# Patient Record
Sex: FEMALE | Race: WHITE | Employment: UNEMPLOYED | URBAN - METROPOLITAN AREA
[De-identification: names, ages, dates, MRNs, and addresses within clinical notes are randomized per-mention and may not be internally consistent; named-entity substitution may affect disease eponyms.]

---

## 2017-07-08 ENCOUNTER — HOSPITAL ENCOUNTER (OUTPATIENT)
Dept: MAMMOGRAPHY | Age: 47
Discharge: HOME | End: 2017-07-08
Attending: OBSTETRICS & GYNECOLOGY
Payer: COMMERCIAL

## 2017-07-08 DIAGNOSIS — Z12.31 VISIT FOR SCREENING MAMMOGRAM: ICD-10-CM

## 2017-07-08 PROCEDURE — 77067 SCR MAMMO BI INCL CAD: CPT

## 2018-02-14 ENCOUNTER — HOSPITAL ENCOUNTER (EMERGENCY)
Age: 48
Discharge: HOME | End: 2018-02-14
Payer: COMMERCIAL

## 2018-02-14 ENCOUNTER — APPOINTMENT (OUTPATIENT)
Dept: CT IMAGING | Age: 48
End: 2018-02-14
Payer: COMMERCIAL

## 2018-02-14 VITALS
DIASTOLIC BLOOD PRESSURE: 60 MMHG | RESPIRATION RATE: 16 BRPM | BODY MASS INDEX: 38.06 KG/M2 | WEIGHT: 257 LBS | TEMPERATURE: 98.1 F | OXYGEN SATURATION: 95 % | SYSTOLIC BLOOD PRESSURE: 125 MMHG | HEIGHT: 69 IN | HEART RATE: 102 BPM

## 2018-02-14 DIAGNOSIS — R51.9 NONINTRACTABLE HEADACHE, UNSPECIFIED CHRONICITY PATTERN, UNSPECIFIED HEADACHE TYPE: Primary | ICD-10-CM

## 2018-02-14 LAB — GLUCOSE BLD STRIP.AUTO-MCNC: 226 MG/DL (ref 65–100)

## 2018-02-14 PROCEDURE — 96372 THER/PROPH/DIAG INJ SC/IM: CPT

## 2018-02-14 PROCEDURE — 74011250636 HC RX REV CODE- 250/636

## 2018-02-14 PROCEDURE — 82962 GLUCOSE BLOOD TEST: CPT

## 2018-02-14 PROCEDURE — 99285 EMERGENCY DEPT VISIT HI MDM: CPT

## 2018-02-14 PROCEDURE — 70450 CT HEAD/BRAIN W/O DYE: CPT

## 2018-02-14 RX ORDER — BUTALBITAL, ACETAMINOPHEN AND CAFFEINE 300; 40; 50 MG/1; MG/1; MG/1
1 CAPSULE ORAL
Qty: 10 CAP | Refills: 0 | Status: SHIPPED | OUTPATIENT
Start: 2018-02-14 | End: 2018-02-14

## 2018-02-14 RX ORDER — ONDANSETRON HYDROCHLORIDE 8 MG/1
8 TABLET, FILM COATED ORAL
Qty: 10 TAB | Refills: 0 | Status: SHIPPED | OUTPATIENT
Start: 2018-02-14 | End: 2020-06-07

## 2018-02-14 RX ORDER — TIZANIDINE HYDROCHLORIDE 6 MG/1
6 CAPSULE, GELATIN COATED ORAL 3 TIMES DAILY
COMMUNITY
End: 2018-06-13

## 2018-02-14 RX ORDER — TRAMADOL HYDROCHLORIDE 50 MG/1
50 TABLET ORAL
COMMUNITY
End: 2018-06-13

## 2018-02-14 RX ORDER — ONDANSETRON 8 MG/1
8 TABLET, ORALLY DISINTEGRATING ORAL
Qty: 10 TAB | Refills: 0 | Status: SHIPPED | OUTPATIENT
Start: 2018-02-14 | End: 2019-09-03 | Stop reason: ALTCHOICE

## 2018-02-14 RX ORDER — ONDANSETRON 8 MG/1
8 TABLET, ORALLY DISINTEGRATING ORAL
COMMUNITY
End: 2018-02-14

## 2018-02-14 RX ORDER — KETOROLAC TROMETHAMINE 30 MG/ML
60 INJECTION, SOLUTION INTRAMUSCULAR; INTRAVENOUS
Status: COMPLETED | OUTPATIENT
Start: 2018-02-14 | End: 2018-02-14

## 2018-02-14 RX ORDER — BUTALBITAL, ACETAMINOPHEN AND CAFFEINE 300; 40; 50 MG/1; MG/1; MG/1
1 CAPSULE ORAL
Qty: 10 CAP | Refills: 0 | Status: SHIPPED | OUTPATIENT
Start: 2018-02-14 | End: 2018-06-13

## 2018-02-14 RX ADMIN — KETOROLAC TROMETHAMINE 60 MG: 30 INJECTION, SOLUTION INTRAMUSCULAR at 03:27

## 2018-02-14 NOTE — DISCHARGE INSTRUCTIONS
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
## 2018-02-14 NOTE — ED NOTES
I have reviewed discharge instructions with the patient. The patient verbalized understanding. Patient left ED via Discharge Method: ambulatory to Home with self    Opportunity for questions and clarification provided. Patient given 2 scripts. To continue your aftercare when you leave the hospital, you may receive an automated call from our care team to check in on how you are doing. This is a free service and part of our promise to provide the best care and service to meet your aftercare needs.  If you have questions, or wish to unsubscribe from this service please call [phone]. Thank you for Choosing our [facility] Emergency Department.

## 2018-06-13 ENCOUNTER — HOSPITAL ENCOUNTER (EMERGENCY)
Age: 48
Discharge: HOME | End: 2018-06-14
Attending: EMERGENCY MEDICINE
Payer: COMMERCIAL

## 2018-06-13 DIAGNOSIS — R10.84 ABDOMINAL PAIN, GENERALIZED: Primary | ICD-10-CM

## 2018-06-13 LAB
ALBUMIN SERPL-MCNC: 3.2 G/DL (ref 3.5–5)
ALBUMIN/GLOB SERPL: 0.7 {RATIO} (ref 1.2–3.5)
ALP SERPL-CCNC: 101 U/L (ref 50–136)
ALT SERPL-CCNC: 26 U/L (ref 12–65)
ANION GAP SERPL CALC-SCNC: 9 MMOL/L (ref 7–16)
AST SERPL-CCNC: 25 U/L (ref 15–37)
BASOPHILS # BLD: 0 K/UL (ref 0–0.2)
BASOPHILS NFR BLD: 0 % (ref 0–2)
BILIRUB SERPL-MCNC: 0.2 MG/DL (ref 0.2–1.1)
BUN SERPL-MCNC: 15 MG/DL (ref 6–23)
CALCIUM SERPL-MCNC: 9.4 MG/DL (ref 8.3–10.4)
CHLORIDE SERPL-SCNC: 97 MMOL/L (ref 98–107)
CO2 SERPL-SCNC: 30 MMOL/L (ref 21–32)
CREAT SERPL-MCNC: 0.76 MG/DL (ref 0.6–1)
DIFFERENTIAL METHOD BLD: ABNORMAL
EOSINOPHIL # BLD: 0.2 K/UL (ref 0–0.8)
EOSINOPHIL NFR BLD: 2 % (ref 0.5–7.8)
ERYTHROCYTE [DISTWIDTH] IN BLOOD BY AUTOMATED COUNT: 15.4 % (ref 11.9–14.6)
GLOBULIN SER CALC-MCNC: 4.5 G/DL (ref 2.3–3.5)
GLUCOSE SERPL-MCNC: 177 MG/DL (ref 65–100)
HCT VFR BLD AUTO: 36.5 % (ref 35.8–46.3)
HGB BLD-MCNC: 12.2 G/DL (ref 11.7–15.4)
IMM GRANULOCYTES # BLD: 0 K/UL (ref 0–0.5)
IMM GRANULOCYTES NFR BLD AUTO: 1 % (ref 0–5)
LACTATE BLD-SCNC: 1.8 MMOL/L (ref 0.5–1.9)
LIPASE SERPL-CCNC: 110 U/L (ref 73–393)
LYMPHOCYTES # BLD: 2.8 K/UL (ref 0.5–4.6)
LYMPHOCYTES NFR BLD: 35 % (ref 13–44)
MCH RBC QN AUTO: 25.8 PG (ref 26.1–32.9)
MCHC RBC AUTO-ENTMCNC: 33.4 G/DL (ref 31.4–35)
MCV RBC AUTO: 77.2 FL (ref 79.6–97.8)
MONOCYTES # BLD: 0.4 K/UL (ref 0.1–1.3)
MONOCYTES NFR BLD: 4 % (ref 4–12)
NEUTS SEG # BLD: 4.7 K/UL (ref 1.7–8.2)
NEUTS SEG NFR BLD: 58 % (ref 43–78)
PLATELET # BLD AUTO: 236 K/UL (ref 150–450)
PMV BLD AUTO: 9.4 FL (ref 10.8–14.1)
POTASSIUM SERPL-SCNC: 3.7 MMOL/L (ref 3.5–5.1)
PROT SERPL-MCNC: 7.7 G/DL (ref 6.3–8.2)
RBC # BLD AUTO: 4.73 M/UL (ref 4.05–5.25)
SODIUM SERPL-SCNC: 136 MMOL/L (ref 136–145)
WBC # BLD AUTO: 8 K/UL (ref 4.3–11.1)

## 2018-06-13 PROCEDURE — 80053 COMPREHEN METABOLIC PANEL: CPT

## 2018-06-13 PROCEDURE — 96375 TX/PRO/DX INJ NEW DRUG ADDON: CPT | Performed by: EMERGENCY MEDICINE

## 2018-06-13 PROCEDURE — 99284 EMERGENCY DEPT VISIT MOD MDM: CPT | Performed by: EMERGENCY MEDICINE

## 2018-06-13 PROCEDURE — 96361 HYDRATE IV INFUSION ADD-ON: CPT | Performed by: EMERGENCY MEDICINE

## 2018-06-13 PROCEDURE — 96374 THER/PROPH/DIAG INJ IV PUSH: CPT | Performed by: EMERGENCY MEDICINE

## 2018-06-13 PROCEDURE — 74011250636 HC RX REV CODE- 250/636: Performed by: EMERGENCY MEDICINE

## 2018-06-13 PROCEDURE — 74011250636 HC RX REV CODE- 250/636

## 2018-06-13 PROCEDURE — 83605 ASSAY OF LACTIC ACID: CPT

## 2018-06-13 PROCEDURE — 83690 ASSAY OF LIPASE: CPT

## 2018-06-13 PROCEDURE — 85025 COMPLETE CBC W/AUTO DIFF WBC: CPT

## 2018-06-13 RX ORDER — METHOCARBAMOL 750 MG/1
TABLET, FILM COATED ORAL 4 TIMES DAILY
Status: ON HOLD | COMMUNITY
End: 2019-09-27

## 2018-06-13 RX ORDER — ONDANSETRON 2 MG/ML
4 INJECTION INTRAMUSCULAR; INTRAVENOUS
Status: COMPLETED | OUTPATIENT
Start: 2018-06-13 | End: 2018-06-13

## 2018-06-13 RX ORDER — HYDROMORPHONE HYDROCHLORIDE 1 MG/ML
1 INJECTION, SOLUTION INTRAMUSCULAR; INTRAVENOUS; SUBCUTANEOUS ONCE
Status: COMPLETED | OUTPATIENT
Start: 2018-06-13 | End: 2018-06-13

## 2018-06-13 RX ADMIN — HYDROMORPHONE HYDROCHLORIDE 1 MG: 1 INJECTION, SOLUTION INTRAMUSCULAR; INTRAVENOUS; SUBCUTANEOUS at 23:45

## 2018-06-13 RX ADMIN — ONDANSETRON 4 MG: 2 INJECTION INTRAMUSCULAR; INTRAVENOUS at 23:45

## 2018-06-13 RX ADMIN — SODIUM CHLORIDE 1000 ML: 900 INJECTION, SOLUTION INTRAVENOUS at 23:45

## 2018-06-13 NOTE — Clinical Note
Clear liquid diet for the next 12 hours and advance her diet as tolerated. Avoid dairy products for at least 2 days.   Follow closely with her primary care physician for possible GI referral.

## 2018-06-14 ENCOUNTER — APPOINTMENT (OUTPATIENT)
Dept: ULTRASOUND IMAGING | Age: 48
End: 2018-06-14
Attending: EMERGENCY MEDICINE
Payer: COMMERCIAL

## 2018-06-14 VITALS
HEIGHT: 69 IN | OXYGEN SATURATION: 94 % | RESPIRATION RATE: 20 BRPM | HEART RATE: 98 BPM | WEIGHT: 228 LBS | BODY MASS INDEX: 33.77 KG/M2 | DIASTOLIC BLOOD PRESSURE: 63 MMHG | TEMPERATURE: 98 F | SYSTOLIC BLOOD PRESSURE: 119 MMHG

## 2018-06-14 PROCEDURE — 74011250637 HC RX REV CODE- 250/637: Performed by: EMERGENCY MEDICINE

## 2018-06-14 PROCEDURE — 76705 ECHO EXAM OF ABDOMEN: CPT

## 2018-06-14 PROCEDURE — 74011000250 HC RX REV CODE- 250: Performed by: EMERGENCY MEDICINE

## 2018-06-14 RX ORDER — DICYCLOMINE HYDROCHLORIDE 20 MG/1
20 TABLET ORAL EVERY 6 HOURS
Qty: 12 TAB | Refills: 0 | Status: SHIPPED | OUTPATIENT
Start: 2018-06-14 | End: 2018-06-14

## 2018-06-14 RX ORDER — LIDOCAINE HYDROCHLORIDE 20 MG/ML
15 SOLUTION OROPHARYNGEAL
Status: COMPLETED | OUTPATIENT
Start: 2018-06-14 | End: 2018-06-14

## 2018-06-14 RX ORDER — DICYCLOMINE HYDROCHLORIDE 20 MG/1
20 TABLET ORAL EVERY 6 HOURS
Qty: 12 TAB | Refills: 0 | Status: ON HOLD | OUTPATIENT
Start: 2018-06-14 | End: 2019-09-27

## 2018-06-14 RX ADMIN — LIDOCAINE HYDROCHLORIDE 15 ML: 20 SOLUTION ORAL; TOPICAL at 00:57

## 2018-06-14 RX ADMIN — Medication 30 ML: at 00:56

## 2018-06-14 NOTE — ED PROVIDER NOTES
HPI Comments: Patient states she started having upper abdominal pain radiating around to her right flank and back this morning. The pain has been constant achy sharp pain worse when she tries to eat or drink. She has had nausea and vomiting as well. She has a history of a cholecystectomy and states that this feels very similar. She denies any fever, denies any known sick contacts. She has not taken any medicine for her symptoms. The pain gets severe at times. Elements of this note were created using speech recognition software. As such, errors of speech recognition may be present. Patient is a 41 y.o. female presenting with abdominal pain. The history is provided by the patient. Abdominal Pain    Associated symptoms include nausea and vomiting. Pertinent negatives include no fever.         Past Medical History:   Diagnosis Date    Anemia     Chronic pain     due to RA    Depression     GERD (gastroesophageal reflux disease)     Hx of renal calculi          Hypercholesterolemia     Hypertension     Migraine     Morbid obesity ([address]     Ovarian cyst     Reflex sympathetic dystrophy since 2005    right arm; s/p injury - laceration in hand    Rheumatoid [address] [address]             Past Surgical History:   Procedure Laterality Date    HX APPENDECTOMY      HX BREAST BIOPSY Left 4/27/2015    LEFT NIPPLE EXPLORATION WITH REMOVAL OF LATTISIMUS DUCT performed by Pratima Ferris MD at [address] [place] [address] HX CHOLECYSTECTOMY      HX ORTHOPAEDIC      rt hand    HX ORTHOPAEDIC      neck    HX TOTAL ABDOMINAL HYSTERECTOMY           HX TUBAL LIGATION           Family History:   Problem Relation Age of Onset    Breast Cancer Paternal Aunt 39    Cancer Paternal Aunt      brst    Cancer Maternal Grandfather      colon    Colon Cancer Maternal Grandfather     Breast Cancer Paternal Grandmother 46    Cancer Paternal Grandmother      breast    Ovarian Cancer Paternal Grandmother     Cancer Mother throat    Hypertension Mother     Cancer Father      non hodgkins lymphoma    Cancer Maternal Grandmother      ovarian    Breast Cancer Maternal Grandmother     Heart Disease Paternal Grandfather     Diabetes Paternal Grandfather     Breast Cancer Sister        Social History     Social History    Marital status:      Spouse name: N/A    Number of children: N/A    Years of education: N/A     Occupational History    Not on file. Social History Main Topics    Smoking status: Never Smoker    Smokeless tobacco: Not on file    Alcohol use Yes      Comment: \"once every couple years\"    Drug use: No    Sexual activity: Yes     Partners: Male     Birth control/ protection: Surgical     Other Topics Concern    Not on file     Social History Narrative         ALLERGIES: Pcn [penicillins] and Norco [hydrocodone-acetaminophen]    Review of Systems   Constitutional: Negative for chills and fever. Gastrointestinal: Positive for abdominal pain, nausea and vomiting. All other systems reviewed and are negative. Vitals:    06/13/18 2318   BP: 174/80   Pulse: 98   Resp: 20   Temp: 98 °F (36.7 °C)   SpO2: 96%   Weight: 103.4 kg (228 lb)   Height: 5' 8.5\" (1.74 m)            Physical Exam   Constitutional: She is oriented to person, place, and time. She appears well-developed and well-nourished. HENT:   Head: Normocephalic and atraumatic. Eyes: Conjunctivae are normal. Pupils are equal, round, and reactive to light. Neck: Normal range of motion. Neck supple. Cardiovascular: Normal rate and regular rhythm. Pulmonary/Chest: Effort normal and breath sounds normal.   Abdominal: Soft. Bowel sounds are normal. There is tenderness. Moderate tenderness to palpation diffuse upper abdomen as indicated   Musculoskeletal: She exhibits no edema or tenderness. Neurological: She is alert and oriented to person, place, and time. Skin: Skin is warm and dry.    Psychiatric: She has a normal mood and affect. Her behavior is normal.   Nursing note and vitals reviewed. MDM  Number of Diagnoses or Management Options  Diagnosis management comments: 1:10 AM discussed results with patient. She had no relief after GI cocktail.   A right upper quadrant ultrasound has been ordered       Amount and/or Complexity of Data Reviewed  Clinical lab tests: ordered and reviewed  Tests in the radiology section of CPT®: ordered    Risk of Complications, Morbidity, and/or Mortality  Presenting problems: moderate  Diagnostic procedures: moderate  Management options: moderate    Patient Progress  Patient progress: stable        ED Course       Procedures

## 2018-06-14 NOTE — ED NOTES
I have reviewed discharge instructions with the patient. The patient verbalized understanding. Patient left ED via Discharge Method: ambulatory to Home with (insert name of family/friend, self, transport ). Opportunity for questions and clarification provided. Patient given 1 scripts. Lata        To continue your aftercare when you leave the hospital, you may receive an automated call from our care team to check in on how you are doing. This is a free service and part of our promise to provide the best care and service to meet your aftercare needs.  If you have questions, or wish to unsubscribe from this service please call [phone]. Thank you for Choosing our [facility] Emergency Department.

## 2018-09-21 ENCOUNTER — HOSPITAL ENCOUNTER (OUTPATIENT)
Dept: MAMMOGRAPHY | Age: 48
Discharge: HOME | End: 2018-09-21
Attending: OBSTETRICS & GYNECOLOGY
Payer: COMMERCIAL

## 2018-09-21 ENCOUNTER — APPOINTMENT (OUTPATIENT)
Dept: GENERAL RADIOLOGY | Age: 48
End: 2018-09-21
Attending: EMERGENCY MEDICINE
Payer: COMMERCIAL

## 2018-09-21 ENCOUNTER — HOSPITAL ENCOUNTER (EMERGENCY)
Age: 48
Discharge: HOME | End: 2018-09-21
Attending: EMERGENCY MEDICINE
Payer: COMMERCIAL

## 2018-09-21 VITALS
DIASTOLIC BLOOD PRESSURE: 80 MMHG | BODY MASS INDEX: 33.62 KG/M2 | WEIGHT: 227 LBS | HEART RATE: 94 BPM | HEIGHT: 69 IN | RESPIRATION RATE: 18 BRPM | SYSTOLIC BLOOD PRESSURE: 130 MMHG | OXYGEN SATURATION: 98 % | TEMPERATURE: 98.4 F

## 2018-09-21 DIAGNOSIS — S90.456A: Primary | ICD-10-CM

## 2018-09-21 DIAGNOSIS — Z12.31 VISIT FOR SCREENING MAMMOGRAM: ICD-10-CM

## 2018-09-21 PROCEDURE — 77067 SCR MAMMO BI INCL CAD: CPT

## 2018-09-21 PROCEDURE — 75810000283 HC INJECTION NERVE BLOCK: Performed by: NURSE PRACTITIONER

## 2018-09-21 PROCEDURE — 74011250636 HC RX REV CODE- 250/636: Performed by: NURSE PRACTITIONER

## 2018-09-21 PROCEDURE — 90471 IMMUNIZATION ADMIN: CPT | Performed by: NURSE PRACTITIONER

## 2018-09-21 PROCEDURE — 75810000121 HC INCSN/RMVL FB ANY OTHER SITE: Performed by: NURSE PRACTITIONER

## 2018-09-21 PROCEDURE — 99283 EMERGENCY DEPT VISIT LOW MDM: CPT | Performed by: NURSE PRACTITIONER

## 2018-09-21 PROCEDURE — 90715 TDAP VACCINE 7 YRS/> IM: CPT | Performed by: NURSE PRACTITIONER

## 2018-09-21 PROCEDURE — 73630 X-RAY EXAM OF FOOT: CPT

## 2018-09-21 RX ADMIN — TETANUS TOXOID, REDUCED DIPHTHERIA TOXOID AND ACELLULAR PERTUSSIS VACCINE, ADSORBED 0.5 ML: 5; 2.5; 8; 8; 2.5 SUSPENSION INTRAMUSCULAR at 17:20

## 2018-09-21 NOTE — ED PROVIDER NOTES
Patient is a 57 y.o. female presenting with toe pain. The history is provided by the patient. No  was used. Toe Pain This is a new problem. The current episode started 1 to 2 hours ago. The problem occurs constantly. The problem has not changed since onset. The pain is present in the right foot. The pain is at a severity of 8/10. The pain is moderate. Associated symptoms include limited range of motion and stiffness. Pertinent negatives include no neck pain. The symptoms are aggravated by standing, movement and palpation. Treatments tried: attempt at self removal. The treatment provided no relief. There has been a history of trauma. This patient presents the ED with a complaint of screw partially embedded into her right great toe onset approximately an hour prior to arrival.  She states that she became angry while on the phone, and kicked the desk, at which point the screw loosened, and embedded into her toe rather forcefully. She states that she attempted to remove the screw on her own, but due to the pain presented to the emergency department. Past Medical History:  
Diagnosis Date  Anemia  Chronic pain   
 due to RA  Depression  GERD (gastroesophageal reflux disease)  Hx of renal calculi  Hypercholesterolemia  Hypertension  Migraine  Morbid obesity ([address]  Ovarian cyst   
 Reflex sympathetic dystrophy since 2005  
 right arm; s/p injury - laceration in hand  Rheumatoid arthritis ([address] Past Surgical History:  
Procedure Laterality Date  HX APPENDECTOMY  HX BREAST BIOPSY Left 4/27/2015 LEFT NIPPLE EXPLORATION WITH REMOVAL OF LATTISIMUS DUCT performed by Anastasiya Yanes MD at [address] [place] [address]  HX ORTHOPAEDIC    
 rt hand  HX ORTHOPAEDIC    
 neck  HX TOTAL ABDOMINAL HYSTERECTOMY  HX TUBAL LIGATION Family History:  
Problem Relation Age of Onset  Breast Cancer Paternal Aunt 39  Cancer Paternal Aunt   
  brst  
 Cancer Maternal Grandfather   
  colon  Colon Cancer Maternal Grandfather  Breast Cancer Paternal Grandmother 46  Cancer Paternal Grandmother   
  breast  
 Ovarian Cancer Paternal Grandmother  Cancer Mother   
  throat  Hypertension Mother  Cancer Father   
  non hodgkins lymphoma  Cancer Maternal Grandmother   
  ovarian  Breast Cancer Maternal Grandmother  Heart Disease Paternal Grandfather  Diabetes Paternal Grandfather  Breast Cancer Sister Social History Social History  Marital status:  Spouse name: N/A  
 Number of children: N/A  
 Years of education: N/A Occupational History  Not on file. Social History Main Topics  Smoking status: Never Smoker  Smokeless tobacco: Not on file  Alcohol use Yes Comment: \"once every couple years\"  Drug use: No  
 Sexual activity: Yes  
  Partners: Male Birth control/ protection: Surgical  
 
Other Topics Concern  Not on file Social History Narrative ALLERGIES: Pcn [penicillins] and Norco [hydrocodone-acetaminophen] Review of Systems Constitutional: Negative for chills and fever. Gastrointestinal: Negative for nausea and vomiting. Musculoskeletal: Positive for gait problem and stiffness. Negative for arthralgias, joint swelling and neck pain. Skin: Positive for wound. Negative for rash. Neurological: Negative for dizziness and syncope. Vitals:  
 09/21/18 1650 BP: 130/80 Pulse: 94 Resp: 18 Temp: 98.4 °F (36.9 °C) SpO2: 98% Weight: 103 kg (227 lb) Height: 5' 8.5\" (1.74 m) Physical Exam  
Constitutional: She is oriented to person, place, and time. She appears well-developed and well-nourished. No distress. HENT:  
Head: Normocephalic and atraumatic. Pulmonary/Chest: Effort normal. No respiratory distress. Neurological: She is alert and oriented to person, place, and time. Skin: Skin is warm and dry. No erythema. Patient has partially avulsed her toenail with the screw, which is embedded just inferior to the nail. There is some involvement of the skin, but no extensive laceration or bleeding present. MDM Number of Diagnoses or Management Options Foreign body of toe, initial encounter: new and does not require workup Diagnosis management comments: Foreign body removed without complication. I do not appreciate any damage on x-ray evaluation; pending radiology evaluation. No significant laceration requiring tissue repair. I discussed follow-up treatment. The patient voiced her full understanding and compliance. Amount and/or Complexity of Data Reviewed Tests in the radiology section of CPT®: ordered and reviewed Independent visualization of images, tracings, or specimens: yes Risk of Complications, Morbidity, and/or Mortality Presenting problems: moderate Diagnostic procedures: low Management options: minimal 
 
Patient Progress Patient progress: improved ED Course Foreign Body Removal 
Date/Time: 9/21/2018 5:16 PM 
Performed by: Alice Rizo Authorized by: Alice Rizo  
 
Consent:  
  Consent obtained:  Verbal 
  Consent given by:  Patient Risks discussed:  Pain and infection Location: Location:  Toe Toe location:  R great toe Depth:  Subungual 
  Tendon involvement:  None Pre-procedure details:  
  Imaging:  X-ray Neurovascular status: intact Preparation: Patient was prepped and draped in usual sterile fashion Anesthesia (see MAR for exact dosages): Anesthesia method:  Nerve block Block needle gauge:  27 G Block anesthetic:  Lidocaine 1% w/o epi Block injection procedure:  Anatomic landmarks palpated, anatomic landmarks identified, negative aspiration for blood, introduced needle and incremental injection Block outcome:  Anesthesia achieved Procedure type:  
  Procedure complexity:  Simple Procedure details:  
  Dissection of underlying tissues: no   
  Removal mechanism: digital. 
  Foreign bodies recovered:  1 Description:  1 wood screw Intact foreign body removal: yes Post-procedure details:  
  Neurovascular status: intact Confirmation:  No additional foreign bodies on visualization Skin closure:  None Dressing:  Antibiotic ointment and bulky dressing Patient tolerance of procedure: Tolerated well, no immediate complications

## 2018-09-21 NOTE — ED NOTES
I have reviewed discharge instructions with the patient. The patient verbalized understanding. Patient left ED via Discharge Method: ambulatory to Home with spouse. Opportunity for questions and clarification provided. Patient given 0 scripts. To continue your aftercare when you leave the hospital, you may receive an automated call from our care team to check in on how you are doing. This is a free service and part of our promise to provide the best care and service to meet your aftercare needs.  If you have questions, or wish to unsubscribe from this service please call [phone]. Thank you for Choosing our [facility] Emergency Department.

## 2018-09-21 NOTE — DISCHARGE INSTRUCTIONS
Keep the wound clean, dry, and covered. I recommend you apply antibiotic ointment every day until the wound fully heals. I also recommend you follow up with your primary physician for further evaluation and treatment; use the numbers listed in your paperwork as needed to help establish with this. Return to the Emergency Department if you have any new or worsening symptoms, including spreading redness, severe drainage from the wound, fever, or rash around the site.

## 2018-10-19 ENCOUNTER — APPOINTMENT (OUTPATIENT)
Dept: CT IMAGING | Age: 48
End: 2018-10-19
Attending: EMERGENCY MEDICINE
Payer: COMMERCIAL

## 2018-10-19 ENCOUNTER — HOSPITAL ENCOUNTER (EMERGENCY)
Age: 48
Discharge: HOME | End: 2018-10-19
Attending: EMERGENCY MEDICINE
Payer: COMMERCIAL

## 2018-10-19 VITALS
SYSTOLIC BLOOD PRESSURE: 125 MMHG | BODY MASS INDEX: 35.1 KG/M2 | TEMPERATURE: 98.1 F | WEIGHT: 237 LBS | HEIGHT: 69 IN | OXYGEN SATURATION: 100 % | HEART RATE: 80 BPM | RESPIRATION RATE: 16 BRPM | DIASTOLIC BLOOD PRESSURE: 80 MMHG

## 2018-10-19 DIAGNOSIS — R10.9 FLANK PAIN, ACUTE: Primary | ICD-10-CM

## 2018-10-19 LAB
ALBUMIN SERPL-MCNC: 3.3 G/DL (ref 3.5–5)
ALBUMIN/GLOB SERPL: 0.8 {RATIO}
ALP SERPL-CCNC: 85 U/L (ref 50–136)
ALT SERPL-CCNC: 28 U/L (ref 12–65)
ANION GAP SERPL CALC-SCNC: 7 MMOL/L
AST SERPL-CCNC: 24 U/L (ref 15–37)
BASOPHILS # BLD: 0 K/UL (ref 0–0.2)
BASOPHILS NFR BLD: 0 % (ref 0–2)
BILIRUB SERPL-MCNC: 0.2 MG/DL (ref 0.2–1.1)
BUN SERPL-MCNC: 12 MG/DL (ref 6–23)
CALCIUM SERPL-MCNC: 9.3 MG/DL (ref 8.3–10.4)
CHLORIDE SERPL-SCNC: 99 MMOL/L (ref 98–107)
CO2 SERPL-SCNC: 29 MMOL/L (ref 21–32)
CREAT SERPL-MCNC: 0.64 MG/DL (ref 0.6–1)
DIFFERENTIAL METHOD BLD: ABNORMAL
EOSINOPHIL # BLD: 0.2 K/UL (ref 0–0.8)
EOSINOPHIL NFR BLD: 3 % (ref 0.5–7.8)
ERYTHROCYTE [DISTWIDTH] IN BLOOD BY AUTOMATED COUNT: 15.9 %
GLOBULIN SER CALC-MCNC: 4.3 G/DL (ref 2.3–3.5)
GLUCOSE SERPL-MCNC: 188 MG/DL (ref 65–100)
HCT VFR BLD AUTO: 37.2 % (ref 35.8–46.3)
HGB BLD-MCNC: 11.6 G/DL (ref 11.7–15.4)
IMM GRANULOCYTES # BLD: 0 K/UL (ref 0–0.5)
IMM GRANULOCYTES NFR BLD AUTO: 1 % (ref 0–5)
LYMPHOCYTES # BLD: 2.2 K/UL (ref 0.5–4.6)
LYMPHOCYTES NFR BLD: 37 % (ref 13–44)
MCH RBC QN AUTO: 24.9 PG (ref 26.1–32.9)
MCHC RBC AUTO-ENTMCNC: 31.2 G/DL (ref 31.4–35)
MCV RBC AUTO: 80 FL (ref 79.6–97.8)
MONOCYTES # BLD: 0.3 K/UL (ref 0.1–1.3)
MONOCYTES NFR BLD: 5 % (ref 4–12)
NEUTS SEG # BLD: 3.1 K/UL (ref 1.7–8.2)
NEUTS SEG NFR BLD: 54 % (ref 43–78)
NRBC # BLD: 0 K/UL (ref 0–0.2)
PLATELET # BLD AUTO: 179 K/UL (ref 150–450)
PMV BLD AUTO: 9.8 FL (ref 9.4–12.3)
POTASSIUM SERPL-SCNC: 3.9 MMOL/L (ref 3.5–5.1)
PROT SERPL-MCNC: 7.6 G/DL
RBC # BLD AUTO: 4.65 M/UL (ref 4.05–5.2)
SODIUM SERPL-SCNC: 135 MMOL/L (ref 136–145)
WBC # BLD AUTO: 5.8 K/UL (ref 4.3–11.1)

## 2018-10-19 PROCEDURE — 74011250636 HC RX REV CODE- 250/636: Performed by: EMERGENCY MEDICINE

## 2018-10-19 PROCEDURE — 80053 COMPREHEN METABOLIC PANEL: CPT

## 2018-10-19 PROCEDURE — 81003 URINALYSIS AUTO W/O SCOPE: CPT | Performed by: EMERGENCY MEDICINE

## 2018-10-19 PROCEDURE — 74011000250 HC RX REV CODE- 250: Performed by: EMERGENCY MEDICINE

## 2018-10-19 PROCEDURE — 96375 TX/PRO/DX INJ NEW DRUG ADDON: CPT | Performed by: EMERGENCY MEDICINE

## 2018-10-19 PROCEDURE — 96374 THER/PROPH/DIAG INJ IV PUSH: CPT | Performed by: EMERGENCY MEDICINE

## 2018-10-19 PROCEDURE — 74176 CT ABD & PELVIS W/O CONTRAST: CPT

## 2018-10-19 PROCEDURE — 85025 COMPLETE CBC W/AUTO DIFF WBC: CPT

## 2018-10-19 PROCEDURE — 99283 EMERGENCY DEPT VISIT LOW MDM: CPT | Performed by: EMERGENCY MEDICINE

## 2018-10-19 PROCEDURE — 96361 HYDRATE IV INFUSION ADD-ON: CPT | Performed by: EMERGENCY MEDICINE

## 2018-10-19 RX ORDER — MORPHINE SULFATE 10 MG/ML
6 INJECTION, SOLUTION INTRAMUSCULAR; INTRAVENOUS
Status: COMPLETED | OUTPATIENT
Start: 2018-10-19 | End: 2018-10-19

## 2018-10-19 RX ORDER — ONDANSETRON 2 MG/ML
4 INJECTION INTRAMUSCULAR; INTRAVENOUS
Status: COMPLETED | OUTPATIENT
Start: 2018-10-19 | End: 2018-10-19

## 2018-10-19 RX ORDER — KETOROLAC TROMETHAMINE 30 MG/ML
30 INJECTION, SOLUTION INTRAMUSCULAR; INTRAVENOUS
Status: COMPLETED | OUTPATIENT
Start: 2018-10-19 | End: 2018-10-19

## 2018-10-19 RX ORDER — HYDROMORPHONE HYDROCHLORIDE 2 MG/ML
1 INJECTION, SOLUTION INTRAMUSCULAR; INTRAVENOUS; SUBCUTANEOUS ONCE
Status: COMPLETED | OUTPATIENT
Start: 2018-10-19 | End: 2018-10-19

## 2018-10-19 RX ADMIN — ONDANSETRON HYDROCHLORIDE 4 MG: 2 INJECTION INTRAMUSCULAR; INTRAVENOUS at 19:04

## 2018-10-19 RX ADMIN — SODIUM CHLORIDE 1000 ML: 900 INJECTION, SOLUTION INTRAVENOUS at 17:21

## 2018-10-19 RX ADMIN — KETOROLAC TROMETHAMINE 30 MG: 30 INJECTION, SOLUTION INTRAMUSCULAR at 18:42

## 2018-10-19 RX ADMIN — SODIUM CHLORIDE 25 MG: 9 INJECTION INTRAMUSCULAR; INTRAVENOUS; SUBCUTANEOUS at 17:21

## 2018-10-19 RX ADMIN — HYDROMORPHONE HYDROCHLORIDE 1 MG: 2 INJECTION, SOLUTION INTRAMUSCULAR; INTRAVENOUS; SUBCUTANEOUS at 17:21

## 2018-10-19 RX ADMIN — MORPHINE SULFATE 6 MG: 10 INJECTION INTRAVENOUS at 19:04

## 2018-10-19 NOTE — ED TRIAGE NOTES
Pt c/o right flank pain, thinks she has a kidney stone. States she has a history of kidney stone. Pt c/o nausea.

## 2018-10-19 NOTE — ED PROVIDER NOTES
Patient states she has been having flank pain since yesterday. She describes it as right flank nonradiating aching pain which has been constant. She has a history of kidney stones with similar pain. She has had some dysuria, she also has had nausea and vomiting. She states it is \"a good while\" and she had a last kidney stone, has seen urology for this in the past.  She has been taking Motrin without any improvement in her symptoms. She states that today she has been unable to eat or drink due to her vomiting. Elements of this note were created using speech recognition software. As such, errors of speech recognition may be present. Flank Pain Associated symptoms include dysuria. Pertinent negatives include no fever. Past Medical History:  
Diagnosis Date  Anemia  Chronic pain   
 due to RA  Depression  GERD (gastroesophageal reflux disease)  Hx of renal calculi  Hypercholesterolemia  Hypertension  Migraine  Morbid obesity ([address]  Ovarian cyst   
 Reflex sympathetic dystrophy since 2005  
 right arm; s/p injury - laceration in hand  Rheumatoid arthritis ([address] Past Surgical History:  
Procedure Laterality Date  HX APPENDECTOMY  HX CHOLECYSTECTOMY  HX ORTHOPAEDIC    
 rt hand  HX ORTHOPAEDIC    
 neck  HX TOTAL ABDOMINAL HYSTERECTOMY  HX TUBAL LIGATION Family History:  
Problem Relation Age of Onset  Breast Cancer Paternal Aunt 39  Cancer Paternal Aunt   
     brst  
 Cancer Maternal Grandfather   
     colon  Colon Cancer Maternal Grandfather  Breast Cancer Paternal Grandmother 46  Cancer Paternal Grandmother   
     breast  
 Ovarian Cancer Paternal Grandmother  Cancer Mother   
     throat  Hypertension Mother  Cancer Father   
     non hodgkins lymphoma  Cancer Maternal Grandmother   
     ovarian  Breast Cancer Maternal Grandmother  Heart Disease Paternal Grandfather  Diabetes Paternal Grandfather  Breast Cancer Sister Social History Socioeconomic History  Marital status:  Spouse name: Not on file  Number of children: Not on file  Years of education: Not on file  Highest education level: Not on file Social Needs  Financial resource strain: Not on file  Food insecurity - worry: Not on file  Food insecurity - inability: Not on file  Transportation needs - medical: Not on file  Transportation needs - non-medical: Not on file Occupational History  Not on file Tobacco Use  Smoking status: Never Smoker  Smokeless tobacco: Never Used Substance and Sexual Activity  Alcohol use: Yes Comment: \"once every couple years\"  Drug use: No  
 Sexual activity: Yes  
  Partners: Male Birth control/protection: Surgical  
Other Topics Concern  Not on file Social History Narrative  Not on file ALLERGIES: Pcn [penicillins] and Norco [hydrocodone-acetaminophen] Review of Systems Constitutional: Negative for chills and fever. Gastrointestinal: Positive for nausea and vomiting. Genitourinary: Positive for dysuria and flank pain. All other systems reviewed and are negative. Vitals:  
 10/19/18 1612 BP: 129/84 Pulse: 82 Resp: 19 Temp: 98.3 °F (36.8 °C) SpO2: 97% Weight: 107.5 kg (237 lb) Height: 5' 8.5\" (1.74 m) Physical Exam  
Constitutional: She is oriented to person, place, and time. She appears well-developed and well-nourished. HENT:  
Head: Normocephalic and atraumatic. Eyes: Conjunctivae are normal. Pupils are equal, round, and reactive to light. Neck: Normal range of motion. Neck supple. Cardiovascular: Normal rate and regular rhythm. Pulmonary/Chest: Effort normal and breath sounds normal.  
Abdominal: Soft. Bowel sounds are normal.  
Musculoskeletal: Normal range of motion. She exhibits no edema. Neurological: She is alert and oriented to person, place, and time. Skin: Skin is warm and dry. MDM Number of Diagnoses or Management Options Diagnosis management comments: 6:59 PM ppatient states she received minimal relief with Dilaudid. I discussed the results with her, no ureteral stone or abnormality on her right side. She will be given morphine for pain control and discharged home with follow-up with her doctor. Amount and/or Complexity of Data Reviewed Clinical lab tests: ordered and reviewed Tests in the radiology section of CPT®: ordered and reviewed Risk of Complications, Morbidity, and/or Mortality Presenting problems: moderate Diagnostic procedures: moderate Management options: moderate Patient Progress Patient progress: improved Procedures

## 2018-10-19 NOTE — ED NOTES
I have reviewed discharge instructions with the patient. The patient verbalized understanding. Patient left ED via Discharge Method: ambulatory to Home with spouse. Opportunity for questions and clarification provided. Patient given 0 scripts. To continue your aftercare when you leave the hospital, you may receive an automated call from our care team to check in on how you are doing. This is a free service and part of our promise to provide the best care and service to meet your aftercare needs.  If you have questions, or wish to unsubscribe from this service please call [phone]. Thank you for Choosing our [facility] Emergency Department.

## 2018-10-19 NOTE — DISCHARGE INSTRUCTIONS
Flank Pain: Care Instructions  Your Care Instructions  Flank pain is pain on the side of the back just below the rib cage and above the waist. It can be on one or both sides. Flank pain has many possible causes, including a kidney stone, a urinary tract infection, or back strain. Flank pain may get better on its own. But don't ignore new symptoms, such as fever, nausea and vomiting, urination problems, pain that gets worse, and dizziness. These may be signs of a more serious problem. You may have to have tests or other treatment. Follow-up care is a key part of your treatment and safety. Be sure to make and go to all appointments, and call your doctor if you are having problems. It's also a good idea to know your test results and keep a list of the medicines you take. How can you care for yourself at home? · Rest until you feel better. · Take pain medicines exactly as directed. ? If the doctor gave you a prescription medicine for pain, take it as prescribed. ? If you are not taking a prescription pain medicine, ask your doctor if you can take an over-the-counter pain medicine, such as acetaminophen (Tylenol), ibuprofen (Advil, Motrin), or naproxen (Aleve). Read and follow all instructions on the label. · If your doctor prescribed antibiotics, take them as directed. Do not stop taking them just because you feel better. You need to take the full course of antibiotics. · To apply heat, put a warm water bottle, a heating pad set on low, or a warm cloth on the painful area. Do not go to sleep with a heating pad on your skin. · To prevent dehydration, drink plenty of fluids, enough so that your urine is light yellow or clear like water. Choose water and other caffeine-free clear liquids until you feel better. If you have kidney, heart, or liver disease and have to limit fluids, talk with your doctor before you increase the amount of fluids you drink. When should you call for help?   Call your doctor now or seek immediate medical care if:    · Your flank pain gets worse.     · You have new symptoms, such as fever, nausea, or vomiting.     · You have symptoms of a urinary problem. For example:  ? You have blood or pus in your urine. ? You have chills or body aches. ? It hurts to urinate. ? You have groin or belly pain.    Watch closely for changes in your health, and be sure to contact your doctor if you do not get better as expected. Where can you learn more? Go to http://martha-neo.info/. Enter S191 in the search box to learn more about \"Flank Pain: Care Instructions. \"  Current as of: November 20, 2017  Content Version: 11.8  © 3163-2368 [employer]. Care instructions adapted under license by [employer] (which disclaims liability or warranty for this information). If you have questions about a medical condition or this instruction, always ask your healthcare professional. [place] [address] any warranty or liability for your use of this information.

## 2019-06-18 ENCOUNTER — HOSPITAL ENCOUNTER (EMERGENCY)
Age: 49
Discharge: HOME | End: 2019-06-18
Attending: EMERGENCY MEDICINE
Payer: COMMERCIAL

## 2019-06-18 ENCOUNTER — APPOINTMENT (OUTPATIENT)
Dept: GENERAL RADIOLOGY | Age: 49
End: 2019-06-18
Attending: EMERGENCY MEDICINE
Payer: COMMERCIAL

## 2019-06-18 VITALS
SYSTOLIC BLOOD PRESSURE: 133 MMHG | DIASTOLIC BLOOD PRESSURE: 85 MMHG | RESPIRATION RATE: 18 BRPM | OXYGEN SATURATION: 99 % | TEMPERATURE: 98.2 F | HEART RATE: 85 BPM

## 2019-06-18 DIAGNOSIS — L03.032 CELLULITIS OF GREAT TOE OF LEFT FOOT: Primary | ICD-10-CM

## 2019-06-18 DIAGNOSIS — R11.2 NAUSEA AND VOMITING, INTRACTABILITY OF VOMITING NOT SPECIFIED, UNSPECIFIED VOMITING TYPE: ICD-10-CM

## 2019-06-18 LAB
ALBUMIN SERPL-MCNC: 3.3 G/DL (ref 3.5–5)
ALBUMIN/GLOB SERPL: 0.7 {RATIO} (ref 1.2–3.5)
ALP SERPL-CCNC: 87 U/L (ref 50–136)
ALT SERPL-CCNC: 24 U/L (ref 12–65)
ANION GAP SERPL CALC-SCNC: 5 MMOL/L (ref 7–16)
AST SERPL-CCNC: 14 U/L (ref 15–37)
BASOPHILS # BLD: 0 K/UL (ref 0–0.2)
BASOPHILS NFR BLD: 0 % (ref 0–2)
BILIRUB SERPL-MCNC: 0.3 MG/DL (ref 0.2–1.1)
BUN SERPL-MCNC: 19 MG/DL (ref 6–23)
CALCIUM SERPL-MCNC: 9.1 MG/DL (ref 8.3–10.4)
CHLORIDE SERPL-SCNC: 100 MMOL/L (ref 98–107)
CO2 SERPL-SCNC: 30 MMOL/L (ref 21–32)
CREAT SERPL-MCNC: 0.82 MG/DL (ref 0.6–1)
CRP SERPL-MCNC: 2.2 MG/DL (ref 0–0.9)
DIFFERENTIAL METHOD BLD: ABNORMAL
EOSINOPHIL # BLD: 0.1 K/UL (ref 0–0.8)
EOSINOPHIL NFR BLD: 2 % (ref 0.5–7.8)
ERYTHROCYTE [DISTWIDTH] IN BLOOD BY AUTOMATED COUNT: 14.9 % (ref 11.9–14.6)
GLOBULIN SER CALC-MCNC: 4.7 G/DL (ref 2.3–3.5)
GLUCOSE SERPL-MCNC: 232 MG/DL (ref 65–100)
HCT VFR BLD AUTO: 35 % (ref 35.8–46.3)
HGB BLD-MCNC: 11.2 G/DL (ref 11.7–15.4)
IMM GRANULOCYTES # BLD AUTO: 0 K/UL (ref 0–0.5)
IMM GRANULOCYTES NFR BLD AUTO: 1 % (ref 0–5)
LIPASE SERPL-CCNC: 135 U/L (ref 73–393)
LYMPHOCYTES # BLD: 1.8 K/UL (ref 0.5–4.6)
LYMPHOCYTES NFR BLD: 33 % (ref 13–44)
MCH RBC QN AUTO: 25.5 PG (ref 26.1–32.9)
MCHC RBC AUTO-ENTMCNC: 32 G/DL (ref 31.4–35)
MCV RBC AUTO: 79.7 FL (ref 79.6–97.8)
MONOCYTES # BLD: 0.3 K/UL (ref 0.1–1.3)
MONOCYTES NFR BLD: 5 % (ref 4–12)
NEUTS SEG # BLD: 3.3 K/UL (ref 1.7–8.2)
NEUTS SEG NFR BLD: 59 % (ref 43–78)
NRBC # BLD: 0 K/UL (ref 0–0.2)
PLATELET # BLD AUTO: 195 K/UL (ref 150–450)
PMV BLD AUTO: 9.3 FL (ref 9.4–12.3)
POTASSIUM SERPL-SCNC: 3.8 MMOL/L (ref 3.5–5.1)
PROT SERPL-MCNC: 8 G/DL (ref 6.3–8.2)
RBC # BLD AUTO: 4.39 M/UL (ref 4.05–5.2)
SODIUM SERPL-SCNC: 135 MMOL/L (ref 136–145)
WBC # BLD AUTO: 5.6 K/UL (ref 4.3–11.1)

## 2019-06-18 PROCEDURE — 73660 X-RAY EXAM OF TOE(S): CPT

## 2019-06-18 PROCEDURE — 85025 COMPLETE CBC W/AUTO DIFF WBC: CPT

## 2019-06-18 PROCEDURE — 83690 ASSAY OF LIPASE: CPT

## 2019-06-18 PROCEDURE — 80053 COMPREHEN METABOLIC PANEL: CPT

## 2019-06-18 PROCEDURE — 99283 EMERGENCY DEPT VISIT LOW MDM: CPT | Performed by: EMERGENCY MEDICINE

## 2019-06-18 PROCEDURE — 86140 C-REACTIVE PROTEIN: CPT

## 2019-06-18 PROCEDURE — 81003 URINALYSIS AUTO W/O SCOPE: CPT | Performed by: EMERGENCY MEDICINE

## 2019-06-18 RX ORDER — PROMETHAZINE HYDROCHLORIDE 25 MG/1
25 TABLET ORAL
Qty: 12 TAB | Refills: 0 | Status: SHIPPED | OUTPATIENT
Start: 2019-06-18 | End: 2019-06-22

## 2019-06-18 RX ORDER — METFORMIN HYDROCHLORIDE 500 MG/1
TABLET ORAL 2 TIMES DAILY WITH MEALS
COMMUNITY
End: 2020-06-29

## 2019-06-18 RX ORDER — ASPIRIN 81 MG/1
TABLET ORAL DAILY
COMMUNITY
End: 2020-06-29

## 2019-06-18 RX ORDER — TRAZODONE HYDROCHLORIDE 50 MG/1
100 TABLET ORAL
COMMUNITY
End: 2020-01-30

## 2019-06-18 RX ORDER — CLINDAMYCIN HYDROCHLORIDE 300 MG/1
300 CAPSULE ORAL 4 TIMES DAILY
Qty: 28 CAP | Refills: 0 | Status: SHIPPED | OUTPATIENT
Start: 2019-06-18 | End: 2019-06-25

## 2019-06-18 RX ORDER — DOXYCYCLINE 100 MG/1
100 CAPSULE ORAL 2 TIMES DAILY
COMMUNITY
End: 2019-06-18

## 2019-06-18 RX ORDER — PRAVASTATIN SODIUM 10 MG/1
TABLET ORAL
COMMUNITY
End: 2019-09-28

## 2019-06-19 NOTE — DISCHARGE INSTRUCTIONS
Take antibiotic as prescribed. Return to the ER if symptoms worsen or progress in any way. Follow up with primary care physician in 48-72 hours for wound recheck. Cellulitis: Care Instructions  Your Care Instructions    Cellulitis is a skin infection caused by bacteria, most often strep or staph. It often occurs after a break in the skin from a scrape, cut, bite, or puncture, or after a rash. Cellulitis may be treated without doing tests to find out what caused it. But your doctor may do tests, if needed, to look for a specific bacteria, like methicillin-resistant Staphylococcus aureus (MRSA). The doctor has checked you carefully, but problems can develop later. If you notice any problems or new symptoms, get medical treatment right away. Follow-up care is a key part of your treatment and safety. Be sure to make and go to all appointments, and call your doctor if you are having problems. It's also a good idea to know your test results and keep a list of the medicines you take. How can you care for yourself at home? · Take your antibiotics as directed. Do not stop taking them just because you feel better. You need to take the full course of antibiotics. · Prop up the infected area on pillows to reduce pain and swelling. Try to keep the area above the level of your heart as often as you can. · If your doctor told you how to care for your wound, follow your doctor's instructions. If you did not get instructions, follow this general advice:  ? Wash the wound with clean water 2 times a day. Don't use hydrogen peroxide or alcohol, which can slow healing. ? You may cover the wound with a thin layer of petroleum jelly, such as Vaseline, and a nonstick bandage. ? Apply more petroleum jelly and replace the bandage as needed. · Be safe with medicines. Take pain medicines exactly as directed. ? If the doctor gave you a prescription medicine for pain, take it as prescribed.   ? If you are not taking a prescription pain medicine, ask your doctor if you can take an over-the-counter medicine. To prevent cellulitis in the future  · Try to prevent cuts, scrapes, or other injuries to your skin. Cellulitis most often occurs where there is a break in the skin. · If you get a scrape, cut, mild burn, or bite, wash the wound with clean water as soon as you can to help avoid infection. Don't use hydrogen peroxide or alcohol, which can slow healing. · If you have swelling in your legs (edema), support stockings and good skin care may help prevent leg sores and cellulitis. · Take care of your feet, especially if you have diabetes or other conditions that increase the risk of infection. Wear shoes and socks. Do not go barefoot. If you have athlete's foot or other skin problems on your feet, talk to your doctor about how to treat them. When should you call for help? Call your doctor now or seek immediate medical care if:    · You have signs that your infection is getting worse, such as:  ? Increased pain, swelling, warmth, or redness. ? Red streaks leading from the area. ? Pus draining from the area. ? A fever.     · You get a rash.    Watch closely for changes in your health, and be sure to contact your doctor if:    · You do not get better as expected. Where can you learn more? Go to http://martha-neo.info/. Dangelo Goodwin in the search box to learn more about \"Cellulitis: Care Instructions. \"  Current as of: April 17, 2018  Content Version: 11.9  © 7466-6335 [employer]. Care instructions adapted under license by [employer] (which disclaims liability or warranty for this information). If you have questions about a medical condition or this instruction, always ask your healthcare professional. [place] [address] any warranty or liability for your use of this information.          Patient Education        Nausea and Vomiting: Care Instructions  Your Care Instructions    When you are nauseated, you may feel weak and sweaty and notice a lot of saliva in your mouth. Nausea often leads to vomiting. Most of the time you do not need to worry about nausea and vomiting, but they can be signs of other illnesses. Two common causes of nausea and vomiting are stomach flu and food poisoning. Nausea and vomiting from viral stomach flu will usually start to improve within 24 hours. Nausea and vomiting from food poisoning may last from 12 to 48 hours. The doctor has checked you carefully, but problems can develop later. If you notice any problems or new symptoms, get medical treatment right away. Follow-up care is a key part of your treatment and safety. Be sure to make and go to all appointments, and call your doctor if you are having problems. It's also a good idea to know your test results and keep a list of the medicines you take. How can you care for yourself at home? · To prevent dehydration, drink plenty of fluids, enough so that your urine is light yellow or clear like water. Choose water and other caffeine-free clear liquids until you feel better. If you have kidney, heart, or liver disease and have to limit fluids, talk with your doctor before you increase the amount of fluids you drink. · Rest in bed until you feel better. · When you are able to eat, try clear soups, mild foods, and liquids until all symptoms are gone for 12 to 48 hours. Other good choices include dry toast, crackers, cooked cereal, and gelatin dessert, such as Jell-O. When should you call for help? Call 911 anytime you think you may need emergency care. For example, call if:    · You passed out (lost consciousness).    Call your doctor now or seek immediate medical care if:    · You have symptoms of dehydration, such as:  ? Dry eyes and a dry mouth. ? Passing only a little dark urine. ?  Feeling thirstier than usual.     · You have new or worsening belly pain.     · You have a new or higher fever.     · You vomit blood or what looks like coffee grounds.    Watch closely for changes in your health, and be sure to contact your doctor if:    · You have ongoing nausea and vomiting.     · Your vomiting is getting worse.     · Your vomiting lasts longer than 2 days.     · You are not getting better as expected. Where can you learn more? Go to http://martha-neo.info/. Enter 25 [id number] in the search box to learn more about \"Nausea and Vomiting: Care Instructions. \"  Current as of: September 23, 2018  Content Version: 11.9  © 2710-1936 [employer]. Care instructions adapted under license by [employer] (which disclaims liability or warranty for this information). If you have questions about a medical condition or this instruction, always ask your healthcare professional. [address] any warranty or liability for your use of this information.

## 2019-06-19 NOTE — ED TRIAGE NOTES
Pt has had wound to L great toe for a couple months, has been seen at PCP for it, states wound is not healing and now has numbness to feet with N/V since yesterday.

## 2019-06-19 NOTE — ED PROVIDER NOTES
53-year-old female presents with complaint of increased redness and pain to left great toe over the past several weeks. States that she was started on doxycycline several weeks ago and completed. States that she was seen by her nurse practitioner today who told her that she may be developing worsening cellulitis and prescribed her doxycycline. Patient states she has chronic numbness to left great toe. Patient with history of previous injury to left great toe. Denies any recent trauma or injury. Denies fever, chills. States that she's had mild nausea and several episodes of vomiting yesterday. States that has resolved. Denies dysuria, hematuria, diarrhea, constipation, melena, hematochezia, chest pain, shortness of breath. The history is provided by the patient. No  was used. Wound Check    This is a new problem. The current episode started more than 1 week ago (3 weeks). The problem occurs constantly. The problem has not changed since onset. Pain location: L great toe  The quality of the pain is described as dull. The pain is at a severity of 2/10. The pain is mild. Pertinent negatives include full range of motion, no stiffness, no tingling, no itching, no back pain and no neck pain. She has tried nothing for the symptoms. The treatment provided no relief. There has been a history of trauma. Vomiting    Pertinent negatives include no chills, no fever, no abdominal pain, no diarrhea, no headaches, no cough and no headaches.         Past Medical History:   Diagnosis Date    Anemia     Chronic pain     due to RA    Depression     GERD (gastroesophageal reflux disease)     Hx of renal calculi          Hypercholesterolemia     Hypertension     Migraine     Morbid obesity ([address]     Ovarian cyst     Reflex sympathetic dystrophy since 2005    right arm; s/p injury - laceration in hand    Rheumatoid arthritis [facility])             Past Surgical History:   Procedure Laterality Date  HX APPENDECTOMY      HX BREAST BIOPSY Left 4/27/2015    LEFT NIPPLE EXPLORATION WITH REMOVAL OF LATTISIMUS DUCT performed by Nithin Marquez MD at [address] HX CHOLECYSTECTOMY      HX ORTHOPAEDIC      rt hand    HX ORTHOPAEDIC      neck    HX TOTAL ABDOMINAL HYSTERECTOMY           HX TUBAL LIGATION           Family History:   Problem Relation Age of Onset    Breast Cancer Paternal Aunt 39    Cancer Paternal Aunt         brst    Cancer Maternal Grandfather         colon    Colon Cancer Maternal Grandfather     Breast Cancer Paternal Grandmother 46    Cancer Paternal Grandmother         breast    Ovarian Cancer Paternal Grandmother     Cancer Mother         throat    Hypertension Mother     Cancer Father         non hodgkins lymphoma    Cancer Maternal Grandmother         ovarian    Breast Cancer Maternal Grandmother     Heart Disease Paternal Grandfather     Diabetes Paternal Grandfather     Breast Cancer Sister        Social History     Socioeconomic History    Marital status:      Spouse name: Not on file    Number of children: Not on file    Years of education: Not on file    Highest education level: Not on file   Occupational History    Not on file   Social Needs    Financial resource strain: Not on file    Food insecurity:     Worry: Not on file     Inability: Not on file    Transportation needs:     Medical: Not on file     Non-medical: Not on file   Tobacco Use    Smoking status: Never Smoker    Smokeless tobacco: Never Used   Substance and Sexual Activity    Alcohol use: Yes     Comment: \"once every couple years\"    Drug use: No    Sexual activity: Yes     Partners: Male     Birth control/protection: Surgical   Lifestyle    Physical activity:     Days per week: Not on file     Minutes per session: Not on file    Stress: Not on file   Relationships    Social connections:     Talks on phone: Not on file     Gets together: Not on file     Attends Bahai service: Not on file     Active member of club or organization: Not on file     Attends meetings of clubs or organizations: Not on file     Relationship status: Not on file    Intimate partner violence:     Fear of current or ex partner: Not on file     Emotionally abused: Not on file     Physically abused: Not on file     Forced sexual activity: Not on file   Other Topics Concern    Not on file   Social History Narrative    Not on file         ALLERGIES: Pcn [penicillins] and Norco [hydrocodone-acetaminophen]    Review of Systems   Constitutional: Negative for chills, fatigue and fever. HENT: Negative for congestion and sore throat. Respiratory: Negative for cough and shortness of breath. Cardiovascular: Negative for chest pain, palpitations and leg swelling. Gastrointestinal: Positive for nausea and vomiting. Negative for abdominal pain, constipation and diarrhea. Genitourinary: Negative for dysuria and flank pain. Musculoskeletal: Negative for back pain, neck pain and stiffness. Skin: Positive for rash and wound. Negative for itching. Neurological: Negative for dizziness, tingling, syncope, light-headedness and headaches. Vitals:    06/18/19 2107   BP: 120/82   Pulse: 86   Resp: 18   Temp: 98.3 °F (36.8 °C)   SpO2: 96%            Physical Exam   Constitutional: She is oriented to person, place, and time. She appears well-developed and well-nourished. Well appearing and in NAD. HENT:   Head: Normocephalic. Mouth/Throat: Oropharynx is clear and moist.   MMM. Eyes: Pupils are equal, round, and reactive to light. EOM are normal.   Neck: No JVD present. No tracheal deviation present. Cardiovascular: Normal rate, regular rhythm, normal heart sounds and intact distal pulses. RRR. Pulses 2+ and equal bilaterally. Pulmonary/Chest: Effort normal and breath sounds normal.   CTAB. Abdominal: Soft. Bowel sounds are normal. There is no tenderness. Soft, NTND.  No rebound or guarding. No CVAT. Musculoskeletal: Normal range of motion. Left foot: There is tenderness and swelling. There is normal range of motion, no bony tenderness, normal capillary refill, no crepitus, no deformity and no laceration. Feet:    Mild swelling noted to left great toe. Mild erythema noted to left great toe. Chronic wounds present to plantar aspect of L great toe and lateral aspect of L great toe. .  No drainage. Cap refill <3 seconds. No LE edema. No calf TTP. DP pulses 2+ in the bilaterally. Neurological: She is alert and oriented to person, place, and time. No cranial nerve deficit. Strength 5/5 throughout. Normal sensory. Skin: Skin is warm and dry. No rash. Psychiatric: She has a normal mood and affect. Her behavior is normal.   Nursing note and vitals reviewed. MDM  Number of Diagnoses or Management Options  Cellulitis of great toe of left foot: new and requires workup  Nausea and vomiting, intractability of vomiting not specified, unspecified vomiting type: new and requires workup  Diagnosis management comments: WBC normal.  XR w/ evidence of cellulitis. CRP 2.2. Patient states that she was on doxycycline in May for this and did not improve. States that she was started on doxycycline today by nurse practitioner. States she took 1 dose. Discussed options for other antibiotic coverage including Keflex versus clindamycin. Discussed possible side effects. Patient states that she was previously on Keflex and doxycycline in the past.  Patient states that she does not want to receive any IV Abx in the ER and is ready for discharge home with prescriptions. Will discharge home with clindamycin and Phenergan for nausea. Patient tolerating po. Instructed the patient to follow up with PCP in 48-72 hours for wound check. Patient given strict return precautions   Patient verbalizes understanding and is in agreement with plan.        Amount and/or Complexity of Data Reviewed  Clinical lab tests: ordered and reviewed  Tests in the radiology section of CPT®: ordered and reviewed  Tests in the medicine section of CPT®: ordered and reviewed  Review and summarize past medical records: yes  Independent visualization of images, tracings, or specimens: yes    Risk of Complications, Morbidity, and/or Mortality  Presenting problems: moderate  Diagnostic procedures: moderate  Management options: moderate    Patient Progress  Patient progress: stable    ED Course as of Jun 18 2322   Tue Jun 18, 2019   2303 XR L great toe IMPRESSION: Cellulitis. [DF]   2304 UA negative. [DF]      ED Course User Index  [DF] Omkar Dozier MD       Procedures    Results Include:    Recent Results (from the past 24 hour(s))   CBC WITH AUTOMATED DIFF    Collection Time: 06/18/19  9:18 PM   Result Value Ref Range    WBC 5.6 4.3 - 11.1 K/uL    RBC 4.39 4.05 - 5.2 M/uL    HGB 11.2 (L) 11.7 - 15.4 g/dL    HCT 35.0 (L) 35.8 - 46.3 %    MCV 79.7 79.6 - 97.8 FL    MCH 25.5 (L) 26.1 - 32.9 PG    MCHC 32.0 31.4 - 35.0 g/dL    RDW 14.9 (H) 11.9 - 14.6 %    PLATELET 383 751 - 957 K/uL    MPV 9.3 (L) 9.4 - 12.3 FL    ABSOLUTE NRBC 0.00 0.0 - 0.2 K/uL    DF AUTOMATED      NEUTROPHILS 59 43 - 78 %    LYMPHOCYTES 33 13 - 44 %    MONOCYTES 5 4.0 - 12.0 %    EOSINOPHILS 2 0.5 - 7.8 %    BASOPHILS 0 0.0 - 2.0 %    IMMATURE GRANULOCYTES 1 0.0 - 5.0 %    ABS. NEUTROPHILS 3.3 1.7 - 8.2 K/UL    ABS. LYMPHOCYTES 1.8 0.5 - 4.6 K/UL    ABS. MONOCYTES 0.3 0.1 - 1.3 K/UL    ABS. EOSINOPHILS 0.1 0.0 - 0.8 K/UL    ABS. BASOPHILS 0.0 0.0 - 0.2 K/UL    ABS. IMM.  GRANS. 0.0 0.0 - 0.5 K/UL   METABOLIC PANEL, COMPREHENSIVE    Collection Time: 06/18/19  9:18 PM   Result Value Ref Range    Sodium 135 (L) 136 - 145 mmol/L    Potassium 3.8 3.5 - 5.1 mmol/L    Chloride 100 98 - 107 mmol/L    CO2 30 21 - 32 mmol/L    Anion gap 5 (L) 7 - 16 mmol/L    Glucose 232 (H) 65 - 100 mg/dL    BUN 19 6 - 23 MG/DL    Creatinine 0.82 0.6 - 1.0 MG/DL    GFR est AA >60 >60 ml/min/1.73m2    GFR est non-AA >60 >60 ml/min/1.73m2    Calcium 9.1 8.3 - 10.4 MG/DL    Bilirubin, total 0.3 0.2 - 1.1 MG/DL    ALT (SGPT) 24 12 - 65 U/L    AST (SGOT) 14 (L) 15 - 37 U/L    Alk. phosphatase 87 50 - 136 U/L    Protein, total 8.0 6.3 - 8.2 g/dL    Albumin 3.3 (L) 3.5 - 5.0 g/dL    Globulin 4.7 (H) 2.3 - 3.5 g/dL    A-G Ratio 0.7 (L) 1.2 - 3.5     LIPASE    Collection Time: 06/18/19  9:18 PM   Result Value Ref Range    Lipase 135 73 - 393 U/L   C REACTIVE PROTEIN, QT    Collection Time: 06/18/19  9:18 PM   Result Value Ref Range    C-Reactive protein 2.2 (H) 0.0 - 0.9 mg/dL              Koby Chahal MD; 6/18/2019 @10:46 PM Voice dictation software was used during the making of this note. This software is not perfect and grammatical and other typographical errors may be present.   This note has not been proofread for errors.  ===================================================================

## 2019-06-19 NOTE — ED NOTES
I have reviewed discharge instructions with the patient. The patient verbalized understanding. Patient left ED via Discharge Method: ambulatory to Home with self. Opportunity for questions and clarification provided. Patient given 2 scripts. To continue your aftercare when you leave the hospital, you may receive an automated call from our care team to check in on how you are doing. This is a free service and part of our promise to provide the best care and service to meet your aftercare needs.  If you have questions, or wish to unsubscribe from this service please call [phone]. Thank you for Choosing our [facility].

## 2019-07-18 ENCOUNTER — HOSPITAL ENCOUNTER (OUTPATIENT)
Dept: CT IMAGING | Age: 49
Discharge: HOME | End: 2019-07-18
Attending: OTOLARYNGOLOGY
Payer: COMMERCIAL

## 2019-07-18 DIAGNOSIS — J32.9 CHRONIC SINUSITIS, UNSPECIFIED LOCATION: ICD-10-CM

## 2019-07-18 PROCEDURE — 70486 CT MAXILLOFACIAL W/O DYE: CPT

## 2019-09-26 ENCOUNTER — HOSPITAL ENCOUNTER (OUTPATIENT)
Age: 49
Setting detail: OBSERVATION
Discharge: HOME | End: 2019-09-28
Attending: EMERGENCY MEDICINE | Admitting: HOSPITALIST
Payer: COMMERCIAL

## 2019-09-26 ENCOUNTER — APPOINTMENT (OUTPATIENT)
Dept: CT IMAGING | Age: 49
End: 2019-09-26
Attending: EMERGENCY MEDICINE
Payer: COMMERCIAL

## 2019-09-26 DIAGNOSIS — R51.9 NONINTRACTABLE HEADACHE, UNSPECIFIED CHRONICITY PATTERN, UNSPECIFIED HEADACHE TYPE: ICD-10-CM

## 2019-09-26 DIAGNOSIS — R53.1 LEFT-SIDED WEAKNESS: Primary | ICD-10-CM

## 2019-09-26 PROBLEM — G45.9 TIA (TRANSIENT ISCHEMIC ATTACK): Status: ACTIVE | Noted: 2019-09-26

## 2019-09-26 PROBLEM — F32.A DEPRESSION: Status: ACTIVE | Noted: 2019-09-26

## 2019-09-26 PROBLEM — R29.810 FACIAL DROOP: Status: ACTIVE | Noted: 2019-09-26

## 2019-09-26 PROBLEM — G90.50 REFLEX SYMPATHETIC DYSTROPHY: Status: ACTIVE | Noted: 2019-09-26

## 2019-09-26 PROBLEM — M06.9 RHEUMATOID ARTHRITIS (HCC): Status: ACTIVE | Noted: 2019-09-26

## 2019-09-26 PROBLEM — I10 HYPERTENSION: Status: ACTIVE | Noted: 2019-09-26

## 2019-09-26 PROBLEM — G89.29 CHRONIC PAIN: Status: ACTIVE | Noted: 2019-09-26

## 2019-09-26 LAB
ALBUMIN SERPL-MCNC: 3.1 G/DL (ref 3.5–5)
ALBUMIN/GLOB SERPL: 0.7 {RATIO} (ref 1.2–3.5)
ALP SERPL-CCNC: 86 U/L (ref 50–136)
ALT SERPL-CCNC: 13 U/L (ref 12–65)
ANION GAP SERPL CALC-SCNC: 6 MMOL/L (ref 7–16)
APTT PPP: 31 SEC (ref 24.7–39.8)
AST SERPL-CCNC: 11 U/L (ref 15–37)
ATRIAL RATE: 90 BPM
BASOPHILS # BLD: 0 K/UL (ref 0–0.2)
BASOPHILS NFR BLD: 0 % (ref 0–2)
BILIRUB SERPL-MCNC: 0.3 MG/DL (ref 0.2–1.1)
BUN SERPL-MCNC: 16 MG/DL (ref 6–23)
CALCIUM SERPL-MCNC: 9.5 MG/DL (ref 8.3–10.4)
CALCULATED P AXIS, ECG09: 70 DEGREES
CALCULATED R AXIS, ECG10: 57 DEGREES
CALCULATED T AXIS, ECG11: 13 DEGREES
CHLORIDE SERPL-SCNC: 100 MMOL/L (ref 98–107)
CO2 SERPL-SCNC: 29 MMOL/L (ref 21–32)
CREAT SERPL-MCNC: 0.64 MG/DL (ref 0.6–1)
DIAGNOSIS, 93000: NORMAL
DIFFERENTIAL METHOD BLD: ABNORMAL
EOSINOPHIL # BLD: 0.2 K/UL (ref 0–0.8)
EOSINOPHIL NFR BLD: 3 % (ref 0.5–7.8)
ERYTHROCYTE [DISTWIDTH] IN BLOOD BY AUTOMATED COUNT: 15.9 % (ref 11.9–14.6)
GLOBULIN SER CALC-MCNC: 4.7 G/DL (ref 2.3–3.5)
GLUCOSE BLD STRIP.AUTO-MCNC: 174 MG/DL (ref 65–100)
GLUCOSE SERPL-MCNC: 141 MG/DL (ref 65–100)
HCT VFR BLD AUTO: 34.4 % (ref 35.8–46.3)
HGB BLD-MCNC: 10.8 G/DL (ref 11.7–15.4)
IMM GRANULOCYTES # BLD AUTO: 0 K/UL (ref 0–0.5)
IMM GRANULOCYTES NFR BLD AUTO: 1 % (ref 0–5)
INR BLD: 1.2 (ref 0.9–1.2)
LYMPHOCYTES # BLD: 2 K/UL (ref 0.5–4.6)
LYMPHOCYTES NFR BLD: 35 % (ref 13–44)
MCH RBC QN AUTO: 25.8 PG (ref 26.1–32.9)
MCHC RBC AUTO-ENTMCNC: 31.4 G/DL (ref 31.4–35)
MCV RBC AUTO: 82.3 FL (ref 79.6–97.8)
MONOCYTES # BLD: 0.4 K/UL (ref 0.1–1.3)
MONOCYTES NFR BLD: 7 % (ref 4–12)
NEUTS SEG # BLD: 3.2 K/UL (ref 1.7–8.2)
NEUTS SEG NFR BLD: 55 % (ref 43–78)
NRBC # BLD: 0 K/UL (ref 0–0.2)
P-R INTERVAL, ECG05: 132 MS
PLATELET # BLD AUTO: 179 K/UL (ref 150–450)
PMV BLD AUTO: 10.3 FL (ref 9.4–12.3)
POTASSIUM SERPL-SCNC: 3.6 MMOL/L (ref 3.5–5.1)
PROT SERPL-MCNC: 7.8 G/DL (ref 6.3–8.2)
PT BLD: 14.2 SECS (ref 9.6–11.6)
Q-T INTERVAL, ECG07: 348 MS
QRS DURATION, ECG06: 92 MS
QTC CALCULATION (BEZET), ECG08: 425 MS
RBC # BLD AUTO: 4.18 M/UL (ref 4.05–5.2)
SODIUM SERPL-SCNC: 135 MMOL/L (ref 136–145)
TROPONIN I SERPL-MCNC: <0.02 NG/ML (ref 0.02–0.05)
VENTRICULAR RATE, ECG03: 90 BPM
WBC # BLD AUTO: 5.9 K/UL (ref 4.3–11.1)

## 2019-09-26 PROCEDURE — 85610 PROTHROMBIN TIME: CPT

## 2019-09-26 PROCEDURE — 99218 HC RM OBSERVATION: CPT

## 2019-09-26 PROCEDURE — 70496 CT ANGIOGRAPHY HEAD: CPT

## 2019-09-26 PROCEDURE — 93005 ELECTROCARDIOGRAM TRACING: CPT | Performed by: EMERGENCY MEDICINE

## 2019-09-26 PROCEDURE — 99285 EMERGENCY DEPT VISIT HI MDM: CPT | Performed by: EMERGENCY MEDICINE

## 2019-09-26 PROCEDURE — 80053 COMPREHEN METABOLIC PANEL: CPT

## 2019-09-26 PROCEDURE — 82962 GLUCOSE BLOOD TEST: CPT

## 2019-09-26 PROCEDURE — 84484 ASSAY OF TROPONIN QUANT: CPT

## 2019-09-26 PROCEDURE — 85025 COMPLETE CBC W/AUTO DIFF WBC: CPT

## 2019-09-26 PROCEDURE — 74011000258 HC RX REV CODE- 258: Performed by: EMERGENCY MEDICINE

## 2019-09-26 PROCEDURE — 85730 THROMBOPLASTIN TIME PARTIAL: CPT

## 2019-09-26 PROCEDURE — 74011636320 HC RX REV CODE- 636/320: Performed by: EMERGENCY MEDICINE

## 2019-09-26 PROCEDURE — 0042T CT PERF W CBF: CPT

## 2019-09-26 PROCEDURE — 70450 CT HEAD/BRAIN W/O DYE: CPT

## 2019-09-26 RX ORDER — SODIUM CHLORIDE 0.9 % (FLUSH) 0.9 %
10 SYRINGE (ML) INJECTION
Status: COMPLETED | OUTPATIENT
Start: 2019-09-26 | End: 2019-09-26

## 2019-09-26 RX ADMIN — SODIUM CHLORIDE 100 ML: 900 INJECTION, SOLUTION INTRAVENOUS at 22:03

## 2019-09-26 RX ADMIN — Medication 10 ML: at 22:03

## 2019-09-26 RX ADMIN — IOPAMIDOL 90 ML: 755 INJECTION, SOLUTION INTRAVENOUS at 22:03

## 2019-09-27 ENCOUNTER — APPOINTMENT (OUTPATIENT)
Dept: MRI IMAGING | Age: 49
End: 2019-09-27
Attending: HOSPITALIST
Payer: COMMERCIAL

## 2019-09-27 PROCEDURE — 99218 HC RM OBSERVATION: CPT

## 2019-09-27 PROCEDURE — 74011250637 HC RX REV CODE- 250/637: Performed by: HOSPITALIST

## 2019-09-27 PROCEDURE — 97161 PT EVAL LOW COMPLEX 20 MIN: CPT

## 2019-09-27 PROCEDURE — 70551 MRI BRAIN STEM W/O DYE: CPT

## 2019-09-27 PROCEDURE — 97166 OT EVAL MOD COMPLEX 45 MIN: CPT

## 2019-09-27 PROCEDURE — C8929 TTE W OR WO FOL WCON,DOPPLER: HCPCS

## 2019-09-27 PROCEDURE — 92610 EVALUATE SWALLOWING FUNCTION: CPT

## 2019-09-27 PROCEDURE — 74011250636 HC RX REV CODE- 250/636: Performed by: HOSPITALIST

## 2019-09-27 PROCEDURE — 97530 THERAPEUTIC ACTIVITIES: CPT

## 2019-09-27 PROCEDURE — 72141 MRI NECK SPINE W/O DYE: CPT

## 2019-09-27 PROCEDURE — 96372 THER/PROPH/DIAG INJ SC/IM: CPT

## 2019-09-27 RX ORDER — LISINOPRIL AND HYDROCHLOROTHIAZIDE 20; 25 MG/1; MG/1
1 TABLET ORAL DAILY
Status: DISCONTINUED | OUTPATIENT
Start: 2019-09-27 | End: 2019-09-28 | Stop reason: HOSPADM

## 2019-09-27 RX ORDER — BISACODYL 5 MG
5 TABLET, DELAYED RELEASE (ENTERIC COATED) ORAL DAILY PRN
Status: DISCONTINUED | OUTPATIENT
Start: 2019-09-27 | End: 2019-09-28 | Stop reason: HOSPADM

## 2019-09-27 RX ORDER — ONDANSETRON 8 MG/1
8 TABLET, ORALLY DISINTEGRATING ORAL
Status: DISCONTINUED | OUTPATIENT
Start: 2019-09-27 | End: 2019-09-28 | Stop reason: HOSPADM

## 2019-09-27 RX ORDER — DICYCLOMINE HYDROCHLORIDE 20 MG/1
20 TABLET ORAL EVERY 6 HOURS
Status: DISCONTINUED | OUTPATIENT
Start: 2019-09-27 | End: 2019-09-27

## 2019-09-27 RX ORDER — SODIUM CHLORIDE 0.9 % (FLUSH) 0.9 %
5-40 SYRINGE (ML) INJECTION EVERY 8 HOURS
Status: DISCONTINUED | OUTPATIENT
Start: 2019-09-27 | End: 2019-09-28 | Stop reason: HOSPADM

## 2019-09-27 RX ORDER — ASPIRIN 81 MG/1
81 TABLET ORAL DAILY
Status: DISCONTINUED | OUTPATIENT
Start: 2019-09-27 | End: 2019-09-28 | Stop reason: HOSPADM

## 2019-09-27 RX ORDER — ENOXAPARIN SODIUM 100 MG/ML
40 INJECTION SUBCUTANEOUS EVERY 24 HOURS
Status: DISCONTINUED | OUTPATIENT
Start: 2019-09-27 | End: 2019-09-28 | Stop reason: HOSPADM

## 2019-09-27 RX ORDER — SODIUM CHLORIDE 0.9 % (FLUSH) 0.9 %
5-40 SYRINGE (ML) INJECTION AS NEEDED
Status: DISCONTINUED | OUTPATIENT
Start: 2019-09-27 | End: 2019-09-28 | Stop reason: HOSPADM

## 2019-09-27 RX ORDER — ONDANSETRON 2 MG/ML
4 INJECTION INTRAMUSCULAR; INTRAVENOUS
Status: DISCONTINUED | OUTPATIENT
Start: 2019-09-27 | End: 2019-09-28 | Stop reason: HOSPADM

## 2019-09-27 RX ORDER — TRAZODONE HYDROCHLORIDE 50 MG/1
100 TABLET ORAL
Status: DISCONTINUED | OUTPATIENT
Start: 2019-09-27 | End: 2019-09-28 | Stop reason: HOSPADM

## 2019-09-27 RX ORDER — PRAVASTATIN SODIUM 20 MG/1
10 TABLET ORAL
Status: DISCONTINUED | OUTPATIENT
Start: 2019-09-27 | End: 2019-09-28 | Stop reason: HOSPADM

## 2019-09-27 RX ORDER — ONDANSETRON HYDROCHLORIDE 8 MG/1
8 TABLET, FILM COATED ORAL
Status: DISCONTINUED | OUTPATIENT
Start: 2019-09-27 | End: 2019-09-27 | Stop reason: SDUPTHER

## 2019-09-27 RX ORDER — PANTOPRAZOLE SODIUM 40 MG/1
40 TABLET, DELAYED RELEASE ORAL
Status: DISCONTINUED | OUTPATIENT
Start: 2019-09-27 | End: 2019-09-27

## 2019-09-27 RX ORDER — SODIUM CHLORIDE 9 MG/ML
125 INJECTION, SOLUTION INTRAVENOUS CONTINUOUS
Status: DISCONTINUED | OUTPATIENT
Start: 2019-09-27 | End: 2019-09-27

## 2019-09-27 RX ORDER — DULOXETIN HYDROCHLORIDE 60 MG/1
60 CAPSULE, DELAYED RELEASE ORAL EVERY 12 HOURS
Status: DISCONTINUED | OUTPATIENT
Start: 2019-09-27 | End: 2019-09-28 | Stop reason: HOSPADM

## 2019-09-27 RX ORDER — DULOXETIN HYDROCHLORIDE 60 MG/1
60 CAPSULE, DELAYED RELEASE ORAL DAILY
Status: DISCONTINUED | OUTPATIENT
Start: 2019-09-27 | End: 2019-09-27 | Stop reason: SDUPTHER

## 2019-09-27 RX ORDER — ACETAMINOPHEN 325 MG/1
650 TABLET ORAL
Status: DISCONTINUED | OUTPATIENT
Start: 2019-09-27 | End: 2019-09-28 | Stop reason: HOSPADM

## 2019-09-27 RX ORDER — METHOCARBAMOL 750 MG/1
750 TABLET, FILM COATED ORAL 4 TIMES DAILY
Status: DISCONTINUED | OUTPATIENT
Start: 2019-09-27 | End: 2019-09-27

## 2019-09-27 RX ADMIN — TRAZODONE HYDROCHLORIDE 100 MG: 50 TABLET ORAL at 02:43

## 2019-09-27 RX ADMIN — ASPIRIN 81 MG: 81 TABLET ORAL at 10:18

## 2019-09-27 RX ADMIN — TRAZODONE HYDROCHLORIDE 100 MG: 50 TABLET ORAL at 21:29

## 2019-09-27 RX ADMIN — Medication 10 ML: at 13:23

## 2019-09-27 RX ADMIN — ACETAMINOPHEN 650 MG: 325 TABLET, FILM COATED ORAL at 02:43

## 2019-09-27 RX ADMIN — PERFLUTREN 1 ML: 6.52 INJECTION, SUSPENSION INTRAVENOUS at 13:30

## 2019-09-27 RX ADMIN — DULOXETINE HYDROCHLORIDE 60 MG: 60 CAPSULE, DELAYED RELEASE ORAL at 10:18

## 2019-09-27 RX ADMIN — Medication 5 ML: at 01:29

## 2019-09-27 RX ADMIN — ENOXAPARIN SODIUM 40 MG: 40 INJECTION SUBCUTANEOUS at 10:18

## 2019-09-27 RX ADMIN — SODIUM CHLORIDE 125 ML/HR: 900 INJECTION, SOLUTION INTRAVENOUS at 01:29

## 2019-09-27 RX ADMIN — SODIUM CHLORIDE 125 ML/HR: 900 INJECTION, SOLUTION INTRAVENOUS at 10:19

## 2019-09-27 RX ADMIN — PRAVASTATIN SODIUM 10 MG: 20 TABLET ORAL at 02:43

## 2019-09-27 RX ADMIN — LISINOPRIL AND HYDROCHLOROTHIAZIDE 1 TABLET: 25; 20 TABLET ORAL at 10:18

## 2019-09-27 RX ADMIN — Medication 10 ML: at 21:29

## 2019-09-27 RX ADMIN — PRAVASTATIN SODIUM 10 MG: 20 TABLET ORAL at 21:29

## 2019-09-27 RX ADMIN — DULOXETINE HYDROCHLORIDE 60 MG: 60 CAPSULE, DELAYED RELEASE ORAL at 21:29

## 2019-09-27 RX ADMIN — Medication 10 ML: at 06:15

## 2019-09-27 NOTE — PROGRESS NOTES
NIH assessment completed at bedside with outgoing RN David Roche.        09/27/19 0015   NIH Stroke Scale   Interval Other (comment)   LOC 0   LOC Questions 0   LOC Commands 0   Best Gaze 0   Visual 0   Facial Palsy 1   Motor Right Arm 0   Motor Left Arm 2   Motor Right Leg 0   Motor Left Leg 2   Limb Ataxia 0   Sensory 1   Best Language 0   Dysarthria 0   Extinction and Inattention 0   Total 6

## 2019-09-27 NOTE — ED PROVIDER NOTES
Presents with complaint of right facial numbness and feeling like it was swollen and left-sided weakness. Patient has difficulty verbalizing this. Last known well was 1845. Started while feeding her children. Has had headache for a week with nausea and vomiting. She had sinus surgery 2 weeks ago. Pt evaluated in triage and reeval after return from CT. The history is provided by the patient and the EMS personnel. Facial Droop   This is a new problem. Episode onset: 1845. The problem has not changed since onset. There was left upper extremity, left lower extremity and right facial focality noted. Primary symptoms include focal weakness, slurred speech, speech difficulty and movement disorder. Pertinent negatives include no loss of sensation, no loss of balance, no memory loss, no agitation, no visual change, no auditory change, no mental status change, no unresponsiveness and no disorientation. There has been no fever. Associated symptoms include vomiting and nausea. Pertinent negatives include no shortness of breath and no chest pain. Associated medical issues do not include CVA.         Past Medical History:   Diagnosis Date    Anemia     Chronic pain     due to RA    Depression     GERD (gastroesophageal reflux disease)     Hx of renal calculi          Hypercholesterolemia     Hypertension     Migraine     Morbid obesity ([address]     Ovarian cyst     Reflex sympathetic dystrophy since 2005    right arm; s/p injury - laceration in hand    Rheumatoid arthritis ([address]          Sinus problem        Past Surgical History:   Procedure Laterality Date    HX APPENDECTOMY      HX BREAST BIOPSY Left 4/27/2015    LEFT NIPPLE EXPLORATION WITH REMOVAL OF LATTISIMUS DUCT [facility] [address] [facility]X CHOLECYSTECTOMY      HX ORTHOPAEDIC      rt hand    HX ORTHOPAEDIC      neck    HX SEPTOPLASTY  08/28/2019    FESS,SMRIT's    HX TOTAL ABDOMINAL HYSTERECTOMY           HX TUBAL LIGATION           Family History:   Problem Relation Age of Onset    Breast Cancer Paternal Aunt 39    Cancer Paternal Aunt         brst    Cancer Maternal Grandfather         colon    Colon Cancer Maternal Grandfather     Breast Cancer Paternal Grandmother 46    Cancer Paternal Grandmother         breast    Ovarian Cancer Paternal Grandmother     Cancer Mother         throat    Hypertension Mother     Cancer Father         non hodgkins lymphoma    Cancer Maternal Grandmother         ovarian    Breast Cancer Maternal Grandmother     Heart Disease Paternal Grandfather     Diabetes Paternal Grandfather     Breast Cancer Sister        Social History     Socioeconomic History    Marital status:      Spouse name: Not on file    Number of children: Not on file    Years of education: Not on file    Highest education level: Not on file   Occupational History    Not on file   Social Needs    Financial resource strain: Not on file    Food insecurity:     Worry: Not on file     Inability: Not on file    Transportation needs:     Medical: Not on file     Non-medical: Not on file   Tobacco Use    Smoking status: Never Smoker    Smokeless tobacco: Never Used   Substance and Sexual Activity    Alcohol use: Yes     Comment: \"once every couple years\"    Drug use: No    Sexual activity: Yes     Partners: Male     Birth control/protection: Surgical   Lifestyle    Physical activity:     Days per week: Not on file     Minutes per session: Not on file    Stress: Not on file   Relationships    Social connections:     Talks on phone: Not on file     Gets together: Not on file     Attends Orthodox service: Not on file     Active member of club or organization: Not on file     Attends meetings of clubs or organizations: Not on file     Relationship status: Not on file    Intimate partner violence:     Fear of current or ex partner: Not on file     Emotionally abused: Not on file     Physically abused: Not on file     Forced sexual activity: Not on file   Other Topics Concern    Not on file   Social History Narrative    Not on file         ALLERGIES: Pcn [penicillins] and Norco [hydrocodone-acetaminophen]    Review of Systems   Constitutional: Negative for chills and fever. Respiratory: Negative for shortness of breath. Cardiovascular: Negative for chest pain. Gastrointestinal: Positive for nausea and vomiting. Neurological: Positive for focal weakness and speech difficulty. Negative for loss of balance. Psychiatric/Behavioral: Negative for agitation and memory loss. All other systems reviewed and are negative. There were no vitals filed for this visit. Physical Exam   Constitutional: She is oriented to person, place, and time. She appears well-developed and well-nourished. No distress. HENT:   Head: Normocephalic and atraumatic. Eyes: Conjunctivae are normal. Right eye exhibits no discharge. Left eye exhibits no discharge. Neck: Normal range of motion. Neck supple. Cardiovascular: Normal rate and regular rhythm. Pulmonary/Chest: Effort normal and breath sounds normal. No respiratory distress. Abdominal: Soft. She exhibits no distension. There is no tenderness. Musculoskeletal: Normal range of motion. She exhibits no edema. Neurological: She is alert and oriented to person, place, and time. No cranial nerve deficit. Loss of right nasolabial fold, weakness on the left side but somewhat inconsistent on exam   Skin: Skin is warm and dry. Capillary refill takes less than 2 seconds. She is not diaphoretic. Psychiatric: She has a normal mood and affect. Her behavior is normal.   Nursing note and vitals reviewed. MDM  Number of Diagnoses or Management Options  Left-sided weakness:   Nonintractable headache, unspecified chronicity pattern, unspecified headache type:   Diagnosis management comments: Patient seen by Nate Saez.   Patient declined TPA recommended CTA and CT perfusion. Neurosurgery was alerted and patient to be admitted for MRI. I spoke with patient and family about TPA and she again declined. Admitted to the hospitalist after a CT perfusion and CTA that were negative.        Amount and/or Complexity of Data Reviewed  Clinical lab tests: reviewed and ordered  Decide to obtain previous medical records or to obtain history from someone other than the patient: yes (ems)  Review and summarize past medical records: yes  Discuss the patient with other providers: yes  Independent visualization of images, tracings, or specimens: yes    Risk of Complications, Morbidity, and/or Mortality  Presenting problems: high  Diagnostic procedures: moderate  Management options: high    Patient Progress  Patient progress: improved         Procedures

## 2019-09-27 NOTE — ED NOTES
Teleneuro offered patient TPA, explained the risks and benefits, pt declined. This nurse offered to have Dr. Tyshawn Sanchez come and explain the risks and benefits again, pt declined.

## 2019-09-27 NOTE — PROGRESS NOTES
Pt had failed initial dysphagia screening in ER. Instructed pt to keep NPO until speech had come by and assessed. Notified Dr. Fariha Roper. Notified MD pt had a neck surgery a year ago with metal plate implant. Per pt, pt has MRI after the neck surgery for her sinus surgery recently. Per MD, it is okay. Consent completed with pt, her spouse, and second RN in room. 0205- Per Dr. Shalom Valdez, repeat dysphagia assessment and attempt swallowing with water. If she is able to tolerate, administered PO medication and allow pt to have oral intake. 0230- Pt able to tolerate apple sauce, water by cup and water by straw. Will administer PO medication and continue to monitor.

## 2019-09-27 NOTE — ED NOTES
Pt arrived via [facility] from home for stroke like symptoms. Last known well at [address]. Pt presents with left sided facial droop, left side numbness, and inability to use left side with same efficacy as right side. Pt evaluated by Dr. Apurva Daniel. Code S called.

## 2019-09-27 NOTE — H&P
Hospitalist H&P/Consult Note     Admit Date:  2019  8:19 PM   Name:  Linda Echavarria   Age:  54 y.o.  :  1970   MRN:  [id number]   PCP:  Juana Bishop MD  Treatment Team: Attending Provider: Zoila Paz MD    HPI:   Patient is a 53 y/o female with hx hypertension, obesity, RA, chronic pain who presented to ED with new onset left facial droop, drooling, dysarthria and facial numbness that began at 6 pm. Also had some numbness to left arm. A code S was called. Head CT negative. She was evaluated by tele-neurology who offered TPA but patient declined after hearing all the risks and benefits. She was then sent for STAT CTA head and neck and this is negative. Hospitalist service consulted for observational admission for further workup. 10 systems reviewed and negative except as noted in HPI. Past Medical History:   Diagnosis Date    Anemia     Chronic pain     due to RA    Depression     GERD (gastroesophageal reflux disease)     Hx of renal calculi          Hypercholesterolemia     Hypertension     Migraine     Morbid obesity ([address]     Ovarian cyst     Reflex sympathetic dystrophy since     right arm; s/p injury - laceration in hand    Rheumatoid arthritis ([address]          Rheumatoid arthritis ([address] 2019    Sinus problem     TIA (transient ischemic attack) 2019      Past Surgical History:   Procedure Laterality Date    HX APPENDECTOMY      HX BREAST BIOPSY Left 2015    LEFT NIPPLE EXPLORATION WITH REMOVAL OF LATTISIMUS DUCT performed by Grace Irwin MD at [address] HX CHOLECYSTECTOMY      HX ORTHOPAEDIC      rt hand    HX ORTHOPAEDIC      neck    HX SEPTOPLASTY  2019    FESS,SMRIT's    HX TOTAL ABDOMINAL HYSTERECTOMY           HX TUBAL LIGATION        Prior to Admission Medications   Prescriptions Last Dose Informant Patient Reported? Taking?    DULoxetine (CYMBALTA) 60 mg capsule 2019 at Unknown time  Yes Yes   Sig: Take 60 mg by mouth two (2) times a day. TAKE AM OF SURGERY WITH SMALL SIP OF WATER   aspirin delayed-release 81 mg tablet 2019 at Unknown time  Yes Yes   Sig: Take  by mouth daily. ibuprofen (MOTRIN) 400 mg tablet 2019 at Unknown time  Yes Yes   Sig: Take  by mouth every six (6) hours as needed for Pain. lisinopril-hydrochlorothiazide (PRINZIDE, ZESTORETIC) 20-25 mg per tablet 2019 at Unknown time  Yes Yes   Si Tab daily. metFORMIN (GLUCOPHAGE) 500 mg tablet Not Taking at Unknown time  Yes No   Sig: Take  by mouth two (2) times daily (with meals). ondansetron hcl (ZOFRAN, AS HYDROCHLORIDE,) 8 mg tablet Not Taking at Unknown time  No No   Sig: Take 1 Tab by mouth every eight (8) hours as needed for Nausea. pravastatin (PRAVACHOL) 10 mg tablet 2019 at Unknown time  Yes Yes   Sig: Take  by mouth nightly. traZODone (DESYREL) 50 mg tablet 2019 at Unknown time  Yes Yes   Sig: Take 100 mg by mouth nightly.       Facility-Administered Medications: None     Allergies   Allergen Reactions    Pcn [Penicillins] Hives    Norco [Hydrocodone-Acetaminophen] Nausea and Vomiting      Social History     Tobacco Use    Smoking status: Never Smoker    Smokeless tobacco: Never Used   Substance Use Topics    Alcohol use: Yes     Comment: \"once every couple years\"      Family History   Problem Relation Age of Onset    Breast Cancer Paternal Aunt 39    Cancer Paternal Aunt         brst    Cancer Maternal Grandfather         colon    Colon Cancer Maternal Grandfather     Breast Cancer Paternal Grandmother 46    Cancer Paternal Grandmother         breast    Ovarian Cancer Paternal Grandmother     Cancer Mother         throat    Hypertension Mother     Cancer Father         non hodgkins lymphoma    Cancer Maternal Grandmother         ovarian    Breast Cancer Maternal Grandmother     Heart Disease Paternal Grandfather     Diabetes Paternal Grandfather     Breast Cancer Sister       Immunization History   Administered Date(s) Administered    Tdap 09/21/2018       Objective:     Patient Vitals for the past 24 hrs:   Temp Pulse Resp BP SpO2   09/27/19 0016 98.6 °F (37 °C) 92 18 128/74 96 %   09/26/19 2228  79 19 124/59 97 %   09/26/19 2055 98.1 °F (36.7 °C) 94 16 116/55 96 %   09/26/19 2036  90 16 124/60 97 %     Oxygen Therapy  O2 Sat (%): 96 % (09/27/19 0016)  Pulse via Oximetry: 79 beats per minute (09/26/19 2228)  O2 Device: Room air (09/27/19 0015)  No intake or output data in the 24 hours ending 09/27/19 0218    Physical Exam:  General:    obese. Alert. Slight left facial droop and dysarthria   Eyes:   Normal sclera. Extraocular movements intact. ENT:  Normocephalic, atraumatic. Moist mucous membranes  CV:   RRR. No murmur, rub, or gallop. Lungs:  CTAB. No wheezing, rhonchi, or rales. Abdomen: Soft, nontender, nondistended. Bowel sounds normal.   Extremities: Warm and dry. No cyanosis or edema. Neurologic: CN II-XII grossly intact. Sensation intact. Skin:     No rashes or jaundice. Wound left great toe  Left great toe heavily calloused    Psych:  Normal mood and affect. I reviewed the labs, imaging, EKGs, telemetry, and other studies done this admission.   Data Review:   Recent Results (from the past 24 hour(s))   GLUCOSE, POC    Collection Time: 09/26/19  8:18 PM   Result Value Ref Range    Glucose (POC) 174 (H) 65 - 100 mg/dL   POC PT/INR    Collection Time: 09/26/19  8:20 PM   Result Value Ref Range    Prothrombin time (POC) 14.2 (H) 9.6 - 11.6 SECS    INR (POC) 1.2 0.9 - 1.2     CBC WITH AUTOMATED DIFF    Collection Time: 09/26/19  8:23 PM   Result Value Ref Range    WBC 5.9 4.3 - 11.1 K/uL    RBC 4.18 4.05 - 5.2 M/uL    HGB 10.8 (L) 11.7 - 15.4 g/dL    HCT 34.4 (L) 35.8 - 46.3 %    MCV 82.3 79.6 - 97.8 FL    MCH 25.8 (L) 26.1 - 32.9 PG    MCHC 31.4 31.4 - 35.0 g/dL    RDW 15.9 (H) 11.9 - 14.6 %    PLATELET 551 173 - 103 K/uL    MPV 10.3 9.4 - 12.3 FL    ABSOLUTE NRBC 0.00 0.0 - 0.2 K/uL    DF AUTOMATED      NEUTROPHILS 55 43 - 78 %    LYMPHOCYTES 35 13 - 44 %    MONOCYTES 7 4.0 - 12.0 %    EOSINOPHILS 3 0.5 - 7.8 %    BASOPHILS 0 0.0 - 2.0 %    IMMATURE GRANULOCYTES 1 0.0 - 5.0 %    ABS. NEUTROPHILS 3.2 1.7 - 8.2 K/UL    ABS. LYMPHOCYTES 2.0 0.5 - 4.6 K/UL    ABS. MONOCYTES 0.4 0.1 - 1.3 K/UL    ABS. EOSINOPHILS 0.2 0.0 - 0.8 K/UL    ABS. BASOPHILS 0.0 0.0 - 0.2 K/UL    ABS. IMM. GRANS. 0.0 0.0 - 0.5 K/UL   METABOLIC PANEL, COMPREHENSIVE    Collection Time: 09/26/19  8:23 PM   Result Value Ref Range    Sodium 135 (L) 136 - 145 mmol/L    Potassium 3.6 3.5 - 5.1 mmol/L    Chloride 100 98 - 107 mmol/L    CO2 29 21 - 32 mmol/L    Anion gap 6 (L) 7 - 16 mmol/L    Glucose 141 (H) 65 - 100 mg/dL    BUN 16 6 - 23 MG/DL    Creatinine 0.64 0.6 - 1.0 MG/DL    GFR est AA >60 >60 ml/min/1.73m2    GFR est non-AA >60 >60 ml/min/1.73m2    Calcium 9.5 8.3 - 10.4 MG/DL    Bilirubin, total 0.3 0.2 - 1.1 MG/DL    ALT (SGPT) 13 12 - 65 U/L    AST (SGOT) 11 (L) 15 - 37 U/L    Alk. phosphatase 86 50 - 136 U/L    Protein, total 7.8 6.3 - 8.2 g/dL    Albumin 3.1 (L) 3.5 - 5.0 g/dL    Globulin 4.7 (H) 2.3 - 3.5 g/dL    A-G Ratio 0.7 (L) 1.2 - 3.5     PTT    Collection Time: 09/26/19  8:23 PM   Result Value Ref Range    aPTT 31.0 24.7 - 39.8 SEC   EKG, 12 LEAD, INITIAL    Collection Time: 09/26/19  8:38 PM   Result Value Ref Range    Ventricular Rate 90 BPM    Atrial Rate 90 BPM    P-R Interval 132 ms    QRS Duration 92 ms    Q-T Interval 348 ms    QTC Calculation (Bezet) 425 ms    Calculated P Axis 70 degrees    Calculated R Axis 57 degrees    Calculated T Axis 13 degrees    Diagnosis       !! AGE AND GENDER SPECIFIC ECG ANALYSIS !!   Normal sinus rhythm  Cannot rule out Anterior infarct (cited on or before 19-APR-2016)  Abnormal ECG  When compared with ECG of 19-APR-2016 14:45,  Borderline criteria for Inferior infarct are no longer Present  Confirmed by Donato Pizarro MD (), [facility] ([id number]) on 9/26/2019 9:00:39 PM TROPONIN I    Collection Time: 09/26/19  9:23 PM   Result Value Ref Range    Troponin-I, Qt. <0.02 (L) 0.02 - 0.05 NG/ML       Imaging Studies:  CXR Results  (Last 48 hours)    None        CT Results  (Last 48 hours)               09/26/19 2203  CTA HEAD NECK W WO CONT Preliminary result    09/26/19 2203  CT PERF W CBF Final result    Impression:  IMPRESSION: No CT evidence of acute intracranial perfusion abnormality. Please note that the determination of patient treatment is not based solely upon   imaging factors or calculation values. Management of ischemia is at the   discretion of the primary physician and is based upon a combination of clinical   and imaging data, along other factors. Narrative:  HEAD CT with PERFUSION IMAGING       INDICATION:  Slurred speech. Multiple axial images obtained through the brain without intravenous contrast.    CT perfusion imaging of the brain was then performed after the administration of   intravenous contrast.  Perfusion maps and perfusion analysis output were   generated using the RAPID perfusion processing software algorithm. Radiation   dose reduction techniques were used for this study: All CT scans performed at   this facility use one or all of the following: Automated exposure control,   adjustment of the mA and/or kVp according to patient's size, iterative   reconstruction. FINDINGS: No areas of abnormal attenuation are seen in the brain. There is no CT   evidence of acute hemorrhage or infarction. The ventricles are normal in size. There are no extra-axial fluid collections. No masses are seen. The sinuses are   clear. There are no bony lesions.        RAPID Output Values:        CBF < 30% volume:  0 ml   (core infarction volume greater than 50 cc associated   with poor outcomes)   Tmax > 6 seconds: 0 ml   Tmax/CBF Mismatch Volume: 0 ml   Tmax/CBF Mismatch Ratio: NA   Hypoperfusion Intensity Ratio (Tmax > 10 seconds/Tmax > 6 seconds): 0   (values   greater than 0.5 associated with poor outcome)   Tmax > 10 seconds Volume: 0 ml   (volume greater than 100 mL is associated with   poor outcome)           09/26/19 2027  CT HEAD WO CONT Final result    Impression:  IMPRESSION:  Negative for acute intracranial abnormality. Maxillary sinus   opacification bilaterally. Narrative:  CT HEAD WITHOUT CONTRAST. INDICATION: Slurred speech and left-sided facial droop. COMPARISON: February 2018         TECHNIQUE:   5 mm axial scans from the skull base to the vertex. Our CT   scanners use one or more of the following:  Automated exposure control,   adjustment of the mA and or kV according to patient size, iterative   reconstruction. FINDINGS:  No acute intraparenchymal hemorrhage or abnormal extra-axial fluid   collection. The ventricles are normal size. No midline shift or mass effect. Included portion of the paranasal sinuses and orbits grossly demonstrates   significant maxillary sinus opacification. . No skull fracture.                  Assessment and Plan:     Hospital Problems as of 9/27/2019 Date Reviewed: 9/3/2019          Codes Class Noted - Resolved POA    * (Principal) TIA (transient ischemic attack) ICD-10-CM: G45.9  ICD-9-CM: 435.9  9/26/2019 - Present Yes        Facial droop ICD-10-CM: R29.810  ICD-9-CM: 781.94  9/26/2019 - Present Yes        Reflex sympathetic dystrophy ICD-10-CM: G90.50  ICD-9-CM: 337.20  9/26/2019 - Present Yes    Overview Signed 9/26/2019 10:38 PM by Sabas Ferguson MD     right arm; s/p injury - laceration in hand             Hypertension ICD-10-CM: I10  ICD-9-CM: 401.9  9/26/2019 - Present Yes        Rheumatoid arthritis ([facility] ICD-10-CM: M06.9  ICD-9-CM: 714.0  9/26/2019 - Present Yes    Overview Signed 9/26/2019 10:38 PM by Sabas Ferguson MD                   Chronic pain ICD-10-CM: G89.29  ICD-9-CM: 338.29  9/26/2019 - Present Yes    Overview Signed 9/26/2019 10:39 PM by Zeke Verduzco Lola Valle MD     due to RA             Depression ICD-10-CM: F32.9  ICD-9-CM: 895  9/26/2019 - Present Yes              PLAN:  · Admit as observation to remote telemetry  · Start aspirin, continue statin  · CT head and CTA head and neck negative  · Will obtain MRI of brain in am to r/o CVA  · Also obtain MRI cervical spine.  She has had prior neck surgery  · echocardiogram  · Additional labs to include thyroid functions, lipid panel  · Also check A1c, sed rate, mg and CRP  · PT/OT and speech evaluations  · SQ lovenox for DVT prophylaxis  · Discussed with patient's spouse at bedside      Estimated LOS:  Less than 24 hours    Signed:  Diamond Deleon MD

## 2019-09-27 NOTE — PROGRESS NOTES
Verbal bedside report given to [facility], oncoming RN. Patient's situation, background, assessment and recommendations provided. Opportunity for questions provided. Oncoming RN assumed care of patient.

## 2019-09-27 NOTE — ED NOTES
TRANSFER - OUT REPORT:    Verbal report given to Acacia(name) on Dena Barthel  being transferred to 329(unit) for routine progression of care       Report consisted of patients Situation, Background, Assessment and   Recommendations(SBAR). Information from the following report(s) ED Summary was reveiwed with the receiving nurse. Opportunity for questions and clarification was provided.       Ischemic Stroke without Activase/TIA    BP Parameters: Less Than 220/120 for 24 hours, then consult MD for parameters    Controlled With: None    Dysphagia Screen Completed: Yes: Fail  Dysphagia Screening  Vocal Quality/Secretions: (!) Abnormal  History of Dysphagia: No  O2 Saturation: Normal  Alertness: Normal  Pre-Swallow Assessment Score: 1     NIH Stroke Scale Complete: Yes: 7    Frequency of Vital Signs: Every 2 hours    Frequency of Neuro Checks: Every 2 hours

## 2019-09-27 NOTE — ED NOTES
Teleneuro first assessment finished. Neurologist to call Dr. Whitney Link, then come back on the screen.

## 2019-09-27 NOTE — PROGRESS NOTES
NIH assessment completed at shift change with oncoming RN.       09/27/19 5174   NIH Stroke Scale   Interval Other (comment)   LOC 0   LOC Questions 0   LOC Commands 0   Best Gaze 0   Visual 0   Facial Palsy 1   Motor Right Arm 0   Motor Left Arm 1   Motor Right Leg 0   Motor Left Leg 1   Limb Ataxia 0   Sensory 1   Best Language 0   Dysarthria 0   Extinction and Inattention 0   Total 4

## 2019-09-27 NOTE — PROGRESS NOTES
Problem: Mobility Impaired (Adult and Pediatric)  Goal: *Acute Goals and Plan of Care (Insert Text)  Description  LTG:  (1.)Ms. Mata Bernabe will move from supine to sit and sit to supine , scoot up and down and roll side to side in bed with MODIFIED INDEPENDENCE within 7 treatment day(s). (2.)Ms. Mata Bernabe will transfer from bed to chair and chair to bed with MODIFIED INDEPENDENCE using the least restrictive device within 7 treatment day(s). (3.)Ms. Mata Bernabe will ambulate with SUPERVISION for 250 feet with the least restrictive device within 7 treatment day(s). (4.)Ms. Mata Bernabe will participate in therapeutic activity/exercises x 23 minutes for increased strength within 7 treatment days. (5.)Ms. Mata Bernabe will perform standing static and dynamic balance activities x 15 minutes with STAND BY ASSIST to improve safety within 7 treatment day(s). (6.)Ms. Mata Bernabe will ascend and descend 2 stairs using R hand rail(s) with SUPERVISION to improve functional mobility and safety within 7 treatment day(s).     ________________________________________________________________________________________________     Outcome: Progressing Towards Goal     PHYSICAL THERAPY: Initial Assessment and AM 9/27/2019  OBSERVATION: PT Visit Days : 1  Payor: Daphne oJnes / Plan: [facility] / Product Type: PPO /       NAME/AGE/GENDER: Jeet Escoto is a 54 y.o. female   PRIMARY DIAGNOSIS: TIA (transient ischemic attack) [G45.9] TIA (transient ischemic attack)   TIA (transient ischemic attack)          ICD-10: Treatment Diagnosis:    Generalized Muscle Weakness (M62.81)  Difficulty in walking, Not elsewhere classified (R26.2)   Precaution/Allergies:  Pcn [penicillins] and Norco [hydrocodone-acetaminophen]      ASSESSMENT:     Ms. Mata Bernabe is a 54 y.o. female in the hospital for the above who was supine in the bed upon arrival.  Pt reports that she lives in a two story house with her spouse that has 14 steps to enter her bedroom. Pt also reported that PTA she was working at her own [employer] and ambulated with independence. Pt admitted to no recent falls in the past year. Ms. Eliza Mathew presents to PT with [facility] AROM in B LEs and decreased L LE strength in hip flexion (4-/5), knee extension (4/5), and ankle dorsiflexion (4/5). Pt also presents with decreased sensation in L L3-S2 dermatomes. Ms. Eliza Mathew demonstrated decreased coordination in L LE but appears to have normal tone. Pt was able to come to sitting on EOB with SBA and intact sitting balance. She has obvious L facial droop with slightly dysarthric speech. Pt was able to stand initially requiring Eleonora and demonstrating fair standing balance. She transferred to recliner with verbal cuing as well as Eleonora. Pt stood again and performed several pre-gait exercises including weight shifting and marching in place. After short seated rest break pt stood again but this time with RW and SBA. She appeared more steady with RW AROM and was educated on safe use of AD for transfers. Pt ambulated around room with RW/gait belt and CGA. Her gait appeared shuffled with decreased L step clearance. Pt also with decreased gait speed. She performed several seated/standing activites and then assisted back to chair with report of \"light headedness. \"  Her symptoms appeared to resolve with sitting. Ms. Eliza Mathew could benefit from skilled PT as she is currently functioning below her baseline.       This section established at most recent assessment   PROBLEM LIST (Impairments causing functional limitations):  Decreased Strength  Decreased Ambulation Ability/Technique  Decreased Balance  Decreased Activity Tolerance   INTERVENTIONS PLANNED: (Benefits and precautions of physical therapy have been discussed with the patient.)  Balance Exercise  Bed Mobility  Family Education  Gait Training  Neuromuscular Re-education/Strengthening  Therapeutic Activites  Therapeutic Exercise/Strengthening  Transfer Training     TREATMENT PLAN: Frequency/Duration: 3 times a week for duration of hospital stay  Rehabilitation Potential For Stated Goals: Good     REHAB RECOMMENDATIONS (at time of discharge pending progress):    Placement: It is my opinion, based on this patient's performance to date, that Ms. Angi Mayfield may benefit from participating in 1-2 additional therapy sessions in order to continue to assess for rehab potential and then make recommendation for disposition at discharge. Equipment:   None at this time              HISTORY:   History of Present Injury/Illness (Reason for Referral):  TIA  Past Medical History/Comorbidities:   Ms. Angi Mayfield  has a past medical history of Anemia, Chronic pain, Depression, GERD (gastroesophageal reflux disease), renal calculi, Hypercholesterolemia, Hypertension, Migraine, Morbid obesity ([address] Ovarian cyst, Reflex sympathetic dystrophy (since 2005), Rheumatoid arthritis ([address] Rheumatoid arthritis ([address] (9/26/2019), Sinus problem, and TIA (transient ischemic attack) (9/26/2019). She also has no past medical history of Aneurysm ([address] Arrhythmia, Asthma, CAD (coronary artery disease), Cancer ([address] Chronic kidney disease, Chronic obstructive pulmonary disease ([address] Coagulation disorder ([address] Diabetes ([address] Difficult intubation, Heart failure ([address] Liver disease, Malignant hyperthermia due to anesthesia, Nausea & vomiting, Pseudocholinesterase deficiency, PUD (peptic ulcer disease), Seizures ([address] Thromboembolus ([address] Thyroid disease, Unspecified adverse effect of anesthesia, or Unspecified sleep apnea. Ms. Angi Mayfield  has a past surgical history that includes hx cholecystectomy; hx appendectomy; hx total abdominal hysterectomy; hx tubal ligation; hx breast biopsy (Left, 4/27/2015); hx orthopaedic; hx orthopaedic; and hx septoplasty (08/28/2019).   Social History/Living Environment:   Home Environment: Private residence  # Steps to Enter: [address] to Enter: Yes  Hand Rails : Bilateral  One/Two Story Residence: Two story  # of Interior Steps: 15  Interior Rails: Left  Living Alone: No  Support Systems: Spouse/Significant Other/Partner  Patient Expects to be Discharged to[de-identified] Private residence  Current DME Used/Available at Home: None  Tub or Shower Type: Shower  Prior Level of Function/Work/Activity:  Lives with spouse in two story home and independent at baseline. Works as a baker.      Number of Personal Factors/Comorbidities that affect the Plan of Care: 3+: HIGH COMPLEXITY   EXAMINATION:   Most Recent Physical Functioning:   Gross Assessment:  AROM: Within functional limits  PROM: Within functional limits  Strength: Generally decreased, functional(L hip flexion 4-/5, knee ext 4/5, dorsiflexion 4/5)  Coordination: Generally decreased, functional(L LE)  Tone: Normal  Sensation: Impaired(decreased in L L3-S2)               Posture:     Balance:  Sitting: Intact  Standing: Impaired  Standing - Static: Fair  Standing - Dynamic : Fair Bed Mobility:  Supine to Sit: Stand-by assistance  Scooting: Supervision  Wheelchair Mobility:     Transfers:  Sit to Stand: Stand-by assistance  Stand to Sit: Contact guard assistance  Bed to Chair: Contact guard assistance;Minimum assistance  Gait:     Speed/Guadalupe: Slow;Shuffled  Step Length: Right shortened;Left shortened  Swing Pattern: Left asymmetrical  Gait Abnormalities: Decreased step clearance  Distance (ft): 12 Feet (ft)(x 2)  Assistive Device: Gait belt;Walker, rolling  Ambulation - Level of Assistance: Contact guard assistance;Minimal assistance      Body Structures Involved:  Nerves  Muscles Body Functions Affected:  Sensory/Pain  Neuromusculoskeletal  Movement Related Activities and Participation Affected:  General Tasks and Demands  Mobility  Domestic Life  Community, Social and Civic Life   Number of elements that affect the Plan of Care: 4+: HIGH COMPLEXITY   CLINICAL PRESENTATION:   Presentation: Stable and uncomplicated: LOW COMPLEXITY   CLINICAL DECISION MAKIN[address] [place] [address] AM-PAC 6 Clicks   Basic Mobility Inpatient Short Form  How much difficulty does the patient currently have. .. Unable A Lot A Little None   1. Turning over in bed (including adjusting bedclothes, sheets and blankets)? ? 1   ? 2   ? 3   ? 4   2. Sitting down on and standing up from a chair with arms ( e.g., wheelchair, bedside commode, etc.)   ? 1   ? 2   ? 3   ? 4   3. Moving from lying on back to sitting on the side of the bed?   ? 1   ? 2   ? 3   ? 4   How much help from another person does the patient currently need. .. Total A Lot A Little None   4. Moving to and from a bed to a chair (including a wheelchair)? ? 1   ? 2   ? 3   ? 4   5. Need to walk in hospital room? ? 1   ? 2   ? 3   ? 4   6. Climbing 3-5 steps with a railing? ? 1   ? 2   ? 3   ? 4   © , Trustees of [address] [place] [address], under license to [employer]. All rights reserved      Score:  Initial: 20 Most Recent: X (Date: -- )    Interpretation of Tool:  Represents activities that are increasingly more difficult (i.e. Bed mobility, Transfers, Gait). Medical Necessity:     Patient demonstrates good   rehab potential due to higher previous functional level. Reason for Services/Other Comments:  Patient continues to require skilled intervention due to decreased balance and functional mobility   . Use of outcome tool(s) and clinical judgement create a POC that gives a: Clear prediction of patient's progress: LOW COMPLEXITY            TREATMENT:   (In addition to Assessment/Re-Assessment sessions the following treatments were rendered)   Pre-treatment Symptoms/Complaints:  Headache  Pain: Initial:   Pain Intensity 1: 6  Pain Location 1: Head  Post Session:  6/10     Therapeutic Activity: (    23 minutes):   Therapeutic activities including Bed transfers, Chair transfers, pre-gait training, Ambulation on level ground and BSC transfers  to improve mobility, strength, balance and coordination. Required minimal   to promote static and dynamic balance in standing and promote coordination of bilateral, upper extremity(s), lower extremity(s). Braces/Orthotics/Lines/Etc:   IV  O2 Device: Room air  Treatment/Session Assessment:    Response to Treatment:  Tolerated fairly given some reports of light headedness while ambulating. Interdisciplinary Collaboration:   Physical Therapist  Registered Nurse  After treatment position/precautions:   Up in chair  Bed/Chair-wheels locked  Bed in low position  Call light within reach  RN notified  Family at bedside   Compliance with Program/Exercises: Will assess as treatment progresses  Recommendations/Intent for next treatment session: \"Next visit will focus on advancements to more challenging activities and reduction in assistance provided\".   Total Treatment Duration:  PT Patient Time In/Time Out  Time In: 0932  Time Out: 120 Tong Gonzales, PT, DPT

## 2019-09-27 NOTE — PROGRESS NOTES
Problem: Dysphagia (Adult)  Goal: *Acute Goals and Plan of Care (Insert Text)  Description  LTG: Patient will tolerate least restrictive diet without overt signs or symptoms of airway compromise. STG: Patient will tolerate regular diet and thin liquids without overt signs or symptoms of airway compromise. STG: Patient will complete oral motor exercises with 90% accuracy given minimal cues. Outcome: Progressing Towards Goal      SPEECH LANGUAGE PATHOLOGY: DYSPHAGIA- Initial Assessment    NAME/AGE/GENDER: Matt Erickson is a 54 y.o. female  DATE: 9/27/2019  PRIMARY DIAGNOSIS: TIA (transient ischemic attack) [G45.9]       ICD-10: Treatment Diagnosis: R13.11 Dysphagia, Oral Phase    INTERDISCIPLINARY COLLABORATION: Registered Nurse  PRECAUTIONS/ALLERGIES: Pcn [penicillins] and Norco [hydrocodone-acetaminophen]       SUBJECTIVE   Alert and participatory.  at bedside. Speech mildly dysarthric. Reports no changes in cognitive linguistic function. Diet Prior to Evaluation: Regular/thin     History of Present Injury/Illness: Ms. Lauren Bai  has a past medical history of Anemia, Chronic pain, Depression, GERD (gastroesophageal reflux disease), renal calculi, Hypercholesterolemia, Hypertension, Migraine, Morbid obesity ([address] Ovarian cyst, Reflex sympathetic dystrophy (since 2005), Rheumatoid arthritis ([address] Rheumatoid arthritis ([address] (9/26/2019), Sinus problem, and TIA (transient ischemic attack) (9/26/2019).  She also has no past medical history of Aneurysm ([address] Arrhythmia, Asthma, CAD (coronary artery disease), Cancer ([address] Chronic kidney disease, Chronic obstructive pulmonary disease ([address] Coagulation disorder ([address] Diabetes ([address] Difficult intubation, Heart failure ([address] Liver disease, Malignant hyperthermia due to anesthesia, Nausea & vomiting, Pseudocholinesterase deficiency, PUD (peptic ulcer disease), Seizures ([address] Thromboembolus ([address] Thyroid disease, Unspecified adverse effect of anesthesia, or Unspecified sleep apnea. . She also  has a past surgical history that includes hx cholecystectomy; hx appendectomy; hx total abdominal hysterectomy; hx tubal ligation; hx breast biopsy (Left, 4/27/2015); hx orthopaedic; hx orthopaedic; and hx septoplasty (08/28/2019). Previous Dysphagia: NONE REPORTED    Problem List:  (Impairments causing functional limitations):  Oral dysphagia    Orientation:   Person  Place  Time  Situation     Pain: Pain Scale 1: Numeric (0 - 10)  Pain Intensity 1: 0  Pain Location 1: Head  Pain Intervention(s) 1: Ice         OBJECTIVE   Oral Motor Assessment:  Labial: Decreased seal and Left droop  Dentition: Intact and Natural  Oral Hygiene: Adequate  Lingual: Decreased rate and Decreased strength  Reduced maximum jaw opening on right side. Left side facial and lingual numbness       Swallow assessment:   Patient presented with thin liquids by cup/straw/successive swallows, puree, mixed, and solid trials. NO overt s/sx airway compromise. No anterior spillage; however, patient placed straw on right side and also masticating chewables on right side of oral cavity. Oral prep time mild-moderately delayed. Oral clearance WNL with all trials. ASSESSMENT   Patient presents with mild oral dysphagia; however, patient compensating for labial and lingual weakness. Patient reports not wanting to have diet modified stating she will continue to take small bites and take her time with meals. Recommend regular consistency diet/thin liquids. Medications whole with liquid wash. Will continue to follow for oral motor exercises to maximize swallow function. Tool Used: Dysphagia Outcome and Severity Scale (BELLE)    Score Comments   Normal Diet  ? 7 With no strategies or extra time needed   Functional Swallow  ? 6 May have mild oral or pharyngeal delay   Mild Dysphagia  ? 5 Which may require one diet consistency restricted    Mild-Moderate Dysphagia  ?  4 With 1-2 diet consistencies restricted   Moderate Dysphagia  ? 3 With 2 or more diet consistencies restricted   Moderate-Severe Dysphagia  ? 2 With partial PO strategies (trials with ST only)   Severe Dysphagia  ? 1 With inability to tolerate any PO safely      Score:  Initial: 5 Most Recent:  (Date 09/27/19 )   Interpretation of Tool: The Dysphagia Outcome and Severity Scale (BELLE) is a simple, easy-to-use, 7-point scale developed to systematically rate the functional severity of dysphagia based on objective assessment and make recommendations for diet level, independence level, and type of nutrition. Current Medications:   No current facility-administered medications on file prior to encounter. Current Outpatient Medications on File Prior to Encounter   Medication Sig Dispense Refill    traZODone (DESYREL) 50 mg tablet Take 100 mg by mouth nightly. pravastatin (PRAVACHOL) 10 mg tablet Take  by mouth nightly. aspirin delayed-release 81 mg tablet Take  by mouth daily. ibuprofen (MOTRIN) 400 mg tablet Take  by mouth every six (6) hours as needed for Pain. lisinopril-hydrochlorothiazide (PRINZIDE, ZESTORETIC) 20-25 mg per tablet 1 Tab daily. DULoxetine (CYMBALTA) 60 mg capsule Take 60 mg by mouth two (2) times a day. TAKE AM OF SURGERY WITH SMALL SIP OF WATER      metFORMIN (GLUCOPHAGE) 500 mg tablet Take  by mouth two (2) times daily (with meals). ondansetron hcl (ZOFRAN, AS HYDROCHLORIDE,) 8 mg tablet Take 1 Tab by mouth every eight (8) hours as needed for Nausea. 10 Tab 0         PLAN    FREQUENCY/DURATION: Continue to follow patient 3 times a week for duration of hospital stay to address above goals. - Recommendations for next treatment session: Next treatment will address oral motor exercises.       REHABILITATION POTENTIAL FOR STATED GOALS: Excellent     COMPLIANCE WITH PROGRAM/EXERCISES: Will assess as treatment progresses    CONTINUATION OF SKILLED SERVICES/MEDICAL NECESSITY:  Patient is expected to demonstrate progress in  swallow strength, swallow function and swallow safety in order to  improve swallow safety and decrease aspiration risk. Patient continues to require skilled intervention due to oral dysphagia. RECOMMENDATIONS   DIET:   continue prescribed diet  PO:  Regular  Liquids:  regular thin    MEDICATIONS: With liquid     ASPIRATION PRECAUTIONS  Slow rate of intake  Small bites/sips  Upright at 90 degrees during meal     COMPENSATORY STRATEGIES/MODIFICATIONS  Ensure oral cavity is clear after meals     EDUCATION:  Recommendations discussed with Nursing  Family  Patient     RECOMMENDATIONS for CONTINUED SPEECH THERAPY:   YES: Anticipate need for ongoing speech therapy during this hospitalization and at next level of care.        SAFETY:  After treatment position/precautions:  Upright in bed  RN notified   at bedside      Total Treatment Duration:   Time In: 3926  Time Out: Musa Montes 3 [facility] 08, [address] [facility]

## 2019-09-27 NOTE — PROGRESS NOTES
Pt seen for Left great toe wound. Pt states that she cannot feel that foot very well  Which started before she came to the hospitaland was frustrated on the phone and kicked a computer desk and noticed there was in a screw in the plantar aspect of her left great toe. Noted a calloused area on lateral aspect of left toe and ingrown toe nail. Pedal pulse present and palpable cap refill less than 3 seconds. Wound with pale pink wound bed and calloused edges. Cleansed wound with dermal wound cleanser, applied layer of xeroform over wound, and wrapped with kev gauze wrap. Recommend dressing change daily and as needed. Also pt wound benefit from out patient follow up with podiatrist for offloading shoe, callous pairing and ingrown toe management. Wound team will continue to follow while in acute care setting.

## 2019-09-27 NOTE — CONSULTS
[facility]  [address] [facility]  [address], [address] [facility]          Chief Complaint   Patient presents with   Mercedes Minor is a 54 y.o. female who presented with the following history  HPI:   Patient is a 53 y/o female with hx hypertension, obesity, RA, chronic pain who presented to ED with new onset left facial droop, drooling, dysarthria and facial numbness that began at 6 pm. Also had some numbness to left arm. A code S was called. Head CT negative. She was evaluated by tele-neurology who offered TPA but patient declined after hearing all the risks and benefits. There are no prior neurological events.     Risk factors include hypertension dyslipidemia morbid obesity long-standing history reflex sympathetic dystrophy with chronic pain    It is noted that the patient has seropositive rheumatoid arthritis    In terms of treatment is noted she has gastroesophageal reflux disease    The patient is being seen for ongoing neurological management           Past Medical History:   Diagnosis Date    Anemia     Chronic pain     due to RA    Depression     GERD (gastroesophageal reflux disease)     Hx of renal calculi          Hypercholesterolemia     Hypertension     Migraine     Morbid obesity ([address]     Ovarian cyst     Reflex sympathetic dystrophy since 2005    right arm; s/p injury - laceration in hand    Rheumatoid arthritis ([address]          Rheumatoid arthritis ([address] 9/26/2019    Sinus problem     TIA (transient ischemic attack) 9/26/2019       Past Surgical History:   Procedure Laterality Date    HX APPENDECTOMY      HX BREAST BIOPSY Left 4/27/2015    LEFT NIPPLE EXPLORATION WITH REMOVAL OF LATTISIMUS DUCT performed by Xi Drake MD at [address] [facility] HX CHOLECYSTECTOMY      HX ORTHOPAEDIC      rt hand    HX ORTHOPAEDIC      neck    HX SEPTOPLASTY  08/28/2019    FESS,SMRIT's    HX TOTAL ABDOMINAL HYSTERECTOMY           HX TUBAL LIGATION Family History   Problem Relation Age of Onset    Breast Cancer Paternal Aunt 39    Cancer Paternal Aunt         brst    Cancer Maternal Grandfather         colon    Colon Cancer Maternal Grandfather     Breast Cancer Paternal Grandmother 46    Cancer Paternal Grandmother         breast    Ovarian Cancer Paternal Grandmother     Cancer Mother         throat    Hypertension Mother     Cancer Father         non hodgkins lymphoma    Cancer Maternal Grandmother         ovarian    Breast Cancer Maternal Grandmother     Heart Disease Paternal Grandfather     Diabetes Paternal Grandfather     Breast Cancer Sister        Social History     Socioeconomic History    Marital status:      Spouse name: Not on file    Number of children: Not on file    Years of education: Not on file    Highest education level: Not on file   Tobacco Use    Smoking status: Never Smoker    Smokeless tobacco: Never Used   Substance and Sexual Activity    Alcohol use: Yes     Comment: \"once every couple years\"    Drug use: No    Sexual activity: Yes     Partners: Male     Birth control/protection: Surgical           Current Facility-Administered Medications:     aspirin delayed-release tablet 81 mg, 81 mg, Oral, DAILY, Claudine Haider MD, 81 mg at 09/27/19 1018    lisinopril-hydroCHLOROthiazide (PRINZIDE, ZESTORETIC) 20-25 mg per tablet 1 Tab, 1 Tab, Oral, DAILY, Claudine Haider MD, 1 Tab at 09/27/19 1018    traZODone (DESYREL) tablet 100 mg, 100 mg, Oral, QHS, Claudine Haider MD, 100 mg at 09/27/19 0243    pravastatin (PRAVACHOL) tablet 10 mg, 10 mg, Oral, QHS, Claudine Haider MD, 10 mg at 09/27/19 0243    sodium chloride (NS) flush 5-40 mL, 5-40 mL, IntraVENous, Q8H, Claudine Haider MD, 10 mL at 09/27/19 1323    sodium chloride (NS) flush 5-40 mL, 5-40 mL, IntraVENous, PRN, Claudine Haider MD    0.9% sodium chloride infusion, 125 mL/hr, IntraVENous, CONTINUOUS, Claudine Haider MD, Last Rate: 125 mL/hr at 09/27/19 1019, 125 mL/hr at 09/27/19 1019    ondansetron (ZOFRAN) injection 4 mg, 4 mg, IntraVENous, Q6H PRN, Gus Zaragoza MD    acetaminophen (TYLENOL) tablet 650 mg, 650 mg, Oral, Q4H PRN, Gus Zaragoza MD, 650 mg at 09/27/19 0243    bisacodyl (DULCOLAX) tablet 5 mg, 5 mg, Oral, DAILY PRN, Gus Zaragoza MD    enoxaparin (LOVENOX) injection 40 mg, 40 mg, SubCUTAneous, Q24H, Gus Zaragoza MD, 40 mg at 09/27/19 1018    ondansetron (ZOFRAN ODT) tablet 8 mg, 8 mg, Oral, Q8H PRN, Gus Zaragoza MD    DULoxetine (CYMBALTA) capsule 60 mg, 60 mg, Oral, Q12H, Gus Zaragoza MD, 60 mg at 09/27/19 1018    Allergies   Allergen Reactions    Pcn [Penicillins] Hives    Norco [Hydrocodone-Acetaminophen] Nausea and Vomiting       Review of Systems   Constitutional: Negative for activity change, appetite change and fever. HENT: Negative. Eyes: Negative. Respiratory: Negative. Cardiovascular: Negative. Gastrointestinal: Negative. Endocrine: Negative. Genitourinary: Negative. Musculoskeletal: Negative. Allergic/Immunologic: Negative. Neurological: Positive for facial asymmetry, weakness, light-headedness and numbness. Psychiatric/Behavioral: Negative. Denies diplopia dysarthria dysphasia dysphagia.   No history of seizures no recent head trauma has heartburn no corticosteroid usage currently  No shortness of breath no palpitations no malar rash    Remainder review of systems unremarkable    Visit Vitals  /55 (BP 1 Location: Right arm, BP Patient Position: At rest)   Pulse 68   Temp 98.1 °F (36.7 °C)   Resp 18   Ht 5' 8.5\" (1.74 m)   Wt 265 lb 6.4 oz (120.4 kg)   SpO2 98%   BMI 39.77 kg/m²     P/E      Most recent MRI  MRI BRAIN WITHOUT CONTRAST 9/27/2019     HISTORY: 24-year-old female with new onset left facial droop and dysarthria with  facial numbness and left arm numbness     TECHNIQUE: Sagittal and axial T1-weighted, axial T2-weighted, axial and coronal  FLAIR, axial T2-weighted gradient-echo, axial diffusion weighted images with ADC  maps of the brain.     COMPARISON: CT head, CT perfusion, CT angiogram 9/26/2019     FINDINGS: There is no acute infarction, acute intracranial hemorrhage, or  significant mass effect.     The left maxillary sinus and multiple ethmoid air cells are opacified. Debris is  present in the right maxillary sinus.     On the T2-weighted and FLAIR sequences, there are a few occasional punctate  white matter hyperintensities within the supratentorial brain. This is not an  uncommon finding which may be present with asymptomatic patients, with migraine  headaches or with mild chronic small vessel ischemic disease. I have reviewed the MRI on the PACS system and concur there is no evidence of acute infarction    Most recent MRA      Most recent CTA    CT angiogram reviewed which demonstrated no large vessel occlusion. The study is reviewed on the PACS system  Most recent Echo  No results found for this visit on 09/26/19. Most recent lipid panels  Lab Results   Component Value Date/Time    Cholesterol, total 228 (H) 01/29/2009 05:33 AM    HDL Cholesterol 46 01/29/2009 05:33 AM    LDL, calculated 147.2 (H) 01/29/2009 05:33 AM    VLDL, calculated 34.8 (H) 01/29/2009 05:33 AM    Triglyceride 174 (H) 01/29/2009 05:33 AM    CHOL/HDL Ratio  01/29/2009 05:33 AM     5.0  [NORMAL MALE]  1/2 AVERAGE  3.43      AVERAGE  4.97  2X  AVERAGE  9.55  3X  AVERAGE 23.99  [NORMAL FEMALE]  1/2 AVERAGE  3.27      AVERAGE  4.44  2X  AVERAGE  7.05  3X  AVERAGE 11.04       Most recent Hgb A1C  Lab Results   Component Value Date/Time    Hemoglobin A1c 5.7 01/29/2009 05:33 AM     General examination demonstrates the patient to be well-nourished. Skin turgor is normal.    Cardiac rate and rhythm is normal.  No murmurs appreciated.     Examination of the joints reveals no evidence of inflammatory peripheral arthritis in the hands, upper or lower extremities    . On formal neurologic examination the patient is alert, cooperative, bright individual in no acute distress. Interaction is normal.  Mood appears normal in terms of modulation. Patient does not appear depressed. The patient, functions normally. On formal mental status questions. In terms of orientation, recent and remote memory. Abstraction and calculation ability. Head and neck examination demonstrates no carotid bruits. No thyromegaly. No evidence of adenopathy in the neck. Cranial nerves  The optic discs are normal to funduscopic inspection. 3 4 6the extraocular movements are full . There is no ptosis. There is no lid lag and there is no nystagmus. 5 7 . The face is clearly asymmetric  8 . Hearing is intact. Agrawal and Charis do not lateralize. 9.  Palate elevates symmetrically in the midline. 10/11/12 speech is normal.  Tongue is midline in the mouth. Shoulder shrug is symmetric. Motor examination  The musculature appears reasonably developed throughout. No asymmetry in tone is appreciated. Individual muscle. Muscle strength is tested in both upper and lower extremities and is 5/5 throughout. There is no focal drift appreciated in either the upper or lower extremities. It is noted on functional examination, the patient has normal ability to stand up from a seated position and arms demonstrate normal posture with thumbs facing anteriorly. The cerebellar examination    Finger to nose and heel knee to shin testing is performed accurately. Midline cerebellar function and gait demonstrate the gait is normal and fluid. He'll toe walking is performed acceptably, and the GAIT  is not abnormally broad. The deep tendon reflexes. The deep tendon reflexes are assessed at biceps, brachioradialis and triceps in the upper extremities, and at the patella and Achilles in the lower extremities. The reflexes are 2/2 in the upper extremities.     The reflexes are 2/2 in the lower extremities. The plantar responses demonstrate a flexor response on the left and on the right side. Diagnoses and all orders for this visit:    1. Left-sided weakness    2. Nonintractable headache, unspecified chronicity pattern, unspecified headache type    Other orders  -     CT HEAD WO CONT; Standing  -     CBC WITH AUTOMATED DIFF; Standing  -     METABOLIC PANEL, COMPREHENSIVE; Standing  -     INSERT PERIPHERAL IV; Standing  -     PTT; Standing  -     POC PT/INR; Standing  -     EKG, 12 LEAD, INITIAL; Standing  -     NURSING-MISCELLANEOUS:; Standing  -     POC GLUCOSE; Standing  -     POC PT/INR;  Standing  -     GLUCOSE, POC; Standing  -     TROPONIN I; Standing  -     CTA HEAD NECK W WO CONT; Standing  -     CT PERF W CBF; Standing  -     iopamidol (ISOVUE-370) 76 % injection 90 mL  -     sodium chloride 0.9 % bolus infusion 100 mL  -     saline peripheral flush soln 10 mL  -     INITIAL PHYSICIAN ORDER: OBSERVATION/OUTPATIENT IN A BED; Standing  -     CARDIAC MONITORING; Standing  -     aspirin delayed-release tablet 81 mg  -     lisinopril-hydroCHLOROthiazide (PRINZIDE, ZESTORETIC) 20-25 mg per tablet 1 Tab  -     traZODone (DESYREL) tablet 100 mg  -     pravastatin (PRAVACHOL) tablet 10 mg  -     NURSING-MISCELLANEOUS:; Standing  -     VITAL SIGNS PER UNIT ROUTINE; Standing  -     NOTIFY PROVIDER: VITAL SIGNS CHANGES; Standing  -     NEURO/VASCULAR CHECKS; Standing  -     INTAKE AND OUTPUT; Standing  -     MEASURE HEIGHT; Standing  -     WEIGH PATIENT; Standing  -     FALL PRECAUTIONS; Standing  -     NURSING-MISCELLANEOUS:; Standing  -     NURSING-MISCELLANEOUS:; Standing  -     sodium chloride (NS) flush 5-40 mL  -     sodium chloride (NS) flush 5-40 mL  -     IP CONSULT TO STROKE COORDINATOR; Standing  -     IP CONSULT TO CASE MANAGEMENT; Standing  -     SLP--EVAL, DEVISE PLAN OF CARE AND TREAT; Standing  -     PT--EVAL, DEVISE PLAN OF CARE AND TREAT; Standing  -     OT--EVAL, DEVISE PLAN OF CARE AND TREAT; Standing  -     FULL CODE; Standing  -     NIH STROKE SCALE ASSESSMENT; Standing  -     RT--OXIMETRY, SPOT CHECK; Standing  -     DIET CARDIAC; Standing  -     0.9% sodium chloride infusion  -     ondansetron (ZOFRAN) injection 4 mg  -     acetaminophen (TYLENOL) tablet 650 mg  -     bisacodyl (DULCOLAX) tablet 5 mg  -     enoxaparin (LOVENOX) injection 40 mg  -     2D ECHO COMPLETE ADULT (TTE) W OR WO CONTR; Standing  -     MRI BRAIN WO CONT; Standing  -     LIPID PANEL; Standing  -     HEMOGLOBIN A1C WITH EAG; Standing  -     SED RATE, AUTOMATED; Standing  -     CRP, HIGH SENSITIVITY; Standing  -     DRUG SCREEN, URINE; Standing  -     TSH 3RD GENERATION; Standing  -     T4, FREE; Standing  -     MAGNESIUM; Standing  -     PHOSPHORUS; Standing  -     ondansetron (ZOFRAN ODT) tablet 8 mg  -     DULoxetine (CYMBALTA) capsule 60 mg  -     IP CONSULT TO WOUND CARE; Standing  -     MRI CERV SPINE WO CONT; Standing  -     NURSING-MISCELLANEOUS:; Standing  -     WOUND CARE, DRESSING CHANGE; Standing  -     perflutren lipid microspheres (DEFINITY) in NS bolus IV  -     IP CONSULT TO NEUROLOGY; Standing  -     NURSING-MISCELLANEOUS:; Standing  -     NURSING-MISCELLANEOUS:; Standing        Impression    Nonspecific event I do not see evidence to suggest stroke. Patient regardless clearly at substantial risk in terms of cerebrovascular disease based upon multiple risk factors which are self-evident in terms of morbid obesity metabolic syndrome dyslipidemia blood pressure etc.    Addressing all of these issues will be challenging    Would agree with long-term aspirin statin and optimisation of metabolic status along with diet and exercise.   Patient compliance will be a substantial issuewith reference to diet the patient was eating a full breakfast from [employer] including biscuits hot cakes syrup etc. at the time I saw her        Thomas Stewart MD

## 2019-09-27 NOTE — PROGRESS NOTES
Care Management Interventions  PCP Verified by CM: Yes  Last Visit to PCP: 09/28/18  Mode of Transport at Discharge: Other (see comment)( Renee Nearing Spouse [phone] )  Transition of Care Consult (CM Consult): Discharge Planning  Discharge Durable Medical Equipment: No  Physical Therapy Consult: No  Occupational Therapy Consult: No  Speech Therapy Consult: No  Current Support Network: Lives with Spouse  Confirm Follow Up Transport: Family  Plan discussed with Pt/Family/Caregiver: Yes  Freedom of Choice Offered: Yes  Discharge Location  Discharge Placement: Home      Pt admitted to 3rd floor [facility] for TIA . CM met with pt to discuss CM needs & DCP. Pt is A&Ox4. Pt is indep at home with all ADLS. Pt lives with spouse. Pt has no DME needs. Pt has no difficulty with obtaining medications or transport. DCP home with spouse. PT OT Eval: outpatient therapy. CM to continue to monitor.

## 2019-09-27 NOTE — PROGRESS NOTES
Problem: Self Care Deficits Care Plan (Adult)  Goal: *Acute Goals and Plan of Care (Insert Text)  Description  1. Pt will toilet with SBA   2. Pt will complete functional mobility for ADLs with SBA  3. Pt will complete lower body dressing with SBA using AE as needed  4. Pt will complete grooming and hygiene at sink with SBA  5. Pt will demonstrate independence with HEP to promote increased BUE strength, coordination,and functional use for ADLs  6. Pt will tolerate 23 minutes functional activity without rest breaks to promote increased endurance for ADLs      Timeframe: 7 days     Outcome: Progressing Towards Goal     OCCUPATIONAL THERAPY: Initial Assessment 9/27/2019  OBSERVATION:    Payor: Daphne Jones / Plan: [place] [facility] / Product Type: PPO /      NAME/AGE/GENDER: Jeet Escoto is a 54 y.o. female   PRIMARY DIAGNOSIS:  TIA (transient ischemic attack) [G45.9] TIA (transient ischemic attack)   TIA (transient ischemic attack)          ICD-10: Treatment Diagnosis:    Generalized Muscle Weakness (M62.81)  Other lack of cordination (R27.8)   Precautions/Allergies:     Pcn [penicillins] and Norco [hydrocodone-acetaminophen]      ASSESSMENT:     Ms. Mata Bernabe was admitted with L facial droop, L side weakness and numbness. Pt recently obtained L toe wound d/t lack of sensation. Pt lives with her  and is independent at baseline, does not use an AD for mobility. This session, pt presented with deficits in strength, coordination, mobility, and balance impacting ADLs. LUE is generally weak, grossly 4/5. LUE lags behind L with ROM but pt was eventually able to obtain full range. Coordination is moderately impaired. Pt donned/ doffed socks with CGA and extra time, demonstrated mobility with CGA with RW. Pt slightly unsteady and required UE support to maintain balance. Pt c/o headache, denied change in vision.  Pt is below her functional baseline and would benefit from skilled OT services to address deficits. This section established at most recent assessment   PROBLEM LIST (Impairments causing functional limitations):  Decreased Strength  Decreased ADL/Functional Activities  Decreased Transfer Abilities  Decreased Balance  Decreased Flexibility/Joint Mobility   INTERVENTIONS PLANNED: (Benefits and precautions of occupational therapy have been discussed with the patient.)  Activities of daily living training  Adaptive equipment training  Balance training  Therapeutic activity  Therapeutic exercise     TREATMENT PLAN: Frequency/Duration: Follow patient 3 times/ week to address above goals. Rehabilitation Potential For Stated Goals: Good     REHAB RECOMMENDATIONS (at time of discharge pending progress):    Placement: It is my opinion, based on this patient's performance to date, that Ms. Angi Mayfield may benefit from OUTPATIENT THERAPY after discharge due to the functional deficits listed above that are likely to improve with skilled rehabilitation because because he/she will benefit from the individualized approach tailored to his/her deficits. Equipment[place]   3[place] [facility] [place]               OCCUPATIONAL PROFILE AND HISTORY:   History of Present Injury/Illness (Reason for Referral):  See H&P  Past Medical History/Comorbidities:   Ms. Angi Mayfield  has a past medical history of Anemia, Chronic pain, Depression, GERD (gastroesophageal reflux disease), renal calculi, Hypercholesterolemia, Hypertension, Migraine, Morbid obesity ([address] Ovarian cyst, Reflex sympathetic dystrophy (since 2005), Rheumatoid arthritis ([address] Rheumatoid arthritis ([address] (9/26/2019), Sinus problem, and TIA (transient ischemic attack) (9/26/2019).  She also has no past medical history of Aneurysm ([address] Arrhythmia, Asthma, CAD (coronary artery disease), Cancer ([address] Chronic kidney disease, Chronic obstructive pulmonary disease ([address] Coagulation disorder ([address] Diabetes ([address] Difficult intubation, Heart failure ([address] Liver disease, Malignant hyperthermia due to anesthesia, Nausea & vomiting, Pseudocholinesterase deficiency, PUD (peptic ulcer disease), Seizures ([facility] Thromboembolus ([facility] Thyroid disease, Unspecified adverse effect of anesthesia, or Unspecified sleep apnea. Ms. Angi Mayfield  has a past surgical history that includes hx cholecystectomy; hx appendectomy; hx total abdominal hysterectomy; hx tubal ligation; hx breast biopsy (Left, 4/27/2015); hx orthopaedic; hx orthopaedic; and hx septoplasty (08/28/2019). Social History/Living Environment:   Home Environment: Private residence  # Steps to Enter: 2  Rails to Enter: Yes  Hand Rails : Bilateral  One/Two Story Residence: Two story  # of Interior Steps: 15  Interior Rails: Left  Living Alone: No  Support Systems: Spouse/Significant Other/Partner, Family member(s)  Patient Expects to be Discharged to[de-identified] Private residence  Current DME Used/Available at Home: None  Tub or Shower Type: Shower  Prior Level of Function/Work/Activity:  Independent, lives with family, no AD     Number of Personal Factors/Comorbidities that affect the Plan of Care: Expanded review of therapy/medical records (1-2):  MODERATE COMPLEXITY   ASSESSMENT OF OCCUPATIONAL PERFORMANCE[de-identified]   Activities of Daily Living:   Basic ADLs (From Assessment) Complex ADLs (From Assessment)   Feeding: Setup  Oral Facial Hygiene/Grooming: Contact guard assistance  Bathing: Contact guard assistance, Minimum assistance  Upper Body Dressing: Contact guard assistance  Lower Body Dressing: Contact guard assistance  Toileting: Contact guard assistance Instrumental ADL  Meal Preparation: Minimum assistance  Homemaking: Minimum assistance   Grooming/Bathing/Dressing Activities of Daily Living     Cognitive Retraining  Safety/Judgement: Awareness of environment; Fall prevention                       Bed/Mat Mobility  Supine to Sit: Stand-by assistance  Sit to Stand: Contact guard assistance  Stand to Sit: Contact guard assistance  Bed to Chair: Contact guard assistance;Minimum assistance  Scooting: Supervision     Most Recent Physical Functioning:   Gross Assessment:  AROM: Within functional limits  Strength: Generally decreased, functional  Coordination: Generally decreased, functional  Tone: Normal  Sensation: Impaired               Posture:     Balance:  Sitting: Intact  Standing: Impaired  Standing - Static: Fair  Standing - Dynamic : Fair Bed Mobility:  Supine to Sit: Stand-by assistance  Scooting: Supervision  Wheelchair Mobility:     Transfers:  Sit to Stand: Contact guard assistance  Stand to Sit: Contact guard assistance  Bed to Chair: Contact guard assistance;Minimum assistance            Patient Vitals for the past 6 hrs:   BP BP Patient Position SpO2 Pulse   19 1010 92/51 Sitting 96 % 76       Mental Status  Neurologic State: Alert  Orientation Level: Oriented X4  Cognition: Follows commands  Perception: Appears intact  Perseveration: No perseveration noted  Safety/Judgement: Awareness of environment, Fall prevention                          Physical Skills Involved:  Range of Motion  Balance  Strength  Activity Tolerance  Sensation  Fine Motor Control  Gross Motor Control Cognitive Skills Affected (resulting in the inability to perform in a timely and safe manner):  none  Psychosocial Skills Affected:  Habits/Routines  Environmental Adaptation   Number of elements that affect the Plan of Care: 5+:  HIGH COMPLEXITY   CLINICAL DECISION MAKIN [facility] [address] [address] -PAC 6 Clicks   Daily Activity Inpatient Short Form  How much help from another person does the patient currently need. .. Total A Lot A Little None   1. Putting on and taking off regular lower body clothing? ? 1   ? 2   ? 3   ? 4   2. Bathing (including washing, rinsing, drying)? ? 1   ? 2   ? 3   ? 4   3. Toileting, which includes using toilet, bedpan or urinal?   ? 1   ? 2   ? 3   ? 4   4. Putting on and taking off regular upper body clothing? ? 1   ? 2   ? 3   ? 4   5.   Taking care of personal grooming such as brushing teeth? ? 1   ? 2   ? 3   ? 4   6. Eating meals? ? 1   ? 2   ? 3   ? 4   © 2007, Trustees of [address] [place] [address] [address] [address], under license to [employer]. All rights reserved      Score:  Initial: 18 Most Recent: X (Date: -- )    Interpretation of Tool:  Represents activities that are increasingly more difficult (i.e. Bed mobility, Transfers, Gait). Medical Necessity:     Patient demonstrates good   rehab potential due to higher previous functional level. Reason for Services/Other Comments:  Patient continues to require present interventions due to patient's inability to independently complete ADLs   . Use of outcome tool(s) and clinical judgement create a POC that gives a: MODERATE COMPLEXITY         TREATMENT:   (In addition to Assessment/Re-Assessment sessions the following treatments were rendered)     Pre-treatment Symptoms/Complaints:    Pain: Initial:   Pain Intensity 1: 8  Pain Location 1: Head  Pain Intervention(s) 1: Nurse notified  Post Session:  8     Assessment/Reassessment only, no treatment provided today    Braces/Orthotics/Lines/Etc:   O2 Device: Room air  Treatment/Session Assessment:    Response to Treatment:  no adverse reaction   Interdisciplinary Collaboration:   Occupational Therapist  Registered Nurse  After treatment position/precautions:   Supine in bed  Bed alarm/tab alert on  Bed/Chair-wheels locked  Call light within reach  RN notified  Family at bedside   Compliance with Program/Exercises: Compliant all of the time. Recommendations/Intent for next treatment session: \"Next visit will focus on advancements to more challenging activities and reduction in assistance provided\".   Total Treatment Duration:  OT Patient Time In/Time Out  Time In: 1025  Time Out: [address] [facility] [place] Reyes

## 2019-09-27 NOTE — PROGRESS NOTES
Dual skin assessment completed upon admission. 09/27/19 0015   Skin Integumentary   Skin Integumentary (WDL) X    Pressure  Injury Documentation No Pressure Injury Noted-Pressure Ulcer Prevention Initiated   Skin Color Appropriate for ethnicity   Skin Condition/Temp Warm;Dry   Skin Integrity Wound (add Wound LDA); Scars (comment); Tattoos (comment)  (L second toe; scratch on upper abdomen)   Turgor Non-tenting   Hair Growth Present   Varicosities Absent     Sacrum skin is intact. Heels dry but intact. Scar from previous surgery noted on upper abdomen. A scratch was noted on upper abdomen also. Per pt, she scratched herself prior to admission. Tattoo visualized on lower back. Wound noted on left second toe. Per pt, she has received the wound from a month ago and had not healed.

## 2019-09-27 NOTE — PROGRESS NOTES
TRANSFER - IN REPORT:    Verbal report received from JAGDISH Rodgers on Grace Berry being received from ER for routine progression of care      Report consisted of patients Situation, Background, Assessment and Recommendations(SBAR). Information from the following report(s) Kardex, ED Summary, Intake/Output, MAR, Recent Results and Cardiac Rhythm SR was reviewed with the receiving nurse. Opportunity for questions and clarification was provided. Assessment completed upon patients arrival to unit and care assumed.

## 2019-09-28 VITALS
RESPIRATION RATE: 17 BRPM | HEART RATE: 75 BPM | HEIGHT: 69 IN | SYSTOLIC BLOOD PRESSURE: 92 MMHG | OXYGEN SATURATION: 96 % | TEMPERATURE: 98.2 F | WEIGHT: 266.5 LBS | DIASTOLIC BLOOD PRESSURE: 60 MMHG | BODY MASS INDEX: 39.47 KG/M2

## 2019-09-28 LAB
ANION GAP SERPL CALC-SCNC: 5 MMOL/L (ref 7–16)
APPEARANCE UR: ABNORMAL
BACTERIA URNS QL MICRO: 0 /HPF
BASOPHILS # BLD: 0 K/UL (ref 0–0.2)
BASOPHILS NFR BLD: 0 % (ref 0–2)
BILIRUB UR QL: NEGATIVE
BUN SERPL-MCNC: 11 MG/DL (ref 6–23)
CALCIUM SERPL-MCNC: 8.6 MG/DL (ref 8.3–10.4)
CASTS URNS QL MICRO: ABNORMAL /LPF
CHLORIDE SERPL-SCNC: 102 MMOL/L (ref 98–107)
CHOLEST SERPL-MCNC: 181 MG/DL
CO2 SERPL-SCNC: 31 MMOL/L (ref 21–32)
COLOR UR: YELLOW
CREAT SERPL-MCNC: 0.67 MG/DL (ref 0.6–1)
CRP SERPL HS-MCNC: 26 MG/L
DIFFERENTIAL METHOD BLD: ABNORMAL
EOSINOPHIL # BLD: 0.2 K/UL (ref 0–0.8)
EOSINOPHIL NFR BLD: 3 % (ref 0.5–7.8)
EPI CELLS #/AREA URNS HPF: ABNORMAL /HPF
ERYTHROCYTE [DISTWIDTH] IN BLOOD BY AUTOMATED COUNT: 16.1 % (ref 11.9–14.6)
ERYTHROCYTE [SEDIMENTATION RATE] IN BLOOD: 86 MM/HR (ref 0–20)
EST. AVERAGE GLUCOSE BLD GHB EST-MCNC: 140 MG/DL
GLUCOSE SERPL-MCNC: 111 MG/DL (ref 65–100)
GLUCOSE UR STRIP.AUTO-MCNC: NEGATIVE MG/DL
HBA1C MFR BLD: 6.5 % (ref 4.8–6)
HCT VFR BLD AUTO: 31.2 % (ref 35.8–46.3)
HDLC SERPL-MCNC: 40 MG/DL (ref 40–60)
HDLC SERPL: 4.5 {RATIO}
HGB BLD-MCNC: 9.6 G/DL (ref 11.7–15.4)
HGB UR QL STRIP: NEGATIVE
IMM GRANULOCYTES # BLD AUTO: 0 K/UL (ref 0–0.5)
IMM GRANULOCYTES NFR BLD AUTO: 1 % (ref 0–5)
KETONES UR QL STRIP.AUTO: NEGATIVE MG/DL
LDLC SERPL CALC-MCNC: 66.4 MG/DL
LEUKOCYTE ESTERASE UR QL STRIP.AUTO: ABNORMAL
LIPID PROFILE,FLP: ABNORMAL
LYMPHOCYTES # BLD: 2.2 K/UL (ref 0.5–4.6)
LYMPHOCYTES NFR BLD: 40 % (ref 13–44)
MAGNESIUM SERPL-MCNC: 2.1 MG/DL (ref 1.8–2.4)
MCH RBC QN AUTO: 25.7 PG (ref 26.1–32.9)
MCHC RBC AUTO-ENTMCNC: 30.8 G/DL (ref 31.4–35)
MCV RBC AUTO: 83.6 FL (ref 79.6–97.8)
MONOCYTES # BLD: 0.4 K/UL (ref 0.1–1.3)
MONOCYTES NFR BLD: 7 % (ref 4–12)
NEUTS SEG # BLD: 2.7 K/UL (ref 1.7–8.2)
NEUTS SEG NFR BLD: 49 % (ref 43–78)
NITRITE UR QL STRIP.AUTO: NEGATIVE
NRBC # BLD: 0 K/UL (ref 0–0.2)
PH UR STRIP: 6.5 [PH] (ref 5–9)
PHOSPHATE SERPL-MCNC: 4.3 MG/DL (ref 2.5–4.5)
PLATELET # BLD AUTO: 161 K/UL (ref 150–450)
PMV BLD AUTO: 9.7 FL (ref 9.4–12.3)
POTASSIUM SERPL-SCNC: 3.9 MMOL/L (ref 3.5–5.1)
PROT UR STRIP-MCNC: NEGATIVE MG/DL
RBC # BLD AUTO: 3.73 M/UL (ref 4.05–5.2)
RBC #/AREA URNS HPF: ABNORMAL /HPF
SODIUM SERPL-SCNC: 138 MMOL/L (ref 136–145)
SP GR UR REFRACTOMETRY: 1.02 (ref 1–1.02)
T4 FREE SERPL-MCNC: 1.2 NG/DL (ref 0.9–1.8)
TRIGL SERPL-MCNC: 373 MG/DL (ref 35–150)
UROBILINOGEN UR QL STRIP.AUTO: 1 EU/DL (ref 0.2–1)
VLDLC SERPL CALC-MCNC: 74.6 MG/DL (ref 6–23)
WBC # BLD AUTO: 5.5 K/UL (ref 4.3–11.1)
WBC URNS QL MICRO: >100 /HPF

## 2019-09-28 PROCEDURE — 85652 RBC SED RATE AUTOMATED: CPT

## 2019-09-28 PROCEDURE — 80048 BASIC METABOLIC PNL TOTAL CA: CPT

## 2019-09-28 PROCEDURE — 74011250636 HC RX REV CODE- 250/636: Performed by: HOSPITALIST

## 2019-09-28 PROCEDURE — 84439 ASSAY OF FREE THYROXINE: CPT

## 2019-09-28 PROCEDURE — 85025 COMPLETE CBC W/AUTO DIFF WBC: CPT

## 2019-09-28 PROCEDURE — 74011000302 HC RX REV CODE- 302: Performed by: NURSE PRACTITIONER

## 2019-09-28 PROCEDURE — 96372 THER/PROPH/DIAG INJ SC/IM: CPT

## 2019-09-28 PROCEDURE — 80061 LIPID PANEL: CPT

## 2019-09-28 PROCEDURE — 86580 TB INTRADERMAL TEST: CPT | Performed by: NURSE PRACTITIONER

## 2019-09-28 PROCEDURE — 36415 COLL VENOUS BLD VENIPUNCTURE: CPT

## 2019-09-28 PROCEDURE — 99218 HC RM OBSERVATION: CPT

## 2019-09-28 PROCEDURE — 84100 ASSAY OF PHOSPHORUS: CPT

## 2019-09-28 PROCEDURE — 80307 DRUG TEST PRSMV CHEM ANLYZR: CPT

## 2019-09-28 PROCEDURE — 74011250637 HC RX REV CODE- 250/637: Performed by: HOSPITALIST

## 2019-09-28 PROCEDURE — 83735 ASSAY OF MAGNESIUM: CPT

## 2019-09-28 PROCEDURE — 81001 URINALYSIS AUTO W/SCOPE: CPT

## 2019-09-28 PROCEDURE — 83036 HEMOGLOBIN GLYCOSYLATED A1C: CPT

## 2019-09-28 PROCEDURE — 86141 C-REACTIVE PROTEIN HS: CPT

## 2019-09-28 RX ORDER — PREDNISONE 20 MG/1
60 TABLET ORAL
Qty: 15 TAB | Refills: 0 | Status: SHIPPED | OUTPATIENT
Start: 2019-09-28 | End: 2020-06-07

## 2019-09-28 RX ORDER — PRAVASTATIN SODIUM 40 MG/1
40 TABLET ORAL
Qty: 30 TAB | Refills: 0 | Status: SHIPPED | OUTPATIENT
Start: 2019-09-28 | End: 2020-09-29

## 2019-09-28 RX ORDER — SULFAMETHOXAZOLE AND TRIMETHOPRIM 800; 160 MG/1; MG/1
1 TABLET ORAL 2 TIMES DAILY
Qty: 6 TAB | Refills: 0 | Status: SHIPPED | OUTPATIENT
Start: 2019-09-28 | End: 2020-01-30

## 2019-09-28 RX ORDER — IBUPROFEN 400 MG/1
800 TABLET ORAL
Status: COMPLETED | OUTPATIENT
Start: 2019-09-28 | End: 2019-09-28

## 2019-09-28 RX ORDER — VALACYCLOVIR HYDROCHLORIDE 1 G/1
1000 TABLET, FILM COATED ORAL 3 TIMES DAILY
Qty: 21 TAB | Refills: 0 | Status: SHIPPED | OUTPATIENT
Start: 2019-09-28 | End: 2020-01-30

## 2019-09-28 RX ADMIN — ASPIRIN 81 MG: 81 TABLET ORAL at 09:01

## 2019-09-28 RX ADMIN — IBUPROFEN 600 MG: 400 TABLET ORAL at 13:05

## 2019-09-28 RX ADMIN — LISINOPRIL AND HYDROCHLOROTHIAZIDE 1 TABLET: 25; 20 TABLET ORAL at 09:01

## 2019-09-28 RX ADMIN — Medication 10 ML: at 06:41

## 2019-09-28 RX ADMIN — TUBERCULIN PURIFIED PROTEIN DERIVATIVE 5 UNITS: 5 INJECTION, SOLUTION INTRADERMAL at 00:35

## 2019-09-28 RX ADMIN — ENOXAPARIN SODIUM 40 MG: 40 INJECTION SUBCUTANEOUS at 09:00

## 2019-09-28 RX ADMIN — IBUPROFEN 800 MG: 400 TABLET ORAL at 00:32

## 2019-09-28 RX ADMIN — DULOXETINE HYDROCHLORIDE 60 MG: 60 CAPSULE, DELAYED RELEASE ORAL at 09:01

## 2019-09-28 NOTE — DISCHARGE INSTRUCTIONS
DISCHARGE SUMMARY from Nurse    PATIENT INSTRUCTIONS:    After general anesthesia or intravenous sedation, for 24 hours or while taking prescription Narcotics:  · Limit your activities  · Do not drive and operate hazardous machinery  · Do not make important personal or business decisions  · Do  not drink alcoholic beverages  · If you have not urinated within 8 hours after discharge, please contact your surgeon on call. Report the following to your surgeon:  · Excessive pain, swelling, redness or odor of or around the surgical area  · Temperature over 100.5  · Nausea and vomiting lasting longer than 4 hours or if unable to take medications  · Any signs of decreased circulation or nerve impairment to extremity: change in color, persistent  numbness, tingling, coldness or increase pain  · Any questions    What to do at Home:  Recommended activity: Activity as tolerated. If you experience any of the following symptoms dizziness, weakness, stroke symptoms, please follow up with MD.    *  Please give a list of your current medications to your Primary Care Provider. *  Please update this list whenever your medications are discontinued, doses are      changed, or new medications (including over-the-counter products) are added. *  Please carry medication information at all times in case of emergency situations. These are general instructions for a healthy lifestyle:    No smoking/ No tobacco products/ Avoid exposure to second hand smoke  Surgeon General's Warning:  Quitting smoking now greatly reduces serious risk to your health.     Obesity, smoking, and sedentary lifestyle greatly increases your risk for illness    A healthy diet, regular physical exercise & weight monitoring are important for maintaining a healthy lifestyle    You may be retaining fluid if you have a history of heart failure or if you experience any of the following symptoms:  Weight gain of 3 pounds or more overnight or 5 pounds in a week, increased swelling in our hands or feet or shortness of breath while lying flat in bed. Please call your doctor as soon as you notice any of these symptoms; do not wait until your next office visit. The discharge information has been reviewed with the patient. The patient verbalized understanding. Discharge medications reviewed with the patient and appropriate educational materials and side effects teaching were provided.   ___________________________________________________________________________________________________________________________________

## 2019-09-28 NOTE — PROGRESS NOTES
Pt is for discharge home today with no needs/supportive care orders recieved for CM at this time. Spoke with pt and she denied any need for follow up therapy at this time. Care Management Interventions  PCP Verified by CM: Yes  Last Visit to PCP: 09/28/18  Mode of Transport at Discharge:  Other (see comment)( Renee Nearing Spouse [phone] )  Transition of Care Consult (CM Consult): Discharge Planning  Discharge Durable Medical Equipment: No  Physical Therapy Consult: No  Occupational Therapy Consult: No  Speech Therapy Consult: No  Current Support Network: Lives with Spouse  Confirm Follow Up Transport: Family  Plan discussed with Pt/Family/Caregiver: Yes  Freedom of Choice Offered: Yes  Discharge Location  Discharge Placement: Home

## 2019-09-28 NOTE — PROGRESS NOTES
09/27/19 2015   NIH Stroke Scale   Interval Other (comment)  (q4 neuro)   LOC 0   LOC Questions 0   LOC Commands 0   Best Gaze 0   Visual 0   Facial Palsy 2   Motor Right Arm 0   Motor Left Arm 1   Motor Right Leg 0   Motor Left Leg 1   Limb Ataxia 0   Sensory 2   Best Language 0   Dysarthria 0   Extinction and Inattention 0   Total 6     Baseline shift assessment. Unchanged.

## 2019-09-28 NOTE — PROGRESS NOTES
Visit by spiritual volunteer.     Rollo Hodgkins, staff [address] [facility]  /   [email or web address]

## 2019-09-28 NOTE — PROGRESS NOTES
Bedside and Verbal shift change report given to Jayden Mccullough RN (oncoming nurse) by self (offgoing nurse).  Report included the following information SBAR, Kardex, Intake/Output, MAR, Recent Results and Cardiac Rhythm SR.

## 2019-09-28 NOTE — PROGRESS NOTES
Discharge instructions given and reviewed with patient and . Prescriptions given, questions answered. Discharged home.

## 2019-09-28 NOTE — DISCHARGE SUMMARY
Hospitalist Discharge Summary     Patient ID:  Grace Berry  [id number]  54 y.o.  1970  Admit date: 9/26/2019  8:19 PM  Discharge date and time: 9/28/2019  Attending: Armand Cody MD  PCP:  Abdirahman Ross MD  Treatment Team: Attending Provider: Armand Cody MD; Primary Nurse: Stan Cantor; Utilization Review: Lulu Laird RN; Hospitalist: Brandy Quinn NP; Care Manager: Marzena Quiroz; Consulting Provider: Mckinley Hill MD    Principal Diagnosis TIA (transient ischemic attack)   Principal Problem:    TIA (transient ischemic attack) (9/26/2019)    Active Problems:    Facial droop (9/26/2019)      Reflex sympathetic dystrophy (9/26/2019)      Overview: right arm; s/p injury - laceration in hand      Hypertension (9/26/2019)      Rheumatoid arthritis ([facility] [address] (9/26/2019)      Overview:        Chronic pain (9/26/2019)      Overview: due to RA      Depression (9/26/2019)     Hospital Course: \"Patient presented to ER 9/26 with complaints of right facial numbness and sensation of edema, left side weakness that began when feeding her children about 1.5 hours PTA. Also reported headache for about a week with intermittent nausea and vomiting after sinus surgery:  Septoplasty on 8/28/19. Some slurred speech. No additional symptoms. No history of CVA. Code S called, CT head negative. Evaluated by TeleNeuro with refusal of tPa offered. Admitted to Observation with stroke protocols in place. Failed first bedside swallow eval, passed second. \"    Cholesterol 373, LDL 66.4, A1C 6.5. ECHO completed but final report pending. PCP to follow up on official report for ECHO. TSH 1.2. CT head neck showed no evidence of large vessel occlusion. MRI brain without contrast showed Occasional nonspecific punctate white matter hyperintensities present. This pattern may be present with migraine headaches or mild chronic small vessel ischemic disease.  MRI cervical spine showed anterior cervical fusion from c4-c7 and mild right foraminal stenosis at C3-C4. Neurology consulted who did not think was CVA. Would like to continue ASA and statin that I have changed to moderate intensity. Patient had persistent left facial droop with suspicion for bells palsy. Will send home with steroid burst and Valacyclovir. Patient with dysuria. UA consistent with UTI. Ordered urine culture that will need to be followed up with PCP. Urine cultures in the past have grown Klebsiella sensitive to Bactrim. Patient had noted toe injury. Wound care saw and recommended outpatient wound care follow up. If worsening weakness, chest pain, or SOB, please come back to the ED. Repeat CMP in three days since on bactrim and Valacyclovir     Update@ 14:43- spoke with neuro who did not think steroids needed. I updated patient and told her not to take the prednisone. Please refer to the admission H&P for details of presentation. In summary, the patient is stable for discharge. Significant Diagnostic Studies:       Labs: Results:       Chemistry Recent Labs     09/28/19 0432 09/26/19 2023   * 141*    135*   K 3.9 3.6    100   CO2 31 29   BUN 11 16   CREA 0.67 0.64   CA 8.6 9.5   AGAP 5* 6*   AP  --  86   TP  --  7.8   ALB  --  3.1*   GLOB  --  4.7*   AGRAT  --  0.7*      CBC w/Diff Recent Labs     09/28/19 0432 09/26/19 2023   WBC 5.5 5.9   RBC 3.73* 4.18   HGB 9.6* 10.8*   HCT 31.2* 34.4*    179   GRANS 49 55   LYMPH 40 35   EOS 3 3      Cardiac Enzymes No results for input(s): CPK, CKND1, JENNIFER in the last 72 hours.     No lab exists for component: CKRMB, TROIP   Coagulation Recent Labs     09/26/19 2023 09/26/19 2020   INR  --  1.2   APTT 31.0  --        Lipid Panel Lab Results   Component Value Date/Time    Cholesterol, total 181 09/28/2019 04:32 AM    HDL Cholesterol 40 09/28/2019 04:32 AM    LDL, calculated 66.4 09/28/2019 04:32 AM    VLDL, calculated 74.6 (H) 09/28/2019 04:32 AM Triglyceride 373 (H) 09/28/2019 04:32 AM    CHOL/HDL Ratio 4.5 09/28/2019 04:32 AM      BNP No results for input(s): BNPP in the last 72 hours. Liver Enzymes Recent Labs     09/26/19 2023   TP 7.8   ALB 3.1*   AP 86   SGOT 11*      Thyroid Studies Lab Results   Component Value Date/Time    T4, Total 9.8 01/29/2009 05:33 AM    T3 Uptake 34 01/29/2009 05:33 AM            Discharge Exam:  Visit Vitals  BP 92/60 (BP 1 Location: Left arm, BP Patient Position: At rest)   Pulse 75   Temp 98.2 °F (36.8 °C)   Resp 17   Ht 5' 8.5\" (1.74 m)   Wt 120.9 kg (266 lb 8 oz)   SpO2 96%   BMI 39.93 kg/m²     General appearance: alert, cooperative, no distress, appears stated age. Left facial droop. Lungs: clear to auscultation bilaterally  Heart: regular rate and rhythm, S1, S2 normal,  Abdomen: soft, non-tender. Bowel sounds normal. No masses,  no organomegaly  Extremities: no cyanosis or edema. Good  strength bilaterally   Neurologic: Grossly normal    Disposition:Home   Discharge Condition: stable  Patient Instructions: As above   Current Discharge Medication List      START taking these medications    Details   trimethoprim-sulfamethoxazole (BACTRIM DS, SEPTRA DS) 160-800 mg per tablet Take 1 Tab by mouth two (2) times a day. Qty: 6 Tab, Refills: 0      predniSONE (DELTASONE) 20 mg tablet Take 60 mg by mouth daily (with breakfast). Qty: 15 Tab, Refills: 0      valACYclovir (VALTREX) 1 gram tablet Take 1 Tab by mouth three (3) times daily. Qty: 21 Tab, Refills: 0         CONTINUE these medications which have CHANGED    Details   pravastatin (PRAVACHOL) 40 mg tablet Take 1 Tab by mouth nightly. Qty: 30 Tab, Refills: 0         CONTINUE these medications which have NOT CHANGED    Details   traZODone (DESYREL) 50 mg tablet Take 100 mg by mouth nightly. aspirin delayed-release 81 mg tablet Take  by mouth daily. ibuprofen (MOTRIN) 400 mg tablet Take  by mouth every six (6) hours as needed for Pain. lisinopril-hydrochlorothiazide (PRINZIDE, ZESTORETIC) 20-25 mg per tablet 1 Tab daily. Associated Diagnoses: Bloody discharge from left nipple; Breast pain, left; Family history of breast cancer      DULoxetine (CYMBALTA) 60 mg capsule Take 60 mg by mouth two (2) times a day. TAKE AM OF SURGERY WITH SMALL SIP OF WATER      metFORMIN (GLUCOPHAGE) 500 mg tablet Take  by mouth two (2) times daily (with meals). ondansetron hcl (ZOFRAN, AS HYDROCHLORIDE,) 8 mg tablet Take 1 Tab by mouth every eight (8) hours as needed for Nausea. Qty: 10 Tab, Refills: 0             Activity: Up and yang   Diet:Cardiac   Wound Care:xeroform over wound, and wrapped with kev gauze wrap. Recommend dressing change daily and as needed    Follow-up PCP in one week. Dr. Keyla Ochoa in two weeks.    ·     Time spent to discharge patient 35 minutes  Signed:  Rocio Mcallister DO  9/28/2019  1:13 PM

## 2019-09-29 LAB
AMPHET UR QL SCN: NEGATIVE
BARBITURATES UR QL SCN: NEGATIVE
BENZODIAZ UR QL: NEGATIVE
CANNABINOIDS UR QL SCN: NEGATIVE
COCAINE UR QL SCN: NEGATIVE
METHADONE UR QL: NEGATIVE
OPIATES UR QL: NEGATIVE
PCP UR QL: NEGATIVE

## 2019-11-02 ENCOUNTER — APPOINTMENT (OUTPATIENT)
Dept: GENERAL RADIOLOGY | Age: 49
End: 2019-11-02
Attending: NURSE PRACTITIONER
Payer: COMMERCIAL

## 2019-11-02 ENCOUNTER — HOSPITAL ENCOUNTER (EMERGENCY)
Age: 49
Discharge: HOME | End: 2019-11-02
Attending: EMERGENCY MEDICINE
Payer: COMMERCIAL

## 2019-11-02 VITALS
RESPIRATION RATE: 18 BRPM | OXYGEN SATURATION: 98 % | BODY MASS INDEX: 32.29 KG/M2 | SYSTOLIC BLOOD PRESSURE: 105 MMHG | HEIGHT: 69 IN | DIASTOLIC BLOOD PRESSURE: 65 MMHG | TEMPERATURE: 98 F | WEIGHT: 218 LBS | HEART RATE: 85 BPM

## 2019-11-02 DIAGNOSIS — S82.892A CLOSED FRACTURE OF LEFT ANKLE, INITIAL ENCOUNTER: Primary | ICD-10-CM

## 2019-11-02 PROCEDURE — 72100 X-RAY EXAM L-S SPINE 2/3 VWS: CPT

## 2019-11-02 PROCEDURE — 73610 X-RAY EXAM OF ANKLE: CPT

## 2019-11-02 PROCEDURE — 74011250637 HC RX REV CODE- 250/637: Performed by: NURSE PRACTITIONER

## 2019-11-02 PROCEDURE — 75810000053 HC SPLINT APPLICATION: Performed by: EMERGENCY MEDICINE

## 2019-11-02 PROCEDURE — 99284 EMERGENCY DEPT VISIT MOD MDM: CPT | Performed by: EMERGENCY MEDICINE

## 2019-11-02 PROCEDURE — 74011250637 HC RX REV CODE- 250/637: Performed by: EMERGENCY MEDICINE

## 2019-11-02 PROCEDURE — 77030033681 HC SPLNT P-CUT SAF BSNM -A: Performed by: EMERGENCY MEDICINE

## 2019-11-02 PROCEDURE — 73562 X-RAY EXAM OF KNEE 3: CPT

## 2019-11-02 RX ORDER — HYDROCODONE BITARTRATE AND ACETAMINOPHEN 5; 325 MG/1; MG/1
1 TABLET ORAL
Qty: 20 TAB | Refills: 0 | OUTPATIENT
Start: 2019-11-02 | End: 2019-11-02

## 2019-11-02 RX ORDER — HYDROCODONE BITARTRATE AND ACETAMINOPHEN 5; 325 MG/1; MG/1
1 TABLET ORAL
Status: DISCONTINUED | OUTPATIENT
Start: 2019-11-02 | End: 2019-11-02 | Stop reason: HOSPADM

## 2019-11-02 RX ORDER — OXYCODONE HYDROCHLORIDE 5 MG/1
5 TABLET ORAL
Status: COMPLETED | OUTPATIENT
Start: 2019-11-02 | End: 2019-11-02

## 2019-11-02 RX ORDER — TRAMADOL HYDROCHLORIDE 50 MG/1
50 TABLET ORAL
Qty: 11 TAB | Refills: 0 | Status: SHIPPED | OUTPATIENT
Start: 2019-11-02 | End: 2019-11-05

## 2019-11-02 RX ADMIN — OXYCODONE HYDROCHLORIDE 5 MG: 5 TABLET ORAL at 19:02

## 2019-11-02 NOTE — ED NOTES
I have reviewed discharge instructions with the patient. The patient verbalized understanding. Patient left ED via Discharge Method: wheelchair to Home with self. Opportunity for questions and clarification provided. Patient given 1 scripts. To continue your aftercare when you leave the hospital, you may receive an automated call from our care team to check in on how you are doing. This is a free service and part of our promise to provide the best care and service to meet your aftercare needs.  If you have questions, or wish to unsubscribe from this service please call [phone]. Thank you for Choosing our [facility] Emergency Department.

## 2019-11-02 NOTE — ED NOTES
Splint applied to left leg using 1 4 x 30 and 1 3 x 35 fiberglass splint material, 2 6 inch ace wraps. Pt tolerated well.

## 2019-11-02 NOTE — DISCHARGE INSTRUCTIONS
Leave your splint in place until you follow up with orthopedics. Call [address] [place] [address] on Monday to schedule a follow up appointment. Elevate and ice your ankle to help with swelling and pain. Norco as prescribed for pain. Return to the emergency department as needed.

## 2019-11-02 NOTE — ED PROVIDER NOTES
Patient presents with left ankle, left knee, and lower back pain after a fall. She states she fell while trying to help her son who was having a seizure. She states she fell down approx 15 steps. She denies hitting her head and denies LOC. The history is provided by the patient.         Past Medical History:   Diagnosis Date    Anemia     Chronic pain     due to RA    Depression     GERD (gastroesophageal reflux disease)     Hx of renal calculi          Hypercholesterolemia     Hypertension     Migraine     Morbid obesity ([facility] [address]     Ovarian cyst     Reflex sympathetic dystrophy since 2005    right arm; s/p injury - laceration in hand    Rheumatoid arthritis ([facility] [address]          Rheumatoid arthritis ([facility] [address] 9/26/2019    Sinus problem     TIA (transient ischemic attack) 9/26/2019       Past Surgical History:   Procedure Laterality Date    HX APPENDECTOMY      HX BREAST BIOPSY Left 4/27/2015    LEFT NIPPLE EXPLORATION WITH REMOVAL OF LATTISIMUS DUCT performed by Allegra Vivar MD at [address] HX CHOLECYSTECTOMY      HX ORTHOPAEDIC      rt hand    HX ORTHOPAEDIC      neck    HX SEPTOPLASTY  08/28/2019    FESS,SMRIT's    HX TOTAL ABDOMINAL HYSTERECTOMY           HX TUBAL LIGATION           Family History:   Problem Relation Age of Onset    Breast Cancer Paternal Aunt 39    Cancer Paternal Aunt         brst    Cancer Maternal Grandfather         colon    Colon Cancer Maternal Grandfather     Breast Cancer Paternal Grandmother 46    Cancer Paternal Grandmother         breast    Ovarian Cancer Paternal Grandmother     Cancer Mother         throat    Hypertension Mother     Cancer Father         non hodgkins lymphoma    Cancer Maternal Grandmother         ovarian    Breast Cancer Maternal Grandmother     Heart Disease Paternal Grandfather     Diabetes Paternal Grandfather     Breast Cancer Sister        Social History     Socioeconomic History    Marital status:      Spouse name: Not on file    Number of children: Not on file    Years of education: Not on file    Highest education level: Not on file   Occupational History    Not on file   Social Needs    Financial resource strain: Not on file    Food insecurity:     Worry: Not on file     Inability: Not on file    Transportation needs:     Medical: Not on file     Non-medical: Not on file   Tobacco Use    Smoking status: Never Smoker    Smokeless tobacco: Never Used   Substance and Sexual Activity    Alcohol use: Yes     Comment: \"once every couple years\"    Drug use: No    Sexual activity: Yes     Partners: Male     Birth control/protection: Surgical   Lifestyle    Physical activity:     Days per week: Not on file     Minutes per session: Not on file    Stress: Not on file   Relationships    Social connections:     Talks on phone: Not on file     Gets together: Not on file     Attends Holiness service: Not on file     Active member of club or organization: Not on file     Attends meetings of clubs or organizations: Not on file     Relationship status: Not on file    Intimate partner violence:     Fear of current or ex partner: Not on file     Emotionally abused: Not on file     Physically abused: Not on file     Forced sexual activity: Not on file   Other Topics Concern    Not on file   Social History Narrative    Not on file         ALLERGIES: Pcn [penicillins] and Norco [hydrocodone-acetaminophen]    Review of Systems   Musculoskeletal: Positive for arthralgias, back pain and joint swelling. Negative for neck pain. Neurological: Negative for dizziness and headaches. Vitals:    11/02/19 1701   BP: 96/63   Pulse: 88   Resp: 20   Temp: 98 °F (36.7 °C)   SpO2: 99%   Weight: 98.9 kg (218 lb)   Height: 5' 8.5\" (1.74 m)            Physical Exam   Constitutional: She is oriented to person, place, and time. She appears well-developed and well-nourished. No distress. HENT:   Head: Normocephalic and atraumatic. Eyes: Conjunctivae and EOM are normal.   Neck: Normal range of motion. Neck supple. Cardiovascular: Normal rate and regular rhythm. Pulmonary/Chest: Effort normal and breath sounds normal.   Abdominal: Soft. She exhibits no distension. There is no tenderness. Musculoskeletal:        Left knee: She exhibits normal range of motion, no swelling and normal patellar mobility. Tenderness found. Lateral joint line tenderness noted. Left ankle: She exhibits swelling. Tenderness. Lateral malleolus tenderness found. Lumbar back: She exhibits tenderness and pain. Back:    Neurological: She is alert and oriented to person, place, and time. Skin: Skin is warm and dry. She is not diaphoretic. Psychiatric: She has a normal mood and affect. Her behavior is normal.   Nursing note and vitals reviewed. Xr Spine Lumb 2 Or 3 V    Result Date: 11/2/2019  Clinical history: Pain after fall. TECHNIQUE: AP, lateral and coned-down lateral views of the lumbar spine. FINDINGS: Alignment of the lumbar spine and vertebral body heights are maintained. There is no acute fracture or dislocation. There is moderate disc height loss at L5-S1. There is facet arthropathy at L5-S1. Cholecystectomy clips are seen. IMPRESSION: 1. No acute fracture or dislocation in the lumbar spine. 2. Degenerative changes at L5-S1. Xr Ankle Lt Min 3 V    Result Date: 11/2/2019  AP, lateral, oblique views left ankle INDICATION: Fall. Pain. COMPARISON: None FINDINGS: There is very tiny avulsion at the tip of the medial malleolus. No lateral or posterior malleolar fracture. There is soft tissue swelling. No dislocation. Ankle mortise is maintained. Joint spaces are preserved. There is tiny inferior calcaneal bone spur. IMPRESSION: Very tiny avulsion at the tip of the medial malleolus. No additional fracture is seen. Xr Knee Lt 3 V    Result Date: 11/2/2019  Left Knee INDICATION: Fall. Pain.  COMPARISON: None TECHNIQUE: AP, lateral, oblique views of the left knee were obtained FINDINGS: There is an apparent lucent line in the proximal tibia (on the AP view), raising the question of nondisplaced fracture. No displaced fracture is seen. No dislocation. Joint spaces are preserved. Alignment is maintained. Suggestion of small suprapatellar joint effusion. IMPRESSION: 1. Apparent lucent line in the proximal tibia on the AP view, likely artifactual. Nondisplaced fracture is not entirely excluded. Follow-up as clinically warranted. MDM  Number of Diagnoses or Management Options  Closed fracture of left ankle, initial encounter:   Diagnosis management comments: Questionable fracture noted on xray. Splint applied and crutches given. Patient referred to orthopedics for follow up. Note from Dr. Tita Villegas: At the time of discharge patient says that [address] [facility] actually makes her itch so I will give her a prescription for something else.        Amount and/or Complexity of Data Reviewed  Tests in the radiology section of CPT®: ordered and reviewed  Tests in the medicine section of CPT®: ordered    Risk of Complications, Morbidity, and/or Mortality  Presenting problems: low  Diagnostic procedures: low  Management options: low    Patient Progress  Patient progress: stable         Procedures

## 2020-01-30 ENCOUNTER — APPOINTMENT (OUTPATIENT)
Dept: CT IMAGING | Age: 50
End: 2020-01-30
Attending: EMERGENCY MEDICINE
Payer: COMMERCIAL

## 2020-01-30 ENCOUNTER — HOSPITAL ENCOUNTER (EMERGENCY)
Age: 50
Discharge: HOME | End: 2020-01-30
Attending: EMERGENCY MEDICINE
Payer: COMMERCIAL

## 2020-01-30 VITALS
HEIGHT: 68 IN | HEART RATE: 83 BPM | SYSTOLIC BLOOD PRESSURE: 119 MMHG | TEMPERATURE: 97.9 F | DIASTOLIC BLOOD PRESSURE: 58 MMHG | OXYGEN SATURATION: 95 % | WEIGHT: 218 LBS | BODY MASS INDEX: 33.04 KG/M2 | RESPIRATION RATE: 17 BRPM

## 2020-01-30 DIAGNOSIS — G43.109 COMPLICATED MIGRAINE: Primary | ICD-10-CM

## 2020-01-30 DIAGNOSIS — J32.0 MAXILLARY SINUSITIS, UNSPECIFIED CHRONICITY: ICD-10-CM

## 2020-01-30 LAB
ALBUMIN SERPL-MCNC: 3.3 G/DL (ref 3.5–5)
ALBUMIN/GLOB SERPL: 0.7 {RATIO} (ref 1.2–3.5)
ALP SERPL-CCNC: 82 U/L (ref 50–130)
ALT SERPL-CCNC: 22 U/L (ref 12–65)
ANION GAP SERPL CALC-SCNC: 6 MMOL/L (ref 7–16)
AST SERPL-CCNC: 26 U/L (ref 15–37)
BASOPHILS # BLD: 0 K/UL (ref 0–0.2)
BASOPHILS NFR BLD: 0 % (ref 0–2)
BILIRUB SERPL-MCNC: 0.3 MG/DL (ref 0.2–1.1)
BUN SERPL-MCNC: 20 MG/DL (ref 6–23)
CALCIUM SERPL-MCNC: 9.5 MG/DL (ref 8.3–10.4)
CHLORIDE SERPL-SCNC: 100 MMOL/L (ref 98–107)
CO2 SERPL-SCNC: 30 MMOL/L (ref 21–32)
CREAT SERPL-MCNC: 0.74 MG/DL (ref 0.6–1)
DIFFERENTIAL METHOD BLD: ABNORMAL
EOSINOPHIL # BLD: 0.1 K/UL (ref 0–0.8)
EOSINOPHIL NFR BLD: 3 % (ref 0.5–7.8)
ERYTHROCYTE [DISTWIDTH] IN BLOOD BY AUTOMATED COUNT: 15.6 % (ref 11.9–14.6)
GLOBULIN SER CALC-MCNC: 4.6 G/DL (ref 2.3–3.5)
GLUCOSE SERPL-MCNC: 135 MG/DL (ref 65–100)
HCT VFR BLD AUTO: 37.2 % (ref 35.8–46.3)
HGB BLD-MCNC: 11.9 G/DL (ref 11.7–15.4)
IMM GRANULOCYTES # BLD AUTO: 0 K/UL (ref 0–0.5)
IMM GRANULOCYTES NFR BLD AUTO: 0 % (ref 0–5)
LYMPHOCYTES # BLD: 2.1 K/UL (ref 0.5–4.6)
LYMPHOCYTES NFR BLD: 39 % (ref 13–44)
MCH RBC QN AUTO: 25.3 PG (ref 26.1–32.9)
MCHC RBC AUTO-ENTMCNC: 32 G/DL (ref 31.4–35)
MCV RBC AUTO: 79 FL (ref 79.6–97.8)
MONOCYTES # BLD: 0.3 K/UL (ref 0.1–1.3)
MONOCYTES NFR BLD: 6 % (ref 4–12)
NEUTS SEG # BLD: 2.7 K/UL (ref 1.7–8.2)
NEUTS SEG NFR BLD: 52 % (ref 43–78)
NRBC # BLD: 0 K/UL (ref 0–0.2)
PLATELET # BLD AUTO: 204 K/UL (ref 150–450)
PMV BLD AUTO: 9.7 FL (ref 9.4–12.3)
POTASSIUM SERPL-SCNC: 4 MMOL/L (ref 3.5–5.1)
PROT SERPL-MCNC: 7.9 G/DL (ref 6.3–8.2)
RBC # BLD AUTO: 4.71 M/UL (ref 4.05–5.2)
SODIUM SERPL-SCNC: 136 MMOL/L (ref 136–145)
WBC # BLD AUTO: 5.3 K/UL (ref 4.3–11.1)

## 2020-01-30 PROCEDURE — 80053 COMPREHEN METABOLIC PANEL: CPT

## 2020-01-30 PROCEDURE — 96375 TX/PRO/DX INJ NEW DRUG ADDON: CPT

## 2020-01-30 PROCEDURE — 99284 EMERGENCY DEPT VISIT MOD MDM: CPT

## 2020-01-30 PROCEDURE — 96374 THER/PROPH/DIAG INJ IV PUSH: CPT

## 2020-01-30 PROCEDURE — 70450 CT HEAD/BRAIN W/O DYE: CPT

## 2020-01-30 PROCEDURE — 85025 COMPLETE CBC W/AUTO DIFF WBC: CPT

## 2020-01-30 PROCEDURE — 81003 URINALYSIS AUTO W/O SCOPE: CPT

## 2020-01-30 PROCEDURE — 74011250636 HC RX REV CODE- 250/636: Performed by: EMERGENCY MEDICINE

## 2020-01-30 PROCEDURE — 93005 ELECTROCARDIOGRAM TRACING: CPT | Performed by: EMERGENCY MEDICINE

## 2020-01-30 RX ORDER — LEVOFLOXACIN 500 MG/1
500 TABLET, FILM COATED ORAL DAILY
Qty: 10 TAB | Refills: 0 | Status: SHIPPED | OUTPATIENT
Start: 2020-01-30 | End: 2020-02-09

## 2020-01-30 RX ORDER — MORPHINE SULFATE 10 MG/ML
5 INJECTION, SOLUTION INTRAMUSCULAR; INTRAVENOUS
Status: COMPLETED | OUTPATIENT
Start: 2020-01-30 | End: 2020-01-30

## 2020-01-30 RX ORDER — METOCLOPRAMIDE HYDROCHLORIDE 5 MG/ML
10 INJECTION INTRAMUSCULAR; INTRAVENOUS
Status: COMPLETED | OUTPATIENT
Start: 2020-01-30 | End: 2020-01-30

## 2020-01-30 RX ADMIN — METOCLOPRAMIDE 10 MG: 5 INJECTION, SOLUTION INTRAMUSCULAR; INTRAVENOUS at 21:56

## 2020-01-30 RX ADMIN — MORPHINE SULFATE 5 MG: 10 INJECTION INTRAVENOUS at 21:57

## 2020-01-31 LAB
ATRIAL RATE: 77 BPM
CALCULATED P AXIS, ECG09: 36 DEGREES
CALCULATED R AXIS, ECG10: 67 DEGREES
CALCULATED T AXIS, ECG11: 21 DEGREES
DIAGNOSIS, 93000: NORMAL
P-R INTERVAL, ECG05: 144 MS
Q-T INTERVAL, ECG07: 382 MS
QRS DURATION, ECG06: 108 MS
QTC CALCULATION (BEZET), ECG08: 432 MS
VENTRICULAR RATE, ECG03: 77 BPM

## 2020-01-31 NOTE — ED NOTES
I have reviewed discharge instructions with the patient. The patient verbalized understanding. Patient left ED via Discharge Method: ambulatory to Home with spouse. Opportunity for questions and clarification provided. Patient given 1 scripts. To continue your aftercare when you leave the hospital, you may receive an automated call from our care team to check in on how you are doing. This is a free service and part of our promise to provide the best care and service to meet your aftercare needs.  If you have questions, or wish to unsubscribe from this service please call [phone]. Thank you for Choosing our [facility].

## 2020-01-31 NOTE — ED PROVIDER NOTES
Patient presents with a chief complaint to this physician of headache that is been intermittent for several days and more constant today. Today at 1 PM she started having some left facial and left arm numbness and tingling. She denies any weakness. She gives a history of a previous Bell's palsy and a previous TIA in the past.    The history is provided by the patient.         Past Medical History:   Diagnosis Date    Anemia     Chronic pain     due to RA    Depression     GERD (gastroesophageal reflux disease)     Hx of renal calculi          Hypercholesterolemia     Hypertension     Migraine     Morbid obesity ([address]     Ovarian cyst     Reflex sympathetic dystrophy since 2005    right arm; s/p injury - laceration in hand    Rheumatoid arthritis ([address]          Rheumatoid arthritis ([facility] [address] 9/26/2019    Sinus problem     TIA (transient ischemic attack) 9/26/2019       Past Surgical History:   Procedure Laterality Date    HX APPENDECTOMY      HX BREAST BIOPSY Left 4/27/2015    LEFT NIPPLE EXPLORATION WITH REMOVAL OF LATTISIMUS DUCT performed by Radames Miramontes MD at [address] HX CHOLECYSTECTOMY      HX ORTHOPAEDIC      rt hand    HX ORTHOPAEDIC      neck    HX SEPTOPLASTY  08/28/2019    FESS,SMRIT's    HX TOTAL ABDOMINAL HYSTERECTOMY           HX TUBAL LIGATION           Family History:   Problem Relation Age of Onset    Breast Cancer Paternal Aunt 39    Cancer Paternal Aunt         brst    Cancer Maternal Grandfather         colon    Colon Cancer Maternal Grandfather     Breast Cancer Paternal Grandmother 46    Cancer Paternal Grandmother         breast    Ovarian Cancer Paternal Grandmother     Cancer Mother         throat    Hypertension Mother     Cancer Father         non hodgkins lymphoma    Cancer Maternal Grandmother         ovarian    Breast Cancer Maternal Grandmother     Heart Disease Paternal Grandfather     Diabetes Paternal Grandfather     Breast Cancer Sister Social History     Socioeconomic History    Marital status:      Spouse name: Not on file    Number of children: Not on file    Years of education: Not on file    Highest education level: Not on file   Occupational History    Not on file   Social Needs    Financial resource strain: Not on file    Food insecurity:     Worry: Not on file     Inability: Not on file    Transportation needs:     Medical: Not on file     Non-medical: Not on file   Tobacco Use    Smoking status: Never Smoker    Smokeless tobacco: Never Used   Substance and Sexual Activity    Alcohol use: Yes     Comment: \"once every couple years\"    Drug use: No    Sexual activity: Yes     Partners: Male     Birth control/protection: Surgical   Lifestyle    Physical activity:     Days per week: Not on file     Minutes per session: Not on file    Stress: Not on file   Relationships    Social connections:     Talks on phone: Not on file     Gets together: Not on file     Attends Latter day service: Not on file     Active member of club or organization: Not on file     Attends meetings of clubs or organizations: Not on file     Relationship status: Not on file    Intimate partner violence:     Fear of current or ex partner: Not on file     Emotionally abused: Not on file     Physically abused: Not on file     Forced sexual activity: Not on file   Other Topics Concern    Not on file   Social History Narrative    Not on file         ALLERGIES: Pcn [penicillins] and Norco [hydrocodone-acetaminophen]    Review of Systems   Constitutional: Negative for chills and fever. HENT: Negative for congestion, rhinorrhea and sore throat. Eyes: Negative for photophobia and redness. Respiratory: Negative for cough and shortness of breath. Cardiovascular: Negative for chest pain and leg swelling. Gastrointestinal: Negative for abdominal pain, diarrhea, nausea and vomiting. Endocrine: Negative for polydipsia and polyuria. Genitourinary: Negative for dysuria and frequency. Musculoskeletal: Negative for back pain and myalgias. Neurological: Positive for facial asymmetry, numbness and headaches. Negative for weakness. Vitals:    01/30/20 2101   BP: 122/76   Pulse: 94   Resp: 16   Temp: 98.2 °F (36.8 °C)   SpO2: 97%   Weight: 98.9 kg (218 lb)   Height: 5' 8\" (1.727 m)            Physical Exam  Vitals signs and nursing note reviewed. Constitutional:       General: She is not in acute distress. Appearance: She is well-developed. HENT:      Head: Normocephalic. Eyes:      Pupils: Pupils are equal, round, and reactive to light. Cardiovascular:      Rate and Rhythm: Normal rate and regular rhythm. Heart sounds: Normal heart sounds. Pulmonary:      Effort: Pulmonary effort is normal.      Breath sounds: Normal breath sounds. Abdominal:      General: There is no distension. Palpations: Abdomen is soft. There is no mass. Tenderness: There is no abdominal tenderness. There is no guarding or rebound. Musculoskeletal: Normal range of motion. Lymphadenopathy:      Cervical: No cervical adenopathy. Skin:     General: Skin is warm and dry. Neurological:      Mental Status: She is alert. Cranial Nerves: Cranial nerve deficit present. Motor: No weakness. Comments: Mild decreased sensation left face left arm and left lower leg. Mild left facial asymmetry that is chronic from previous Bell's palsy. Mild atypical drift of left arm and left leg. No visual field impairments. Extraocular movements intact. Cerebellar function intact. Patient alert and oriented x3. MDM  Number of Diagnoses or Management Options  Diagnosis management comments: NIH Stroke Severity Score @ 9:31 PM    Interval: Baseline  LOC: Alert  LOC Questions:  Answers both questions correctly  LOC Commands: Performs both tasks correctly  Best Gaze: Normal  Visual: No visual loss  Facial Palsy: Minor paralysis  Motor Right Arm: No drift  Motor Left Arm: No drift  Motor Right Leg: No drift  Motor Left Leg: Drift  Limb Ataxia: Absent  Sensory: Mild to Moderate  Best Language: No aphasia  Dysarthria: Normal  Extinction and Inattention: No abnormality    Total: 3    NIHSS >= 6 consider NeuroInterventional Therapy        11:10 PM  Is resolved with treatment of headache. CT scan negative. Review of records reveals just a few months ago in September she had a complete work-up including CTA of the head and neck MRI of the brain and MRI of the cervical spine. She had patent internal carotid arteries and no stenosis. Echocardiogram was negative as well. Symptoms resolved and I do not believe she needs repeat work-up just a few months later. If not already taking we will have her take a low-dose 81 mg aspirin daily as a precaution. She has a neurologist with whom she can follow-up with as well as a PCP. I will treat her maxillary sinusitis with Levaquin given her allergy to penicillin. Initiate stroke work-up. Treat possible complex migraine headache.            Amount and/or Complexity of Data Reviewed  Clinical lab tests: ordered and reviewed (Results for orders placed or performed during the hospital encounter of 01/30/20  -CBC WITH AUTOMATED DIFF       Result                      Value             Ref Range           WBC                         5.3               4.3 - 11.1 K*       RBC                         4.71              4.05 - 5.2 M*       HGB                         11.9              11.7 - 15.4 *       HCT                         37.2              35.8 - 46.3 %       MCV                         79.0 (L)          79.6 - 97.8 *       MCH                         25.3 (L)          26.1 - 32.9 *       MCHC                        32.0              31.4 - 35.0 *       RDW                         15.6 (H)          11.9 - 14.6 %       PLATELET                    204               150 - 450 K/*       MPV                         9.7               9.4 - 12.3 FL       ABSOLUTE NRBC               0.00              0.0 - 0.2 K/*       DF                          AUTOMATED                             NEUTROPHILS                 52                43 - 78 %           LYMPHOCYTES                 39                13 - 44 %           MONOCYTES                   6                 4.0 - 12.0 %        EOSINOPHILS                 3                 0.5 - 7.8 %         BASOPHILS                   0                 0.0 - 2.0 %         IMMATURE GRANULOCYTES       0                 0.0 - 5.0 %         ABS. NEUTROPHILS            2.7               1.7 - 8.2 K/*       ABS. LYMPHOCYTES            2.1               0.5 - 4.6 K/*       ABS. MONOCYTES              0.3               0.1 - 1.3 K/*       ABS. EOSINOPHILS            0.1               0.0 - 0.8 K/*       ABS. BASOPHILS              0.0               0.0 - 0.2 K/*       ABS. IMM.  GRANS.            0.0               0.0 - 0.5 K/*  -METABOLIC PANEL, COMPREHENSIVE       Result                      Value             Ref Range           Sodium                      136               136 - 145 mm*       Potassium                   4.0               3.5 - 5.1 mm*       Chloride                    100               98 - 107 mmo*       CO2                         30                21 - 32 mmol*       Anion gap                   6 (L)             7 - 16 mmol/L       Glucose                     135 (H)           65 - 100 mg/*       BUN                         20                6 - 23 MG/DL        Creatinine                  0.74              0.6 - 1.0 MG*       GFR est AA                  >60               >60 ml/min/1*       GFR est non-AA              >60               >60 ml/min/1*       Calcium                     9.5               8.3 - 10.4 M*       Bilirubin, total            0.3               0.2 - 1.1 MG*       ALT (SGPT)                  22                12 - 65 U/L AST (SGOT)                  26                15 - 37 U/L         Alk. phosphatase            82                50 - 130 U/L        Protein, total              7.9               6.3 - 8.2 g/*       Albumin                     3.3 (L)           3.5 - 5.0 g/*       Globulin                    4.6 (H)           2.3 - 3.5 g/*       A-G Ratio                   0.7 (L)           1.2 - 3.5      )  Tests in the radiology section of CPT®: ordered and reviewed (Ct Head Wo Cont    Result Date: 1/30/2020  EXAMINATION: HEAD CT WITHOUT CONTRAST 1/30/2020 10:23 PM ACCESSION NUMBER: [id number] COMPARISON: None available INDICATION: Left-sided headache with left-sided numbness and tingling onset 1 PM, headache for several days- CVA? ICH? TECHNIQUE: Multiple-row detector helical CT examination of the head without intravenous contrast. Radiation dose reduction techniques were used for this study. Our CT scanners use one or all of the following: Automated exposure control, adjustment of the mA and/or kV according to patient size, iterative reconstruction. FINDINGS: Brain: There is no mass, mass effect, evidence of an acute stroke, or intracranial hemorrhage. Ventricles: Normal Vasculature: Normal Bones: Normal Surrounding soft tissues:  A small air-fluid level is seen in the right maxillary sinus. Mucous retention cysts are present in the left maxillary sinus. The mastoid air cells are patent. IMPRESSION: 1. No acute intracranial abnormality.  2. Right maxillary sinusitis with chronic left maxillary sinus disease.     )           Procedures

## 2020-01-31 NOTE — DISCHARGE INSTRUCTIONS
Patient Education      Aspirin 81 mg once daily for migraine prevention and for stroke prevention. Tylenol and ibuprofen for headaches. Levaquin once daily for 10 days for sinusitis. Follow-up this coming week with both your neurologist and primary care doctor for follow-up and recheck. Return if any new, worsening or concerning symptoms especially return of numbness tingling or weakness like you experienced earlier today. Migraine Headache: Care Instructions  Your Care Instructions  Migraines are painful, throbbing headaches that often start on one side of the head. They may cause nausea and vomiting and make you sensitive to light, sound, or smell. Without treatment, migraines can last from 4 hours to a few days. Medicines can help prevent migraines or stop them after they have started. Your doctor can help you find which ones work best for you. Follow-up care is a key part of your treatment and safety. Be sure to make and go to all appointments, and call your doctor if you are having problems. It's also a good idea to know your test results and keep a list of the medicines you take. How can you care for yourself at home? · Do not drive if you have taken a prescription pain medicine. · Rest in a quiet, dark room until your headache is gone. Close your eyes, and try to relax or go to sleep. Don't watch TV or read. · Put a cold, moist cloth or cold pack on the painful area for 10 to 20 minutes at a time. Put a thin cloth between the cold pack and your skin. · Use a warm, moist towel or a heating pad set on low to relax tight shoulder and neck muscles. · Have someone gently massage your neck and shoulders. · Take your medicines exactly as prescribed. Call your doctor if you think you are having a problem with your medicine. You will get more details on the specific medicines your doctor prescribes. · Be careful not to take pain medicine more often than the instructions allow.  You could get worse or more frequent headaches when the medicine wears off. To prevent migraines  · Keep a headache diary so you can figure out what triggers your headaches. Avoiding triggers may help you prevent headaches. Record when each headache began, how long it lasted, and what the pain was like. (Was it throbbing, aching, stabbing, or dull?) Write down any other symptoms you had with the headache, such as nausea, flashing lights or dark spots, or sensitivity to bright light or loud noise. Note if the headache occurred near your period. List anything that might have triggered the headache. Triggers may include certain foods (chocolate, cheese, wine) or odors, smoke, bright light, stress, or lack of sleep. · If your doctor has prescribed medicine for your migraines, take it as directed. You may have medicine that you take only when you get a migraine and medicine that you take all the time to help prevent migraines. ? If your doctor has prescribed medicine for when you get a headache, take it at the first sign of a migraine, unless your doctor has given you other instructions. ? If your doctor has prescribed medicine to prevent migraines, take it exactly as prescribed. Call your doctor if you think you are having a problem with your medicine. · Find healthy ways to deal with stress. Migraines are most common during or right after stressful times. Take time to relax before and after you do something that has caused a migraine in the past.  · Try to keep your muscles relaxed by keeping good posture. Check your jaw, face, neck, and shoulder muscles for tension. Try to relax them. When you sit at a desk, change positions often. And make sure to stretch for 30 seconds each hour. · Get plenty of sleep and exercise. · Eat meals on a regular schedule. Avoid foods and drinks that often trigger migraines.  These include chocolate, alcohol (especially red wine and port), aspartame, monosodium glutamate (MSG), and some additives found in foods (such as hot dogs, lindsay, cold cuts, aged cheeses, and pickled foods). · Limit caffeine. Don't drink too much coffee, tea, or soda. But don't quit caffeine suddenly. That can also give you migraines. · Do not smoke or allow others to smoke around you. If you need help quitting, talk to your doctor about stop-smoking programs and medicines. These can increase your chances of quitting for good. · If you are taking birth control pills or hormone therapy, talk to your doctor about whether they are triggering your migraines. When should you call for help? Call 911 anytime you think you may need emergency care. For example, call if:    · You have signs of a stroke. These may include:  ? Sudden numbness, paralysis, or weakness in your face, arm, or leg, especially on only one side of your body. ? Sudden vision changes. ? Sudden trouble speaking. ? Sudden confusion or trouble understanding simple statements. ? Sudden problems with walking or balance. ? A sudden, severe headache that is different from past headaches.    Call your doctor now or seek immediate medical care if:    · You have new or worse nausea and vomiting.     · You have a new or higher fever.     · Your headache gets much worse.    Watch closely for changes in your health, and be sure to contact your doctor if:    · You are not getting better after 2 days (48 hours). Where can you learn more? Go to http://martha-neo.info/. Enter T997 in the search box to learn more about \"Migraine Headache: Care Instructions. \"  Current as of: March 28, 2019  Content Version: 12.2  © 1795-3186 [employer]. Care instructions adapted under license by [employer] (which disclaims liability or warranty for this information).  If you have questions about a medical condition or this instruction, always ask your healthcare professional. [address] any warranty or liability for your use of this information. Patient Education        Sinusitis: Care Instructions  Your Care Instructions    Sinusitis is an infection of the lining of the sinus cavities in your head. Sinusitis often follows a cold. It causes pain and pressure in your head and face. In most cases, sinusitis gets better on its own in 1 to 2 weeks. But some mild symptoms may last for several weeks. Sometimes antibiotics are needed. Follow-up care is a key part of your treatment and safety. Be sure to make and go to all appointments, and call your doctor if you are having problems. It's also a good idea to know your test results and keep a list of the medicines you take. How can you care for yourself at home? · Take an over-the-counter pain medicine, such as acetaminophen (Tylenol), ibuprofen (Advil, Motrin), or naproxen (Aleve). Read and follow all instructions on the label. · If the doctor prescribed antibiotics, take them as directed. Do not stop taking them just because you feel better. You need to take the full course of antibiotics. · Be careful when taking over-the-counter cold or flu medicines and Tylenol at the same time. Many of these medicines have acetaminophen, which is Tylenol. Read the labels to make sure that you are not taking more than the recommended dose. Too much acetaminophen (Tylenol) can be harmful. · Breathe warm, moist air from a steamy shower, a hot bath, or a sink filled with hot water. Avoid cold, dry air. Using a humidifier in your home may help. Follow the directions for cleaning the machine. · Use saline (saltwater) nasal washes to help keep your nasal passages open and wash out mucus and bacteria. You can buy saline nose drops at a grocery store or drugstore. Or you can make your own at home by adding 1 teaspoon of salt and 1 teaspoon of baking soda to 2 cups of distilled water. If you make your own, fill a bulb syringe with the solution, insert the tip into your nostril, and squeeze gently.  [place] your nose.  · Put a hot, wet towel or a warm gel pack on your face 3 or 4 times a day for 5 to 10 minutes each time. · Try a decongestant nasal spray like oxymetazoline (Afrin). Do not use it for more than 3 days in a row. Using it for more than 3 days can make your congestion worse. When should you call for help? Call your doctor now or seek immediate medical care if:    · You have new or worse swelling or redness in your face or around your eyes.     · You have a new or higher fever.    Watch closely for changes in your health, and be sure to contact your doctor if:    · You have new or worse facial pain.     · The mucus from your nose becomes thicker (like pus) or has new blood in it.     · You are not getting better as expected. Where can you learn more? Go to http://martha-neo.info/. Enter J378 in the search box to learn more about \"Sinusitis: Care Instructions. \"  Current as of: October 21, 2018  Content Version: 12.2  © 7596-1314 Healthwise, Incorporated. Care instructions adapted under license by [employer] (which disclaims liability or warranty for this information). If you have questions about a medical condition or this instruction, always ask your healthcare professional. [address] any warranty or liability for your use of this information.

## 2020-01-31 NOTE — ED TRIAGE NOTES
Pt to er c/o having abd pain radiating to her back a couple of days ago, sts when she eats it gets worse, sts she has a headache for the last couple of days but got worse today, sts she feels numbness and tingling on her left side of her face that started at approx 1300.

## 2020-06-07 ENCOUNTER — APPOINTMENT (OUTPATIENT)
Dept: GENERAL RADIOLOGY | Age: 50
End: 2020-06-07
Attending: EMERGENCY MEDICINE
Payer: COMMERCIAL

## 2020-06-07 ENCOUNTER — HOSPITAL ENCOUNTER (EMERGENCY)
Age: 50
Discharge: HOME | End: 2020-06-07
Attending: EMERGENCY MEDICINE
Payer: COMMERCIAL

## 2020-06-07 VITALS
SYSTOLIC BLOOD PRESSURE: 110 MMHG | RESPIRATION RATE: 20 BRPM | TEMPERATURE: 98.3 F | WEIGHT: 228 LBS | BODY MASS INDEX: 34.56 KG/M2 | HEIGHT: 68 IN | HEART RATE: 90 BPM | DIASTOLIC BLOOD PRESSURE: 76 MMHG | OXYGEN SATURATION: 97 %

## 2020-06-07 DIAGNOSIS — S90.32XA CONTUSION OF LEFT HEEL, INITIAL ENCOUNTER: Primary | ICD-10-CM

## 2020-06-07 PROCEDURE — 99284 EMERGENCY DEPT VISIT MOD MDM: CPT

## 2020-06-07 PROCEDURE — 74011250637 HC RX REV CODE- 250/637: Performed by: EMERGENCY MEDICINE

## 2020-06-07 PROCEDURE — 73650 X-RAY EXAM OF HEEL: CPT

## 2020-06-07 RX ORDER — DULOXETIN HYDROCHLORIDE 60 MG/1
60 CAPSULE, DELAYED RELEASE ORAL 2 TIMES DAILY
COMMUNITY
Start: 2010-02-09

## 2020-06-07 RX ORDER — GUAIFENESIN 100 MG/5ML
81 LIQUID (ML) ORAL
COMMUNITY

## 2020-06-07 RX ORDER — BUPROPION HYDROCHLORIDE 150 MG/1
300 TABLET ORAL DAILY
COMMUNITY
Start: 2020-06-03

## 2020-06-07 RX ORDER — TRAZODONE HYDROCHLORIDE 100 MG/1
100 TABLET ORAL
COMMUNITY
Start: 2019-08-01

## 2020-06-07 RX ORDER — HYDROCODONE BITARTRATE AND ACETAMINOPHEN 10; 325 MG/1; MG/1
1 TABLET ORAL
Status: COMPLETED | OUTPATIENT
Start: 2020-06-07 | End: 2020-06-07

## 2020-06-07 RX ORDER — HYDROCODONE BITARTRATE AND ACETAMINOPHEN 7.5; 325 MG/1; MG/1
1 TABLET ORAL
Qty: 8 TAB | Refills: 0 | Status: SHIPPED | OUTPATIENT
Start: 2020-06-07 | End: 2020-06-09

## 2020-06-07 RX ORDER — LISINOPRIL 2.5 MG/1
2.5 TABLET ORAL DAILY
COMMUNITY
End: 2020-06-07

## 2020-06-07 RX ADMIN — HYDROCODONE BITARTRATE AND ACETAMINOPHEN 1 TABLET: 10; 325 TABLET ORAL at 23:26

## 2020-06-08 NOTE — ED PROVIDER NOTES
57-year-old female has insulin-dependent diabetes. States she has had some occasional troubles with her Achilles tendons. She stepped back yesterday and felt a pop in her heel. This was a left heel when she was stepping backwards and planting her foot. Complains of persistent heel pain and swelling. Pain with weightbearing. The history is provided by the patient. Foot Pain    This is a new problem. The current episode started 12 to 24 hours ago. The problem occurs constantly. The problem has not changed since onset. The pain is present in the left foot. The quality of the pain is described as aching and sharp. The pain is moderate. Associated symptoms include limited range of motion. Pertinent negatives include no numbness and no back pain. The symptoms are aggravated by standing. She has tried nothing for the symptoms. There has been a history of trauma.         Past Medical History:   Diagnosis Date    Anemia     Chronic pain     due to RA    Depression     GERD (gastroesophageal reflux disease)     Hx of renal calculi          Hypercholesterolemia     Hypertension     Migraine     Morbid obesity ([address]     Ovarian cyst     Reflex sympathetic dystrophy since 2005    right arm; s/p injury - laceration in hand    Rheumatoid arthritis ([address]          Rheumatoid arthritis ([address] 9/26/2019    Sinus problem     TIA (transient ischemic attack) 9/26/2019       Past Surgical History:   Procedure Laterality Date    HX APPENDECTOMY      HX BREAST BIOPSY Left 4/27/2015    LEFT NIPPLE EXPLORATION WITH REMOVAL OF LATTISIMUS DUCT performed by Remedios Koo [facility] at [facility] [address] HX CHOLECYSTECTOMY      HX ORTHOPAEDIC      rt hand    HX ORTHOPAEDIC      neck    HX SEPTOPLASTY  08/28/2019    FESS,SMRIT's    HX TOTAL ABDOMINAL HYSTERECTOMY           HX TUBAL LIGATION           Family History:   Problem Relation Age of Onset    Breast Cancer Paternal Aunt 39    Cancer Paternal Aunt         brst    Cancer Maternal Grandfather         colon    Colon Cancer Maternal Grandfather     Breast Cancer Paternal Grandmother 46    Cancer Paternal Grandmother         breast    Ovarian Cancer Paternal Grandmother     Cancer Mother         throat    Hypertension Mother     Cancer Father         non hodgkins lymphoma    Cancer Maternal Grandmother         ovarian    Breast Cancer Maternal Grandmother     Heart Disease Paternal Grandfather     Diabetes Paternal Grandfather     Breast Cancer Sister        Social History     Socioeconomic History    Marital status:      Spouse name: Not on file    Number of children: Not on file    Years of education: Not on file    Highest education level: Not on file   Occupational History    Not on file   Social Needs    Financial resource strain: Not on file    Food insecurity     Worry: Not on file     Inability: Not on file    Transportation needs     Medical: Not on file     Non-medical: Not on file   Tobacco Use    Smoking status: Never Smoker    Smokeless tobacco: Never Used   Substance and Sexual Activity    Alcohol use: Yes     Comment: \"once every couple years\"    Drug use: No    Sexual activity: Yes     Partners: Male     Birth control/protection: Surgical   Lifestyle    Physical activity     Days per week: Not on file     Minutes per session: Not on file    Stress: Not on file   Relationships    Social connections     Talks on phone: Not on file     Gets together: Not on file     Attends [employer] service: Not on file     Active member of club or organization: Not on file     Attends meetings of clubs or organizations: Not on file     Relationship status: Not on file    Intimate partner violence     Fear of current or ex partner: Not on file     Emotionally abused: Not on file     Physically abused: Not on file     Forced sexual activity: Not on file   Other Topics Concern    Not on file   Social History Narrative    Not on file ALLERGIES: Pcn [penicillins] and Norco [hydrocodone-acetaminophen]    Review of Systems   Constitutional: Negative for chills and fever. Musculoskeletal: Negative for back pain. Neurological: Negative for weakness and numbness. Vitals:    06/07/20 2206 06/07/20 2215   BP: 107/70    Pulse: 90    Resp: 20    Temp: 98.3 °F (36.8 °C)    SpO2: 97% 97%   Weight: 103.4 kg (228 lb)    Height: 5' 8\" (1.727 m)             Physical Exam  Vitals signs and nursing note reviewed. Constitutional:       Appearance: She is not ill-appearing. Musculoskeletal:        Feet:       Comments: Abrasion overlying the distal Achilles on the left side. Slight erythema surrounding this area. Patient states this is a chronic condition. Skin:     General: Skin is warm and dry. Neurological:      Mental Status: She is alert. MDM  Number of Diagnoses or Management Options  Diagnosis management comments: Patient has negative McMurphy's sign. Doubt Achilles rupture. Possibility of contusion or calcaneal fracture. Imaging ordered. Amount and/or Complexity of Data Reviewed  Tests in the radiology section of CPT®: ordered and reviewed    Risk of Complications, Morbidity, and/or Mortality  Presenting problems: low  Diagnostic procedures: minimal  Management options: low    Patient Progress  Patient progress: stable         Procedures    Xr Calcaneus Lt    Result Date: 6/7/2020  TWO-VIEW LEFT CALCANEUS: CLINICAL HISTORY:  Pain after audible pop while stretching. COMPARISON:  None. FINDINGS:  No definite fracture, malalignment, or dennis bone destruction is evident. No persistent radiopaque foreign body is seen. Moderate plantar calcaneal spur is noted. IMPRESSION:  Plantar calcaneal spur with no acute abnormality. Patient more tender on the plantar surface of the heel than over the Achilles tendon. No evidence for rupture.   Would like orthopedic follow-up for possible cortisone injection or reevaluation.

## 2020-06-08 NOTE — ED TRIAGE NOTES
Patient presents with left heel to calf pain. Denies trauma/injury. States she was walking in her house when she heard a pop. Mask applied to patient.

## 2020-06-08 NOTE — DISCHARGE INSTRUCTIONS
Rest.  Try some heat to the area of the Achilles tendon but consider cool compresses to the bottom of her heel. Call orthopedist tomorrow for appointment to recheck. Use crutches for limited weightbearing. If symptoms improve and you try a shoe, a heel lift may be helpful. Patient Education        Plantar Fasciitis: Care Instructions  Your Care Instructions     Plantar fasciitis is pain and inflammation of the plantar fascia, the tissue at the bottom of your foot that connects the heel bone to the toes. The plantar fascia also supports the arch. If you strain the plantar fascia, it can develop small tears and cause heel pain when you stand or walk. Plantar fasciitis can be caused by running or other sports. It also may occur in people who are overweight or who have high arches or flat feet. You may get plantar fasciitis if you walk or stand for long periods, or have a tight Achilles tendon or calf muscles. You can improve your foot pain with rest and other care at home. It might take a few weeks to a few months for your foot to heal completely. Follow-up care is a key part of your treatment and safety. Be sure to make and go to all appointments, and call your doctor if you are having problems. It's also a good idea to know your test results and keep a list of the medicines you take. How can you care for yourself at home? · Rest your feet often. Reduce your activity to a level that lets you avoid pain. If possible, do not run or walk on hard surfaces. · Take pain medicines exactly as directed. ? If the doctor gave you a prescription medicine for pain, take it as prescribed. ? If you are not taking a prescription pain medicine, take an over-the-counter anti-inflammatory medicine for pain and swelling, such as ibuprofen (Advil, Motrin) or naproxen (Aleve). Read and follow all instructions on the label. · Use ice massage to help with pain and swelling.  You can use an ice cube or an ice cup several times a day. To make an ice cup, fill a paper cup with water and freeze it. Cut off the top of the cup until a half-inch of ice shows. Hold onto the remaining paper to use the cup. Rub the ice in small circles over the area for 5 to 7 minutes. · Contrast baths, which alternate hot and cold water, can also help reduce swelling. But because heat alone may make pain and swelling worse, end a contrast bath with a soak in cold water. · Wear a night splint if your doctor suggests it. A night splint holds your foot with the toes pointed up and the foot and ankle at a 90-degree angle. This position gives the bottom of your foot a constant, gentle stretch. · Do simple exercises such as calf stretches and towel stretches 2 to 3 times each day, especially when you first get up in the morning. These can help the plantar fascia become more flexible. They also make the muscles that support your arch stronger. Hold these stretches for 15 to 30 seconds per stretch. Repeat 2 to 4 times. ? Stand about 1 foot from a wall. Place the palms of both hands against the wall at chest level. Lean forward against the wall, keeping one leg with the knee straight and heel on the ground while bending the knee of the other leg.  ? Sit down on the floor or a mat with your feet stretched in front of you. Roll up a towel lengthwise, and loop it over the ball of your foot. Holding the towel at both ends, gently pull the towel toward you to stretch your foot. · Wear shoes with good arch support. Athletic shoes or shoes with a well-cushioned sole are good choices. · Try heel cups or shoe inserts (orthotics) to help cushion your heel. You can buy these at many shoe stores. · Put on your shoes as soon as you get out of bed. Going barefoot or wearing slippers may make your pain worse. · Reach and stay at a good weight for your height. This puts less strain on your feet. When should you call for help?    Call your doctor now or seek immediate medical care if:  · You have heel pain with fever, redness, or warmth in your heel. · You cannot put weight on the sore foot. Watch closely for changes in your health, and be sure to contact your doctor if:  · You have numbness or tingling in your heel. · Your heel pain lasts more than 2 weeks. Where can you learn more? Go to http://martha-neo.info/  Enter X351 in the search box to learn more about \"Plantar Fasciitis: Care Instructions. \"  Current as of: March 2, 2020               Content Version: 12.5  © 3589-8050 [employer]. Care instructions adapted under license by [employer] (which disclaims liability or warranty for this information). If you have questions about a medical condition or this instruction, always ask your healthcare professional. [address] any warranty or liability for your use of this information.

## 2020-06-08 NOTE — ED NOTES
I have reviewed discharge instructions with the patient. The patient verbalized understanding. Patient left ED via Discharge Method: wheelchair to Home with . Opportunity for questions and clarification provided. Patient given 1 scripts. To continue your aftercare when you leave the hospital, you may receive an automated call from our care team to check in on how you are doing. This is a free service and part of our promise to provide the best care and service to meet your aftercare needs.  If you have questions, or wish to unsubscribe from this service please call [phone]. Thank you for Choosing our [address] [place] [address] Emergency Department.

## 2020-06-29 ENCOUNTER — APPOINTMENT (OUTPATIENT)
Dept: GENERAL RADIOLOGY | Age: 50
DRG: 638 | End: 2020-06-29
Attending: EMERGENCY MEDICINE
Payer: COMMERCIAL

## 2020-06-29 ENCOUNTER — HOSPITAL ENCOUNTER (INPATIENT)
Age: 50
LOS: 3 days | Discharge: HOME HEALTH | DRG: 638 | End: 2020-07-03
Attending: EMERGENCY MEDICINE | Admitting: INTERNAL MEDICINE
Payer: COMMERCIAL

## 2020-06-29 ENCOUNTER — APPOINTMENT (OUTPATIENT)
Dept: ULTRASOUND IMAGING | Age: 50
DRG: 638 | End: 2020-06-29
Attending: EMERGENCY MEDICINE
Payer: COMMERCIAL

## 2020-06-29 DIAGNOSIS — L03.031 CELLULITIS AND ABSCESS OF TOE OF RIGHT FOOT: ICD-10-CM

## 2020-06-29 DIAGNOSIS — L02.611 CELLULITIS AND ABSCESS OF TOE OF RIGHT FOOT: ICD-10-CM

## 2020-06-29 DIAGNOSIS — M86.9 OSTEOMYELITIS OF RIGHT FOOT, UNSPECIFIED TYPE (HCC): Primary | ICD-10-CM

## 2020-06-29 PROBLEM — E87.6 HYPOKALEMIA: Status: ACTIVE | Noted: 2020-06-29

## 2020-06-29 PROBLEM — Z86.73 H/O TIA (TRANSIENT ISCHEMIC ATTACK) AND STROKE: Status: ACTIVE | Noted: 2020-06-29

## 2020-06-29 PROBLEM — E66.9 CLASS 1 OBESITY IN ADULT: Status: ACTIVE | Noted: 2020-06-29

## 2020-06-29 LAB
ALBUMIN SERPL-MCNC: 3.3 G/DL (ref 3.5–5)
ALBUMIN/GLOB SERPL: 0.6 {RATIO} (ref 1.2–3.5)
ALP SERPL-CCNC: 96 U/L (ref 50–136)
ALT SERPL-CCNC: 19 U/L (ref 12–65)
ANION GAP SERPL CALC-SCNC: 7 MMOL/L (ref 7–16)
AST SERPL-CCNC: 9 U/L (ref 15–37)
BASOPHILS # BLD: 0 K/UL (ref 0–0.2)
BASOPHILS NFR BLD: 0 % (ref 0–2)
BILIRUB SERPL-MCNC: 0.4 MG/DL (ref 0.2–1.1)
BUN SERPL-MCNC: 12 MG/DL (ref 6–23)
CALCIUM SERPL-MCNC: 9.7 MG/DL (ref 8.3–10.4)
CHLORIDE SERPL-SCNC: 99 MMOL/L (ref 98–107)
CO2 SERPL-SCNC: 28 MMOL/L (ref 21–32)
CREAT SERPL-MCNC: 0.81 MG/DL (ref 0.6–1)
CRP SERPL-MCNC: 12.6 MG/DL (ref 0–0.9)
DIFFERENTIAL METHOD BLD: ABNORMAL
EOSINOPHIL # BLD: 0.2 K/UL (ref 0–0.8)
EOSINOPHIL NFR BLD: 4 % (ref 0.5–7.8)
ERYTHROCYTE [DISTWIDTH] IN BLOOD BY AUTOMATED COUNT: 15.2 % (ref 11.9–14.6)
ERYTHROCYTE [SEDIMENTATION RATE] IN BLOOD: 102 MM/HR (ref 0–20)
EST. AVERAGE GLUCOSE BLD GHB EST-MCNC: 137 MG/DL
GLOBULIN SER CALC-MCNC: 5.1 G/DL (ref 2.3–3.5)
GLUCOSE SERPL-MCNC: 156 MG/DL (ref 65–100)
HBA1C MFR BLD: 6.4 %
HCT VFR BLD AUTO: 33.6 % (ref 35.8–46.3)
HGB BLD-MCNC: 10.6 G/DL (ref 11.7–15.4)
IMM GRANULOCYTES # BLD AUTO: 0 K/UL (ref 0–0.5)
IMM GRANULOCYTES NFR BLD AUTO: 0 % (ref 0–5)
LACTATE SERPL-SCNC: 1.4 MMOL/L (ref 0.4–2)
LYMPHOCYTES # BLD: 1.6 K/UL (ref 0.5–4.6)
LYMPHOCYTES NFR BLD: 26 % (ref 13–44)
MAGNESIUM SERPL-MCNC: 2 MG/DL (ref 1.8–2.4)
MCH RBC QN AUTO: 25.5 PG (ref 26.1–32.9)
MCHC RBC AUTO-ENTMCNC: 31.5 G/DL (ref 31.4–35)
MCV RBC AUTO: 80.8 FL (ref 79.6–97.8)
MONOCYTES # BLD: 0.4 K/UL (ref 0.1–1.3)
MONOCYTES NFR BLD: 6 % (ref 4–12)
NEUTS SEG # BLD: 3.9 K/UL (ref 1.7–8.2)
NEUTS SEG NFR BLD: 64 % (ref 43–78)
NRBC # BLD: 0 K/UL (ref 0–0.2)
PLATELET # BLD AUTO: 234 K/UL (ref 150–450)
PMV BLD AUTO: 9.5 FL (ref 9.4–12.3)
POTASSIUM SERPL-SCNC: 3.3 MMOL/L (ref 3.5–5.1)
PROCALCITONIN SERPL-MCNC: <0.05 NG/ML
PROT SERPL-MCNC: 8.4 G/DL (ref 6.3–8.2)
RBC # BLD AUTO: 4.16 M/UL (ref 4.05–5.2)
SODIUM SERPL-SCNC: 134 MMOL/L (ref 136–145)
WBC # BLD AUTO: 6.1 K/UL (ref 4.3–11.1)

## 2020-06-29 PROCEDURE — 96375 TX/PRO/DX INJ NEW DRUG ADDON: CPT

## 2020-06-29 PROCEDURE — 93971 EXTREMITY STUDY: CPT

## 2020-06-29 PROCEDURE — 73630 X-RAY EXAM OF FOOT: CPT

## 2020-06-29 PROCEDURE — 74011250637 HC RX REV CODE- 250/637: Performed by: FAMILY MEDICINE

## 2020-06-29 PROCEDURE — 74011250636 HC RX REV CODE- 250/636: Performed by: FAMILY MEDICINE

## 2020-06-29 PROCEDURE — 84145 PROCALCITONIN (PCT): CPT

## 2020-06-29 PROCEDURE — 96361 HYDRATE IV INFUSION ADD-ON: CPT

## 2020-06-29 PROCEDURE — 83605 ASSAY OF LACTIC ACID: CPT

## 2020-06-29 PROCEDURE — 87205 SMEAR GRAM STAIN: CPT

## 2020-06-29 PROCEDURE — 85025 COMPLETE CBC W/AUTO DIFF WBC: CPT

## 2020-06-29 PROCEDURE — 99218 HC RM OBSERVATION: CPT

## 2020-06-29 PROCEDURE — 99285 EMERGENCY DEPT VISIT HI MDM: CPT

## 2020-06-29 PROCEDURE — 83735 ASSAY OF MAGNESIUM: CPT

## 2020-06-29 PROCEDURE — 80053 COMPREHEN METABOLIC PANEL: CPT

## 2020-06-29 PROCEDURE — 74011000258 HC RX REV CODE- 258: Performed by: FAMILY MEDICINE

## 2020-06-29 PROCEDURE — 86140 C-REACTIVE PROTEIN: CPT

## 2020-06-29 PROCEDURE — 83036 HEMOGLOBIN GLYCOSYLATED A1C: CPT

## 2020-06-29 PROCEDURE — 87040 BLOOD CULTURE FOR BACTERIA: CPT

## 2020-06-29 PROCEDURE — 74011250636 HC RX REV CODE- 250/636: Performed by: EMERGENCY MEDICINE

## 2020-06-29 PROCEDURE — 96372 THER/PROPH/DIAG INJ SC/IM: CPT

## 2020-06-29 PROCEDURE — 96365 THER/PROPH/DIAG IV INF INIT: CPT

## 2020-06-29 PROCEDURE — 85652 RBC SED RATE AUTOMATED: CPT

## 2020-06-29 RX ORDER — IBUPROFEN 400 MG/1
400 TABLET ORAL
Status: DISCONTINUED | OUTPATIENT
Start: 2020-06-29 | End: 2020-07-02 | Stop reason: HOSPADM

## 2020-06-29 RX ORDER — NALOXONE HYDROCHLORIDE 0.4 MG/ML
0.4 INJECTION, SOLUTION INTRAMUSCULAR; INTRAVENOUS; SUBCUTANEOUS AS NEEDED
Status: DISCONTINUED | OUTPATIENT
Start: 2020-06-29 | End: 2020-07-02 | Stop reason: HOSPADM

## 2020-06-29 RX ORDER — ACETAMINOPHEN 325 MG/1
650 TABLET ORAL
Status: DISCONTINUED | OUTPATIENT
Start: 2020-06-29 | End: 2020-07-02 | Stop reason: HOSPADM

## 2020-06-29 RX ORDER — ONDANSETRON 2 MG/ML
4 INJECTION INTRAMUSCULAR; INTRAVENOUS
Status: DISCONTINUED | OUTPATIENT
Start: 2020-06-29 | End: 2020-07-02 | Stop reason: HOSPADM

## 2020-06-29 RX ORDER — ZOLPIDEM TARTRATE 5 MG/1
5 TABLET ORAL
Status: DISCONTINUED | OUTPATIENT
Start: 2020-06-29 | End: 2020-07-02 | Stop reason: HOSPADM

## 2020-06-29 RX ORDER — MORPHINE SULFATE 2 MG/ML
1 INJECTION, SOLUTION INTRAMUSCULAR; INTRAVENOUS
Status: DISCONTINUED | OUTPATIENT
Start: 2020-06-29 | End: 2020-07-02 | Stop reason: HOSPADM

## 2020-06-29 RX ORDER — ADHESIVE BANDAGE
30 BANDAGE TOPICAL DAILY PRN
Status: DISCONTINUED | OUTPATIENT
Start: 2020-06-29 | End: 2020-07-02 | Stop reason: HOSPADM

## 2020-06-29 RX ORDER — POTASSIUM CHLORIDE 20 MEQ/1
40 TABLET, EXTENDED RELEASE ORAL EVERY 4 HOURS
Status: COMPLETED | OUTPATIENT
Start: 2020-06-29 | End: 2020-06-30

## 2020-06-29 RX ORDER — TRAZODONE HYDROCHLORIDE 50 MG/1
100 TABLET ORAL
Status: DISCONTINUED | OUTPATIENT
Start: 2020-06-29 | End: 2020-07-02 | Stop reason: HOSPADM

## 2020-06-29 RX ORDER — OXYCODONE AND ACETAMINOPHEN 5; 325 MG/1; MG/1
1 TABLET ORAL
Status: DISCONTINUED | OUTPATIENT
Start: 2020-06-29 | End: 2020-07-02 | Stop reason: HOSPADM

## 2020-06-29 RX ORDER — DIPHENHYDRAMINE HYDROCHLORIDE 50 MG/ML
12.5 INJECTION, SOLUTION INTRAMUSCULAR; INTRAVENOUS
Status: DISCONTINUED | OUTPATIENT
Start: 2020-06-29 | End: 2020-07-02 | Stop reason: HOSPADM

## 2020-06-29 RX ORDER — HYDROCODONE BITARTRATE AND ACETAMINOPHEN 5; 325 MG/1; MG/1
1 TABLET ORAL
Status: DISCONTINUED | OUTPATIENT
Start: 2020-06-29 | End: 2020-06-29

## 2020-06-29 RX ORDER — SODIUM CHLORIDE 0.9 % (FLUSH) 0.9 %
5-40 SYRINGE (ML) INJECTION AS NEEDED
Status: DISCONTINUED | OUTPATIENT
Start: 2020-06-29 | End: 2020-07-02 | Stop reason: HOSPADM

## 2020-06-29 RX ORDER — DULOXETIN HYDROCHLORIDE 60 MG/1
60 CAPSULE, DELAYED RELEASE ORAL 2 TIMES DAILY
Status: DISCONTINUED | OUTPATIENT
Start: 2020-06-29 | End: 2020-07-02 | Stop reason: HOSPADM

## 2020-06-29 RX ORDER — BUPROPION HYDROCHLORIDE 150 MG/1
150 TABLET, EXTENDED RELEASE ORAL DAILY
Status: DISCONTINUED | OUTPATIENT
Start: 2020-06-30 | End: 2020-07-02 | Stop reason: HOSPADM

## 2020-06-29 RX ORDER — HEPARIN SODIUM 5000 [USP'U]/ML
5000 INJECTION, SOLUTION INTRAVENOUS; SUBCUTANEOUS EVERY 8 HOURS
Status: DISCONTINUED | OUTPATIENT
Start: 2020-06-29 | End: 2020-07-02 | Stop reason: HOSPADM

## 2020-06-29 RX ORDER — GUAIFENESIN 100 MG/5ML
81 LIQUID (ML) ORAL DAILY
Status: DISCONTINUED | OUTPATIENT
Start: 2020-06-30 | End: 2020-07-02 | Stop reason: HOSPADM

## 2020-06-29 RX ORDER — LISINOPRIL AND HYDROCHLOROTHIAZIDE 20; 25 MG/1; MG/1
1 TABLET ORAL DAILY
Status: DISCONTINUED | OUTPATIENT
Start: 2020-06-30 | End: 2020-06-30

## 2020-06-29 RX ORDER — PRAVASTATIN SODIUM 20 MG/1
40 TABLET ORAL
Status: DISCONTINUED | OUTPATIENT
Start: 2020-06-29 | End: 2020-07-02 | Stop reason: HOSPADM

## 2020-06-29 RX ORDER — SODIUM CHLORIDE 9 MG/ML
100 INJECTION, SOLUTION INTRAVENOUS CONTINUOUS
Status: DISCONTINUED | OUTPATIENT
Start: 2020-06-29 | End: 2020-07-02 | Stop reason: HOSPADM

## 2020-06-29 RX ORDER — SODIUM CHLORIDE 0.9 % (FLUSH) 0.9 %
5-40 SYRINGE (ML) INJECTION EVERY 8 HOURS
Status: DISCONTINUED | OUTPATIENT
Start: 2020-06-29 | End: 2020-07-02 | Stop reason: HOSPADM

## 2020-06-29 RX ORDER — METRONIDAZOLE 500 MG/100ML
500 INJECTION, SOLUTION INTRAVENOUS EVERY 8 HOURS
Status: DISCONTINUED | OUTPATIENT
Start: 2020-06-29 | End: 2020-07-02 | Stop reason: HOSPADM

## 2020-06-29 RX ORDER — PANTOPRAZOLE SODIUM 40 MG/1
40 TABLET, DELAYED RELEASE ORAL
Status: DISCONTINUED | OUTPATIENT
Start: 2020-06-29 | End: 2020-07-02 | Stop reason: HOSPADM

## 2020-06-29 RX ORDER — ONDANSETRON 2 MG/ML
4 INJECTION INTRAMUSCULAR; INTRAVENOUS
Status: COMPLETED | OUTPATIENT
Start: 2020-06-29 | End: 2020-06-29

## 2020-06-29 RX ORDER — VANCOMYCIN/0.9 % SOD CHLORIDE 1.5G/250ML
1500 PLASTIC BAG, INJECTION (ML) INTRAVENOUS EVERY 12 HOURS
Status: DISCONTINUED | OUTPATIENT
Start: 2020-06-30 | End: 2020-07-01

## 2020-06-29 RX ADMIN — PRAVASTATIN SODIUM 40 MG: 20 TABLET ORAL at 21:21

## 2020-06-29 RX ADMIN — DULOXETINE HYDROCHLORIDE 60 MG: 60 CAPSULE, DELAYED RELEASE ORAL at 21:21

## 2020-06-29 RX ADMIN — ONDANSETRON 4 MG: 2 INJECTION INTRAMUSCULAR; INTRAVENOUS at 17:16

## 2020-06-29 RX ADMIN — SODIUM CHLORIDE 1000 ML: 900 INJECTION, SOLUTION INTRAVENOUS at 16:39

## 2020-06-29 RX ADMIN — OXYCODONE HYDROCHLORIDE AND ACETAMINOPHEN 1 TABLET: 5; 325 TABLET ORAL at 20:34

## 2020-06-29 RX ADMIN — POTASSIUM CHLORIDE 40 MEQ: 1500 TABLET, EXTENDED RELEASE ORAL at 21:20

## 2020-06-29 RX ADMIN — SODIUM CHLORIDE 100 ML/HR: 900 INJECTION, SOLUTION INTRAVENOUS at 21:21

## 2020-06-29 RX ADMIN — HEPARIN SODIUM 5000 UNITS: 5000 INJECTION INTRAVENOUS; SUBCUTANEOUS at 21:20

## 2020-06-29 RX ADMIN — VANCOMYCIN HYDROCHLORIDE 2500 MG: 10 INJECTION, POWDER, LYOPHILIZED, FOR SOLUTION INTRAVENOUS at 16:39

## 2020-06-29 RX ADMIN — METRONIDAZOLE 500 MG: 500 INJECTION, SOLUTION INTRAVENOUS at 19:30

## 2020-06-29 RX ADMIN — CEFEPIME HYDROCHLORIDE 1 G: 1 INJECTION, POWDER, FOR SOLUTION INTRAMUSCULAR; INTRAVENOUS at 21:21

## 2020-06-29 RX ADMIN — Medication 10 ML: at 21:23

## 2020-06-29 RX ADMIN — PANTOPRAZOLE SODIUM 40 MG: 40 TABLET, DELAYED RELEASE ORAL at 21:20

## 2020-06-29 NOTE — H&P
Hospitalist H&P Note     Admit Date:  2020  3:50 PM   Name:  Jerrell Guzman   Age:  54 y.o.  :  1970   MRN:  [id number]   PCP:  Tran Fang MD  Treatment Team: Attending Provider: Gian Minor MD; Primary Nurse: Nicol López  Discharge rt big toe,swelling, rt leg pain  HPI:   54 yr old female pt with DM TYPE 2- says mostly hypoglycemic, not on any medications as per her pcp recommendations, h/o tia 2019,h/o left facial droop on and off since [address] pronounced in stress? (says had bells palsy when she was pregnant several years ago),morbid obesity, depression, hyperlipidemia, RA vs Reflex sympathetic dystrophy-says was told by doctor that she might have either. Neuropathy starting from lower 1/3rd of both legs, more pronounced both feet. Since past 6 months pt had rt big toe plantar aspect wound with on and off discharge. Recent tiny bleed started about few weeks ago,has had her rt big toe nail slightly peeled off. Noticed swelling big toe, didn't have any pain toe secondary to neuropathy,gradually increasing past 2-3 days,extending to the medial aspect of leg and also noticed rt leg cramp/pain. Yesterday took her rt toe nail off. Also noticed fever >101 f improved on NSAID. Said had to take one more dose of NSAID this evening. Noticed pus discharge from the rt  Big toe plantar wound today. On and off headache,mild, dull, improved on NSAID. Also say in lot of stress. Of the 10 ROS apart from one mentioned above , pt other ROS were normal and non contributary. Potassium 3.3  xr rt foot-  IMPRESSION: Soft tissue swelling about the right great toe. No definite bony  abnormality. If there strong clinical concern for osteomyelitis, consider  further evaluation with right forefoot MRI. Rt lower extremity venous doppler-  Negative evaluation for deep venous thrombosis within the right  lower extremity.     Pt will be admitted for rt big toe cellulitis with probable abscess. 10 systems reviewed and negative except as noted in HPI.   Past Medical History:   Diagnosis Date    Anemia     Chronic pain     due to RA    Depression     GERD (gastroesophageal reflux disease)     Hx of renal calculi          Hypercholesterolemia     Hypertension     Migraine     [facility]bid [facility]     Ovarian cyst     Reflex sympathetic dystrophy since 2005    right arm; s/p injury - laceration in hand    Rheumatoid arthritis ([facility]          Rheumatoid arthritis ([facility] [facility] 9/26/2019    Sinus problem     TIA (transient ischemic attack) 9/26/2019      Past Surgical History:   Procedure Laterality Date    HX APPENDECTOMY      HX BREAST BIOPSY Left 4/27/2015    LEFT NIPPLE EXPLORATION WITH REMOVAL OF LATTISIMUS DUCT performed by Campbell Blake MD at [address] HX CHOLECYSTECTOMY      HX ORTHOPAEDIC      rt hand    HX ORTHOPAEDIC      neck    HX SEPTOPLASTY  08/28/2019    FESS,SMRIT's    HX TOTAL ABDOMINAL HYSTERECTOMY           HX TUBAL LIGATION        Allergies   Allergen Reactions    Pcn [Penicillins] Hives    Norco [Hydrocodone-Acetaminophen] Nausea and Vomiting      Social History     Tobacco Use    Smoking status: Never Smoker    Smokeless tobacco: Never Used   Substance Use Topics    Alcohol use: Yes     Comment: \"once every couple years\"      Family History   Problem Relation Age of Onset    Breast Cancer Paternal Aunt 39    Cancer Paternal Aunt         brst    Cancer Maternal Grandfather         colon    Colon Cancer Maternal Grandfather     Breast Cancer Paternal Grandmother 46    Cancer Paternal Grandmother         breast    Ovarian Cancer Paternal Grandmother     Cancer Mother         throat    Hypertension Mother     Cancer Father         non hodgkins lymphoma    Cancer Maternal Grandmother         ovarian    Breast Cancer Maternal Grandmother     Heart Disease Paternal Grandfather     Diabetes Paternal Grandfather     Breast Cancer Sister       Immunization History   Administered Date(s) Administered    TB Skin Test (PPD) Intradermal 2019    Tdap 2018     PTA Medications:  Prior to Admission Medications   Prescriptions Last Dose Informant Patient Reported? Taking? DULoxetine (CYMBALTA) 60 mg capsule   Yes No   Sig: Take 60 mg by mouth two (2) times a day. aspirin 81 mg chewable tablet   Yes No   Sig: Take 81 mg by mouth. buPROPion XL (WELLBUTRIN XL) 150 mg tablet   Yes No   Sig: Take 150 mg by mouth daily. lisinopril-hydrochlorothiazide (PRINZIDE, ZESTORETIC) 20-25 mg per tablet   Yes No   Si Tab daily. pravastatin (PRAVACHOL) 40 mg tablet   No No   Sig: Take 1 Tab by mouth nightly. traZODone (DESYREL) 100 mg tablet   Yes No   Sig: Take 100 mg by mouth. Facility-Administered Medications: None       Objective:     Patient Vitals for the past 24 hrs:   Temp Pulse Resp BP SpO2   20 1859    99/56 98 %   20 1839    111/55 99 %   20 1819    135/66 97 %   20 1759    120/45 94 %   20 1742     98 %   20 1740    112/53    20 1548 98.8 °F (37.1 °C) 92 18 97/57 97 %     Oxygen Therapy  O2 Sat (%): 98 % (20 1859)  Pulse via Oximetry: 79 beats per minute (20 1859)  O2 Device: Room air (20 1601)  No intake or output data in the 24 hours ending 20    Physical Exam:  General:    Well nourished. Alert. No respiratory distress. Has left facial droop-  and wife says this is not new, has been chronic on and off since 2019. Also at time has numbness left angle of the mouth on and off, none at the time of my examination. Eyes:   Normal sclera. Extraocular movements intact. ENT:  Normocephalic, atraumatic. Moist mucous membranes  CV:   RRR. No murmur, rub, or gallop. Lungs:  CTAB. No wheezing, rhonchi, or rales. Abdomen: Soft, nontender, nondistended. Bowel sounds normal. Morbidly obese  Extremities: Warm and dry.   No cyanosis or edema. Neurologic: CN II-XII grossly intact. Neuropathy- mild decrease sensation bilateral lower extremity , lower 1/3rd of both legs, significant where pt cannot feel any sensation - bilateral feet. Skin:     Swelling rt big toe, wound plantar aspect rt big toe with very small amount of pus drainage, non tender(neuropathy),denuded rt big toe nail. c/o mild tender medical aspect of the rt leg on palpation , no significant erythema rt leg at the time of my examination. Psych:  Anxious    I reviewed the labs, imaging. Data Review:   Recent Results (from the past 24 hour(s))   CBC WITH AUTOMATED DIFF    Collection Time: 06/29/20  4:34 PM   Result Value Ref Range    WBC 6.1 4.3 - 11.1 K/uL    RBC 4.16 4.05 - 5.2 M/uL    HGB 10.6 (L) 11.7 - 15.4 g/dL    HCT 33.6 (L) 35.8 - 46.3 %    MCV 80.8 79.6 - 97.8 FL    MCH 25.5 (L) 26.1 - 32.9 PG    MCHC 31.5 31.4 - 35.0 g/dL    RDW 15.2 (H) 11.9 - 14.6 %    PLATELET 445 925 - 561 K/uL    MPV 9.5 9.4 - 12.3 FL    ABSOLUTE NRBC 0.00 0.0 - 0.2 K/uL    DF AUTOMATED      NEUTROPHILS 64 43 - 78 %    LYMPHOCYTES 26 13 - 44 %    MONOCYTES 6 4.0 - 12.0 %    EOSINOPHILS 4 0.5 - 7.8 %    BASOPHILS 0 0.0 - 2.0 %    IMMATURE GRANULOCYTES 0 0.0 - 5.0 %    ABS. NEUTROPHILS 3.9 1.7 - 8.2 K/UL    ABS. LYMPHOCYTES 1.6 0.5 - 4.6 K/UL    ABS. MONOCYTES 0.4 0.1 - 1.3 K/UL    ABS. EOSINOPHILS 0.2 0.0 - 0.8 K/UL    ABS. BASOPHILS 0.0 0.0 - 0.2 K/UL    ABS. IMM.  GRANS. 0.0 0.0 - 0.5 K/UL   LACTIC ACID    Collection Time: 06/29/20  4:34 PM   Result Value Ref Range    Lactic acid 1.4 0.4 - 2.0 MMOL/L   METABOLIC PANEL, COMPREHENSIVE    Collection Time: 06/29/20  4:34 PM   Result Value Ref Range    Sodium 134 (L) 136 - 145 mmol/L    Potassium 3.3 (L) 3.5 - 5.1 mmol/L    Chloride 99 98 - 107 mmol/L    CO2 28 21 - 32 mmol/L    Anion gap 7 7 - 16 mmol/L    Glucose 156 (H) 65 - 100 mg/dL    BUN 12 6 - 23 MG/DL    Creatinine 0.81 0.6 - 1.0 MG/DL    GFR est AA >60 >60 ml/min/1.73m2    GFR est non-AA >60 >60 ml/min/1.73m2    Calcium 9.7 8.3 - 10.4 MG/DL    Bilirubin, total 0.4 0.2 - 1.1 MG/DL    ALT (SGPT) 19 12 - 65 U/L    AST (SGOT) 9 (L) 15 - 37 U/L    Alk. phosphatase 96 50 - 136 U/L    Protein, total 8.4 (H) 6.3 - 8.2 g/dL    Albumin 3.3 (L) 3.5 - 5.0 g/dL    Globulin 5.1 (H) 2.3 - 3.5 g/dL    A-G Ratio 0.6 (L) 1.2 - 3.5     PROCALCITONIN    Collection Time: 06/29/20  4:34 PM   Result Value Ref Range    Procalcitonin <0.05 ng/mL   SED RATE, AUTOMATED    Collection Time: 06/29/20  4:34 PM   Result Value Ref Range    Sed rate, automated 102 (H) 0 - 20 mm/hr   C REACTIVE PROTEIN, QT    Collection Time: 06/29/20  4:34 PM   Result Value Ref Range    C-Reactive protein 12.6 (H) 0.0 - 0.9 mg/dL       All Micro Results     Procedure Component Value Units Date/Time    CULTURE, Adelita Netter STAIN [[id number]]     Order Status:  Sent Specimen:  Wound from Toe     CULTURE, BLOOD [[id number]] Collected:  06/29/20 1635    Order Status:  Completed Specimen:  Blood Updated:  06/29/20 1652    CULTURE, BLOOD [[id number]] Collected:  06/29/20 1634    Order Status:  Completed Specimen:  Blood Updated:  06/29/20 1652          Other Studies:  Xr Foot Rt Min 3 V    Result Date: 6/29/2020  Right foot CLINICAL INDICATION: Chronic wound to the right great toe FINDINGS: Three views of the right great toe show no obvious destructive bone change appreciated to indicate osteomyelitis by plain radiography. Soft tissue swelling is evident. The IP and first MTP joint spaces are well-maintained. There is no fracture. IMPRESSION: Soft tissue swelling about the right great toe. No definite bony abnormality. If there strong clinical concern for osteomyelitis, consider further evaluation with right forefoot MRI. Duplex Lower Ext Venous Right    Result Date: 6/29/2020  Right lower extremity Doppler evaluation: 06/29/2020 HISTORY: Pain, palpable cords swelling.   Symptoms are moderate and have been present x1 week Grayscale, color-flow and Doppler evaluation of the major veins of the right lower extremity was performed. Comparison: None FINDINGS: There is normal compression and augmentation involving the right common femoral, superficial femoral and popliteal veins. No grayscale or color-flow findings within these vessels or within the distal greater saphenous or profunda femoral veins were noted to suggest deep venous thrombosis. Interrogated portions of the peroneal and posterior tibial veins were also unremarkable. No popliteal cyst is identified. Cursory imaging of the left common femoral vein reveals it to be patent with normal color-flow and augmentation. There is a 2.7 cm lymph node within the right groin which may be reactive. IMPRESSION: Negative evaluation for deep venous thrombosis within the right lower extremity. Assessment and Plan:     Hospital Problems as of 6/29/2020 Date Reviewed: 9/3/2019          Codes Class Noted - Resolved POA    * (Principal) Cellulitis and abscess of toe of right foot ICD-10-CM: L03.031, L02.611  ICD-9-CM: 681.10  6/29/2020 - Present Unknown        H/O TIA (transient ischemic attack) and stroke ICD-10-CM: Z86.73  ICD-9-CM: V12.54  6/29/2020 - Present Unknown        Hypokalemia ICD-10-CM: E87.6  ICD-9-CM: 276.8  6/29/2020 - Present Unknown        Facial droop ICD-10-CM: R29.810  ICD-9-CM: 781.94  9/26/2019 - Present Yes        Hypertension ICD-10-CM: I10  ICD-9-CM: 401.9  9/26/2019 - Present Yes        Chronic pain ICD-10-CM: G89.29  ICD-9-CM: 338.29  9/26/2019 - Present Yes    Overview Signed 9/26/2019 10:39 PM by Dave Michel MD     due to RA             Depression ICD-10-CM: F32.9  ICD-9-CM: 482  9/26/2019 - Present Yes              PLAN:  - rt big toe cellulitis with abscess- cont vancomycin and cefepime. Will order MRI rt forefoot- r/o osteomyelitis. Wound culture ordered. wound care consult. May need ortho consult depending on the MRI results.  -dm type 2- presently not on any medications as per pcp recommendations- will order a1c- diabetic diet  - hypokalemia- replace. Will order mag level.   - h/o tia, chronic on and off left facial droop - cont asa and statin  - chronic pain  - htn- controlled , cont home medication.  - depression- cont home medication.  - Morbid obesity      DVT ppx:  heparin  Anticipated DC needs:    Code status:  Full  Estimated LOS:  Less than 2 midnights  Risk:  high    Signed:  Timbo Waller MD

## 2020-06-29 NOTE — ED TRIAGE NOTES
Presents with wound to right great toe. States wound has been ongoing for over 6 months. Patient reports noticing redness this week. Mask applied.

## 2020-06-29 NOTE — ED PROVIDER NOTES
82-year-old female presents with complaints of pain and swelling in her right great toe  Patient states that she has had problems with the toe for months but that over the last 4 to 6 weeks she has had more pain redness and swelling in the great toe. Patient states that over the last several days she has had chills with some associated fever. She measured her temperature yesterday at 102.2  Patient has been unable to get in with her primary care doctor for evaluation and decided today to present to the ER after her toenail fell off    She feels discomfort and sees streaking and with her right posterior calf  Denies vomiting or diarrhea  There is no abdominal pain    The history is provided by the patient and the spouse. Foot Ulcer    This is a new problem. The current episode started more than 1 week ago. The problem occurs constantly. The problem has been gradually worsening. The pain is present in the right toes and right lower leg. The quality of the pain is described as aching and pounding. The pain is moderate. Associated symptoms include numbness. Pertinent negatives include no neck pain. The symptoms are aggravated by standing and contact. She has tried nothing for the symptoms. There has been no history of extremity trauma.         Past Medical History:   Diagnosis Date    Anemia     Chronic pain     due to RA    Depression     GERD (gastroesophageal reflux disease)     Hx of renal calculi          Hypercholesterolemia     Hypertension     Migraine     Morbid obesity ([address]     Ovarian cyst     Reflex sympathetic dystrophy since 2005    right arm; s/p injury - laceration in hand    Rheumatoid arthritis ([address]          Rheumatoid arthritis ([address] 9/26/2019    Sinus problem     TIA (transient ischemic attack) 9/26/2019       Past Surgical History:   Procedure Laterality Date    HX APPENDECTOMY      HX BREAST BIOPSY Left 4/27/2015    LEFT NIPPLE EXPLORATION WITH REMOVAL OF LATTISIMUS DUCT performed by Emili Mayer MD at [address] HX CHOLECYSTECTOMY      HX ORTHOPAEDIC      rt hand    HX ORTHOPAEDIC      neck    HX SEPTOPLASTY  08/28/2019    FESS,SMRIT's    HX TOTAL ABDOMINAL HYSTERECTOMY           HX TUBAL LIGATION           Family History:   Problem Relation Age of Onset    Breast Cancer Paternal Aunt 39    Cancer Paternal Aunt         brst    Cancer Maternal Grandfather         colon    Colon Cancer Maternal Grandfather     Breast Cancer Paternal Grandmother 46    Cancer Paternal Grandmother         breast    Ovarian Cancer Paternal Grandmother     Cancer Mother         throat    Hypertension Mother     Cancer Father         non hodgkins lymphoma    Cancer Maternal Grandmother         ovarian    Breast Cancer Maternal Grandmother     Heart Disease Paternal Grandfather     Diabetes Paternal Grandfather     Breast Cancer Sister        Social History     Socioeconomic History    Marital status:      Spouse name: Not on file    Number of children: Not on file    Years of education: Not on file    Highest education level: Not on file   Occupational History    Not on file   Social Needs    Financial resource strain: Not on file    Food insecurity     Worry: Not on file     Inability: Not on file    Transportation needs     Medical: Not on file     Non-medical: Not on file   Tobacco Use    Smoking status: Never Smoker    Smokeless tobacco: Never Used   Substance and Sexual Activity    Alcohol use: Yes     Comment: \"once every couple years\"    Drug use: No    Sexual activity: Yes     Partners: Male     Birth control/protection: Surgical   Lifestyle    Physical activity     Days per week: Not on file     Minutes per session: Not on file    Stress: Not on file   Relationships    Social connections     Talks on phone: Not on file     Gets together: Not on file     Attends Church service: Not on file     Active member of club or organization: Not on file Attends meetings of clubs or organizations: Not on file     Relationship status: Not on file    Intimate partner violence     Fear of current or ex partner: Not on file     Emotionally abused: Not on file     Physically abused: Not on file     Forced sexual activity: Not on file   Other Topics Concern    Not on file   Social History Narrative    Not on file         ALLERGIES: Pcn [penicillins] and Norco [hydrocodone-acetaminophen]    Review of Systems   Constitutional: Negative for chills and fever. HENT: Negative for congestion, ear pain and rhinorrhea. Eyes: Negative for photophobia and discharge. Respiratory: Negative for cough and shortness of breath. Cardiovascular: Negative for chest pain and palpitations. Gastrointestinal: Negative for abdominal pain, constipation, diarrhea and vomiting. Endocrine: Negative for cold intolerance and heat intolerance. Genitourinary: Negative for dysuria and flank pain. Musculoskeletal: Negative for arthralgias, myalgias and neck pain. Skin: Positive for color change and wound. Negative for rash. Allergic/Immunologic: Negative for environmental allergies and food allergies. Neurological: Positive for numbness. Negative for syncope and headaches. Hematological: Negative for adenopathy. Does not bruise/bleed easily. Psychiatric/Behavioral: Negative for dysphoric mood. The patient is not nervous/anxious. All other systems reviewed and are negative. Vitals:    06/29/20 1548   BP: 97/57   Pulse: 92   Resp: 18   Temp: 98.8 °F (37.1 °C)   SpO2: 97%   Weight: 103.4 kg (228 lb)   Height: 5' 8\" (1.727 m)            Physical Exam  Vitals signs and nursing note reviewed. Constitutional:       General: She is in acute distress. Appearance: Normal appearance. She is well-developed. She is obese. HENT:      Head: Normocephalic and atraumatic.       Right Ear: External ear normal.      Left Ear: External ear normal.      Mouth/Throat: Mouth: Mucous membranes are moist.      Pharynx: Oropharynx is clear. No oropharyngeal exudate. Eyes:      Extraocular Movements: Extraocular movements intact. Conjunctiva/sclera: Conjunctivae normal.      Pupils: Pupils are equal, round, and reactive to light. Neck:      Musculoskeletal: Normal range of motion and neck supple. Vascular: No JVD. Cardiovascular:      Rate and Rhythm: Normal rate and regular rhythm. Pulses: Normal pulses. Heart sounds: Normal heart sounds. No murmur. No friction rub. No gallop. Pulmonary:      Effort: Pulmonary effort is normal.      Breath sounds: Normal breath sounds. Abdominal:      General: Bowel sounds are normal. There is no distension. Palpations: Abdomen is soft. There is no mass. Tenderness: There is no abdominal tenderness. Musculoskeletal: Normal range of motion. General: Swelling and tenderness present. No deformity. Left lower leg: Normal.        Legs:         Feet:    Skin:     General: Skin is warm and dry. Capillary Refill: Capillary refill takes less than 2 seconds. Findings: Erythema present. No rash. Neurological:      Mental Status: She is alert and oriented to person, place, and time. Cranial Nerves: No cranial nerve deficit. Sensory: No sensory deficit. Gait: Gait normal.   Psychiatric:         Speech: Speech normal.         Behavior: Behavior normal.         Thought Content: Thought content normal.         Judgment: Judgment normal.          MDM  Number of Diagnoses or Management Options  Osteomyelitis of right foot, unspecified type [facility]): new and requires workup  Diagnosis management comments: 51-year-old diabetic female with measured temperature of 102.2 at home  Obviously infected left great toe now with missing nail.   Patient's initial blood pressure 97/57    We will start septic work-up including IV fluids and IV antibiotics    6:27 PM  Sed rate and CRP are elevated  White blood cell count, lactic acid normal    Given patient's protracted history, measured fever at home and risk factors the patient will be admitted to the hospitalist service       Amount and/or Complexity of Data Reviewed  Clinical lab tests: ordered and reviewed  Tests in the radiology section of CPT®: ordered and reviewed  Tests in the medicine section of CPT®: ordered and reviewed  Decide to obtain previous medical records or to obtain history from someone other than the patient: yes  Obtain history from someone other than the patient: yes  Review and summarize past medical records: yes  Discuss the patient with other providers: yes  Independent visualization of images, tracings, or specimens: yes    Risk of Complications, Morbidity, and/or Mortality  Presenting problems: high  Diagnostic procedures: high  Management options: moderate  General comments: Elements of this note have been dictated via voice recognition software. Text and phrases may be limited by the accuracy of the software. The chart has been reviewed, but errors may still be present.       Patient Progress  Patient progress: stable         Procedures

## 2020-06-30 ENCOUNTER — APPOINTMENT (OUTPATIENT)
Dept: MRI IMAGING | Age: 50
DRG: 638 | End: 2020-06-30
Attending: FAMILY MEDICINE
Payer: COMMERCIAL

## 2020-06-30 PROBLEM — M86.171 ACUTE OSTEOMYELITIS OF RIGHT FOOT (HCC): Status: ACTIVE | Noted: 2020-06-30

## 2020-06-30 PROBLEM — M86.10 ACUTE OSTEOMYELITIS (HCC): Status: ACTIVE | Noted: 2020-06-30

## 2020-06-30 PROBLEM — G62.9 PERIPHERAL NEUROPATHY: Status: ACTIVE | Noted: 2020-06-30

## 2020-06-30 LAB
ANION GAP SERPL CALC-SCNC: 7 MMOL/L (ref 7–16)
BUN SERPL-MCNC: 12 MG/DL (ref 6–23)
CALCIUM SERPL-MCNC: 9.2 MG/DL (ref 8.3–10.4)
CHLORIDE SERPL-SCNC: 106 MMOL/L (ref 98–107)
CO2 SERPL-SCNC: 25 MMOL/L (ref 21–32)
CREAT SERPL-MCNC: 0.85 MG/DL (ref 0.6–1)
FERRITIN SERPL-MCNC: 70 NG/ML (ref 8–388)
GLUCOSE SERPL-MCNC: 141 MG/DL (ref 65–100)
IRON SATN MFR SERPL: 11 %
IRON SERPL-MCNC: 26 UG/DL (ref 35–150)
POTASSIUM SERPL-SCNC: 4 MMOL/L (ref 3.5–5.1)
SODIUM SERPL-SCNC: 138 MMOL/L (ref 136–145)
TIBC SERPL-MCNC: 231 UG/DL (ref 250–450)

## 2020-06-30 PROCEDURE — 36415 COLL VENOUS BLD VENIPUNCTURE: CPT

## 2020-06-30 PROCEDURE — 83540 ASSAY OF IRON: CPT

## 2020-06-30 PROCEDURE — 74011250637 HC RX REV CODE- 250/637: Performed by: FAMILY MEDICINE

## 2020-06-30 PROCEDURE — 74011250636 HC RX REV CODE- 250/636: Performed by: FAMILY MEDICINE

## 2020-06-30 PROCEDURE — 74011250636 HC RX REV CODE- 250/636: Performed by: INTERNAL MEDICINE

## 2020-06-30 PROCEDURE — 96375 TX/PRO/DX INJ NEW DRUG ADDON: CPT

## 2020-06-30 PROCEDURE — 96372 THER/PROPH/DIAG INJ SC/IM: CPT

## 2020-06-30 PROCEDURE — 65270000029 HC RM PRIVATE

## 2020-06-30 PROCEDURE — 73718 MRI LOWER EXTREMITY W/O DYE: CPT

## 2020-06-30 PROCEDURE — 99218 HC RM OBSERVATION: CPT

## 2020-06-30 PROCEDURE — 96376 TX/PRO/DX INJ SAME DRUG ADON: CPT

## 2020-06-30 PROCEDURE — 82728 ASSAY OF FERRITIN: CPT

## 2020-06-30 PROCEDURE — 80048 BASIC METABOLIC PNL TOTAL CA: CPT

## 2020-06-30 PROCEDURE — 74011000258 HC RX REV CODE- 258: Performed by: FAMILY MEDICINE

## 2020-06-30 RX ORDER — VANCOMYCIN/0.9 % SOD CHLORIDE 1.5G/250ML
1500 PLASTIC BAG, INJECTION (ML) INTRAVENOUS EVERY 12 HOURS
Status: CANCELLED | OUTPATIENT
Start: 2020-06-30

## 2020-06-30 RX ORDER — LORAZEPAM 2 MG/ML
1 INJECTION INTRAMUSCULAR ONCE
Status: COMPLETED | OUTPATIENT
Start: 2020-06-30 | End: 2020-06-30

## 2020-06-30 RX ORDER — METRONIDAZOLE 500 MG/100ML
500 INJECTION, SOLUTION INTRAVENOUS EVERY 8 HOURS
Status: CANCELLED | OUTPATIENT
Start: 2020-06-30

## 2020-06-30 RX ADMIN — MORPHINE SULFATE 1 MG: 2 INJECTION, SOLUTION INTRAMUSCULAR; INTRAVENOUS at 11:02

## 2020-06-30 RX ADMIN — SODIUM CHLORIDE 100 ML/HR: 900 INJECTION, SOLUTION INTRAVENOUS at 13:32

## 2020-06-30 RX ADMIN — CEFEPIME HYDROCHLORIDE 1 G: 1 INJECTION, POWDER, FOR SOLUTION INTRAMUSCULAR; INTRAVENOUS at 09:18

## 2020-06-30 RX ADMIN — OXYCODONE HYDROCHLORIDE AND ACETAMINOPHEN 1 TABLET: 5; 325 TABLET ORAL at 07:46

## 2020-06-30 RX ADMIN — MORPHINE SULFATE 1 MG: 2 INJECTION, SOLUTION INTRAMUSCULAR; INTRAVENOUS at 04:55

## 2020-06-30 RX ADMIN — SODIUM CHLORIDE 500 ML: 900 INJECTION, SOLUTION INTRAVENOUS at 09:00

## 2020-06-30 RX ADMIN — DULOXETINE HYDROCHLORIDE 60 MG: 60 CAPSULE, DELAYED RELEASE ORAL at 09:20

## 2020-06-30 RX ADMIN — VANCOMYCIN HYDROCHLORIDE 1500 MG: 10 INJECTION, POWDER, LYOPHILIZED, FOR SOLUTION INTRAVENOUS at 18:31

## 2020-06-30 RX ADMIN — LORAZEPAM 1 MG: 2 INJECTION INTRAMUSCULAR; INTRAVENOUS at 16:50

## 2020-06-30 RX ADMIN — HEPARIN SODIUM 5000 UNITS: 5000 INJECTION INTRAVENOUS; SUBCUTANEOUS at 12:47

## 2020-06-30 RX ADMIN — Medication 10 ML: at 22:00

## 2020-06-30 RX ADMIN — HEPARIN SODIUM 5000 UNITS: 5000 INJECTION INTRAVENOUS; SUBCUTANEOUS at 21:27

## 2020-06-30 RX ADMIN — DULOXETINE HYDROCHLORIDE 60 MG: 60 CAPSULE, DELAYED RELEASE ORAL at 21:27

## 2020-06-30 RX ADMIN — HEPARIN SODIUM 5000 UNITS: 5000 INJECTION INTRAVENOUS; SUBCUTANEOUS at 03:42

## 2020-06-30 RX ADMIN — Medication 10 ML: at 06:00

## 2020-06-30 RX ADMIN — MORPHINE SULFATE 1 MG: 2 INJECTION, SOLUTION INTRAMUSCULAR; INTRAVENOUS at 20:41

## 2020-06-30 RX ADMIN — Medication 10 ML: at 14:00

## 2020-06-30 RX ADMIN — ASPIRIN 81 MG 81 MG: 81 TABLET ORAL at 09:20

## 2020-06-30 RX ADMIN — PRAVASTATIN SODIUM 40 MG: 20 TABLET ORAL at 21:27

## 2020-06-30 RX ADMIN — METRONIDAZOLE 500 MG: 500 INJECTION, SOLUTION INTRAVENOUS at 22:46

## 2020-06-30 RX ADMIN — METRONIDAZOLE 500 MG: 500 INJECTION, SOLUTION INTRAVENOUS at 03:41

## 2020-06-30 RX ADMIN — MORPHINE SULFATE 1 MG: 2 INJECTION, SOLUTION INTRAMUSCULAR; INTRAVENOUS at 00:56

## 2020-06-30 RX ADMIN — CEFEPIME HYDROCHLORIDE 1 G: 1 INJECTION, POWDER, FOR SOLUTION INTRAMUSCULAR; INTRAVENOUS at 21:27

## 2020-06-30 RX ADMIN — POTASSIUM CHLORIDE 40 MEQ: 1500 TABLET, EXTENDED RELEASE ORAL at 00:50

## 2020-06-30 RX ADMIN — PANTOPRAZOLE SODIUM 40 MG: 40 TABLET, DELAYED RELEASE ORAL at 07:36

## 2020-06-30 RX ADMIN — OXYCODONE HYDROCHLORIDE AND ACETAMINOPHEN 1 TABLET: 5; 325 TABLET ORAL at 16:51

## 2020-06-30 RX ADMIN — BUPROPION HYDROCHLORIDE 150 MG: 150 TABLET, EXTENDED RELEASE ORAL at 09:20

## 2020-06-30 RX ADMIN — VANCOMYCIN HYDROCHLORIDE 1500 MG: 10 INJECTION, POWDER, LYOPHILIZED, FOR SOLUTION INTRAVENOUS at 04:55

## 2020-06-30 NOTE — ED NOTES
TRANSFER - OUT REPORT:    Verbal report given to JAGDISH Donovan on Jaonn Posada  being transferred to [address] [place] [address] for routine progression of care       Report consisted of patients Situation, Background, Assessment and   Recommendations(SBAR). Information from the following report(s) SBAR, Kardex, ED Summary, [facility] and Recent Results was reviewed with the receiving nurse. Lines:   Peripheral IV 06/29/20 Right Antecubital (Active)   Site Assessment Clean, dry, & intact 6/29/2020  4:37 PM   Phlebitis Assessment 0 6/29/2020  4:37 PM   Infiltration Assessment 0 6/29/2020  4:37 PM   Dressing Status Clean, dry, & intact 6/29/2020  4:37 PM       Peripheral IV 06/29/20 Left Forearm (Active)   Site Assessment Clean, dry, & intact 6/29/2020  4:38 PM   Phlebitis Assessment 0 6/29/2020  4:38 PM   Infiltration Assessment 0 6/29/2020  4:38 PM        Opportunity for questions and clarification was provided.       Patient transported with:   [employer]

## 2020-06-30 NOTE — CDMP QUERY
Patient admitted with Acute osteomyelitis of the right, noted to have history of diabetes. After study, can the suspected etiology the patient's cellulitis be further specified as: 
 
 
·Osteomyelitis of right foot related to diabetes ·Osteomyelitis of right foot  unrelated diabetes ·Other_________ ·Unable to determine The medical record reflects the following: 
  Risk Factors: 54 yr, Hx DM2, peripheral neuropathy, HTn 
 
  Clinical Indicators: MRI shows Acute osteomyelitis of the distal phalanx of the great toe with surrounding soft tissue cellulitis, per documentation \"right foot ulcer with cellulitis with streaks up her right leg\" Treatment:  IV vancomycin, IV cefepime, IV Flagyl, orthopedic surgery consult Thanks, Elvira Leiva, RN Compliant Documentation Management Program 
[phone]

## 2020-06-30 NOTE — PROGRESS NOTES
Hospitalist Progress Note    Patient: Pipe Kim MRN: [id number]  SSN: [id number]    YOB: 1970  Age: 54 y.o. Sex: female      Admit Date: 6/29/2020    LOS: 0 days     Subjective:     54year old female with a PMH of DM2 with associated peripheral neuropathy, not on any DM medications due to hypoglycemia, h/o tia October 2019, h/o left facial droop on and off since October (more pronounced in stress; says had bells palsy when she was pregnant several years ago), morbid obesity, depression, hyperlipidemia, RA vs RLS admitted on 6/29 due to right foot ulcer with cellulitis with streaks up her right leg. MRI confirmed OM.    6/30 - Leg erythema improved. Right hallux continues to be swollen and painful. N/V resolved. Denies CP/SOB. Review of systems negative except stated above. Objective:     Visit Vitals  /59 (BP 1 Location: Left arm, BP Patient Position: At rest;Sitting)   Pulse 74   Temp 98 °F (36.7 °C)   Resp 18   Ht 5' 8\" (1.727 m)   Wt 103.4 kg (228 lb)   SpO2 96%   BMI 34.67 kg/m²      Oxygen Therapy  O2 Sat (%): 96 % (06/30/20 0757)  Pulse via Oximetry: 79 beats per minute (06/29/20 1859)  O2 Device: Room air (06/29/20 1601)      Intake and Output: No intake or output data in the 24 hours ending 06/30/20 0950      Physical Exam:   GENERAL: alert, cooperative, no distress, appears stated age  EYE: conjunctivae/corneas clear. PERRL. THROAT & NECK: normal and no erythema or exudates noted. LUNG: clear to auscultation bilaterally  HEART: regular rate and rhythm, S1S2, no murmur, no JVD  ABDOMEN: soft, non-tender, non-distended. Bowel sounds normal.   EXTREMITIES:  No edema, 2+ pedal/radial pulses bilaterally  SKIN: no rash or abnormalities  NEUROLOGIC: A&Ox3. Cranial nerves 2-12 grossly intact.     Lab/Data Review:  Recent Results (from the past 24 hour(s))   CULTURE, BLOOD    Collection Time: 06/29/20  4:34 PM   Result Value Ref Range    Special Requests: RIGHT ANTECUBITAL      Culture result: NO GROWTH AFTER 14 HOURS     CBC WITH AUTOMATED DIFF    Collection Time: 06/29/20  4:34 PM   Result Value Ref Range    WBC 6.1 4.3 - 11.1 K/uL    RBC 4.16 4.05 - 5.2 M/uL    HGB 10.6 (L) 11.7 - 15.4 g/dL    HCT 33.6 (L) 35.8 - 46.3 %    MCV 80.8 79.6 - 97.8 FL    MCH 25.5 (L) 26.1 - 32.9 PG    MCHC 31.5 31.4 - 35.0 g/dL    RDW 15.2 (H) 11.9 - 14.6 %    PLATELET 554 838 - 166 K/uL    MPV 9.5 9.4 - 12.3 FL    ABSOLUTE NRBC 0.00 0.0 - 0.2 K/uL    DF AUTOMATED      NEUTROPHILS 64 43 - 78 %    LYMPHOCYTES 26 13 - 44 %    MONOCYTES 6 4.0 - 12.0 %    EOSINOPHILS 4 0.5 - 7.8 %    BASOPHILS 0 0.0 - 2.0 %    IMMATURE GRANULOCYTES 0 0.0 - 5.0 %    ABS. NEUTROPHILS 3.9 1.7 - 8.2 K/UL    ABS. LYMPHOCYTES 1.6 0.5 - 4.6 K/UL    ABS. MONOCYTES 0.4 0.1 - 1.3 K/UL    ABS. EOSINOPHILS 0.2 0.0 - 0.8 K/UL    ABS. BASOPHILS 0.0 0.0 - 0.2 K/UL    ABS. IMM. GRANS. 0.0 0.0 - 0.5 K/UL   LACTIC ACID    Collection Time: 06/29/20  4:34 PM   Result Value Ref Range    Lactic acid 1.4 0.4 - 2.0 MMOL/L   METABOLIC PANEL, COMPREHENSIVE    Collection Time: 06/29/20  4:34 PM   Result Value Ref Range    Sodium 134 (L) 136 - 145 mmol/L    Potassium 3.3 (L) 3.5 - 5.1 mmol/L    Chloride 99 98 - 107 mmol/L    CO2 28 21 - 32 mmol/L    Anion gap 7 7 - 16 mmol/L    Glucose 156 (H) 65 - 100 mg/dL    BUN 12 6 - 23 MG/DL    Creatinine 0.81 0.6 - 1.0 MG/DL    GFR est AA >60 >60 ml/min/1.73m2    GFR est non-AA >60 >60 ml/min/1.73m2    Calcium 9.7 8.3 - 10.4 MG/DL    Bilirubin, total 0.4 0.2 - 1.1 MG/DL    ALT (SGPT) 19 12 - 65 U/L    AST (SGOT) 9 (L) 15 - 37 U/L    Alk.  phosphatase 96 50 - 136 U/L    Protein, total 8.4 (H) 6.3 - 8.2 g/dL    Albumin 3.3 (L) 3.5 - 5.0 g/dL    Globulin 5.1 (H) 2.3 - 3.5 g/dL    A-G Ratio 0.6 (L) 1.2 - 3.5     PROCALCITONIN    Collection Time: 06/29/20  4:34 PM   Result Value Ref Range    Procalcitonin <0.05 ng/mL   SED RATE, AUTOMATED    Collection Time: 06/29/20  4:34 PM   Result Value Ref Range Sed rate, automated 102 (H) 0 - 20 mm/hr   C REACTIVE PROTEIN, QT    Collection Time: 06/29/20  4:34 PM   Result Value Ref Range    C-Reactive protein 12.6 (H) 0.0 - 0.9 mg/dL   HEMOGLOBIN A1C WITH EAG    Collection Time: 06/29/20  4:34 PM   Result Value Ref Range    Hemoglobin A1c 6.4 %    Est. average glucose 137 mg/dL   MAGNESIUM    Collection Time: 06/29/20  4:34 PM   Result Value Ref Range    Magnesium 2.0 1.8 - 2.4 mg/dL   CULTURE, BLOOD    Collection Time: 06/29/20  4:35 PM   Result Value Ref Range    Special Requests: LEFT  FOREARM        Culture result: NO GROWTH AFTER 14 HOURS     CULTURE, WOUND W GRAM STAIN    Collection Time: 06/29/20  9:37 PM   Result Value Ref Range    Special Requests: NO SPECIAL REQUESTS      GRAM STAIN PENDING     Culture result:        NO GROWTH AFTER SHORT PERIOD OF INCUBATION. FURTHER RESULTS TO FOLLOW AFTER OVERNIGHT INCUBATION. METABOLIC PANEL, BASIC    Collection Time: 06/30/20  4:41 AM   Result Value Ref Range    Sodium 138 136 - 145 mmol/L    Potassium 4.0 3.5 - 5.1 mmol/L    Chloride 106 98 - 107 mmol/L    CO2 25 21 - 32 mmol/L    Anion gap 7 7 - 16 mmol/L    Glucose 141 (H) 65 - 100 mg/dL    BUN 12 6 - 23 MG/DL    Creatinine 0.85 0.6 - 1.0 MG/DL    GFR est AA >60 >60 ml/min/1.73m2    GFR est non-AA >60 >60 ml/min/1.73m2    Calcium 9.2 8.3 - 10.4 MG/DL       Imaging:  Xr Foot Rt Min 3 V    Result Date: 6/29/2020  Right foot CLINICAL INDICATION: Chronic wound to the right great toe FINDINGS: Three views of the right great toe show no obvious destructive bone change appreciated to indicate osteomyelitis by plain radiography. Soft tissue swelling is evident. The IP and first MTP joint spaces are well-maintained. There is no fracture. IMPRESSION: Soft tissue swelling about the right great toe. No definite bony abnormality. If there strong clinical concern for osteomyelitis, consider further evaluation with right forefoot MRI.     Xr Calcaneus Lt    Result Date: 6/7/2020  TWO-VIEW LEFT CALCANEUS: CLINICAL HISTORY:  Pain after audible pop while stretching. COMPARISON:  None. FINDINGS:  No definite fracture, malalignment, or dennis bone destruction is evident. No persistent radiopaque foreign body is seen. Moderate plantar calcaneal spur is noted. IMPRESSION:  Plantar calcaneal spur with no acute abnormality. Mri Foot Rt Wo Cont    Result Date: 6/30/2020  MRI of the right forefoot without contrast CLINICAL INDICATION: Right great toe swelling with cellulitis and abscess, concern for osteomyelitis PROCEDURE: Multiplanar and multisequence MR imaging performed through the right forefoot without the administration of intravenous gadolinium contrast. COMPARISON: Right foot radiographs dated 6/29/2020 FINDINGS: Generalized soft tissue swelling is appreciated diffusely about the distal aspect of the great toe with low T1 and bright T2 signal changes in the distal phalanx that meet imaging criteria for osteomyelitis. The marrow signal in the proximal phalanx is normal. There is no joint effusion at the IP joint or the first MTP joint to suspect septic arthritis. There is mild inflammation along the phalangeal attachment of the flexor hallucis longus tendon without dennis rupture. This is likely a mild tenosynovitis. IMPRESSION: 1. Acute osteomyelitis of the distal phalanx of the great toe with surrounding soft tissue cellulitis. 2. Mild tenosynovitis along the flexor hallucis longus tendon at its phalangeal attachment without tendon rupture. Duplex Lower Ext Venous Right    Result Date: 6/29/2020  Right lower extremity Doppler evaluation: 06/29/2020 HISTORY: Pain, palpable cords swelling. Symptoms are moderate and have been present x1 week Grayscale, color-flow and Doppler evaluation of the major veins of the right lower extremity was performed.  Comparison: None FINDINGS: There is normal compression and augmentation involving the right common femoral, superficial femoral and popliteal veins. No grayscale or color-flow findings within these vessels or within the distal greater saphenous or profunda femoral veins were noted to suggest deep venous thrombosis. Interrogated portions of the peroneal and posterior tibial veins were also unremarkable. No popliteal cyst is identified. Cursory imaging of the left common femoral vein reveals it to be patent with normal color-flow and augmentation. There is a 2.7 cm lymph node within the right groin which may be reactive. IMPRESSION: Negative evaluation for deep venous thrombosis within the right lower extremity. No results found for this visit on 06/29/20. Cultures: All Micro Results     Procedure Component Value Units Date/Time    CULTURE, Go Salines [[id number]] Collected:  06/29/20 2137    Order Status:  Completed Specimen:  Wound from Toe Updated:  06/30/20 0814     Special Requests: NO SPECIAL REQUESTS        GRAM STAIN PENDING     Culture result:       NO GROWTH AFTER SHORT PERIOD OF INCUBATION. FURTHER RESULTS TO FOLLOW AFTER OVERNIGHT INCUBATION.           CULTURE, BLOOD [[id number]] Collected:  06/29/20 1634    Order Status:  Completed Specimen:  Blood Updated:  06/30/20 0711     Special Requests: RIGHT ANTECUBITAL        Culture result: NO GROWTH AFTER 14 HOURS       CULTURE, BLOOD [[id number]] Collected:  06/29/20 1635    Order Status:  Completed Specimen:  Blood Updated:  06/30/20 0711     Special Requests: --        LEFT  FOREARM       Culture result: NO GROWTH AFTER 14 HOURS             Assessment/Plan:     Principal Problem:    Acute osteomyelitis of right foot ([address] (6/30/2020)  - MRI showed acute osteomyelitis of the distal phalanx of the great toe with surrounding soft tissue cellulitis & mild tenosynovitis along the flexor hallucis longus tendon at its phalangeal attachment without tendon rupture  - Continue Vanc + Cefepime + Flagyl --> will hold until orthopedic surgery evaluates  - ESR + CRP elevated  - Consult ID for assistance  - Consult orthopedics for assistance    Active Problems:    Cellulitis and abscess of toe of right foot (6/29/2020)  - Erythema improving  - Continue Vanc + Cefepime + Flagyl --> will hold until orthopedic surgery evaluates  - ESR + CRP elevated  - Consult ID for assistance      Hypertension (9/26/2019)  - BP low this AM  - Hold BP meds      Hypokalemia (6/29/2020)  - Resolved      Facial droop (9/26/2019)  - Chronic      Peripheral neuropathy (6/30/2020)  - Continue Cymbalta      Reflex sympathetic dystrophy (9/26/2019)  - Being evaluated as OP      Chronic pain (9/26/2019)  - ? RLS  - Continue home meds      Depression (9/26/2019)  - Continue Cymbalta  - Continue Wellbutrin      H/O TIA (transient ischemic attack) and stroke (6/29/2020)  - Continue ASA  - Continue Pravastatin      Class 1 obesity in adult (6/29/2020)    Today's Plan: Continue Cefepime + Flagyl + Vanc when appropriate. Consult orthopedics & ID.     DIET DIABETIC CONSISTENT CARB Regular    DVT Prophylaxis: Heparin    Discharge Plan: Home when appropriate      Signed By: Omaira Leonard,      June 30, 2020

## 2020-06-30 NOTE — PROGRESS NOTES
06/29/20 2047   Dual Skin Pressure Injury Assessment   Dual Skin Pressure Injury Assessment WDL   Second Care Provider (Based on [address] [place] [address]) Shahnaz Pizarro RN   Skin Integumentary   Skin Integumentary (WDL) X    Pressure  Injury Documentation No Pressure Injury Noted-Pressure Ulcer Prevention Initiated   Skin Integrity Wound (add Wound LDA)  (right great toe)   Wound Prevention and Protection Methods   Orientation of Wound Prevention Posterior   Location of Wound Prevention Sacrum/Coccyx   Dressing Present  No   Wound Offloading (Prevention Methods) Bed, pressure reduction mattress

## 2020-06-30 NOTE — CONSULTS
[facility]  Consultation Note    Patient ID:  Name: Julia Miles  MRN: [id number]  AGE: 54 y.o.  : 1970    Date of Consultation:  2020  Referring Physician:  Hospitalist     Subjective: Pt complains of right foot wound that started 6 months ago as a small ulcerated wound to the plantar aspect of the right great toe. This was treated by her PCP with 2% mupirocin but the wound has never really healed. Then two days ago the toe became so swollen that the nail lifted up. The redness had started to spread up her right lower leg. This redness has improved some since admission and treatment with IV antibiotics. The patient was seen and examined by the hospitalist and they ordered an MRI of the right foot that demonstrated osteomyelitis of the right 1st distal phalanx. The patient has no pain and says that she has had neuropathy but has never had DM and A1C has been normal. She does have a history of RA.        Past Medical History Includes:   Past Medical History:   Diagnosis Date    Anemia     Chronic pain     due to RA    Depression     GERD (gastroesophageal reflux disease)     Hx of renal calculi          Hypercholesterolemia     Hypertension     Migraine     Morbid obesity ([address]     Ovarian cyst     Reflex sympathetic dystrophy since     right arm; s/p injury - laceration in hand    Rheumatoid arthritis ([address]          Rheumatoid arthritis ([address] 2019    Sinus problem     TIA (transient ischemic attack) 2019   ,   Past Surgical History:   Procedure Laterality Date    HX APPENDECTOMY      HX BREAST BIOPSY Left 2015    LEFT NIPPLE EXPLORATION WITH REMOVAL OF LATTISIMUS DUCT performed by Sebastian Huntley MD at [address] [facility] HX CHOLECYSTECTOMY      HX ORTHOPAEDIC      rt hand    HX ORTHOPAEDIC      neck    HX SEPTOPLASTY  2019    FESS,SMRIT's    HX TOTAL ABDOMINAL HYSTERECTOMY           HX TUBAL LIGATION       Family History:   Family History   Problem Relation Age of Onset    Breast Cancer Paternal Aunt 39    Cancer Paternal Aunt         brst    Cancer Maternal Grandfather         colon    Colon Cancer Maternal Grandfather     Breast Cancer Paternal Grandmother 46    Cancer Paternal Grandmother         breast    Ovarian Cancer Paternal Grandmother     Cancer Mother         throat    Hypertension Mother     Cancer Father         non hodgkins lymphoma    Cancer Maternal Grandmother         ovarian    Breast Cancer Maternal Grandmother     Heart Disease Paternal Grandfather     Diabetes Paternal Grandfather     Breast Cancer Sister       Social History:   Social History     Tobacco Use    Smoking status: Never Smoker    Smokeless tobacco: Never Used   Substance Use Topics    Alcohol use: Yes     Comment: \"once every couple years\"       ALLERGIES:   Allergies   Allergen Reactions    Pcn [Penicillins] Hives    Norco [Hydrocodone-Acetaminophen] Nausea and Vomiting        Patient Medications    Current Facility-Administered Medications   Medication Dose Route Frequency    [Held by provider] metroNIDAZOLE (FLAGYL) IVPB premix 500 mg  500 mg IntraVENous Q8H    aspirin chewable tablet 81 mg  81 mg Oral DAILY    buPROPion SR (WELLBUTRIN SR) tablet 150 mg  150 mg Oral DAILY    DULoxetine (CYMBALTA) capsule 60 mg  60 mg Oral BID    [Held by provider] lisinopril-hydroCHLOROthiazide (PRINZIDE, ZESTORETIC) 20-25 mg per tablet 1 Tab  1 Tab Oral DAILY    pravastatin (PRAVACHOL) tablet 40 mg  40 mg Oral QHS    traZODone (DESYREL) tablet 100 mg  100 mg Oral QHS PRN    sodium chloride (NS) flush 5-40 mL  5-40 mL IntraVENous Q8H    sodium chloride (NS) flush 5-40 mL  5-40 mL IntraVENous PRN    acetaminophen (TYLENOL) tablet 650 mg  650 mg Oral Q4H PRN    morphine injection 1 mg  1 mg IntraVENous Q4H PRN    heparin (porcine) injection 5,000 Units  5,000 Units SubCUTAneous Q8H    naloxone [facility]) injection 0.4 mg  0.4 mg IntraVENous PRN    diphenhydrAMINE (BENADRYL) injection 12.5 mg  12.5 mg IntraVENous Q4H PRN    ondansetron (ZOFRAN) injection 4 mg  4 mg IntraVENous Q4H PRN    magnesium hydroxide (MILK OF MAGNESIA) 400 mg/5 mL oral suspension 30 mL  30 mL Oral DAILY PRN    zolpidem (AMBIEN) tablet 5 mg  5 mg Oral QHS PRN    [Held by provider] cefepime (MAXIPIME) 1 g in 0.9% sodium chloride (MBP/ADV) 50 mL  1 g IntraVENous Q12H    0.9% sodium chloride infusion  100 mL/hr IntraVENous CONTINUOUS    ibuprofen (MOTRIN) tablet 400 mg  400 mg Oral Q4H PRN    pantoprazole (PROTONIX) tablet 40 mg  40 mg Oral ACB    oxyCODONE-acetaminophen (PERCOCET) 5-325 mg per tablet 1 Tab  1 Tab Oral Q4H PRN    morphine injection 1 mg  1 mg IntraVENous Q6H PRN    [Held by provider] vancomycin (VANCOCIN) 1500 mg in  ml infusion  1,500 mg IntraVENous Q12H         Review of Systems:  Pertinent items are noted in HPI. Physical Exam:      General: NAD, Alert, Oriented x 3   Mental Status: Appropriate   Psych: Normal Affect, Normal Mood    HEENT: Normal Cephalic/Atraumatic, PERRL   Lungs: Respirations even and unlabored, Breath Sounds were clear, no respiratory distress   Heart: Regular Rate and Rhythm   Vascular: Distal pulses intact, good capillary refill   Skin: right great toe redness and plantar ulcer. There is some vasculitis of the medial foot  Musculoskeletal: exam of both lower extremities reveal skin findings above. Normal ROM of the great toe and right ankle.    Lymphatic: No lympahdenopathy  Neuro: No gross deficits   Abdomen: Soft, Non tender, No distension      VITALS:   Patient Vitals for the past 8 hrs:   BP Temp Pulse Resp SpO2   20 1122 94/60 98.2 °F (36.8 °C) 77 18 97 %   20 0829 102/59       20 0757 (!) 87/45 98 °F (36.7 °C) 74 18 96 %   20 0438 91/60 98.3 °F (36.8 °C) 85 18 98 %    , Temp (24hrs), Av.3 °F (36.8 °C), Min:98 °F (36.7 °C), Max:98.8 °F (37.1 °C)         MRI right foot: 1. Acute osteomyelitis of the distal phalanx of the great toe with surrounding  soft tissue cellulitis. 2. Mild tenosynovitis along the flexor hallucis longus tendon at its phalangeal  attachment without tendon rupture. Diagnosis   Patient Active Problem List   Diagnosis Code    Ovarian cyst N83.209    Right lower quadrant abdominal pain R10.31    TIA (transient ischemic attack) G45.9    Facial droop R29.810    Reflex sympathetic dystrophy G90.50    Hypertension I10    Rheumatoid arthritis ([facility] M06.9    Chronic pain G89.29    Depression F32.9    Cellulitis and abscess of toe of right foot L03.031, L02.611    H/O TIA (transient ischemic attack) and stroke Z86.73    Hypokalemia E87.6    Class 1 obesity in adult E66.9    Acute osteomyelitis of right foot (HCC) M86.171    Peripheral neuropathy G62.9    Acute osteomyelitis (HCC) M86.10          Assessment and Plan:     Osteomyelitis right 1st distal phalanx: will discuss with on call orthopedic surgeon, Dr. Glen Zaidi.      ANNELISE Robbins  6/30/2020,  11:24 AM

## 2020-06-30 NOTE — PROGRESS NOTES
Vancomycin Consult    MD ordering: Mariia JONES following? no  Indication: SSTI  DOT:  5  days  Goal level(s): 10 - 20    Ht Readings from Last 1 Encounters:   20 5' 8\" (1.727 m)      Wt Readings from Last 1 Encounters:   20 103.4 kg (228 lb)         Temp (24hrs), Av.5 °F (36.9 °C), Min:98.2 °F (36.8 °C), Max:98.8 °F (37.1 °C)    Dosing weight: 103 kg  54 y.o. Date:  Dose/Freq Admin Times Level/Time:    Vanc 2500 mg IV LD 1639     Vanc 1500 mg IV q12h (04)  (16)                        Recent Labs     20  1634   BUN 12   CREA 0.81   WBC 6.1   PCT <0.05   LAC 1.4     Estimated Creatinine Clearance: 108 mL/min (based on SCr of 0.81 mg/dL). No results found for: Idris Zimmerman    Day 1 Assessment and Plan:  Vancomycin 1500 mg IV every 12 hours. The next dose is due at 4 am tomorrow.   Estephanie Arcos, PharmD, BCPS

## 2020-06-30 NOTE — PROGRESS NOTES
Problem: Falls - Risk of  Goal: *Absence of Falls  Description: Document Houston Sweeney Fall Risk and appropriate interventions in the flowsheet.   Outcome: Progressing Towards Goal  Note: Fall Risk Interventions: Call light within reach, side rails x2

## 2020-06-30 NOTE — PROGRESS NOTES
This patient was seen and examined today. She does have a history of chronic infection of her great toe. This has been going on a long time. She was recently admitted for IV antibiotics. An MRI scan showed that she did have osteomyelitis of the distal phalanx. Her x-rays were noted to be negative. I did discuss her options today. I also discussed this with my foot and ankle partner. That would be Dr. Jabier Cabna. She really needs to have an amputation of the distal aspect of her great toe. The distal phalanx. She will be transferred to the [facility] and placed on the surgery schedule with Dr. Jabier Caban on Thursday. He will see me tomorrow.

## 2020-06-30 NOTE — CONSULTS
Infectious Disease Consult    Today's Date: 6/30/2020   Admit Date: 6/29/2020    Impression:   · R foot plantar ulcer / R 1st toe osteomyelitis  · DM type 2 - most recent A1C 6.4  · Diabetic neuropathy  · Elevated ESR/CRP  · Obesity  · Dysuria / recurrent UTI  · PCN allergy (hives) - tolerates cephalosporins    Plan:   · Agree with empiric vancomycin, cefepime, flagyl. Could hold antibiotics for possible operative intervention, but if this will be delayed then would proceed with continuing antibiotics. · Duration of therapy will depend upon orthopedic plans. Pt to meet with Dr. Aminata Chi soon to discuss. · ID will continue to follow. Anti-infectives:   · IV vanc  · IV cefepime  · IV flagyl    Subjective:   Date of Consultation:  June 30, 2020  Referring Physician: Dr. Eleonora Man    Patient is a 54 y.o. female who presented to [facility] on 6/29 for evaluation of worsening R 1st toe redness and inflammation with streaking up her R leg. She has h/o diabetic neuropathy and reports a stubborn right first toe ulcer since January. She has had several courses of oral clindamycin along with a few courses of treatment for UTI since January. She reports that the inflammation really began over the past 2-3 days. On presentation to [facility], she had nl WBC but elevated ESR/CRP. She was admitted and given IV vancomycin, cefepime, and flagyl. An MRI was obtained this morning that confirms acute osteomyelitis. Orthopedic surgery has been consulted for consideration of surgical intervention. ID is consulted for further recommendations. She reports ongoing R foot pain, but improved streaking. She had fevers at home, but none since admission.     Patient Active Problem List   Diagnosis Code    Ovarian cyst N83.209    Right lower quadrant abdominal pain R10.31    TIA (transient ischemic attack) G45.9    Facial droop R29.810    Reflex sympathetic dystrophy G90.50    Hypertension I10    Rheumatoid arthritis ([facility] [address] M06.9    Chronic pain G89.29    Depression F32.9    Cellulitis and abscess of toe of right foot L03.031, L02.611    H/O TIA (transient ischemic attack) and stroke Z86.73    Hypokalemia E87.6    Class 1 obesity in adult E66.9    Acute osteomyelitis of right foot (HCC) M86.171    Peripheral neuropathy G62.9    Acute osteomyelitis (HCC) M86.10     Past Medical History:   Diagnosis Date    Anemia     Chronic pain     due to RA    Depression     GERD (gastroesophageal reflux disease)     Hx of renal calculi          Hypercholesterolemia     Hypertension     Migraine     Morbid obesity ([address]     Ovarian cyst     Reflex sympathetic dystrophy since 2005    right arm; s/p injury - laceration in hand    Rheumatoid arthritis ([facility] [address]          Rheumatoid arthritis ([facility] [address] 9/26/2019    Sinus problem     TIA (transient ischemic attack) 9/26/2019      Family History   Problem Relation Age of Onset    Breast Cancer Paternal Aunt 39    Cancer Paternal Aunt         brst    Cancer Maternal Grandfather         colon    Colon Cancer Maternal Grandfather     Breast Cancer Paternal Grandmother 46    Cancer Paternal Grandmother         breast    Ovarian Cancer Paternal Grandmother     Cancer Mother         throat    Hypertension Mother     Cancer Father         non hodgkins lymphoma    Cancer Maternal Grandmother         ovarian    Breast Cancer Maternal Grandmother     Heart Disease Paternal Grandfather     Diabetes Paternal Grandfather     Breast Cancer Sister       Social History     Tobacco Use    Smoking status: Never Smoker    Smokeless tobacco: Never Used   Substance Use Topics    Alcohol use: Yes     Comment: \"once every couple years\"     Past Surgical History:   Procedure Laterality Date    HX APPENDECTOMY      HX BREAST BIOPSY Left 4/27/2015    LEFT NIPPLE EXPLORATION WITH REMOVAL OF LATTISIMUS DUCT performed by [facility]    HX CHOLECYSTECTOMY      HX ORTHOPAEDIC      rt hand    HX ORTHOPAEDIC      neck    HX SEPTOPLASTY  2019    FESS,SMRIT's    HX TOTAL ABDOMINAL HYSTERECTOMY           HX TUBAL LIGATION        Prior to Admission medications    Medication Sig Start Date End Date Taking? Authorizing Provider   buPROPion XL (WELLBUTRIN XL) 150 mg tablet Take 150 mg by mouth daily. 6/3/20  Yes Other, MD Letitia   DULoxetine (CYMBALTA) 60 mg capsule Take 60 mg by mouth two (2) times a day. 2/9/10  Yes Other, MD Letitia   aspirin 81 mg chewable tablet Take 81 mg by mouth. Yes Other, MD Letitia   traZODone (DESYREL) 100 mg tablet Take 100 mg by mouth. 19  Yes Other, MD Letitia   pravastatin (PRAVACHOL) 40 mg tablet Take 1 Tab by mouth nightly. 19  Yes Valentine Moy, DO   lisinopril-hydrochlorothiazide (PRINZIDE, ZESTORETIC) 20-25 mg per tablet 1 Tab daily. 14  Yes Provider, Historical       Allergies   Allergen Reactions    Pcn [Penicillins] Hives    Norco [Hydrocodone-Acetaminophen] Nausea and Vomiting        Review of Systems:  A comprehensive review of systems was negative except for that written in the History of Present Illness. Objective:     Visit Vitals  BP 94/60 (BP 1 Location: Right arm, BP Patient Position: At rest;Sitting)   Pulse 77   Temp 98.2 °F (36.8 °C)   Resp 18   Ht 5' 8\" (1.727 m)   Wt 103.4 kg (228 lb)   SpO2 97%   BMI 34.67 kg/m²     Temp (24hrs), Av.3 °F (36.8 °C), Min:98 °F (36.7 °C), Max:98.8 °F (37.1 °C)       Lines:  Peripheral IV:       Physical Exam:    General:  Alert, cooperative, obese female, in no acute distress   Eyes:  Sclera anicteric. Pupils equally round and reactive to light. Mouth/Throat: Mucous membranes normal, oral pharynx clear   Neck: Supple   Lungs:   Clear to auscultation bilaterally, good effort   CV:  Regular rate and rhythm,no murmur, click, rub or gallop   Abdomen:   Soft, non-tender.  bowel sounds normal. non-distended   Extremities: No cyanosis; moderate bilateral LE edema;   Skin: Skin color, texture, turgor normal. R first toe inflammation / ulceration noted;   Lymph nodes: Cervical and supraclavicular normal   Musculoskeletal: R 1st toe swelling;    Lines/Devices:  Intact, no erythema, drainage or tenderness   Psych: Alert and oriented, normal mood affect given the setting       Data Review:     CBC:  Recent Labs     20  1634   WBC 6.1   GRANS 64   MONOS 6   EOS 4   ANEU 3.9   ABL 1.6   HGB 10.6*   HCT 33.6*          BMP:  Recent Labs     20  0441 20  1634   CREA 0.85 0.81   BUN 12 12    134*   K 4.0 3.3*    99   CO2 25 28   AGAP 7 7   * 156*       LFTS:  Recent Labs     20  1634   TBILI 0.4   ALT 19   AP 96   TP 8.4*   ALB 3.3*       Microbiology:     All Micro Results     Procedure Component Value Units Date/Time    CULTURE, Antwan Olivera STAIN [[id number]] Collected:  20    Order Status:  Completed Specimen:  Wound from Toe Updated:  20 1033     Special Requests: NO SPECIAL REQUESTS        GRAM STAIN 0 TO 4 WBC'S/OIF      FEW GRAM POSITIVE COCCI        Culture result:       NO GROWTH AFTER SHORT PERIOD OF INCUBATION. FURTHER RESULTS TO FOLLOW AFTER OVERNIGHT INCUBATION. CULTURE, BLOOD [[id number]] Collected:  20 163    Order Status:  Completed Specimen:  Blood Updated:  20 0711     Special Requests: RIGHT ANTECUBITAL        Culture result: NO GROWTH AFTER 14 HOURS       CULTURE, BLOOD [[id number]] Collected:  20 1635    Order Status:  Completed Specimen:  Blood Updated:  20 0711     Special Requests: --        LEFT  FOREARM       Culture result: NO GROWTH AFTER 14 HOURS             Imagin20 MRI R foot: IMPRESSION:  1. Acute osteomyelitis of the distal phalanx of the great toe with surrounding  soft tissue cellulitis. 2. Mild tenosynovitis along the flexor hallucis longus tendon at its phalangeal  attachment without tendon rupture. 20 RLE US:  IMPRESSION: Negative evaluation for deep venous thrombosis within the right  lower extremity. 6/29/20 R foot film: IMPRESSION: Soft tissue swelling about the right great toe. No definite bony  abnormality. If there strong clinical concern for osteomyelitis, consider  further evaluation with right forefoot MRI.     Signed By: Neo Cano MD     June 30, 2020

## 2020-06-30 NOTE — PROGRESS NOTES
Care Management Interventions  PCP Verified by CM: Yes  Transition of Care Consult (CM Consult): Discharge Planning  Discharge Durable Medical Equipment: No(none)  Physical Therapy Consult: No  Occupational Therapy Consult: No  Speech Therapy Consult: No  Current Support Network: Lives with Spouse(Ramses Guzman [phone])  Confirm Follow Up Transport: Family  Discharge Location  Discharge Placement: Home  Met with patient for d/c planning. Patient alert and oriented x 3, independent of ADL's and lives with her  Atul Michel in 2 story home. She requires no DME and has transportation as needed. She has [facility] [employer] and obtains medications at [facility] [phone]. She voices no concerns or needs for d/c. She states ready to d/c as soon as the doctor says she can go. D/C plan is home with spouse when medically stable.

## 2020-06-30 NOTE — PROGRESS NOTES
Vancomycin Consult    MD ordering: Mariia JONES following? yes  Indication: Osteomyelitis  DOT:  ? days  Goal level(s): 15 - 20    Ht Readings from Last 1 Encounters:   20 5' 8\" (1.727 m)      Wt Readings from Last 1 Encounters:   20 103.4 kg (228 lb)         Temp (24hrs), Av.2 °F (36.8 °C), Min:98 °F (36.7 °C), Max:98.3 °F (36.8 °C)    Dosing weight: 103 kg  54 y.o. Date:  Dose/Freq Admin Times Level/Time:   20 Vanc 2500 mg IV x 1 dose 1639    20 Vanc 1500 mg IV q12h 0455, (1700)    20 Vanc 1500 mg IV q12h (0500) Tr level due at 0400                 Recent Labs     20  0441 20  1634   BUN 12 12   CREA 0.85 0.81   WBC  --  6.1   PCT  --  <0.05   LAC  --  1.4     Estimated Creatinine Clearance: 102.9 mL/min (based on SCr of 0.85 mg/dL). Day 2 Assessment and Plan:  Continue current dose of Vancomycin 1500 mg IV every 12 hours. Next dose is due today at 1700. Trough level scheduled for tomorrow at 0400, prior to 0500 dose. Pharmacy will continue to follow.       Thank you,    Arnel Velez, PharmD

## 2020-07-01 ENCOUNTER — ANESTHESIA EVENT (OUTPATIENT)
Dept: SURGERY | Age: 50
End: 2020-07-01
Payer: COMMERCIAL

## 2020-07-01 LAB
ANION GAP SERPL CALC-SCNC: 5 MMOL/L (ref 7–16)
BUN SERPL-MCNC: 9 MG/DL (ref 6–23)
CALCIUM SERPL-MCNC: 8.4 MG/DL (ref 8.3–10.4)
CHLORIDE SERPL-SCNC: 108 MMOL/L (ref 98–107)
CO2 SERPL-SCNC: 26 MMOL/L (ref 21–32)
CREAT SERPL-MCNC: 0.72 MG/DL (ref 0.6–1)
ERYTHROCYTE [DISTWIDTH] IN BLOOD BY AUTOMATED COUNT: 15 % (ref 11.9–14.6)
GLUCOSE SERPL-MCNC: 120 MG/DL (ref 65–100)
HCT VFR BLD AUTO: 28.4 % (ref 35.8–46.3)
HGB BLD-MCNC: 8.6 G/DL (ref 11.7–15.4)
MCH RBC QN AUTO: 25 PG (ref 26.1–32.9)
MCHC RBC AUTO-ENTMCNC: 30.3 G/DL (ref 31.4–35)
MCV RBC AUTO: 82.6 FL (ref 79.6–97.8)
NRBC # BLD: 0 K/UL (ref 0–0.2)
PLATELET # BLD AUTO: 169 K/UL (ref 150–450)
PMV BLD AUTO: 9.3 FL (ref 9.4–12.3)
POTASSIUM SERPL-SCNC: 4.2 MMOL/L (ref 3.5–5.1)
RBC # BLD AUTO: 3.44 M/UL (ref 4.05–5.2)
SODIUM SERPL-SCNC: 139 MMOL/L (ref 136–145)
VANCOMYCIN TROUGH SERPL-MCNC: 12.8 UG/ML (ref 5–20)
WBC # BLD AUTO: 3.8 K/UL (ref 4.3–11.1)

## 2020-07-01 PROCEDURE — 36415 COLL VENOUS BLD VENIPUNCTURE: CPT

## 2020-07-01 PROCEDURE — 74011000258 HC RX REV CODE- 258: Performed by: INTERNAL MEDICINE

## 2020-07-01 PROCEDURE — 74011250636 HC RX REV CODE- 250/636: Performed by: INTERNAL MEDICINE

## 2020-07-01 PROCEDURE — 65270000029 HC RM PRIVATE

## 2020-07-01 PROCEDURE — 85027 COMPLETE CBC AUTOMATED: CPT

## 2020-07-01 PROCEDURE — 80048 BASIC METABOLIC PNL TOTAL CA: CPT

## 2020-07-01 PROCEDURE — 74011250636 HC RX REV CODE- 250/636: Performed by: FAMILY MEDICINE

## 2020-07-01 PROCEDURE — 74011250637 HC RX REV CODE- 250/637: Performed by: FAMILY MEDICINE

## 2020-07-01 PROCEDURE — 80202 ASSAY OF VANCOMYCIN: CPT

## 2020-07-01 PROCEDURE — 74011250637 HC RX REV CODE- 250/637: Performed by: INTERNAL MEDICINE

## 2020-07-01 PROCEDURE — 74011000258 HC RX REV CODE- 258: Performed by: FAMILY MEDICINE

## 2020-07-01 RX ORDER — VANCOMYCIN 1.75 GRAM/500 ML IN 0.9 % SODIUM CHLORIDE INTRAVENOUS
1750 EVERY 12 HOURS
Status: DISCONTINUED | OUTPATIENT
Start: 2020-07-01 | End: 2020-07-02 | Stop reason: HOSPADM

## 2020-07-01 RX ORDER — LORAZEPAM 1 MG/1
1 TABLET ORAL
Status: DISCONTINUED | OUTPATIENT
Start: 2020-07-01 | End: 2020-07-02 | Stop reason: HOSPADM

## 2020-07-01 RX ORDER — VANCOMYCIN 2 GRAM/500 ML IN 0.9 % SODIUM CHLORIDE INTRAVENOUS
2000 EVERY 12 HOURS
Status: DISCONTINUED | OUTPATIENT
Start: 2020-07-01 | End: 2020-07-01

## 2020-07-01 RX ORDER — FLUCONAZOLE 100 MG/1
150 TABLET ORAL
Status: COMPLETED | OUTPATIENT
Start: 2020-07-01 | End: 2020-07-01

## 2020-07-01 RX ORDER — NYSTATIN 100000 U/G
CREAM TOPICAL
Status: DISCONTINUED | OUTPATIENT
Start: 2020-07-01 | End: 2020-07-02 | Stop reason: HOSPADM

## 2020-07-01 RX ADMIN — MORPHINE SULFATE 1 MG: 2 INJECTION, SOLUTION INTRAMUSCULAR; INTRAVENOUS at 10:31

## 2020-07-01 RX ADMIN — Medication 10 ML: at 06:00

## 2020-07-01 RX ADMIN — DULOXETINE HYDROCHLORIDE 60 MG: 60 CAPSULE, DELAYED RELEASE ORAL at 20:13

## 2020-07-01 RX ADMIN — MORPHINE SULFATE 1 MG: 2 INJECTION, SOLUTION INTRAMUSCULAR; INTRAVENOUS at 18:51

## 2020-07-01 RX ADMIN — VANCOMYCIN HYDROCHLORIDE 1750 MG: 10 INJECTION, POWDER, LYOPHILIZED, FOR SOLUTION INTRAVENOUS at 23:33

## 2020-07-01 RX ADMIN — MORPHINE SULFATE 1 MG: 2 INJECTION, SOLUTION INTRAMUSCULAR; INTRAVENOUS at 05:43

## 2020-07-01 RX ADMIN — PANTOPRAZOLE SODIUM 40 MG: 40 TABLET, DELAYED RELEASE ORAL at 07:43

## 2020-07-01 RX ADMIN — TRAZODONE HYDROCHLORIDE 100 MG: 50 TABLET ORAL at 21:36

## 2020-07-01 RX ADMIN — HEPARIN SODIUM 5000 UNITS: 5000 INJECTION INTRAVENOUS; SUBCUTANEOUS at 05:13

## 2020-07-01 RX ADMIN — ASPIRIN 81 MG 81 MG: 81 TABLET ORAL at 07:43

## 2020-07-01 RX ADMIN — PRAVASTATIN SODIUM 40 MG: 20 TABLET ORAL at 21:17

## 2020-07-01 RX ADMIN — METRONIDAZOLE 500 MG: 500 INJECTION, SOLUTION INTRAVENOUS at 12:24

## 2020-07-01 RX ADMIN — METRONIDAZOLE 500 MG: 500 INJECTION, SOLUTION INTRAVENOUS at 20:13

## 2020-07-01 RX ADMIN — Medication 10 ML: at 21:18

## 2020-07-01 RX ADMIN — CEFEPIME HYDROCHLORIDE 1 G: 1 INJECTION, POWDER, FOR SOLUTION INTRAMUSCULAR; INTRAVENOUS at 07:44

## 2020-07-01 RX ADMIN — METRONIDAZOLE 500 MG: 500 INJECTION, SOLUTION INTRAVENOUS at 06:21

## 2020-07-01 RX ADMIN — MORPHINE SULFATE 1 MG: 2 INJECTION, SOLUTION INTRAMUSCULAR; INTRAVENOUS at 23:40

## 2020-07-01 RX ADMIN — MORPHINE SULFATE 1 MG: 2 INJECTION, SOLUTION INTRAMUSCULAR; INTRAVENOUS at 01:17

## 2020-07-01 RX ADMIN — SODIUM CHLORIDE 100 ML/HR: 900 INJECTION, SOLUTION INTRAVENOUS at 20:04

## 2020-07-01 RX ADMIN — FLUCONAZOLE 150 MG: 100 TABLET ORAL at 18:42

## 2020-07-01 RX ADMIN — VANCOMYCIN HYDROCHLORIDE 1750 MG: 10 INJECTION, POWDER, LYOPHILIZED, FOR SOLUTION INTRAVENOUS at 10:56

## 2020-07-01 RX ADMIN — HEPARIN SODIUM 5000 UNITS: 5000 INJECTION INTRAVENOUS; SUBCUTANEOUS at 20:13

## 2020-07-01 RX ADMIN — HEPARIN SODIUM 5000 UNITS: 5000 INJECTION INTRAVENOUS; SUBCUTANEOUS at 12:21

## 2020-07-01 RX ADMIN — Medication 5 ML: at 13:08

## 2020-07-01 RX ADMIN — CEFEPIME HYDROCHLORIDE 2 G: 2 INJECTION, POWDER, FOR SOLUTION INTRAVENOUS at 20:13

## 2020-07-01 RX ADMIN — BUPROPION HYDROCHLORIDE 150 MG: 150 TABLET, EXTENDED RELEASE ORAL at 07:44

## 2020-07-01 RX ADMIN — DULOXETINE HYDROCHLORIDE 60 MG: 60 CAPSULE, DELAYED RELEASE ORAL at 07:44

## 2020-07-01 NOTE — PROGRESS NOTES
Patient with osteomyelitis and plans for surgery at [facility] on Thursday 7/2/2020. Pending clinical progress and surgical findings patient may need IV antibiotics at d/c. CM following.

## 2020-07-01 NOTE — ANESTHESIA PREPROCEDURE EVALUATION
Relevant Problems   No relevant active problems       Anesthetic History   No history of anesthetic complications            Review of Systems / Medical History  Patient summary reviewed and pertinent labs reviewed    Pulmonary  Within defined limits                 Neuro/Psych       CVA  TIA and psychiatric history    Comments: Apparent R CVA 9/19 L facial droop  Hx of Bell's palsy during a pregnancy affecting same area  Hx of depression   Apparent DM associated peripheral neuropathy Cardiovascular    Hypertension              Exercise tolerance: >4 METS     GI/Hepatic/Renal     GERD: well controlled           Endo/Other        Morbid obesity, arthritis and anemia    Comments: Hyperglycemia on recent labs, previously on Metformin, recently eliminated by new physician Other Findings   Comments: Osteomyelitis R great Toe         Physical Exam    Airway  Mallampati: II  TM Distance: > 6 cm  Neck ROM: normal range of motion   Mouth opening: Normal     Cardiovascular    Rhythm: regular           Dental  No notable dental hx       Pulmonary                 Abdominal  GI exam deferred       Other Findings            Anesthetic Plan    ASA: 3  Anesthesia type: general          Induction: Intravenous  Anesthetic plan and risks discussed with: Patient      Discussed peripheral nerve blocks, pt's peripheral neuropathy significant(did not feel ER physician removing screw from under great toe nail)    Dr Stanley Claudio will block while under GA. Anemia noted and pt admits to melena. Will give in report on transport.

## 2020-07-01 NOTE — PROGRESS NOTES
Infectious Disease Progress Note    Today's Date: 2020   Admit Date: 2020    Impression:   · R foot plantar ulcer / R 1st toe osteomyelitis  · DM type 2 - most recent A1C 6.4  · Diabetic neuropathy  · Elevated ESR/CRP  · Obesity  · Dysuria / recurrent UTI  · PCN allergy (hives) - tolerates cephalosporins    Plan:   · Continue vancomycin, cefepime, flagyl. · Plans noted for possible distal toe amputation tomorrow. Please send operative cultures to help guide therapy. · Wound culture pending. Anti-infectives:   · IV vanc  · IV cefepime  · IV flagyl    Subjective:   Date of Consultation:  2020  Referring Physician: Dr. Alicia Núñez    Feeling more calm today; no fevers; wanting to discuss surgery but agreeable; planned for surgery tomorrow. Allergies   Allergen Reactions    Pcn [Penicillins] Hives    Norco [Hydrocodone-Acetaminophen] Nausea and Vomiting        Review of Systems:  A comprehensive review of systems was negative except for that written in the History of Present Illness.     Objective:     Visit Vitals  /67 (BP 1 Location: Right arm, BP Patient Position: At rest)   Pulse 79   Temp 97.6 °F (36.4 °C)   Resp 16   Ht 5' 8\" (1.727 m)   Wt 103.4 kg (228 lb)   SpO2 98%   BMI 34.67 kg/m²     Temp (24hrs), Av °F (36.7 °C), Min:97.6 °F (36.4 °C), Max:98.2 °F (36.8 °C)       Lines:  Peripheral IV:       Physical Exam:    General:  Alert, cooperative, obese female, in no acute distress   Eyes:  Sclera anicteric   Mouth/Throat: Mucous membranes normal   Neck: Supple   Lungs:   Breathing comfortably   CV:     Abdomen:   non-distended   Extremities: moderate bilateral LE edema;   Skin: R first toe inflammation / ulceration unchanged       Musculoskeletal: R 1st toe swelling;    Lines/Devices:  Intact, no erythema, drainage or tenderness   Psych: Alert and oriented, normal mood affect given the setting       Data Review:     CBC:  Recent Labs     20  0353 20  1634   WBC 3.8* 6. 1   GRANS  --  64   MONOS  --  6   EOS  --  4   ANEU  --  3.9   ABL  --  1.6   HGB 8.6* 10.6*   HCT 28.4* 33.6*    234       BMP:  Recent Labs     07/01/20  0353 06/30/20  0441 06/29/20  1634   CREA 0.72 0.85 0.81   BUN 9 12 12    138 134*   K 4.2 4.0 3.3*   * 106 99   CO2 26 25 28   AGAP 5* 7 7   * 141* 156*       LFTS:  Recent Labs     06/29/20  1634   TBILI 0.4   ALT 19   AP 96   TP 8.4*   ALB 3.3*       Microbiology:     All Micro Results     Procedure Component Value Units Date/Time    CULTURE, Ornelas Sieve STAIN [[id number]]  (Abnormal) Collected:  06/29/20 2137    Order Status:  Completed Specimen:  Wound from Toe Updated:  07/01/20 0744     Special Requests: NO SPECIAL REQUESTS        GRAM STAIN 0 TO 4 WBC'S/OIF      FEW GRAM POSITIVE COCCI        Culture result:       LIGHT GRAM POSITIVE COCCI SUBCULTURE IN PROGRESS                  CULTURE IN PROGRESS,FURTHER UPDATES TO FOLLOW          CULTURE, BLOOD [[id number]] Collected:  06/29/20 1634    Order Status:  Completed Specimen:  Blood Updated:  07/01/20 0707     Special Requests: RIGHT ANTECUBITAL        Culture result: NO GROWTH 2 DAYS       CULTURE, BLOOD [[id number]] Collected:  06/29/20 1635    Order Status:  Completed Specimen:  Blood Updated:  07/01/20 0707     Special Requests: --        LEFT  FOREARM       Culture result: NO GROWTH 2 DAYS             Imaging:   No new imaging    Signed By: Tomas Carranza MD     July 1, 2020

## 2020-07-01 NOTE — ROUTINE PROCESS
END OF SHIFT NOTE: 
 
Intake/Output No intake/output data recorded. Voiding: YES Catheter: NO 
Drain:   
 
 
 
 
 
Stool:  0 occurrences. Emesis:  0 occurrences. VITAL SIGNS Patient Vitals for the past 12 hrs: 
 Temp Pulse Resp BP SpO2  
07/01/20 1500 98.9 °F (37.2 °C) 68 16 115/59 96 % 07/01/20 1100 98.5 °F (36.9 °C) 73 16 94/56 98 % 07/01/20 0808 97.6 °F (36.4 °C) 79 16 109/67 98 % Pain Assessment Pain 1 Pain Scale 1: Numeric (0 - 10) (07/01/20 1852) Pain Intensity 1: 9 (07/01/20 1852) Patient Stated Pain Goal: 0 (07/01/20 0750) Pain Reassessment 1: Patient resting w/respiratory rate greater than 10 (07/01/20 0750) Pain Location 1: Leg (07/01/20 1852) Pain Orientation 1: Lower (07/01/20 1852) Pain Description 1: Aching (07/01/20 1852) Pain Intervention(s) 1: Medication (see MAR) (07/01/20 1852) Ambulating Yes Additional Information: Removal of R great toe tomorrow Shift report given to oncoming nurse at the bedside.  
 
Татьяна Carey RN

## 2020-07-01 NOTE — PROGRESS NOTES
Problem: Falls - Risk of  Goal: *Absence of Falls  Description: Document Gale Wills Fall Risk and appropriate interventions in the flowsheet.   Outcome: Progressing Towards Goal  Note: Fall Risk Interventions:            Medication Interventions: Teach patient to arise slowly

## 2020-07-01 NOTE — PROGRESS NOTES
Hospitalist Progress Note    Patient: Salvador Estrada MRN: [id number]  SSN: [id number]    YOB: 1970  Age: 54 y.o. Sex: female      Admit Date: 6/29/2020    LOS: 1 day     Subjective:     54year old female with a PMH of DM2 with associated peripheral neuropathy, not on any DM medications due to hypoglycemia, h/o tia October 2019, h/o left facial droop on and off since October (more pronounced in stress; says had bells palsy when she was pregnant several years ago), morbid obesity, depression, hyperlipidemia, RA vs RLS admitted on 6/29 due to right foot ulcer with cellulitis with streaks up her right leg. MRI confirmed OM.    6/30 - Leg erythema resolved. Right hallux continues to be swollen and painful. N/V resolved. Denies CP/SOB. Review of systems negative except stated above. Objective:     Visit Vitals  /67 (BP 1 Location: Right arm, BP Patient Position: At rest)   Pulse 79   Temp 97.6 °F (36.4 °C)   Resp 16   Ht 5' 8\" (1.727 m)   Wt 103.4 kg (228 lb)   SpO2 98%   BMI 34.67 kg/m²      Oxygen Therapy  O2 Sat (%): 98 % (07/01/20 0808)  Pulse via Oximetry: 79 beats per minute (06/29/20 1859)  O2 Device: Room air (06/29/20 1601)      Intake and Output:     Intake/Output Summary (Last 24 hours) at 7/1/2020 0920  Last data filed at 7/1/2020 0400  Gross per 24 hour   Intake 120 ml   Output 500 ml   Net -380 ml         Physical Exam:   GENERAL: alert, cooperative, no distress, appears stated age  EYE: conjunctivae/corneas clear. PERRL. THROAT & NECK: normal and no erythema or exudates noted. LUNG: clear to auscultation bilaterally  HEART: regular rate and rhythm, S1S2, no murmur, no JVD  ABDOMEN: soft, non-tender, non-distended. Bowel sounds normal.   EXTREMITIES:  No edema, 2+ pedal/radial pulses bilaterally  SKIN: no rash or abnormalities  NEUROLOGIC: A&Ox3. Cranial nerves 2-12 grossly intact.     Lab/Data Review:  Recent Results (from the past 24 hour(s))   CBC W/O DIFF Collection Time: 07/01/20  3:53 AM   Result Value Ref Range    WBC 3.8 (L) 4.3 - 11.1 K/uL    RBC 3.44 (L) 4.05 - 5.2 M/uL    HGB 8.6 (L) 11.7 - 15.4 g/dL    HCT 28.4 (L) 35.8 - 46.3 %    MCV 82.6 79.6 - 97.8 FL    MCH 25.0 (L) 26.1 - 32.9 PG    MCHC 30.3 (L) 31.4 - 35.0 g/dL    RDW 15.0 (H) 11.9 - 14.6 %    PLATELET 141 984 - 684 K/uL    MPV 9.3 (L) 9.4 - 12.3 FL    ABSOLUTE NRBC 0.00 0.0 - 0.2 K/uL   METABOLIC PANEL, BASIC    Collection Time: 07/01/20  3:53 AM   Result Value Ref Range    Sodium 139 136 - 145 mmol/L    Potassium 4.2 3.5 - 5.1 mmol/L    Chloride 108 (H) 98 - 107 mmol/L    CO2 26 21 - 32 mmol/L    Anion gap 5 (L) 7 - 16 mmol/L    Glucose 120 (H) 65 - 100 mg/dL    BUN 9 6 - 23 MG/DL    Creatinine 0.72 0.6 - 1.0 MG/DL    GFR est AA >60 >60 ml/min/1.73m2    GFR est non-AA >60 >60 ml/min/1.73m2    Calcium 8.4 8.3 - 10.4 MG/DL   VANCOMYCIN, TROUGH    Collection Time: 07/01/20  7:09 AM   Result Value Ref Range    Vancomycin,trough 12.8 5 - 20 ug/mL       Imaging:  Xr Foot Rt Min 3 V    Result Date: 6/29/2020  Right foot CLINICAL INDICATION: Chronic wound to the right great toe FINDINGS: Three views of the right great toe show no obvious destructive bone change appreciated to indicate osteomyelitis by plain radiography. Soft tissue swelling is evident. The IP and first MTP joint spaces are well-maintained. There is no fracture. IMPRESSION: Soft tissue swelling about the right great toe. No definite bony abnormality. If there strong clinical concern for osteomyelitis, consider further evaluation with right forefoot MRI. Xr Calcaneus Lt    Result Date: 6/7/2020  TWO-VIEW LEFT CALCANEUS: CLINICAL HISTORY:  Pain after audible pop while stretching. COMPARISON:  None. FINDINGS:  No definite fracture, malalignment, or dennis bone destruction is evident. No persistent radiopaque foreign body is seen. Moderate plantar calcaneal spur is noted. IMPRESSION:  Plantar calcaneal spur with no acute abnormality. Mri Foot Rt Wo Cont    Result Date: 6/30/2020  MRI of the right forefoot without contrast CLINICAL INDICATION: Right great toe swelling with cellulitis and abscess, concern for osteomyelitis PROCEDURE: Multiplanar and multisequence MR imaging performed through the right forefoot without the administration of intravenous gadolinium contrast. COMPARISON: Right foot radiographs dated 6/29/2020 FINDINGS: Generalized soft tissue swelling is appreciated diffusely about the distal aspect of the great toe with low T1 and bright T2 signal changes in the distal phalanx that meet imaging criteria for osteomyelitis. The marrow signal in the proximal phalanx is normal. There is no joint effusion at the IP joint or the first MTP joint to suspect septic arthritis. There is mild inflammation along the phalangeal attachment of the flexor hallucis longus tendon without dennis rupture. This is likely a mild tenosynovitis. IMPRESSION: 1. Acute osteomyelitis of the distal phalanx of the great toe with surrounding soft tissue cellulitis. 2. Mild tenosynovitis along the flexor hallucis longus tendon at its phalangeal attachment without tendon rupture. Duplex Lower Ext Venous Right    Result Date: 6/29/2020  Right lower extremity Doppler evaluation: 06/29/2020 HISTORY: Pain, palpable cords swelling. Symptoms are moderate and have been present x1 week Grayscale, color-flow and Doppler evaluation of the major veins of the right lower extremity was performed. Comparison: None FINDINGS: There is normal compression and augmentation involving the right common femoral, superficial femoral and popliteal veins. No grayscale or color-flow findings within these vessels or within the distal greater saphenous or profunda femoral veins were noted to suggest deep venous thrombosis. Interrogated portions of the peroneal and posterior tibial veins were also unremarkable. No popliteal cyst is identified.  Cursory imaging of the left common femoral vein reveals it to be patent with normal color-flow and augmentation. There is a 2.7 cm lymph node within the right groin which may be reactive. IMPRESSION: Negative evaluation for deep venous thrombosis within the right lower extremity. No results found for this visit on 06/29/20. Cultures:   All Micro Results     Procedure Component Value Units Date/Time    CULTURE, Levorn Delano STAIN [[id number]]  (Abnormal) Collected:  06/29/20 2137    Order Status:  Completed Specimen:  Wound from Toe Updated:  07/01/20 0744     Special Requests: NO SPECIAL REQUESTS        GRAM STAIN 0 TO 4 WBC'S/OIF      FEW GRAM POSITIVE COCCI        Culture result:       LIGHT GRAM POSITIVE COCCI SUBCULTURE IN PROGRESS                  CULTURE IN [address] UPDATES TO FOLLOW          CULTURE, BLOOD [[id number]] Collected:  06/29/20 1634    Order Status:  Completed Specimen:  Blood Updated:  07/01/20 0707     Special Requests: RIGHT ANTECUBITAL        Culture result: NO GROWTH 2 DAYS       CULTURE, BLOOD [[id number]] Collected:  06/29/20 1635    Order Status:  Completed Specimen:  Blood Updated:  07/01/20 0707     Special Requests: --        LEFT  FOREARM       Culture result: NO GROWTH 2 DAYS             Assessment/Plan:     Principal Problem:    Acute osteomyelitis of right foot ([address] (6/30/2020)  - MRI showed acute osteomyelitis of the distal phalanx of the great toe with surrounding soft tissue cellulitis & mild tenosynovitis along the flexor hallucis longus tendon at its phalangeal attachment without tendon rupture  - Continue Vanc + Cefepime + Flagyl --> will hold until orthopedic surgery evaluates  - ESR + CRP elevated  - ID following  - Orthopedics planning for distal hallux amputation on 7/2/20    Active Problems:    Cellulitis and abscess of toe of right foot (6/29/2020)  - Erythema improving  - Continue Vanc + Cefepime + Flagyl --> will hold until orthopedic surgery evaluates  - ESR + CRP elevated  - ID following Hypertension (9/26/2019)  - BP low this AM  - Hold BP meds      Hypokalemia (6/29/2020)  - Resolved      Facial droop (9/26/2019)  - Chronic      Peripheral neuropathy (6/30/2020)  - Continue Cymbalta      Reflex sympathetic dystrophy (9/26/2019)  - Being evaluated as OP      Chronic pain (9/26/2019)  - ? RLS  - Continue home meds      Depression (9/26/2019)  - Continue Cymbalta  - Continue Wellbutrin      H/O TIA (transient ischemic attack) and stroke (6/29/2020)  - Continue ASA  - Continue Pravastatin      Class 1 obesity in adult (6/29/2020)    Today's Plan: Continue Cefepime + Flagyl + Vanc. To possibly go to OR on 7/2/20.     DIET REGULAR    DVT Prophylaxis: Heparin    Discharge Plan: Home when appropriate      Signed By: Amadeo Watt DO     July 1, 2020

## 2020-07-01 NOTE — PROGRESS NOTES
Vancomycin Consult    MD ordering: Modesta Shearer   ID following? yes  Indication: Osteomyelitis  Goal level(s): 15 - 20    Ht Readings from Last 1 Encounters:   20 5' 8\" (1.727 m)      Wt Readings from Last 1 Encounters:   20 103.4 kg (228 lb)         Temp (24hrs), Av.1 °F (36.7 °C), Min:97.6 °F (36.4 °C), Max:98.5 °F (36.9 °C)    Dosing weight: 103 kg  54 y.o. Date:  Dose/Freq Admin Times Level/Time:   20 Vanc 2500 mg IV x 1 dose 1639     20 Vanc 1500 mg IV q12h 0455, 1831     20 Vanc 1500 mg IV q12h (0500) (dose not given)  Dose change Tr level @ 0709 = 12.8   20  Vanc 1750 mg IV q12h  1056, (2300)      20  Vanc 1750 mg IV q12h  (1100)  Tr level due @ 2200                 Recent Labs     20  0353 20  0441 20  1634   BUN 9 12 12   CREA 0.72 0.85 0.81   WBC 3.8*  --  6.1   PCT  --   --  <0.05   LAC  --   --  1.4     Estimated Creatinine Clearance: 121.5 mL/min (based on SCr of 0.72 mg/dL). Lab Results   Component Value Date/Time    Vancomycin,trough 12.8 2020 07:09 AM       Day 3 Assessment and Plan:  Patient only received half of 1500 mg dose last night. Therefore, trough level today is slightly inaccurate (factored this into my new dose calculation). Based on trough level, changed dose to Vancomycin 1750 mg IV every 12 hours. Next dose is due today at 2300. Pharmacy will continue to follow.       Thank you,    Candie Bowman, PharmD

## 2020-07-02 ENCOUNTER — HOSPITAL ENCOUNTER (OUTPATIENT)
Age: 50
Setting detail: OUTPATIENT SURGERY
Discharge: OTHER INSTITUTION | End: 2020-07-02
Attending: ORTHOPAEDIC SURGERY | Admitting: ORTHOPAEDIC SURGERY
Payer: COMMERCIAL

## 2020-07-02 ENCOUNTER — ANESTHESIA (OUTPATIENT)
Dept: SURGERY | Age: 50
End: 2020-07-02
Payer: COMMERCIAL

## 2020-07-02 VITALS
OXYGEN SATURATION: 92 % | HEART RATE: 70 BPM | BODY MASS INDEX: 34.67 KG/M2 | TEMPERATURE: 98.8 F | RESPIRATION RATE: 16 BRPM | DIASTOLIC BLOOD PRESSURE: 62 MMHG | SYSTOLIC BLOOD PRESSURE: 124 MMHG | WEIGHT: 228 LBS

## 2020-07-02 LAB
ANION GAP SERPL CALC-SCNC: 5 MMOL/L (ref 7–16)
BACTERIA SPEC CULT: ABNORMAL
BUN SERPL-MCNC: 8 MG/DL (ref 6–23)
CALCIUM SERPL-MCNC: 8 MG/DL (ref 8.3–10.4)
CHLORIDE SERPL-SCNC: 109 MMOL/L (ref 98–107)
CO2 SERPL-SCNC: 26 MMOL/L (ref 21–32)
CREAT SERPL-MCNC: 0.65 MG/DL (ref 0.6–1)
ERYTHROCYTE [DISTWIDTH] IN BLOOD BY AUTOMATED COUNT: 15.1 % (ref 11.9–14.6)
GLUCOSE BLD STRIP.AUTO-MCNC: 108 MG/DL (ref 65–100)
GLUCOSE SERPL-MCNC: 119 MG/DL (ref 65–100)
GRAM STN SPEC: ABNORMAL
GRAM STN SPEC: ABNORMAL
HCT VFR BLD AUTO: 26.2 % (ref 35.8–46.3)
HCT VFR BLD AUTO: 29.6 % (ref 35.8–46.3)
HGB BLD-MCNC: 8.1 G/DL (ref 11.7–15.4)
HGB BLD-MCNC: 9.1 G/DL (ref 11.7–15.4)
HIV 1+2 AB+HIV1 P24 AG SERPL QL IA: NONREACTIVE
HIV12 RESULT COMMENT, HHIVC: ABNORMAL
MCH RBC QN AUTO: 25.2 PG (ref 26.1–32.9)
MCHC RBC AUTO-ENTMCNC: 30.9 G/DL (ref 31.4–35)
MCV RBC AUTO: 81.4 FL (ref 79.6–97.8)
NRBC # BLD: 0 K/UL (ref 0–0.2)
PLATELET # BLD AUTO: 163 K/UL (ref 150–450)
PMV BLD AUTO: 9.5 FL (ref 9.4–12.3)
POTASSIUM SERPL-SCNC: 3.6 MMOL/L (ref 3.5–5.1)
RBC # BLD AUTO: 3.22 M/UL (ref 4.05–5.2)
SERVICE CMNT-IMP: ABNORMAL
SODIUM SERPL-SCNC: 140 MMOL/L (ref 136–145)
VANCOMYCIN TROUGH SERPL-MCNC: 24.2 UG/ML (ref 5–20)
WBC # BLD AUTO: 3.3 K/UL (ref 4.3–11.1)

## 2020-07-02 PROCEDURE — 74011250637 HC RX REV CODE- 250/637: Performed by: FAMILY MEDICINE

## 2020-07-02 PROCEDURE — 74011250636 HC RX REV CODE- 250/636: Performed by: NURSE ANESTHETIST, CERTIFIED REGISTERED

## 2020-07-02 PROCEDURE — 77030000032 HC CUF TRNQT ZIMM -B: Performed by: ORTHOPAEDIC SURGERY

## 2020-07-02 PROCEDURE — 74011250636 HC RX REV CODE- 250/636: Performed by: FAMILY MEDICINE

## 2020-07-02 PROCEDURE — 76060000032 HC ANESTHESIA 0.5 TO 1 HR: Performed by: ORTHOPAEDIC SURGERY

## 2020-07-02 PROCEDURE — 76210000021 HC REC RM PH II 0.5 TO 1 HR: Performed by: ORTHOPAEDIC SURGERY

## 2020-07-02 PROCEDURE — 74011250636 HC RX REV CODE- 250/636: Performed by: INTERNAL MEDICINE

## 2020-07-02 PROCEDURE — 77030002916 HC SUT ETHLN J&J -A: Performed by: ORTHOPAEDIC SURGERY

## 2020-07-02 PROCEDURE — 80202 ASSAY OF VANCOMYCIN: CPT

## 2020-07-02 PROCEDURE — 77030002933 HC SUT MCRYL J&J -A: Performed by: ORTHOPAEDIC SURGERY

## 2020-07-02 PROCEDURE — 65270000029 HC RM PRIVATE

## 2020-07-02 PROCEDURE — 74011250637 HC RX REV CODE- 250/637: Performed by: ANESTHESIOLOGY

## 2020-07-02 PROCEDURE — 74011000258 HC RX REV CODE- 258: Performed by: INTERNAL MEDICINE

## 2020-07-02 PROCEDURE — 77030018836 HC SOL IRR NACL ICUM -A: Performed by: ORTHOPAEDIC SURGERY

## 2020-07-02 PROCEDURE — 36415 COLL VENOUS BLD VENIPUNCTURE: CPT

## 2020-07-02 PROCEDURE — 76010000138 HC OR TIME 0.5 TO 1 HR: Performed by: ORTHOPAEDIC SURGERY

## 2020-07-02 PROCEDURE — 80048 BASIC METABOLIC PNL TOTAL CA: CPT

## 2020-07-02 PROCEDURE — 74011000250 HC RX REV CODE- 250: Performed by: ORTHOPAEDIC SURGERY

## 2020-07-02 PROCEDURE — 85018 HEMOGLOBIN: CPT

## 2020-07-02 PROCEDURE — 85027 COMPLETE CBC AUTOMATED: CPT

## 2020-07-02 PROCEDURE — 74011000250 HC RX REV CODE- 250: Performed by: NURSE ANESTHETIST, CERTIFIED REGISTERED

## 2020-07-02 PROCEDURE — 76210000063 HC OR PH I REC FIRST 0.5 HR: Performed by: ORTHOPAEDIC SURGERY

## 2020-07-02 PROCEDURE — 77030010509 HC AIRWY LMA MSK TELE -A: Performed by: ANESTHESIOLOGY

## 2020-07-02 PROCEDURE — 82962 GLUCOSE BLOOD TEST: CPT

## 2020-07-02 PROCEDURE — 87389 HIV-1 AG W/HIV-1&-2 AB AG IA: CPT

## 2020-07-02 PROCEDURE — 74011250637 HC RX REV CODE- 250/637: Performed by: INTERNAL MEDICINE

## 2020-07-02 PROCEDURE — 74011250636 HC RX REV CODE- 250/636: Performed by: ANESTHESIOLOGY

## 2020-07-02 RX ORDER — SODIUM CHLORIDE, SODIUM LACTATE, POTASSIUM CHLORIDE, CALCIUM CHLORIDE 600; 310; 30; 20 MG/100ML; MG/100ML; MG/100ML; MG/100ML
75 INJECTION, SOLUTION INTRAVENOUS CONTINUOUS
Status: DISCONTINUED | OUTPATIENT
Start: 2020-07-02 | End: 2020-07-02 | Stop reason: HOSPADM

## 2020-07-02 RX ORDER — ONDANSETRON 2 MG/ML
4 INJECTION INTRAMUSCULAR; INTRAVENOUS
Status: DISCONTINUED | OUTPATIENT
Start: 2020-07-02 | End: 2020-07-03 | Stop reason: HOSPADM

## 2020-07-02 RX ORDER — DULOXETIN HYDROCHLORIDE 60 MG/1
60 CAPSULE, DELAYED RELEASE ORAL 2 TIMES DAILY
Status: DISCONTINUED | OUTPATIENT
Start: 2020-07-02 | End: 2020-07-03 | Stop reason: HOSPADM

## 2020-07-02 RX ORDER — BUPROPION HYDROCHLORIDE 150 MG/1
150 TABLET, EXTENDED RELEASE ORAL DAILY
Status: DISCONTINUED | OUTPATIENT
Start: 2020-07-03 | End: 2020-07-03 | Stop reason: HOSPADM

## 2020-07-02 RX ORDER — BUPIVACAINE HYDROCHLORIDE 5 MG/ML
INJECTION, SOLUTION EPIDURAL; INTRACAUDAL AS NEEDED
Status: DISCONTINUED | OUTPATIENT
Start: 2020-07-02 | End: 2020-07-02 | Stop reason: HOSPADM

## 2020-07-02 RX ORDER — DEXAMETHASONE SODIUM PHOSPHATE 4 MG/ML
INJECTION, SOLUTION INTRA-ARTICULAR; INTRALESIONAL; INTRAMUSCULAR; INTRAVENOUS; SOFT TISSUE AS NEEDED
Status: DISCONTINUED | OUTPATIENT
Start: 2020-07-02 | End: 2020-07-02 | Stop reason: HOSPADM

## 2020-07-02 RX ORDER — SODIUM CHLORIDE 0.9 % (FLUSH) 0.9 %
5-40 SYRINGE (ML) INJECTION EVERY 8 HOURS
Status: DISCONTINUED | OUTPATIENT
Start: 2020-07-02 | End: 2020-07-03 | Stop reason: HOSPADM

## 2020-07-02 RX ORDER — DIPHENHYDRAMINE HYDROCHLORIDE 50 MG/ML
12.5 INJECTION, SOLUTION INTRAMUSCULAR; INTRAVENOUS
Status: DISCONTINUED | OUTPATIENT
Start: 2020-07-02 | End: 2020-07-02 | Stop reason: HOSPADM

## 2020-07-02 RX ORDER — OXYCODONE HYDROCHLORIDE 5 MG/1
5 TABLET ORAL
Status: DISCONTINUED | OUTPATIENT
Start: 2020-07-02 | End: 2020-07-02 | Stop reason: HOSPADM

## 2020-07-02 RX ORDER — MIDAZOLAM HYDROCHLORIDE 1 MG/ML
2 INJECTION, SOLUTION INTRAMUSCULAR; INTRAVENOUS
Status: DISCONTINUED | OUTPATIENT
Start: 2020-07-02 | End: 2020-07-02 | Stop reason: HOSPADM

## 2020-07-02 RX ORDER — LIDOCAINE HYDROCHLORIDE 10 MG/ML
0.1 INJECTION INFILTRATION; PERINEURAL AS NEEDED
Status: DISCONTINUED | OUTPATIENT
Start: 2020-07-02 | End: 2020-07-02 | Stop reason: HOSPADM

## 2020-07-02 RX ORDER — OXYCODONE AND ACETAMINOPHEN 5; 325 MG/1; MG/1
1 TABLET ORAL AS NEEDED
Status: DISCONTINUED | OUTPATIENT
Start: 2020-07-02 | End: 2020-07-02 | Stop reason: HOSPADM

## 2020-07-02 RX ORDER — SODIUM CHLORIDE 0.9 % (FLUSH) 0.9 %
5-40 SYRINGE (ML) INJECTION EVERY 8 HOURS
Status: DISCONTINUED | OUTPATIENT
Start: 2020-07-02 | End: 2020-07-02 | Stop reason: HOSPADM

## 2020-07-02 RX ORDER — SODIUM CHLORIDE 0.9 % (FLUSH) 0.9 %
5-40 SYRINGE (ML) INJECTION AS NEEDED
Status: DISCONTINUED | OUTPATIENT
Start: 2020-07-02 | End: 2020-07-02 | Stop reason: HOSPADM

## 2020-07-02 RX ORDER — SODIUM CHLORIDE, SODIUM LACTATE, POTASSIUM CHLORIDE, CALCIUM CHLORIDE 600; 310; 30; 20 MG/100ML; MG/100ML; MG/100ML; MG/100ML
100 INJECTION, SOLUTION INTRAVENOUS CONTINUOUS
Status: DISCONTINUED | OUTPATIENT
Start: 2020-07-02 | End: 2020-07-02 | Stop reason: HOSPADM

## 2020-07-02 RX ORDER — ONDANSETRON 2 MG/ML
INJECTION INTRAMUSCULAR; INTRAVENOUS AS NEEDED
Status: DISCONTINUED | OUTPATIENT
Start: 2020-07-02 | End: 2020-07-02 | Stop reason: HOSPADM

## 2020-07-02 RX ORDER — DIPHENHYDRAMINE HYDROCHLORIDE 50 MG/ML
12.5 INJECTION, SOLUTION INTRAMUSCULAR; INTRAVENOUS
Status: DISCONTINUED | OUTPATIENT
Start: 2020-07-02 | End: 2020-07-03 | Stop reason: HOSPADM

## 2020-07-02 RX ORDER — TRAZODONE HYDROCHLORIDE 50 MG/1
100 TABLET ORAL
Status: DISCONTINUED | OUTPATIENT
Start: 2020-07-02 | End: 2020-07-03 | Stop reason: HOSPADM

## 2020-07-02 RX ORDER — LORAZEPAM 0.5 MG/1
0.5 TABLET ORAL
Status: DISCONTINUED | OUTPATIENT
Start: 2020-07-02 | End: 2020-07-03 | Stop reason: HOSPADM

## 2020-07-02 RX ORDER — HEPARIN SODIUM 5000 [USP'U]/ML
5000 INJECTION, SOLUTION INTRAVENOUS; SUBCUTANEOUS EVERY 8 HOURS
Status: DISCONTINUED | OUTPATIENT
Start: 2020-07-03 | End: 2020-07-02

## 2020-07-02 RX ORDER — ADHESIVE BANDAGE
30 BANDAGE TOPICAL DAILY PRN
Status: DISCONTINUED | OUTPATIENT
Start: 2020-07-02 | End: 2020-07-03 | Stop reason: HOSPADM

## 2020-07-02 RX ORDER — PRAVASTATIN SODIUM 20 MG/1
40 TABLET ORAL
Status: DISCONTINUED | OUTPATIENT
Start: 2020-07-02 | End: 2020-07-03 | Stop reason: HOSPADM

## 2020-07-02 RX ORDER — SODIUM CHLORIDE 0.9 % (FLUSH) 0.9 %
5-40 SYRINGE (ML) INJECTION AS NEEDED
Status: DISCONTINUED | OUTPATIENT
Start: 2020-07-02 | End: 2020-07-03 | Stop reason: HOSPADM

## 2020-07-02 RX ORDER — IBUPROFEN 400 MG/1
400 TABLET ORAL
Status: DISCONTINUED | OUTPATIENT
Start: 2020-07-02 | End: 2020-07-03 | Stop reason: HOSPADM

## 2020-07-02 RX ORDER — NALOXONE HYDROCHLORIDE 0.4 MG/ML
0.4 INJECTION, SOLUTION INTRAMUSCULAR; INTRAVENOUS; SUBCUTANEOUS AS NEEDED
Status: DISCONTINUED | OUTPATIENT
Start: 2020-07-02 | End: 2020-07-03 | Stop reason: HOSPADM

## 2020-07-02 RX ORDER — SODIUM CHLORIDE 9 MG/ML
50 INJECTION, SOLUTION INTRAVENOUS CONTINUOUS
Status: DISCONTINUED | OUTPATIENT
Start: 2020-07-02 | End: 2020-07-02 | Stop reason: HOSPADM

## 2020-07-02 RX ORDER — FENTANYL CITRATE 50 UG/ML
25 INJECTION, SOLUTION INTRAMUSCULAR; INTRAVENOUS ONCE
Status: DISCONTINUED | OUTPATIENT
Start: 2020-07-02 | End: 2020-07-02 | Stop reason: HOSPADM

## 2020-07-02 RX ORDER — HYDROMORPHONE HYDROCHLORIDE 2 MG/ML
0.5 INJECTION, SOLUTION INTRAMUSCULAR; INTRAVENOUS; SUBCUTANEOUS
Status: DISCONTINUED | OUTPATIENT
Start: 2020-07-02 | End: 2020-07-02 | Stop reason: HOSPADM

## 2020-07-02 RX ORDER — PANTOPRAZOLE SODIUM 40 MG/1
40 TABLET, DELAYED RELEASE ORAL
Status: DISCONTINUED | OUTPATIENT
Start: 2020-07-03 | End: 2020-07-03 | Stop reason: HOSPADM

## 2020-07-02 RX ORDER — PROPOFOL 10 MG/ML
INJECTION, EMULSION INTRAVENOUS AS NEEDED
Status: DISCONTINUED | OUTPATIENT
Start: 2020-07-02 | End: 2020-07-02 | Stop reason: HOSPADM

## 2020-07-02 RX ORDER — ZOLPIDEM TARTRATE 5 MG/1
5 TABLET ORAL
Status: DISCONTINUED | OUTPATIENT
Start: 2020-07-02 | End: 2020-07-03 | Stop reason: HOSPADM

## 2020-07-02 RX ORDER — METRONIDAZOLE 500 MG/100ML
500 INJECTION, SOLUTION INTRAVENOUS EVERY 8 HOURS
Status: DISCONTINUED | OUTPATIENT
Start: 2020-07-02 | End: 2020-07-03

## 2020-07-02 RX ORDER — OXYCODONE AND ACETAMINOPHEN 5; 325 MG/1; MG/1
1 TABLET ORAL
Status: DISCONTINUED | OUTPATIENT
Start: 2020-07-02 | End: 2020-07-03 | Stop reason: HOSPADM

## 2020-07-02 RX ORDER — LIDOCAINE HYDROCHLORIDE 20 MG/ML
INJECTION, SOLUTION EPIDURAL; INFILTRATION; INTRACAUDAL; PERINEURAL AS NEEDED
Status: DISCONTINUED | OUTPATIENT
Start: 2020-07-02 | End: 2020-07-02 | Stop reason: HOSPADM

## 2020-07-02 RX ORDER — FAMOTIDINE 20 MG/1
20 TABLET, FILM COATED ORAL ONCE
Status: COMPLETED | OUTPATIENT
Start: 2020-07-02 | End: 2020-07-02

## 2020-07-02 RX ORDER — MORPHINE SULFATE 2 MG/ML
1 INJECTION, SOLUTION INTRAMUSCULAR; INTRAVENOUS
Status: DISCONTINUED | OUTPATIENT
Start: 2020-07-02 | End: 2020-07-03 | Stop reason: HOSPADM

## 2020-07-02 RX ORDER — VANCOMYCIN 1.75 GRAM/500 ML IN 0.9 % SODIUM CHLORIDE INTRAVENOUS
1750 EVERY 12 HOURS
Status: DISCONTINUED | OUTPATIENT
Start: 2020-07-02 | End: 2020-07-03 | Stop reason: DRUGHIGH

## 2020-07-02 RX ORDER — GUAIFENESIN 100 MG/5ML
81 LIQUID (ML) ORAL DAILY
Status: DISCONTINUED | OUTPATIENT
Start: 2020-07-03 | End: 2020-07-02

## 2020-07-02 RX ORDER — ACETAMINOPHEN 325 MG/1
650 TABLET ORAL
Status: DISCONTINUED | OUTPATIENT
Start: 2020-07-02 | End: 2020-07-03 | Stop reason: HOSPADM

## 2020-07-02 RX ORDER — MIDAZOLAM HYDROCHLORIDE 1 MG/ML
2 INJECTION, SOLUTION INTRAMUSCULAR; INTRAVENOUS ONCE
Status: COMPLETED | OUTPATIENT
Start: 2020-07-02 | End: 2020-07-02

## 2020-07-02 RX ORDER — SODIUM CHLORIDE 9 MG/ML
100 INJECTION, SOLUTION INTRAVENOUS CONTINUOUS
Status: DISCONTINUED | OUTPATIENT
Start: 2020-07-02 | End: 2020-07-03 | Stop reason: HOSPADM

## 2020-07-02 RX ADMIN — CEFEPIME HYDROCHLORIDE 2 G: 2 INJECTION, POWDER, FOR SOLUTION INTRAVENOUS at 19:54

## 2020-07-02 RX ADMIN — LIDOCAINE HYDROCHLORIDE 60 MG: 20 INJECTION, SOLUTION EPIDURAL; INFILTRATION; INTRACAUDAL; PERINEURAL at 15:55

## 2020-07-02 RX ADMIN — PRAVASTATIN SODIUM 40 MG: 20 TABLET ORAL at 20:49

## 2020-07-02 RX ADMIN — DULOXETINE HYDROCHLORIDE 60 MG: 60 CAPSULE, DELAYED RELEASE ORAL at 20:49

## 2020-07-02 RX ADMIN — Medication 10 ML: at 20:49

## 2020-07-02 RX ADMIN — BUPROPION HYDROCHLORIDE 150 MG: 150 TABLET, EXTENDED RELEASE ORAL at 08:02

## 2020-07-02 RX ADMIN — VANCOMYCIN HYDROCHLORIDE 1750 MG: 10 INJECTION, POWDER, LYOPHILIZED, FOR SOLUTION INTRAVENOUS at 11:07

## 2020-07-02 RX ADMIN — CEFEPIME HYDROCHLORIDE 2 G: 2 INJECTION, POWDER, FOR SOLUTION INTRAVENOUS at 08:03

## 2020-07-02 RX ADMIN — MORPHINE SULFATE 1 MG: 2 INJECTION, SOLUTION INTRAMUSCULAR; INTRAVENOUS at 08:17

## 2020-07-02 RX ADMIN — Medication 10 ML: at 05:41

## 2020-07-02 RX ADMIN — ONDANSETRON 4 MG: 2 INJECTION INTRAMUSCULAR; INTRAVENOUS at 16:03

## 2020-07-02 RX ADMIN — METRONIDAZOLE 500 MG: 500 INJECTION, SOLUTION INTRAVENOUS at 03:55

## 2020-07-02 RX ADMIN — SODIUM CHLORIDE 100 ML/HR: 9 INJECTION, SOLUTION INTRAVENOUS at 19:00

## 2020-07-02 RX ADMIN — DULOXETINE HYDROCHLORIDE 60 MG: 60 CAPSULE, DELAYED RELEASE ORAL at 08:02

## 2020-07-02 RX ADMIN — VANCOMYCIN HYDROCHLORIDE 1750 MG: 10 INJECTION, POWDER, LYOPHILIZED, FOR SOLUTION INTRAVENOUS at 22:11

## 2020-07-02 RX ADMIN — SODIUM CHLORIDE, SODIUM LACTATE, POTASSIUM CHLORIDE, AND CALCIUM CHLORIDE 75 ML/HR: 600; 310; 30; 20 INJECTION, SOLUTION INTRAVENOUS at 13:46

## 2020-07-02 RX ADMIN — MIDAZOLAM 2 MG: 1 INJECTION INTRAMUSCULAR; INTRAVENOUS at 15:34

## 2020-07-02 RX ADMIN — DEXAMETHASONE SODIUM PHOSPHATE 4 MG: 4 INJECTION, SOLUTION INTRAMUSCULAR; INTRAVENOUS at 16:03

## 2020-07-02 RX ADMIN — FAMOTIDINE 20 MG: 20 TABLET, FILM COATED ORAL at 13:46

## 2020-07-02 RX ADMIN — METRONIDAZOLE 500 MG: 500 INJECTION, SOLUTION INTRAVENOUS at 20:48

## 2020-07-02 RX ADMIN — PROPOFOL 200 MG: 10 INJECTION, EMULSION INTRAVENOUS at 15:55

## 2020-07-02 RX ADMIN — MORPHINE SULFATE 1 MG: 2 INJECTION, SOLUTION INTRAMUSCULAR; INTRAVENOUS at 20:48

## 2020-07-02 NOTE — PROGRESS NOTES
Problem: Falls - Risk of  Goal: *Absence of Falls  Description: Document Duy Pulido Fall Risk and appropriate interventions in the flowsheet.   Outcome: Progressing Towards Goal  Note: Fall Risk Interventions:       Mentation Interventions: Adequate sleep, hydration, pain control    Medication Interventions: Teach patient to arise slowly         History of Falls Interventions: Consult care management for discharge planning

## 2020-07-02 NOTE — PERIOP NOTES
TRANSFER - IN REPORT:    Verbal report received from David Welsh RN(name) on Elina Boop  being received from  358(unit) for ordered procedure      Report consisted of patients Situation, Background, Assessment and   Recommendations(SBAR). Information from the following report(s) SBAR, Procedure Summary, MAR, Recent Results, Pre Procedure Checklist and Procedure Verification was reviewed with the receiving nurse. Opportunity for questions and clarification was provided. Assessment completed upon patients arrival to unit and care assumed. Informed staff that pt needed to be transferred from  to  via transport for procedure and would be returning to . Restated pt needed to be at DT by 1245, hibiclens bath x2, NPO after MN with clear liquids until 0830.

## 2020-07-02 NOTE — PROGRESS NOTES
Patient seen and examined     She has osteo of the right great toe with continued swelling and drainage noted    Discussed plans for amputation with patient and her  and they understand and wish to proceed

## 2020-07-02 NOTE — PROGRESS NOTES
END OF SHIFT NOTE:    Patient rested well overnight, medicated for pain x1, see MAR. Has been NPO since midnight for R toe amputation today. Planning for p/u at 1200. Hibiclens bath complete last night and this am.     Intake/Output  07/01 1901 - 07/02 0700  In: 3084 [P. O.:660; I.V.:2424]  Out: 600 [Urine:600]   Voiding: YES  Catheter: NO  Drain:      Stool:  0 occurrences. Stool Assessment  Stool Appearance: Loose (07/01/20 2014)    Emesis:  0 occurrences. VITAL SIGNS  Patient Vitals for the past 12 hrs:   Temp Pulse Resp BP SpO2   07/02/20 0250 98.5 °F (36.9 °C) 75 17 94/57 93 %   07/01/20 2300 98.5 °F (36.9 °C) 74 18 104/66 98 %   07/01/20 1910 98.7 °F (37.1 °C) 60 18 97/54        Pain Assessment  Pain 1  Pain Scale 1: Numeric (0 - 10) (07/02/20 0355)  Pain Intensity 1: 0 (07/02/20 0355)  Patient Stated Pain Goal: 0 (07/02/20 0355)  Pain Reassessment 1: Yes (07/02/20 0001)  Pain Onset 1: PTA (07/01/20 2340)  Pain Location 1: Foot;Leg (07/01/20 2340)  Pain Orientation 1: Lower; Left (07/01/20 2340)  Pain Description 1: Aching (07/01/20 2340)  Pain Intervention(s) 1: Medication (see MAR) (07/01/20 2340)    Ambulating  Yes    Shift report given to oncoming nurse at the bedside.     Keyshawn Louis

## 2020-07-02 NOTE — PROGRESS NOTES
TRANSFER - OUT REPORT:    Verbal report given to Suman Chatterjee RN (name) on Pipe Kim  being transferred to OR (unit) for ordered procedure       Report consisted of patients Situation, Background, Assessment and   Recommendations(SBAR). Information from the following report(s) SBAR, Kardex, [facility] and Recent Results was reviewed with the receiving nurse. Lines:   Peripheral IV 06/30/20 Left Forearm (Active)   Site Assessment Clean, dry, & intact 7/2/2020  3:55 AM   Phlebitis Assessment 0 7/2/2020  3:55 AM   Infiltration Assessment 0 7/2/2020  3:55 AM   Dressing Status Clean, dry, & intact 7/2/2020  3:55 AM   Dressing Type Transparent;Tape 7/2/2020  3:55 AM   Hub Color/Line Status Blue;Flushed;Patent; Infusing 7/2/2020  3:55 AM   Action Taken Zeroed/Rezeroed 7/2/2020  3:55 AM   Alcohol Cap Used No 7/2/2020  3:55 AM        Opportunity for questions and clarification was provided. Patient to be transported later today, p/u around 1200. Patient has had two Hibiclens baths, one last night, and is currently bathing this morning.

## 2020-07-02 NOTE — PROGRESS NOTES
Hospitalist Progress Note    Patient: Candida Basilio MRN: [id number]  SSN: [id number]    YOB: 1970  Age: 54 y.o. Sex: female      Admit Date: 6/29/2020    LOS: 2 days     Subjective:     54year old female with a PMH of DM2 with associated peripheral neuropathy, not on any DM medications due to hypoglycemia, h/o tia October 2019, h/o left facial droop on and off since October (more pronounced in stress; says had bells palsy when she was pregnant several years ago), morbid obesity, depression, hyperlipidemia, RA vs RLS admitted on 6/29 due to right foot ulcer with cellulitis with streaks up her right leg. MRI confirmed OM.    7/2 - Leg erythema resolved. Right hallux continues to be swollen and painful. N/V resolved. Denies CP/SOB. Review of systems negative except stated above. Objective:     Visit Vitals  /53 (BP 1 Location: Right arm, BP Patient Position: At rest;Supine)   Pulse 64   Temp 98.2 °F (36.8 °C)   Resp 15   Ht 5' 8\" (1.727 m)   Wt 103.4 kg (228 lb)   SpO2 92%   BMI 34.67 kg/m²      Oxygen Therapy  O2 Sat (%): 92 % (07/02/20 0813)  Pulse via Oximetry: 79 beats per minute (06/29/20 1859)  O2 Device: Room air (07/02/20 0355)      Intake and Output:     Intake/Output Summary (Last 24 hours) at 7/2/2020 0839  Last data filed at 7/2/2020 0320  Gross per 24 hour   Intake 3824 ml   Output 800 ml   Net 3024 ml         Physical Exam:   GENERAL: alert, cooperative, no distress, appears stated age  EYE: conjunctivae/corneas clear. PERRL. THROAT & NECK: normal and no erythema or exudates noted. LUNG: clear to auscultation bilaterally  HEART: regular rate and rhythm, S1S2, no murmur, no JVD  ABDOMEN: soft, non-tender, non-distended. Bowel sounds normal.   EXTREMITIES:  No edema, 2+ pedal/radial pulses bilaterally  SKIN: right hallux red and swollen  NEUROLOGIC: A&Ox3. Cranial nerves 2-12 grossly intact.     Lab/Data Review:  Recent Results (from the past 24 hour(s))   CBC W/O DIFF    Collection Time: 07/02/20  4:01 AM   Result Value Ref Range    WBC 3.3 (L) 4.3 - 11.1 K/uL    RBC 3.22 (L) 4.05 - 5.2 M/uL    HGB 8.1 (L) 11.7 - 15.4 g/dL    HCT 26.2 (L) 35.8 - 46.3 %    MCV 81.4 79.6 - 97.8 FL    MCH 25.2 (L) 26.1 - 32.9 PG    MCHC 30.9 (L) 31.4 - 35.0 g/dL    RDW 15.1 (H) 11.9 - 14.6 %    PLATELET 560 892 - 532 K/uL    MPV 9.5 9.4 - 12.3 FL    ABSOLUTE NRBC 0.00 0.0 - 0.2 K/uL   METABOLIC PANEL, BASIC    Collection Time: 07/02/20  4:01 AM   Result Value Ref Range    Sodium 140 136 - 145 mmol/L    Potassium 3.6 3.5 - 5.1 mmol/L    Chloride 109 (H) 98 - 107 mmol/L    CO2 26 21 - 32 mmol/L    Anion gap 5 (L) 7 - 16 mmol/L    Glucose 119 (H) 65 - 100 mg/dL    BUN 8 6 - 23 MG/DL    Creatinine 0.65 0.6 - 1.0 MG/DL    GFR est AA >60 >60 ml/min/1.73m2    GFR est non-AA >60 >60 ml/min/1.73m2    Calcium 8.0 (L) 8.3 - 10.4 MG/DL   HIV 1/2 AG/AB, 4TH GENERATION,W RFLX CONFIRM    Collection Time: 07/02/20  4:01 AM   Result Value Ref Range    HIV 1/2 Interpretation NONREACTIVE NR      HIV 1/2 result comment SEE NOTE (A) NR         Imaging:  Xr Foot Rt Min 3 V    Result Date: 6/29/2020  Right foot CLINICAL INDICATION: Chronic wound to the right great toe FINDINGS: Three views of the right great toe show no obvious destructive bone change appreciated to indicate osteomyelitis by plain radiography. Soft tissue swelling is evident. The IP and first MTP joint spaces are well-maintained. There is no fracture. IMPRESSION: Soft tissue swelling about the right great toe. No definite bony abnormality. If there strong clinical concern for osteomyelitis, consider further evaluation with right forefoot MRI. Xr Calcaneus Lt    Result Date: 6/7/2020  TWO-VIEW LEFT CALCANEUS: CLINICAL HISTORY:  Pain after audible pop while stretching. COMPARISON:  None. FINDINGS:  No definite fracture, malalignment, or dennis bone destruction is evident. No persistent radiopaque foreign body is seen.   Moderate plantar calcaneal spur is noted. IMPRESSION:  Plantar calcaneal spur with no acute abnormality. Mri Foot Rt Wo Cont    Result Date: 6/30/2020  MRI of the right forefoot without contrast CLINICAL INDICATION: Right great toe swelling with cellulitis and abscess, concern for osteomyelitis PROCEDURE: Multiplanar and multisequence MR imaging performed through the right forefoot without the administration of intravenous gadolinium contrast. COMPARISON: Right foot radiographs dated 6/29/2020 FINDINGS: Generalized soft tissue swelling is appreciated diffusely about the distal aspect of the great toe with low T1 and bright T2 signal changes in the distal phalanx that meet imaging criteria for osteomyelitis. The marrow signal in the proximal phalanx is normal. There is no joint effusion at the IP joint or the first MTP joint to suspect septic arthritis. There is mild inflammation along the phalangeal attachment of the flexor hallucis longus tendon without dennis rupture. This is likely a mild tenosynovitis. IMPRESSION: 1. Acute osteomyelitis of the distal phalanx of the great toe with surrounding soft tissue cellulitis. 2. Mild tenosynovitis along the flexor hallucis longus tendon at its phalangeal attachment without tendon rupture. Duplex Lower Ext Venous Right    Result Date: 6/29/2020  Right lower extremity Doppler evaluation: 06/29/2020 HISTORY: Pain, palpable cords swelling. Symptoms are moderate and have been present x1 week Grayscale, color-flow and Doppler evaluation of the major veins of the right lower extremity was performed. Comparison: None FINDINGS: There is normal compression and augmentation involving the right common femoral, superficial femoral and popliteal veins. No grayscale or color-flow findings within these vessels or within the distal greater saphenous or profunda femoral veins were noted to suggest deep venous thrombosis.  Interrogated portions of the peroneal and posterior tibial veins were also unremarkable. No popliteal cyst is identified. Cursory imaging of the left common femoral vein reveals it to be patent with normal color-flow and augmentation. There is a 2.7 cm lymph node within the right groin which may be reactive. IMPRESSION: Negative evaluation for deep venous thrombosis within the right lower extremity. No results found for this visit on 06/29/20. Cultures:   All Micro Results     Procedure Component Value Units Date/Time    CULTURE, BLOOD [[id number]] Collected:  06/29/20 1634    Order Status:  Completed Specimen:  Blood Updated:  07/02/20 0722     Special Requests: RIGHT ANTECUBITAL        Culture result: NO GROWTH 3 DAYS       CULTURE, BLOOD [[id number]] Collected:  06/29/20 1635    Order Status:  Completed Specimen:  Blood Updated:  07/02/20 0722     Special Requests: --        LEFT  FOREARM       Culture result: NO GROWTH 3 DAYS       CULTURE, WOUND Randine Host STAIN [[id number]]  (Abnormal) Collected:  06/29/20 2137    Order Status:  Completed Specimen:  Wound from Toe Updated:  07/01/20 0744     Special Requests: NO SPECIAL REQUESTS        GRAM STAIN 0 TO 4 WBC'S/OIF      FEW GRAM POSITIVE COCCI        Culture result:       LIGHT GRAM POSITIVE COCCI SUBCULTURE IN PROGRESS                  CULTURE IN PROGRESS,FURTHER UPDATES TO FOLLOW                Assessment/Plan:     Principal Problem:    Acute osteomyelitis of right foot ([address] (6/30/2020)  - MRI showed acute osteomyelitis of the distal phalanx of the great toe with surrounding soft tissue cellulitis & mild tenosynovitis along the flexor hallucis longus tendon at its phalangeal attachment without tendon rupture  - Continue Vanc + Cefepime + Flagyl  - ESR + CRP elevated  - ID following  - Orthopedics planning for distal hallux amputation on 7/2/20    Active Problems:    Cellulitis and abscess of toe of right foot (6/29/2020)  - Erythema improving  - Continue Vanc + Cefepime + Flagyl  - ESR + CRP elevated  - ID following Hypertension (9/26/2019)  - BP low this AM  - Hold BP meds      Hypokalemia (6/29/2020)  - Resolved      Facial droop (9/26/2019)  - Chronic      Peripheral neuropathy (6/30/2020)  - Continue Cymbalta      Reflex sympathetic dystrophy (9/26/2019)  - Being evaluated as OP      Chronic pain (9/26/2019)  - ? RLS  - Continue home meds      Depression (9/26/2019)  - Continue Cymbalta  - Continue Wellbutrin      H/O TIA (transient ischemic attack) and stroke (6/29/2020)  - Continue ASA  - Continue Pravastatin      Class 1 obesity in adult (6/29/2020)    Today's Plan: Continue Cefepime + Flagyl + Vanc. To go to OR on 7/2/20.     DIET NPO Except Meds    DVT Prophylaxis: Heparin    Discharge Plan: Home when appropriate      Signed By: Emani Shanks,      July 2, 2020

## 2020-07-02 NOTE — PROGRESS NOTES
Received call from Dr. Tiffanie Malagon, regarding patient's diet order. Verbal order received and read back to be NPO after midnight. This order is already in place. Patient will be NPO after midnight tonight in anticipation of surgery tomorrow.

## 2020-07-02 NOTE — PERIOP NOTES
TRANSFER - OUT REPORT:    Verbal report given to [facility] RN on Bharat Cartagena  being transferred to [phone] for routine post - op       Report consisted of patients Situation, Background, Assessment and   Recommendations(SBAR). Information from the following report(s) SBAR, OR Summary, Procedure Summary, Intake/Output, MAR, Recent Results and Cardiac Rhythm Sinus Rhythm was reviewed with the receiving nurse. Opportunity for questions and clarification was provided.       Patient transported with:   O2 @ room air liters     Pt to transport via [address], await their arrival,  at bedside

## 2020-07-02 NOTE — PROGRESS NOTES
END OF SHIFT NOTE:    Intake/Output  No intake/output data recorded. Voiding: YES  Catheter: NO  Drain:              Stool:  0 occurrences. Stool Assessment  Stool Appearance: Loose (07/01/20 2014)    Emesis:  0 occurrences. VITAL SIGNS  Patient Vitals for the past 12 hrs:   Temp Pulse Resp BP SpO2   07/02/20 1125 98 °F (36.7 °C) 68 18 92/59 96 %   07/02/20 0813 98.2 °F (36.8 °C) 64 15 101/53 92 %       Pain Assessment  Pain 1  Pain Scale 1: Numeric (0 - 10) (07/02/20 0819)  Pain Intensity 1: 8 (07/02/20 0819)  Patient Stated Pain Goal: 0 (07/02/20 0805)  Pain Reassessment 1: Patient resting w/respiratory rate greater than 10 (07/02/20 0919)  Pain Onset 1: PTA (07/01/20 2340)  Pain Location 1: Toe (comment which one) (07/02/20 0819)  Pain Orientation 1: Lower; Left (07/01/20 2340)  Pain Description 1: Aching (07/02/20 0819)  Pain Intervention(s) 1: Medication (see MAR) (07/02/20 4501)    Ambulating  Yes    Additional Information: amputate of the R great toe today. Returned to [place] patient doing well eating dinner. Shift report given to oncoming nurse at the bedside.     Zoey Rosa, RN

## 2020-07-02 NOTE — ANESTHESIA POSTPROCEDURE EVALUATION
Procedure(s):  RIGHT GREAT AMPUTATION TOE. general    Anesthesia Post Evaluation      Multimodal analgesia: multimodal analgesia used between 6 hours prior to anesthesia start to PACU discharge  Patient location during evaluation: bedside  Patient participation: complete - patient participated  Level of consciousness: awake  Pain management: adequate  Airway patency: patent  Anesthetic complications: no  Cardiovascular status: acceptable and stable  Respiratory status: acceptable and room air  Hydration status: acceptable  Post anesthesia nausea and vomiting:  none      INITIAL Post-op Vital signs:   Vitals Value Taken Time   /59 7/2/2020  4:44 PM   Temp 37.1 °C (98.8 °F) 7/2/2020  4:25 PM   Pulse 84 7/2/2020  4:45 PM   Resp 16 7/2/2020  4:44 PM   SpO2 92 % 7/2/2020  4:45 PM   Vitals shown include unvalidated device data.

## 2020-07-02 NOTE — OP NOTES
FULL OP NOTE    PATIENT NAME: Candida Basilio  MRN: [id number]    DATE OF SURGERY: 7/2/2020    PREOPERATIVE DIAGNOSIS: osteomyelitis of right great toe      POSTOPERATIVE DIAGNOSIS: osteomyelitis of right great toe  . PROCEDURE: Right great toe amputation, 332[address]    SURGEON: Hilbert Dakins, MD    HARDWARE: * No implants in log *   INDICATIONS: This patient is a 57-year-old female with right great toe osteomyelitis who is failed conservative antibiotic therapy desires amputation. Risks and benefits the procedure include but not limited to risk of anesthetic complications as well surgical complications include damage nerves blood vessels, risk of infection, risk of incomplete pain relief, need for additional surgery discussed with the patient. She understands risks and wishes to proceed with surgery at this time. PROCEDURE IN DETAIL: A time out was done to confirm the operating procedure, surgeon, patient and site. Once confirmed by the team, procedure was started. The patient was anesthetized using 0.5% marcaine. An elliptical incision was made at the base the proximal phalanx of the great toe. Bovie electrocautery was used to create hemostasis. Resection was then performed to the medial aspect of the proximal phalanx of the great toe as there appeared to be no evidence of infection there. Was then irrigated and closed using Monocryl and nylon sutures. Sterile dressings and applied. Anesthesia was discontinued. Patient transferred back to recovery bed. She was taken recovery in satisfactory condition. She appeared to tolerate the procedure well. There were no apparent surgical anesthetic complications. All needle sponge counts correct. Postop patient be transferred back to the [facility] for additional care under the hospitalist service with infectious disease consultation. TOURNIQUET TIME: Approx 10 minutes. SPECIMENS: none    ESTIMATED BLOOD LOSS: min mL.

## 2020-07-02 NOTE — BRIEF OP NOTE
Brief Postoperative Note    Patient: Jas Kwan  YOB: 1970  MRN: [id number]    Date of Procedure: 7/2/2020     Pre-Op Diagnosis: osteomyelitis of right great toe    Post-Op Diagnosis: Same as preoperative diagnosis.       Procedure(s):  RIGHT GREAT AMPUTATION TOE    Surgeon(s):  Nghia Araujo MD    Surgical Assistant: None    Anesthesia: MAC     Estimated Blood Loss (mL): Minimal    Complications: None    Specimens: * No specimens in log *     Implants: * No implants in log *    Drains: * No LDAs found *    Findings:     Electronically Signed by Tena Garza MD on 7/2/2020 at 5:01 PM

## 2020-07-03 ENCOUNTER — HOME HEALTH ADMISSION (OUTPATIENT)
Dept: HOME HEALTH SERVICES | Facility: HOME HEALTH | Age: 50
End: 2020-07-03
Payer: COMMERCIAL

## 2020-07-03 VITALS
HEART RATE: 54 BPM | HEIGHT: 68 IN | BODY MASS INDEX: 34.56 KG/M2 | OXYGEN SATURATION: 96 % | RESPIRATION RATE: 18 BRPM | DIASTOLIC BLOOD PRESSURE: 60 MMHG | WEIGHT: 228 LBS | SYSTOLIC BLOOD PRESSURE: 106 MMHG | TEMPERATURE: 98.5 F

## 2020-07-03 LAB
ANION GAP SERPL CALC-SCNC: 6 MMOL/L (ref 7–16)
BUN SERPL-MCNC: 9 MG/DL (ref 6–23)
CALCIUM SERPL-MCNC: 8 MG/DL (ref 8.3–10.4)
CHLORIDE SERPL-SCNC: 108 MMOL/L (ref 98–107)
CO2 SERPL-SCNC: 24 MMOL/L (ref 21–32)
CREAT SERPL-MCNC: 0.73 MG/DL (ref 0.6–1)
ERYTHROCYTE [DISTWIDTH] IN BLOOD BY AUTOMATED COUNT: 15.2 % (ref 11.9–14.6)
GLUCOSE SERPL-MCNC: 181 MG/DL (ref 65–100)
HCT VFR BLD AUTO: 27.8 % (ref 35.8–46.3)
HGB BLD-MCNC: 8.5 G/DL (ref 11.7–15.4)
MCH RBC QN AUTO: 25.1 PG (ref 26.1–32.9)
MCHC RBC AUTO-ENTMCNC: 30.6 G/DL (ref 31.4–35)
MCV RBC AUTO: 82.2 FL (ref 79.6–97.8)
NRBC # BLD: 0 K/UL (ref 0–0.2)
PLATELET # BLD AUTO: 158 K/UL (ref 150–450)
PMV BLD AUTO: 9.5 FL (ref 9.4–12.3)
POTASSIUM SERPL-SCNC: 3.8 MMOL/L (ref 3.5–5.1)
RBC # BLD AUTO: 3.38 M/UL (ref 4.05–5.2)
SODIUM SERPL-SCNC: 138 MMOL/L (ref 136–145)
WBC # BLD AUTO: 4.2 K/UL (ref 4.3–11.1)

## 2020-07-03 PROCEDURE — 74011250636 HC RX REV CODE- 250/636: Performed by: INTERNAL MEDICINE

## 2020-07-03 PROCEDURE — 74011250637 HC RX REV CODE- 250/637: Performed by: INTERNAL MEDICINE

## 2020-07-03 PROCEDURE — 85027 COMPLETE CBC AUTOMATED: CPT

## 2020-07-03 PROCEDURE — 02HV33Z INSERTION OF INFUSION DEVICE INTO SUPERIOR VENA CAVA, PERCUTANEOUS APPROACH: ICD-10-PCS | Performed by: INTERNAL MEDICINE

## 2020-07-03 PROCEDURE — 97530 THERAPEUTIC ACTIVITIES: CPT

## 2020-07-03 PROCEDURE — 97165 OT EVAL LOW COMPLEX 30 MIN: CPT

## 2020-07-03 PROCEDURE — 36573 INSJ PICC RS&I 5 YR+: CPT | Performed by: INTERNAL MEDICINE

## 2020-07-03 PROCEDURE — 97161 PT EVAL LOW COMPLEX 20 MIN: CPT

## 2020-07-03 PROCEDURE — 80048 BASIC METABOLIC PNL TOTAL CA: CPT

## 2020-07-03 PROCEDURE — 36415 COLL VENOUS BLD VENIPUNCTURE: CPT

## 2020-07-03 PROCEDURE — C1751 CATH, INF, PER/CENT/MIDLINE: HCPCS

## 2020-07-03 PROCEDURE — 74011000258 HC RX REV CODE- 258: Performed by: INTERNAL MEDICINE

## 2020-07-03 RX ORDER — METRONIDAZOLE 500 MG/1
500 TABLET ORAL EVERY 12 HOURS
Status: DISCONTINUED | OUTPATIENT
Start: 2020-07-03 | End: 2020-07-03

## 2020-07-03 RX ORDER — HYDROCODONE BITARTRATE AND ACETAMINOPHEN 7.5; 325 MG/1; MG/1
1 TABLET ORAL
Qty: 28 TAB | Refills: 0 | Status: SHIPPED | OUTPATIENT
Start: 2020-07-03 | End: 2020-07-10

## 2020-07-03 RX ORDER — VANCOMYCIN HYDROCHLORIDE
1250 EVERY 12 HOURS
Status: DISCONTINUED | OUTPATIENT
Start: 2020-07-03 | End: 2020-07-03

## 2020-07-03 RX ADMIN — MAGNESIUM HYDROXIDE 30 ML: 400 SUSPENSION ORAL at 06:02

## 2020-07-03 RX ADMIN — MORPHINE SULFATE 1 MG: 2 INJECTION, SOLUTION INTRAMUSCULAR; INTRAVENOUS at 02:21

## 2020-07-03 RX ADMIN — MORPHINE SULFATE 1 MG: 2 INJECTION, SOLUTION INTRAMUSCULAR; INTRAVENOUS at 06:02

## 2020-07-03 RX ADMIN — BUPROPION HYDROCHLORIDE 150 MG: 150 TABLET, EXTENDED RELEASE ORAL at 09:41

## 2020-07-03 RX ADMIN — MORPHINE SULFATE 1 MG: 2 INJECTION, SOLUTION INTRAMUSCULAR; INTRAVENOUS at 13:54

## 2020-07-03 RX ADMIN — METRONIDAZOLE 500 MG: 500 TABLET, FILM COATED ORAL at 09:44

## 2020-07-03 RX ADMIN — DULOXETINE HYDROCHLORIDE 60 MG: 60 CAPSULE, DELAYED RELEASE ORAL at 09:41

## 2020-07-03 RX ADMIN — PANTOPRAZOLE SODIUM 40 MG: 40 TABLET, DELAYED RELEASE ORAL at 09:41

## 2020-07-03 RX ADMIN — CEFTRIAXONE SODIUM 2 G: 2 INJECTION, POWDER, FOR SOLUTION INTRAMUSCULAR; INTRAVENOUS at 09:44

## 2020-07-03 RX ADMIN — METRONIDAZOLE 500 MG: 500 INJECTION, SOLUTION INTRAVENOUS at 06:02

## 2020-07-03 RX ADMIN — Medication 10 ML: at 06:02

## 2020-07-03 RX ADMIN — MORPHINE SULFATE 1 MG: 2 INJECTION, SOLUTION INTRAMUSCULAR; INTRAVENOUS at 09:37

## 2020-07-03 NOTE — PROGRESS NOTES
OCCUPATIONAL THERAPY: Initial Assessment, Discharge, and AM 7/3/2020  INPATIENT:    Payor: Jordi Olp / Plan: [address] / Product Type: PPO /      NAME/AGE/GENDER: Vanna Rock is a 54 y.o. female   PRIMARY DIAGNOSIS:  Cellulitis and abscess of toe of right foot [L03.031, L02.611]  Acute osteomyelitis ([address] [M86.10] Acute osteomyelitis of right foot ([address] Acute osteomyelitis of right foot ([address]        ICD-10: Treatment Diagnosis:    Difficulty in walking, Not elsewhere classified (R26.2)   Precautions/Allergies:     Pcn [penicillins] and Norco [hydrocodone-acetaminophen]      ASSESSMENT:     Ms. Zo Singh presents with above diagnosis. Pt was seen in room with  present. PT had seen pt and provided her with a walking shoe. Shoe provided the patient with ability to ambulate and complete self care with supervision only. No further OT warranted. OCCUPATIONAL PROFILE AND HISTORY:   History of Present Injury/Illness (Reason for Referral):  See H&P  Past Medical History/Comorbidities:   Ms. Zo Singh  has a past medical history of Anemia, Chronic pain, Depression, GERD (gastroesophageal reflux disease), renal calculi, Hypercholesterolemia, Hypertension, Migraine, Morbid obesity ([address] Ovarian cyst, Reflex sympathetic dystrophy (since 2005), Rheumatoid arthritis ([address] Rheumatoid arthritis ([address] (9/26/2019), Sinus problem, and TIA (transient ischemic attack) (9/26/2019).  She also has no past medical history of Aneurysm ([address] Arrhythmia, Asthma, CAD (coronary artery disease), Cancer ([address] Chronic kidney disease, Chronic obstructive pulmonary disease ([address] Coagulation disorder ([address] Diabetes ([address] Difficult intubation, Heart failure ([address] Liver disease, Malignant hyperthermia due to anesthesia, Nausea & vomiting, Pseudocholinesterase deficiency, PUD (peptic ulcer disease), Seizures ([address] Thromboembolus ([address] Thyroid disease, Unspecified adverse effect of anesthesia, or Unspecified sleep apnea. Ms. Hafsa Lee  has a past surgical history that includes hx cholecystectomy; hx appendectomy; hx total abdominal hysterectomy; hx tubal ligation; hx breast biopsy (Left, 4/27/2015); hx orthopaedic; hx orthopaedic; and hx septoplasty (08/28/2019).   Social History/Living Environment:   Home Environment: Private residence  # Steps to Enter: 2  Rails to Enter: Yes  Hand Rails : Right  One/Two Story Residence: Two story  # of Interior Steps: 16  Interior Rails: Left  Living Alone: No  Support Systems: Spouse/Significant Other/Partner  Patient Expects to be Discharged to[de-identified] Private residence  Current DME Used/Available at Home: None  Prior Level of Function/Work/Activity:  Independent      Number of Personal Factors/Comorbidities that affect the Plan of Care: Brief history (0):  LOW COMPLEXITY   ASSESSMENT OF OCCUPATIONAL PERFORMANCE[de-identified]   Activities of Daily Living:   Basic ADLs (From Assessment) Complex ADLs (From Assessment)   Feeding: Independent  Oral Facial Hygiene/Grooming: Supervision  Bathing: Supervision  Upper Body Dressing: Supervision  Lower Body Dressing: Supervision  Toileting: Supervision     Grooming/Bathing/Dressing Activities of Daily Living     Cognitive Retraining  Safety/Judgement: Awareness of environment                 Functional Transfers  Bathroom Mobility: Supervision/set up  Toilet Transfer : Supervision  Shower Transfer: Supervision     Bed/Mat Mobility  Supine to Sit: Independent  Sit to Supine: Independent  Sit to Stand: Supervision  Stand to Sit: Supervision  Bed to Chair: Supervision(with walker & right off loading boot)  Scooting: Independent     Most Recent Physical Functioning:   Gross Assessment:                  Posture:     Balance:  Sitting: Intact  Standing: Intact Bed Mobility:  Supine to Sit: Independent  Sit to Supine: Independent  Scooting: Independent  Wheelchair Mobility:     Transfers:  Sit to Stand: Supervision  Stand to Sit: Supervision  Bed to Chair: Supervision(with walker & right off loading boot)            Patient Vitals for the past 6 hrs:   BP BP Patient Position SpO2 Pulse   20 0735 113/59 At rest 99 % (!) 47   20 1105 109/66 At rest 98 % (!) 51       Mental Status  Neurologic State: Alert  Orientation Level: Oriented X4  Cognition: Appropriate decision making  Perception: Appears intact  Perseveration: No perseveration noted  Safety/Judgement: Awareness of environment            LLE Assessment  LLE Assessment (WDL): Within defined limits RLE Assessment  RLE Assessment (WDL): Exceptions to WDL           Physical Skills Involved:  Balance Cognitive Skills Affected (resulting in the inability to perform in a timely and safe manner):  none  Psychosocial Skills Affected:  Environmental Adaptation   Number of elements that affect the Plan of Care: 1-3:  LOW COMPLEXITY   CLINICAL DECISION MAKIN[address] [place] [address] AM-PAC 6 Clicks   Daily Activity Inpatient Short Form  How much help from another person does the patient currently need. .. Total A Lot A Little None   1. Putting on and taking off regular lower body clothing? [] 1   [] 2   [] 3   [x] 4   2. Bathing (including washing, rinsing, drying)? [] 1   [] 2   [] 3   [x] 4   3. Toileting, which includes using toilet, bedpan or urinal?   [] 1   [] 2   [] 3   [x] 4   4. Putting on and taking off regular upper body clothing? [] 1   [] 2   [] 3   [x] 4   5. Taking care of personal grooming such as brushing teeth? [] 1   [] 2   [] 3   [x] 4   6. Eating meals? [] 1   [] 2   [] 3   [x] 4   © , Trustees of [address] [place], [place] [id number], under license to [employer]. All rights reserved      Score:  Initial: 24 Most Recent: X (Date: -- )    Interpretation of Tool:  Represents activities that are increasingly more difficult (i.e. Bed mobility, Transfers, Gait).        Use of outcome tool(s) and clinical judgement create a POC that gives a: LOW COMPLEXITY TREATMENT:   (In addition to Assessment/Re-Assessment sessions the following treatments were rendered)     Pre-treatment Symptoms/Complaints:    Pain: Initial:   Pain Intensity 1: 0 0 Post Session:  0     Assessment/Reassessment only, no treatment provided today    Braces/Orthotics/Lines/Etc:   O2 Device: Room air  Treatment/Session Assessment:    Response to Treatment:  pt tolerated well   Interdisciplinary Collaboration:   Physical Therapist  Occupational Therapist  After treatment position/precautions:   Supine in bed  Bed/Chair-wheels locked  Bed in low position  Call light within reach  RN notified  Family at bedside   Compliance with Program/Exercises: Compliant all of the time.     Total Treatment Duration:  OT Patient Time In/Time Out  Time In: 1140  Time Out: 1000 Teresa Lema OT

## 2020-07-03 NOTE — PROGRESS NOTES
PICC Placement Note    PRE-PROCEDURE VERIFICATION  Correct Procedure: yes. Time out completed with assistant Olegario Stoddard RN and all persons present in agreement with time out. Correct Site:  yes  Temperature: Temp: 98.5 °F (36.9 °C), Temperature Source: Temp Source: Oral  Recent Labs     07/03/20  0509   BUN 9   CREA 0.73      WBC 4.2*     Allergies: Pcn [penicillins] and Norco [hydrocodone-acetaminophen]  Education materials for PICC Care given to patient or family. PROCEDURE DETAIL  A single lumen PICC line was started for antibiotic therapy. The following documentation is in addition to the PICC properties in the lines/airways flowsheet :  Lot #: [id number]  xylocaine used: yes  Mid-Arm Circumference: 32 (cm)  Internal Catheter Length: 44 (cm)  Internal Catheter Total Length: 44 (cm)  Vein Selection for PICC:right basilic  Central Line Bundle followed yes  Complication Related to Insertion: none  Both the insertion guidewire and ECG guidewire were removed intact all ports have positive blood return and were flush well with normal saline. The location of the tip of the PICC is verified using ECG technology. The tip is in the SVC per ECG reading. See image below.          Line is okay to use: yes

## 2020-07-03 NOTE — PROGRESS NOTES
Problem: Falls - Risk of  Goal: *Absence of Falls  Description: Document Laron Woodrowcarito Fall Risk and appropriate interventions in the flowsheet.   Outcome: Progressing Towards Goal  Note: Fall Risk Interventions:       Mentation Interventions: Adequate sleep, hydration, pain control    Medication Interventions: Patient to call before getting OOB         History of Falls Interventions: Consult care management for discharge planning         Problem: Patient Education: Go to Patient Education Activity  Goal: Patient/Family Education  Outcome: Progressing Towards Goal

## 2020-07-03 NOTE — PROGRESS NOTES
Nutrition  Reason for assessment: LOS Day 3  Assessment:   Diet: DIET REGULAR    Food/Nutrition Patient History:    Patient presented with acute osteomyelitis of right foot. She had right big toe amputated on 7/2. PMH includes TIA, HTN and possible DM2. Patient reports she has had some constipation and lower appetite for 3-4 weeks. She says it started when foot started to get worse. Possible related to pain and infection. She says during that time she had no nausea or vomiting. She reports eating 4 pieces of lindsay for breakfast. Plans to have yogurt and grapes for mid morning snack. Explained goal of smaller meals more frequently throughout day. Also encouraged extra protein for wound heals, such as smoothies, yogurt, meat and fish. Patient has no other questions or concerns right now. Anthropometrics:  Height: 5' 8\" (172.7 cm), Weight: 103.4 kg (228 lb),  , Body mass index is 34.67 kg/m². BMI class of obese. Macronutrient needs: (using CBW (Current body weight) unknown source 103.4 kg)  EER: 7236-5381 kcal/day (15-20 kcals/kg )  EPR:  g/day (0.8-1 g/kg )     Intake/Comparative Standards: Per documentation, patient consuming average 1000% of 2 recorded meals in 1 days. Patient Vitals for the past 100 hrs:   % Diet Eaten   07/01/20 1234 100 %   07/01/20 0946 100 %     Nutrition Diagnosis:  No nutrition diagnosis at this time. Nutrition Intervention:  Meals and Snacks: Continue with current diet. Discharge Nutrition Plan: No discharge needs at this time.      Nery Brantley [address], [address] [place] [address],    [phone]

## 2020-07-03 NOTE — DISCHARGE SUMMARY
Hospitalist Discharge Summary     Patient ID:  Vanna Rock  [id number]  54 y.o.  1970  Admit date: 6/29/2020  3:50 PM  Discharge date and time: 7/3/2020  Attending: Zaki Carranza DO  PCP:  Prasanna Moore MD  Treatment Team: Attending Provider: Zaki Carranza DO; Care Manager: Manson Crigler, RN; Consulting Provider: Kiersten Baker MD; Consulting Provider: Kaushal Del Rosario MD; Utilization Review: Meeta Garcia RN; Physical Therapist: Ro Alvarado PT; Occupational Therapist: Isela Mosley OT; : ELVER Mehta    Principal Diagnosis Acute osteomyelitis of right foot [facility])    Hospital Problems as of 7/3/2020 Date Reviewed: 9/3/2019          Codes Class Noted - Resolved POA    * (Principal) Acute osteomyelitis of right foot ([facility] ICD-10-CM: M86.171  ICD-9-CM: 730.07  6/30/2020 - Present Yes        Cellulitis and abscess of toe of right foot ICD-10-CM: L03.031, L02.611  ICD-9-CM: 681.10  6/29/2020 - Present Yes        Hypokalemia ICD-10-CM: E87.6  ICD-9-CM: 276.8  6/29/2020 - Present Yes        Hypertension ICD-10-CM: I10  ICD-9-CM: 401.9  9/26/2019 - Present Yes        Peripheral neuropathy ICD-10-CM: G62.9  ICD-9-CM: 356.9  6/30/2020 - Present Yes        Facial droop ICD-10-CM: R29.810  ICD-9-CM: 781.94  9/26/2019 - Present Yes        H/O TIA (transient ischemic attack) and stroke ICD-10-CM: Z86.73  ICD-9-CM: V12.54  6/29/2020 - Present Yes        Reflex sympathetic dystrophy ICD-10-CM: G90.50  ICD-9-CM: 337.20  9/26/2019 - Present Yes    Overview Signed 9/26/2019 10:38 PM by Kiki Kenny MD     right arm; s/p injury - laceration in hand             Chronic pain ICD-10-CM: G89.29  ICD-9-CM: 338.29  9/26/2019 - Present Yes    Overview Signed 9/26/2019 10:39 PM by Kiki Kenny MD     due to RA             Depression ICD-10-CM: F32.9  ICD-9-CM: 311  9/26/2019 - Present Yes        Class 1 obesity in adult ICD-10-CM: E66.9  ICD-9-CM: 278.00  6/29/2020 - Present Yes        Acute osteomyelitis [facility]) ICD-10-CM: M86.10  ICD-9-CM: 730.00  6/30/2020 - Present               Hospital Course:  Please refer to the admission H&P for details of presentation. In summary, the patient is a 54year old female with a PMH of DM2 with associated peripheral neuropathy, not on any DM medications due to hypoglycemia, h/o tia October 2019, h/o left facial droop on and off since October (more pronounced in stress; says had bells palsy when she was pregnant several years ago), morbid obesity, depression, hyperlipidemia, RA vs RLS admitted on 6/29 due to right foot ulcer with cellulitis with streaks up her right leg. MRI confirmed OM. She was on Cefepime + Flagyl + Vanc. Orthopedic surgery did a distal phalangeal amputation on 7/2/20. She was changed to Ceftriaxone. She worked with PT and did well. She remained stable and was discharged home. She will complete 4 weeks of Ceftriaxone at home - EOT 7/31/20. Significant Diagnostic Studies:    Labs: Results:       Chemistry Recent Labs     07/03/20  0509 07/02/20  0401 07/01/20  0353   * 119* 120*    140 139   K 3.8 3.6 4.2   * 109* 108*   CO2 24 26 26   BUN 9 8 9   CREA 0.73 0.65 0.72   CA 8.0* 8.0* 8.4   AGAP 6* 5* 5*      CBC w/Diff Recent Labs     07/03/20  0509 07/02/20  1918 07/02/20  0401 07/01/20  0353   WBC 4.2*  --  3.3* 3.8*   RBC 3.38*  --  3.22* 3.44*   HGB 8.5* 9.1* 8.1* 8.6*   HCT 27.8* 29.6* 26.2* 28.4*     --  163 169      Cardiac Enzymes No results for input(s): CPK, CKND1, JENNIFER in the last 72 hours. No lab exists for component: CKRMB, TROIP   Coagulation No results for input(s): PTP, INR, APTT, INREXT in the last 72 hours.     Lipid Panel Lab Results   Component Value Date/Time    Cholesterol, total 181 09/28/2019 04:32 AM    HDL Cholesterol 40 09/28/2019 04:32 AM    LDL, calculated 66.4 09/28/2019 04:32 AM    VLDL, calculated 74.6 (H) 09/28/2019 04:32 AM Triglyceride 373 (H) 09/28/2019 04:32 AM    CHOL/HDL Ratio 4.5 09/28/2019 04:32 AM      BNP No results for input(s): BNPP in the last 72 hours. Liver Enzymes No results for input(s): TP, ALB, TBIL, AP in the last 72 hours. No lab exists for component: SGOT, GPT, DBIL   Thyroid Studies Lab Results   Component Value Date/Time    T4, Total 9.8 01/29/2009 05:33 AM    T3 Uptake 34 01/29/2009 05:33 AM            Imaging:  No results found. Microbiology/Cultures: All Micro Results     Procedure Component Value Units Date/Time    CULTURE, BLOOD [[id number]] Collected:  06/29/20 1634    Order Status:  Completed Specimen:  Blood Updated:  07/03/20 0731     Special Requests: RIGHT ANTECUBITAL        Culture result: NO GROWTH 4 DAYS       CULTURE, BLOOD [[id number]] Collected:  06/29/20 1635    Order Status:  Completed Specimen:  Blood Updated:  07/03/20 0731     Special Requests: --        LEFT  FOREARM       Culture result: NO GROWTH 4 DAYS       CULTURE, WOUND [place] STAIN [[id number]]  (Abnormal) Collected:  06/29/20 2137    Order Status:  Completed Specimen:  Wound from Toe Updated:  07/02/20 1412     Special Requests: NO SPECIAL REQUESTS        GRAM STAIN 0 TO 4 WBC'S/OIF      FEW GRAM POSITIVE COCCI        Culture result:       LIGHT STREPTOCOCCI, BETA HEMOLYTIC GROUP B                  GROUP B STREPTOCOCCI REMAIN UNIVERSALLY SUSCEPTIBLE TO PENICILLIN, AMPICILLIN, AND CEFAZOLIN. RESISTANCE TO CLINDAMYCIN AND ERYTHROMYCIN CAN OCCUR.  PLEASE CONTACT LABORATORY WITHIN 48 HOURS IF ERYTHROMYCIN AND CLINDAMYCIN SUSCEPTIBILITY TESTING IS NEEDED.                  LIGHT MIXED SKIN ROBERTA ISOLATED                Discharge Exam:  Visit Vitals  /59 (BP 1 Location: Left arm, BP Patient Position: At rest)   Pulse (!) 47   Temp 97.9 °F (36.6 °C)   Resp 18   Ht 5' 8\" (1.727 m)   Wt 103.4 kg (228 lb)   SpO2 99%   BMI 34.67 kg/m²     General appearance: alert, cooperative, no distress, appears stated age  Lungs: clear to auscultation bilaterally  Heart: regular rate and rhythm, S1, S2 normal, no murmur, click, rub or gallop  Abdomen: soft, non-tender. Bowel sounds normal. No masses,  no organomegaly  Extremities: Right foot in bandages  Neurologic: Grossly normal    Disposition: home  Discharge Condition: stable  Patient Instructions:   Current Discharge Medication List      START taking these medications    Details   cefTRIAXone 2 gram 2 g, ADDaptor 1 Device IVPB 2 g by IntraVENous route every twenty-four (24) hours for 27 days. Qty: 27 Dose, Refills: 0      HYDROcodone-acetaminophen (NORCO) 7.5-325 mg per tablet Take 1 Tab by mouth every six (6) hours as needed for Pain for up to 7 days. Max Daily Amount: 4 Tabs. Qty: 28 Tab, Refills: 0    Associated Diagnoses: Cellulitis and abscess of toe of right foot         CONTINUE these medications which have NOT CHANGED    Details   buPROPion XL (WELLBUTRIN XL) 150 mg tablet Take 150 mg by mouth daily. DULoxetine (CYMBALTA) 60 mg capsule Take 60 mg by mouth two (2) times a day. aspirin 81 mg chewable tablet Take 81 mg by mouth. traZODone (DESYREL) 100 mg tablet Take 100 mg by mouth.      pravastatin (PRAVACHOL) 40 mg tablet Take 1 Tab by mouth nightly. Qty: 30 Tab, Refills: 0      lisinopril-hydrochlorothiazide (PRINZIDE, ZESTORETIC) 20-25 mg per tablet 1 Tab daily. Associated Diagnoses: Bloody discharge from left nipple; Breast pain, left;  Family history of breast cancer             Activity: Activity as tolerated  Diet: Regular Diet  Wound Care: As directed    Follow-up  ·   Dr. Madhu Hunter in one week  · Dr. Tee Perez in 2 weeks  · ID at scheduled appt  Time spent to discharge patient 35 minutes  Signed:  Wade Pack DO  7/3/2020  10:54 AM

## 2020-07-03 NOTE — PROGRESS NOTES
Care Management Interventions  PCP Verified by CM: Yes  Transition of Care Consult (CM Consult): Discharge Planning, [address]: Yes  Discharge Durable Medical Equipment: No(none)  Physical Therapy Consult: No  Occupational Therapy Consult: No  Speech Therapy Consult: No  Current Support Network: Lives with Spouse, Own Home  Confirm Follow Up Transport: Family  The Patient and/or Patient Representative was Provided with a Choice of Provider and Agrees with the Discharge Plan?: Yes  Discharge Location  Discharge Placement: Home with home health       Per MD order pt will benefit from a 2WRW. STEPHANE provided pt a walker using [employer] [address] sent order via [facility]            Per MD pt stable for d/c. Dr. Kylah Handy informed SW he had final orders in [address] [place] [address], PICC was ordered this AM. STEPHANE spoke with Le Burger at [facility] who is aware of pt and can acquire final orders from [address] [place] [address]. Le Burger states it will be late this afternoon before she can come teach pt at bedside. STEPHANE spoke with pt offered choices of [place] and pt agreeable with [facility]. STEPHANE made referral to [facility] and spoke with Dondra Heimlich who accepts pt and can start service on Sat. Per Antonio with PT, pt's orthodic will be here by noon today. Pt states she has a w/c can use at home.

## 2020-07-03 NOTE — DISCHARGE INSTRUCTIONS
DISCHARGE SUMMARY from Nurse    PATIENT INSTRUCTIONS:    After general anesthesia or intravenous sedation, for 24 hours or while taking prescription Narcotics:  · Limit your activities  · Do not drive and operate hazardous machinery  · Do not make important personal or business decisions  · Do  not drink alcoholic beverages  · If you have not urinated within 8 hours after discharge, please contact your surgeon on call. Report the following to your surgeon:  · Excessive pain, swelling, redness or odor of or around the surgical area  · Temperature over 100.5  · Nausea and vomiting lasting longer than 4 hours or if unable to take medications  · Any signs of decreased circulation or nerve impairment to extremity: change in color, persistent  numbness, tingling, coldness or increase pain  · Any questions    What to do at Home:  Recommended activity: Activity as tolerated, no driving for 2 weeks    If you experience any of the following symptoms fever 100.5 or greater, unrelieved pain, uncontrolled nausea vomiting diarrhea please follow up with your PCP. *  Please give a list of your current medications to your Primary Care Provider. *  Please update this list whenever your medications are discontinued, doses are      changed, or new medications (including over-the-counter products) are added. *  Please carry medication information at all times in case of emergency situations. These are general instructions for a healthy lifestyle:    No smoking/ No tobacco products/ Avoid exposure to second hand smoke  Surgeon General's Warning:  Quitting smoking now greatly reduces serious risk to your health.     Obesity, smoking, and sedentary lifestyle greatly increases your risk for illness    A healthy diet, regular physical exercise & weight monitoring are important for maintaining a healthy lifestyle    You may be retaining fluid if you have a history of heart failure or if you experience any of the following symptoms:  Weight gain of 3 pounds or more overnight or 5 pounds in a week, increased swelling in our hands or feet or shortness of breath while lying flat in bed. Please call your doctor as soon as you notice any of these symptoms; do not wait until your next office visit. The discharge information has been reviewed with the patient. The patient verbalized understanding. Discharge medications reviewed with the patient and appropriate educational materials and side effects teaching were provided.   ___________________________________________________________________________________________________________________________________

## 2020-07-03 NOTE — PROGRESS NOTES
Infectious Disease Progress Note    Today's Date: 7/3/2020   Admit Date: 2020    Impression:   · R foot plantar ulcer / R 1st toe osteomyelitis; Wound Cx: Group B Strep  · S/p R 1st toe amputation, 20 (no path, cultures sent)  · DM type 2 - most recent A1C 6.4  · Diabetic neuropathy  · Elevated ESR/CRP  · Obesity  · Dysuria / recurrent UTI  · PCN allergy (hives) - tolerates cephalosporins    Plan:   · Place PICC today. · Will plan 4 weeks of IV ceftriaxone 2gm IV q24hrs with EOT 20. · Orders sent to case management. · Will arrange ID follow up in 4 weeks. Anti-infectives:   · IV vanc  · IV cefepime  · IV flagyl    Subjective:   Date of Consultation:  July 3, 2020  Referring Physician: Dr. Gomez Carter    Doing well s/p surgery; no fevers; planned d/c today; Allergies   Allergen Reactions    Pcn [Penicillins] Hives    Norco [Hydrocodone-Acetaminophen] Nausea and Vomiting        Review of Systems:  A comprehensive review of systems was negative except for that written in the History of Present Illness.     Objective:     Visit Vitals  /59 (BP 1 Location: Left arm, BP Patient Position: At rest)   Pulse (!) 47   Temp 97.9 °F (36.6 °C)   Resp 18   Ht 5' 8\" (1.727 m)   Wt 103.4 kg (228 lb)   SpO2 99%   BMI 34.67 kg/m²     Temp (24hrs), Av.2 °F (36.8 °C), Min:97.6 °F (36.4 °C), Max:98.8 °F (37.1 °C)       Lines:  Peripheral IV:       Physical Exam:    General:  Alert, cooperative, obese female, in no acute distress   Eyes:  Sclera anicteric   Mouth/Throat: Mucous membranes normal   Neck: Supple   Lungs:   Breathing comfortably   CV:     Abdomen:   non-distended   Extremities: moderate bilateral LE edema;   Skin: R first toe inflammation / ulceration unchanged       Musculoskeletal: R foot in surgical bandage   Lines/Devices:  Intact, no erythema, drainage or tenderness   Psych: Alert and oriented, normal mood affect given the setting       Data Review:     CBC:  Recent Labs     20  0509 07/02/20  1918 07/02/20  0401 07/01/20  0353   WBC 4.2*  --  3.3* 3.8*   HGB 8.5* 9.1* 8.1* 8.6*   HCT 27.8* 29.6* 26.2* 28.4*     --  163 169       BMP:  Recent Labs     07/03/20  0509 07/02/20  0401 07/01/20  0353   CREA 0.73 0.65 0.72   BUN 9 8 9    140 139   K 3.8 3.6 4.2   * 109* 108*   CO2 24 26 26   AGAP 6* 5* 5*   * 119* 120*       LFTS:  No results for input(s): TBILI, ALT, AP, TP, ALB in the last 72 hours. No lab exists for component: SGOT    Microbiology:     All Micro Results     Procedure Component Value Units Date/Time    CULTURE, BLOOD [[id number]] Collected:  06/29/20 1634    Order Status:  Completed Specimen:  Blood Updated:  07/03/20 0731     Special Requests: RIGHT ANTECUBITAL        Culture result: NO GROWTH 4 DAYS       CULTURE, BLOOD [[id number]] Collected:  06/29/20 1635    Order Status:  Completed Specimen:  Blood Updated:  07/03/20 0731     Special Requests: --        LEFT  FOREARM       Culture result: NO GROWTH 4 DAYS       CULTURE, WOUND Theta Cordial STAIN [[id number]]  (Abnormal) Collected:  06/29/20 2137    Order Status:  Completed Specimen:  Wound from Toe Updated:  07/02/20 1412     Special Requests: NO SPECIAL REQUESTS        GRAM STAIN 0 TO 4 WBC'S/OIF      FEW GRAM POSITIVE COCCI        Culture result:       LIGHT STREPTOCOCCI, BETA HEMOLYTIC GROUP B                  GROUP B STREPTOCOCCI REMAIN UNIVERSALLY SUSCEPTIBLE TO PENICILLIN, AMPICILLIN, AND CEFAZOLIN. RESISTANCE TO CLINDAMYCIN AND ERYTHROMYCIN CAN OCCUR.  PLEASE CONTACT LABORATORY WITHIN 48 HOURS IF ERYTHROMYCIN AND CLINDAMYCIN SUSCEPTIBILITY TESTING IS NEEDED.                  LIGHT MIXED SKIN ROBERTA ISOLATED                Imaging:   No new imaging    Signed By: Brandan Jones MD     July 3, 2020

## 2020-07-03 NOTE — PROGRESS NOTES
Problem: Mobility Impaired (Adult and Pediatric)  Goal: *Acute Goals and Plan of Care (Insert Text)  Description: ALL GOALS MET 7/3/20 :  (1.)Ms. Tesfaye Leach will move from supine to sit and sit to supine  with INDEPENDENT. (2.)Ms. Tesfaye Leach will transfer from bed to chair and chair to bed with SUPERVISION using a walker & off loading boot. (3.)Ms. Tesfaye Leach will ambulate with SUPERVISION for 200 feet with a rolling walker & off loading boot. 4) pt able to go up & down 12 steps with left rail & supervision. Outcome: Resolved/Met     PHYSICAL THERAPY: Initial Assessment and AM 7/3/2020  INPATIENT: PT Visit Days : 1  Payor: Bethany Jones / Plan: Madhavi Alves / Product Type: PPO /       NAME/AGE/GENDER: Julian Marley is a 54 y.o. female   PRIMARY DIAGNOSIS: Cellulitis and abscess of toe of right foot [L03.031, L02.611]  Acute osteomyelitis ([address] [M86.10] Acute osteomyelitis of right foot ([address] Acute osteomyelitis of right foot ([address]        ICD-10: Treatment Diagnosis:    Generalized Muscle Weakness (M62.81)  Difficulty in walking, Not elsewhere classified (R26.2)   Precaution/Allergies:  Pcn [penicillins] and Norco [hydrocodone-acetaminophen]      ASSESSMENT:     Ms. Tesfaye Leach presents with mildly impaired functional mobility due to right great toe amputation. This pt was able to function at a supervision level using an off loading boot on the right & a rolling walker. Pt was given instructions when following up with her surgeon, that an RX for proper foot wear might be needed for an orthotist & out pt PT if she has difficulty weaning off her walker. No further acute PT indicated.     This section established at most recent assessment   PROBLEM LIST (Impairments causing functional limitations):  NA    INTERVENTIONS PLANNED: (Benefits and precautions of physical therapy have been discussed with the patient.)  NA      TREATMENT PLAN: Frequency/Duration: NA  Rehabilitation Potential For Stated Goals: NA      REHAB RECOMMENDATIONS (at time of discharge pending progress):    Placement: It is my opinion, based on this patient's performance to date, that Ms. Hafsa Lee may benefit from being discharged with NO further skilled therapy due to all goals being met. Equipment:   Off loading boot & rolling walker               HISTORY:   History of Present Injury/Illness (Reason for Referral):  in stress? (says had bells palsy when she was pregnant several years ago),morbid obesity, depression, hyperlipidemia, RA vs Reflex sympathetic dystrophy-says was told by doctor that she might have either. Neuropathy starting from lower 1/3rd of both legs, more pronounced both feet. Since past 6 months pt had rt big toe plantar aspect wound with on and off discharge. Recent tiny bleed started about few weeks ago,has had her rt big toe nail slightly peeled off. Noticed swelling big toe, didn't have any pain toe secondary to neuropathy,gradually increasing past 2-3 days,extending to the medial aspect of leg and also noticed rt leg cramp/pain. Yesterday took her rt toe nail off. Also noticed fever >101 f improved on NSAID. Said had to take one more dose of NSAID this evening. Noticed pus discharge from the rt  Big toe plantar wound today. On and off headache,mild, dull, improved on NSAID. Also say in lot of stress. Past Medical History/Comorbidities:   Ms. Hafsa Lee  has a past medical history of Anemia, Chronic pain, Depression, GERD (gastroesophageal reflux disease), renal calculi, Hypercholesterolemia, Hypertension, Migraine, Morbid obesity ([address] Ovarian cyst, Reflex sympathetic dystrophy (since 2005), Rheumatoid arthritis ([address] Rheumatoid arthritis ([address] (9/26/2019), Sinus problem, and TIA (transient ischemic attack) (9/26/2019).  She also has no past medical history of Aneurysm ([address] Arrhythmia, Asthma, CAD (coronary artery disease), Cancer ([address] Chronic kidney disease, Chronic obstructive pulmonary disease ([address] Coagulation disorder ([facility] Diabetes ([facility] Difficult intubation, Heart failure ([facility] Liver disease, Malignant hyperthermia due to anesthesia, Nausea & vomiting, Pseudocholinesterase deficiency, PUD (peptic ulcer disease), Seizures ([facility] Thromboembolus ([facility] Thyroid disease, Unspecified adverse effect of anesthesia, or Unspecified sleep apnea. Ms. Chanel Anaya  has a past surgical history that includes hx cholecystectomy; hx appendectomy; hx total abdominal hysterectomy; hx tubal ligation; hx breast biopsy (Left, 4/27/2015); hx orthopaedic; hx orthopaedic; and hx septoplasty (08/28/2019). Social History/Living Environment:   Home Environment: Private residence  # Steps to Enter: 2  Rails to Enter: Yes  Hand Rails : Right  One/Two Story Residence: Two story  # of Interior Steps: 16  Interior Rails: Left  Living Alone: No  Support Systems: Spouse/Significant Other/Partner  Patient Expects to be Discharged to[de-identified] Private residence  Current DME Used/Available at Home: None  Prior Level of Function/Work/Activity:  Pt was independent without an assistive device prior to this admission. Personal Factors: Other factors that influence how disability is experienced by the patient:  current & PMH   Number of Personal Factors/Comorbidities that affect the Plan of Care: 3+: HIGH COMPLEXITY   EXAMINATION:   Most Recent Physical Functioning:   Gross Assessment:  AROM: Generally decreased, functional(all limbs & core)  Strength: Generally decreased, functional(all limbs & core)  Coordination: Generally decreased, functional(all limbs & core)               Balance:  Sitting: Intact; Without support  Standing: Impaired; With support(walker & right off loading boot) Bed Mobility:  Supine to Sit: Independent  Sit to Supine: Independent  Scooting: Independent       Transfers:  Sit to Stand: Supervision  Stand to Sit: Supervision  Bed to Chair: Supervision(with walker & right off loading boot)  Gait:  Right Side Weight Bearing: As tolerated(only with off loading boot)  Speed/Guadalupe: Delayed  Step Length: Left shortened  Stance: Right decreased  Gait Abnormalities: Antalgic;Decreased step clearance  Distance (ft): 200 Feet (ft)  Assistive Device: Walker, rolling( & right off loading boot)  Ambulation - Level of Assistance: Supervision  Number of Stairs Trained: 12  Stairs - Level of Assistance: Supervision  Rail Use: Left    Functional Mobility:         Gait/Ambulation:  sup        Transfers:  sup        Bed Mobility:  Ind        Stairs:  sup   Body Structures Involved:  Metabolic  Muscles Body Functions Affected: Movement Related  Metobolic/Endocrine Activities and Participation Affected:  General Tasks and Demands  Mobility   Number of elements that affect the Plan of Care: 4+: HIGH COMPLEXITY   CLINICAL PRESENTATION:   Presentation: Stable and uncomplicated: LOW COMPLEXITY   CLINICAL DECISION MAKING:   Juli Doyle -PAC 6 Clicks   Basic Mobility Inpatient Short Form  How much difficulty does the patient currently have. .. Unable A Lot A Little None   1. Turning over in bed (including adjusting bedclothes, sheets and blankets)? [] 1   [] 2   [] 3   [x] 4   2. Sitting down on and standing up from a chair with arms ( e.g., wheelchair, bedside commode, etc.)   [] 1   [] 2   [] 3   [x] 4   3. Moving from lying on back to sitting on the side of the bed? [] 1   [] 2   [] 3   [x] 4   How much help from another person does the patient currently need. .. Total A Lot A Little None   4. Moving to and from a bed to a chair (including a wheelchair)? [] 1   [] 2   [] 3   [x] 4   5. Need to walk in hospital room? [] 1   [] 2   [] 3   [x] 4   6. Climbing 3-5 steps with a railing? [] 1   [] 2   [] 3   [x] 4   © 2007, Trustees of [employer], under license to [employer].  All rights reserved      Score:  Initial: 24 Most Recent: X (Date: -- )    Interpretation of Tool:  Represents activities that are increasingly more difficult (i.e. Bed mobility, Transfers, Gait). Medical Necessity:     No acute PT follow up. Reason for Services/Other Comments:  No acute PT follow up. Use of outcome tool(s) and clinical judgement create a POC that gives a: Clear prediction of patient's progress: LOW COMPLEXITY            TREATMENT:   (In addition to Assessment/Re-Assessment sessions the following treatments were rendered)   Pre-treatment Symptoms/Complaints:  pt pleased with off loading boot  Pain: Initial: numeric scale  Pain Intensity 1: 0  Post Session:  0/10     Therapeutic Activity: (    13 min (extra time to work through all activity noted): Therapeutic activities including progressive ambulation out of room down laurent along with ambulation up / down full flight of stairs, balancing with boot only, proper use & fitting of pt's personal walker along with home safety in addition to long term expectations  to improve mobility, strength, balance, coordination, and dynamic movement of leg - bilateral to improve functional stability . Assessment/ 12 min    Braces/Orthotics/Lines/Etc:   IV  Treatment/Session Assessment:    Response to Treatment:  tolerated well  Interdisciplinary Collaboration:   Registered Nurse    After treatment position/precautions:   Supine in bed  Bed/Chair-wheels locked  Bed in low position  Caregiver at bedside  Call light within reach  RN notified  Family at bedside   Compliance with Program/Exercises: Compliant all of the time  Recommendations/ DC acute PT services.   Total Treatment Duration:  PT Patient Time In/Time Out  Time In: 1111  Time Out: [address] Atilio Cabot, PT

## 2020-07-03 NOTE — PROGRESS NOTES
Vancomycin Consult    MD ordering: Joel Clement   ID following? yes  Indication: Osteomyelitis  Goal level(s): 15 - 20    Ht Readings from Last 1 Encounters:   20 5' 8\" (1.727 m)      Wt Readings from Last 1 Encounters:   20 103.4 kg (228 lb)         Temp (24hrs), Av.6 °F (37 °C), Min:98.2 °F (36.8 °C), Max:98.9 °F (37.2 °C)    Dosing weight: 103 kg  54 y.o. Date:  Dose/Freq Admin Times Level/Time:   20 Vanc 2500 mg IV x 1 dose 1639     20 Vanc 1500 mg IV q12h 0455, 1831     20 Vanc 1500 mg IV q12h (0500) (dose not given)  Dose change Tr level @ 0709 = 12.8   20  Vanc 1750 mg IV q12h  1056, 2333     20  Vanc 1750 mg IV q12h  1107 (2200 dose held) Trough at 2232 was 24.2   7/3 Vanc 1250 mg IV q12h (09)  (21)                   Recent Labs     20  0401 20  0353 20  0441 20  1634   BUN 8 9 12 12   CREA 0.65 0.72 0.85 0.81   WBC 3.3* 3.8*  --  6.1   PCT  --   --   --  <0.05   LAC  --   --   --  1.4     Estimated Creatinine Clearance: 125 mL/min (by C-G formula based on SCr of 0.65 mg/dL). Day 4 Assessment and Plan:  Vancomycin dose reduced to 1250 mg IV every 12 hours.   The next dose is due at 9 am.  Thank you,    Mani Sellers, PharmD, BCPS

## 2020-07-04 ENCOUNTER — HOME CARE VISIT (OUTPATIENT)
Dept: SCHEDULING | Facility: HOME HEALTH | Age: 50
End: 2020-07-04
Payer: COMMERCIAL

## 2020-07-04 LAB
BACTERIA SPEC CULT: NORMAL
BACTERIA SPEC CULT: NORMAL
SERVICE CMNT-IMP: NORMAL
SERVICE CMNT-IMP: NORMAL

## 2020-07-04 PROCEDURE — G0299 HHS/HOSPICE OF RN EA 15 MIN: HCPCS

## 2020-07-04 PROCEDURE — 400013 HH SOC

## 2020-07-05 VITALS
RESPIRATION RATE: 18 BRPM | TEMPERATURE: 97.1 F | HEART RATE: 65 BPM | HEIGHT: 68 IN | OXYGEN SATURATION: 96 % | WEIGHT: 228 LBS | SYSTOLIC BLOOD PRESSURE: 115 MMHG | DIASTOLIC BLOOD PRESSURE: 68 MMHG | BODY MASS INDEX: 34.56 KG/M2

## 2020-07-06 ENCOUNTER — HOME CARE VISIT (OUTPATIENT)
Dept: SCHEDULING | Facility: HOME HEALTH | Age: 50
End: 2020-07-06
Payer: COMMERCIAL

## 2020-07-06 ENCOUNTER — HOSPITAL ENCOUNTER (OUTPATIENT)
Dept: LAB | Age: 50
Discharge: HOME | End: 2020-07-06
Payer: COMMERCIAL

## 2020-07-06 LAB
ALBUMIN SERPL-MCNC: 2.8 G/DL (ref 3.5–5)
ALBUMIN/GLOB SERPL: 0.7 {RATIO} (ref 1.2–3.5)
ALP SERPL-CCNC: 73 U/L (ref 50–136)
ALT SERPL-CCNC: 14 U/L (ref 12–65)
AST SERPL-CCNC: 10 U/L (ref 15–37)
BASOPHILS # BLD: 0 K/UL (ref 0–0.2)
BASOPHILS NFR BLD: 0 % (ref 0–2)
BILIRUB DIRECT SERPL-MCNC: 0.1 MG/DL
BILIRUB SERPL-MCNC: 0.2 MG/DL (ref 0.2–1.1)
CREAT SERPL-MCNC: 0.72 MG/DL (ref 0.6–1)
CRP SERPL-MCNC: 2 MG/DL (ref 0–0.9)
DIFFERENTIAL METHOD BLD: ABNORMAL
EOSINOPHIL # BLD: 0.1 K/UL (ref 0–0.8)
EOSINOPHIL NFR BLD: 3 % (ref 0.5–7.8)
ERYTHROCYTE [DISTWIDTH] IN BLOOD BY AUTOMATED COUNT: 15.6 % (ref 11.9–14.6)
ERYTHROCYTE [SEDIMENTATION RATE] IN BLOOD: 76 MM/HR (ref 0–20)
GLOBULIN SER CALC-MCNC: 3.8 G/DL (ref 2.3–3.5)
HCT VFR BLD AUTO: 27.8 % (ref 35.8–46.3)
HGB BLD-MCNC: 8.6 G/DL (ref 11.7–15.4)
IMM GRANULOCYTES # BLD AUTO: 0.1 K/UL (ref 0–0.5)
IMM GRANULOCYTES NFR BLD AUTO: 1 % (ref 0–5)
LYMPHOCYTES # BLD: 1.3 K/UL (ref 0.5–4.6)
LYMPHOCYTES NFR BLD: 30 % (ref 13–44)
MCH RBC QN AUTO: 25.7 PG (ref 26.1–32.9)
MCHC RBC AUTO-ENTMCNC: 30.9 G/DL (ref 31.4–35)
MCV RBC AUTO: 83 FL (ref 79.6–97.8)
MONOCYTES # BLD: 0.3 K/UL (ref 0.1–1.3)
MONOCYTES NFR BLD: 6 % (ref 4–12)
NEUTS SEG # BLD: 2.5 K/UL (ref 1.7–8.2)
NEUTS SEG NFR BLD: 59 % (ref 43–78)
NRBC # BLD: 0 K/UL (ref 0–0.2)
PLATELET # BLD AUTO: 174 K/UL (ref 150–450)
PMV BLD AUTO: 9.6 FL (ref 9.4–12.3)
PROT SERPL-MCNC: 6.6 G/DL (ref 6.3–8.2)
RBC # BLD AUTO: 3.35 M/UL (ref 4.05–5.2)
WBC # BLD AUTO: 4.2 K/UL (ref 4.3–11.1)

## 2020-07-06 PROCEDURE — 85025 COMPLETE CBC W/AUTO DIFF WBC: CPT

## 2020-07-06 PROCEDURE — 85652 RBC SED RATE AUTOMATED: CPT

## 2020-07-06 PROCEDURE — 86140 C-REACTIVE PROTEIN: CPT

## 2020-07-06 PROCEDURE — 82565 ASSAY OF CREATININE: CPT

## 2020-07-06 PROCEDURE — G0299 HHS/HOSPICE OF RN EA 15 MIN: HCPCS

## 2020-07-06 PROCEDURE — 80076 HEPATIC FUNCTION PANEL: CPT

## 2020-07-07 VITALS
DIASTOLIC BLOOD PRESSURE: 74 MMHG | OXYGEN SATURATION: 98 % | TEMPERATURE: 98.7 F | SYSTOLIC BLOOD PRESSURE: 130 MMHG | RESPIRATION RATE: 16 BRPM | HEART RATE: 80 BPM

## 2020-07-09 ENCOUNTER — HOME CARE VISIT (OUTPATIENT)
Dept: SCHEDULING | Facility: HOME HEALTH | Age: 50
End: 2020-07-09
Payer: COMMERCIAL

## 2020-07-09 PROCEDURE — G0299 HHS/HOSPICE OF RN EA 15 MIN: HCPCS

## 2020-07-13 ENCOUNTER — HOME CARE VISIT (OUTPATIENT)
Dept: SCHEDULING | Facility: HOME HEALTH | Age: 50
End: 2020-07-13
Payer: COMMERCIAL

## 2020-07-13 ENCOUNTER — HOSPITAL ENCOUNTER (OUTPATIENT)
Dept: LAB | Age: 50
Discharge: HOME | End: 2020-07-13
Payer: COMMERCIAL

## 2020-07-13 VITALS
OXYGEN SATURATION: 96 % | TEMPERATURE: 96.8 F | SYSTOLIC BLOOD PRESSURE: 130 MMHG | HEART RATE: 80 BPM | RESPIRATION RATE: 18 BRPM | DIASTOLIC BLOOD PRESSURE: 70 MMHG

## 2020-07-13 VITALS
DIASTOLIC BLOOD PRESSURE: 94 MMHG | SYSTOLIC BLOOD PRESSURE: 148 MMHG | OXYGEN SATURATION: 99 % | RESPIRATION RATE: 17 BRPM | HEART RATE: 72 BPM | TEMPERATURE: 97.3 F

## 2020-07-13 LAB
ALBUMIN SERPL-MCNC: 3.6 G/DL (ref 3.5–5)
ALBUMIN/GLOB SERPL: 0.8 {RATIO} (ref 1.2–3.5)
ALP SERPL-CCNC: 79 U/L (ref 50–136)
ALT SERPL-CCNC: 21 U/L (ref 12–65)
AST SERPL-CCNC: 16 U/L (ref 15–37)
BASOPHILS # BLD: 0 K/UL (ref 0–0.2)
BASOPHILS NFR BLD: 0 % (ref 0–2)
BILIRUB DIRECT SERPL-MCNC: 0.1 MG/DL
BILIRUB SERPL-MCNC: 0.3 MG/DL (ref 0.2–1.1)
CREAT SERPL-MCNC: 0.69 MG/DL (ref 0.6–1)
CRP SERPL-MCNC: 0.7 MG/DL (ref 0–0.9)
DIFFERENTIAL METHOD BLD: ABNORMAL
EOSINOPHIL # BLD: 0.2 K/UL (ref 0–0.8)
EOSINOPHIL NFR BLD: 3 % (ref 0.5–7.8)
ERYTHROCYTE [DISTWIDTH] IN BLOOD BY AUTOMATED COUNT: 16.3 % (ref 11.9–14.6)
GLOBULIN SER CALC-MCNC: 4.7 G/DL (ref 2.3–3.5)
HCT VFR BLD AUTO: 36.5 % (ref 35.8–46.3)
HGB BLD-MCNC: 11.2 G/DL (ref 11.7–15.4)
IMM GRANULOCYTES # BLD AUTO: 0 K/UL (ref 0–0.5)
IMM GRANULOCYTES NFR BLD AUTO: 1 % (ref 0–5)
LYMPHOCYTES # BLD: 2.2 K/UL (ref 0.5–4.6)
LYMPHOCYTES NFR BLD: 31 % (ref 13–44)
MCH RBC QN AUTO: 24.9 PG (ref 26.1–32.9)
MCHC RBC AUTO-ENTMCNC: 30.7 G/DL (ref 31.4–35)
MCV RBC AUTO: 81.1 FL (ref 79.6–97.8)
MONOCYTES # BLD: 0.5 K/UL (ref 0.1–1.3)
MONOCYTES NFR BLD: 7 % (ref 4–12)
NEUTS SEG # BLD: 4.1 K/UL (ref 1.7–8.2)
NEUTS SEG NFR BLD: 58 % (ref 43–78)
NRBC # BLD: 0 K/UL (ref 0–0.2)
PLATELET # BLD AUTO: 173 K/UL (ref 150–450)
PMV BLD AUTO: 10 FL (ref 9.4–12.3)
PROT SERPL-MCNC: 8.3 G/DL (ref 6.3–8.2)
RBC # BLD AUTO: 4.5 M/UL (ref 4.05–5.2)
WBC # BLD AUTO: 7.1 K/UL (ref 4.3–11.1)

## 2020-07-13 PROCEDURE — G0299 HHS/HOSPICE OF RN EA 15 MIN: HCPCS

## 2020-07-13 PROCEDURE — 80076 HEPATIC FUNCTION PANEL: CPT

## 2020-07-13 PROCEDURE — 82565 ASSAY OF CREATININE: CPT

## 2020-07-13 PROCEDURE — 86140 C-REACTIVE PROTEIN: CPT

## 2020-07-13 PROCEDURE — 85652 RBC SED RATE AUTOMATED: CPT

## 2020-07-13 PROCEDURE — 85025 COMPLETE CBC W/AUTO DIFF WBC: CPT

## 2020-07-14 LAB — ERYTHROCYTE [SEDIMENTATION RATE] IN BLOOD: 62 MM/HR (ref 0–20)

## 2020-07-16 ENCOUNTER — HOME CARE VISIT (OUTPATIENT)
Dept: SCHEDULING | Facility: HOME HEALTH | Age: 50
End: 2020-07-16
Payer: COMMERCIAL

## 2020-07-16 PROCEDURE — G0299 HHS/HOSPICE OF RN EA 15 MIN: HCPCS

## 2020-07-20 ENCOUNTER — HOSPITAL ENCOUNTER (OUTPATIENT)
Dept: LAB | Age: 50
Discharge: HOME | End: 2020-07-20
Payer: COMMERCIAL

## 2020-07-20 ENCOUNTER — HOME CARE VISIT (OUTPATIENT)
Dept: SCHEDULING | Facility: HOME HEALTH | Age: 50
End: 2020-07-20
Payer: COMMERCIAL

## 2020-07-20 LAB
ALBUMIN SERPL-MCNC: 3.5 G/DL (ref 3.5–5)
ALBUMIN/GLOB SERPL: 0.8 {RATIO} (ref 1.2–3.5)
ALP SERPL-CCNC: 85 U/L (ref 50–136)
ALT SERPL-CCNC: 21 U/L (ref 12–65)
AST SERPL-CCNC: 13 U/L (ref 15–37)
BASOPHILS # BLD: 0 K/UL (ref 0–0.2)
BASOPHILS NFR BLD: 0 % (ref 0–2)
BILIRUB DIRECT SERPL-MCNC: 0.1 MG/DL
BILIRUB SERPL-MCNC: 0.2 MG/DL (ref 0.2–1.1)
CREAT SERPL-MCNC: 0.71 MG/DL (ref 0.6–1)
CRP SERPL-MCNC: 2.4 MG/DL (ref 0–0.9)
DIFFERENTIAL METHOD BLD: ABNORMAL
EOSINOPHIL # BLD: 0.2 K/UL (ref 0–0.8)
EOSINOPHIL NFR BLD: 4 % (ref 0.5–7.8)
ERYTHROCYTE [DISTWIDTH] IN BLOOD BY AUTOMATED COUNT: 15.6 % (ref 11.9–14.6)
ERYTHROCYTE [SEDIMENTATION RATE] IN BLOOD: 70 MM/HR (ref 0–20)
GLOBULIN SER CALC-MCNC: 4.5 G/DL (ref 2.3–3.5)
HCT VFR BLD AUTO: 34.2 % (ref 35.8–46.3)
HGB BLD-MCNC: 10.9 G/DL (ref 11.7–15.4)
IMM GRANULOCYTES # BLD AUTO: 0 K/UL (ref 0–0.5)
IMM GRANULOCYTES NFR BLD AUTO: 0 % (ref 0–5)
LYMPHOCYTES # BLD: 2.1 K/UL (ref 0.5–4.6)
LYMPHOCYTES NFR BLD: 38 % (ref 13–44)
MCH RBC QN AUTO: 25.2 PG (ref 26.1–32.9)
MCHC RBC AUTO-ENTMCNC: 31.9 G/DL (ref 31.4–35)
MCV RBC AUTO: 79.2 FL (ref 79.6–97.8)
MONOCYTES # BLD: 0.3 K/UL (ref 0.1–1.3)
MONOCYTES NFR BLD: 6 % (ref 4–12)
NEUTS SEG # BLD: 2.8 K/UL (ref 1.7–8.2)
NEUTS SEG NFR BLD: 51 % (ref 43–78)
NRBC # BLD: 0 K/UL (ref 0–0.2)
PLATELET # BLD AUTO: 191 K/UL (ref 150–450)
PMV BLD AUTO: 9.8 FL (ref 9.4–12.3)
PROT SERPL-MCNC: 8 G/DL (ref 6.3–8.2)
RBC # BLD AUTO: 4.32 M/UL (ref 4.05–5.2)
WBC # BLD AUTO: 5.5 K/UL (ref 4.3–11.1)

## 2020-07-20 PROCEDURE — 85025 COMPLETE CBC W/AUTO DIFF WBC: CPT

## 2020-07-20 PROCEDURE — 85652 RBC SED RATE AUTOMATED: CPT

## 2020-07-20 PROCEDURE — G0299 HHS/HOSPICE OF RN EA 15 MIN: HCPCS

## 2020-07-20 PROCEDURE — 86140 C-REACTIVE PROTEIN: CPT

## 2020-07-20 PROCEDURE — 82565 ASSAY OF CREATININE: CPT

## 2020-07-20 PROCEDURE — 80076 HEPATIC FUNCTION PANEL: CPT

## 2020-07-21 VITALS
SYSTOLIC BLOOD PRESSURE: 98 MMHG | TEMPERATURE: 96.4 F | RESPIRATION RATE: 16 BRPM | HEART RATE: 86 BPM | DIASTOLIC BLOOD PRESSURE: 57 MMHG | OXYGEN SATURATION: 98 %

## 2020-07-23 ENCOUNTER — HOME CARE VISIT (OUTPATIENT)
Dept: SCHEDULING | Facility: HOME HEALTH | Age: 50
End: 2020-07-23
Payer: COMMERCIAL

## 2020-07-23 VITALS
SYSTOLIC BLOOD PRESSURE: 102 MMHG | RESPIRATION RATE: 18 BRPM | HEART RATE: 80 BPM | OXYGEN SATURATION: 98 % | DIASTOLIC BLOOD PRESSURE: 60 MMHG

## 2020-07-23 PROCEDURE — G0299 HHS/HOSPICE OF RN EA 15 MIN: HCPCS

## 2020-07-24 ENCOUNTER — HOME CARE VISIT (OUTPATIENT)
Dept: HOME HEALTH SERVICES | Facility: HOME HEALTH | Age: 50
End: 2020-07-24
Payer: COMMERCIAL

## 2020-07-27 ENCOUNTER — HOSPITAL ENCOUNTER (OUTPATIENT)
Dept: LAB | Age: 50
Discharge: HOME | End: 2020-07-27
Payer: COMMERCIAL

## 2020-07-27 ENCOUNTER — HOME CARE VISIT (OUTPATIENT)
Dept: SCHEDULING | Facility: HOME HEALTH | Age: 50
End: 2020-07-27
Payer: COMMERCIAL

## 2020-07-27 VITALS
RESPIRATION RATE: 16 BRPM | SYSTOLIC BLOOD PRESSURE: 122 MMHG | OXYGEN SATURATION: 99 % | DIASTOLIC BLOOD PRESSURE: 74 MMHG | HEART RATE: 78 BPM | TEMPERATURE: 98.1 F

## 2020-07-27 LAB
ALBUMIN SERPL-MCNC: 2.7 G/DL (ref 3.5–5)
ALBUMIN/GLOB SERPL: 0.8 {RATIO} (ref 1.2–3.5)
ALP SERPL-CCNC: 59 U/L (ref 50–136)
ALT SERPL-CCNC: 22 U/L (ref 12–65)
AST SERPL-CCNC: 11 U/L (ref 15–37)
BASOPHILS # BLD: 0 K/UL (ref 0–0.2)
BASOPHILS NFR BLD: 0 % (ref 0–2)
BILIRUB DIRECT SERPL-MCNC: 0.1 MG/DL
BILIRUB SERPL-MCNC: 0.2 MG/DL (ref 0.2–1.1)
CREAT SERPL-MCNC: 0.47 MG/DL (ref 0.6–1)
CRP SERPL-MCNC: 1.3 MG/DL (ref 0–0.9)
DIFFERENTIAL METHOD BLD: ABNORMAL
EOSINOPHIL # BLD: 0.2 K/UL (ref 0–0.8)
EOSINOPHIL NFR BLD: 4 % (ref 0.5–7.8)
ERYTHROCYTE [DISTWIDTH] IN BLOOD BY AUTOMATED COUNT: 15.8 % (ref 11.9–14.6)
ERYTHROCYTE [SEDIMENTATION RATE] IN BLOOD: 63 MM/HR (ref 0–20)
GLOBULIN SER CALC-MCNC: 3.3 G/DL (ref 2.3–3.5)
HCT VFR BLD AUTO: 32.9 % (ref 35.8–46.3)
HGB BLD-MCNC: 10.3 G/DL (ref 11.7–15.4)
IMM GRANULOCYTES # BLD AUTO: 0 K/UL (ref 0–0.5)
IMM GRANULOCYTES NFR BLD AUTO: 0 % (ref 0–5)
LYMPHOCYTES # BLD: 1.8 K/UL (ref 0.5–4.6)
LYMPHOCYTES NFR BLD: 32 % (ref 13–44)
MCH RBC QN AUTO: 25 PG (ref 26.1–32.9)
MCHC RBC AUTO-ENTMCNC: 31.3 G/DL (ref 31.4–35)
MCV RBC AUTO: 79.9 FL (ref 79.6–97.8)
MONOCYTES # BLD: 0.3 K/UL (ref 0.1–1.3)
MONOCYTES NFR BLD: 5 % (ref 4–12)
NEUTS SEG # BLD: 3.2 K/UL (ref 1.7–8.2)
NEUTS SEG NFR BLD: 58 % (ref 43–78)
NRBC # BLD: 0 K/UL (ref 0–0.2)
PLATELET # BLD AUTO: 205 K/UL (ref 150–450)
PMV BLD AUTO: 9.8 FL (ref 9.4–12.3)
PROT SERPL-MCNC: 6 G/DL (ref 6.3–8.2)
RBC # BLD AUTO: 4.12 M/UL (ref 4.05–5.2)
WBC # BLD AUTO: 5.5 K/UL (ref 4.3–11.1)

## 2020-07-27 PROCEDURE — 86140 C-REACTIVE PROTEIN: CPT

## 2020-07-27 PROCEDURE — 85652 RBC SED RATE AUTOMATED: CPT

## 2020-07-27 PROCEDURE — 82565 ASSAY OF CREATININE: CPT

## 2020-07-27 PROCEDURE — 80076 HEPATIC FUNCTION PANEL: CPT

## 2020-07-27 PROCEDURE — 85025 COMPLETE CBC W/AUTO DIFF WBC: CPT

## 2020-07-27 PROCEDURE — G0299 HHS/HOSPICE OF RN EA 15 MIN: HCPCS

## 2020-07-30 ENCOUNTER — HOME CARE VISIT (OUTPATIENT)
Dept: SCHEDULING | Facility: HOME HEALTH | Age: 50
End: 2020-07-30
Payer: COMMERCIAL

## 2020-07-30 PROCEDURE — G0299 HHS/HOSPICE OF RN EA 15 MIN: HCPCS

## 2020-07-31 ENCOUNTER — HOME CARE VISIT (OUTPATIENT)
Dept: HOME HEALTH SERVICES | Facility: HOME HEALTH | Age: 50
End: 2020-07-31
Payer: COMMERCIAL

## 2020-08-03 ENCOUNTER — HOME CARE VISIT (OUTPATIENT)
Dept: SCHEDULING | Facility: HOME HEALTH | Age: 50
End: 2020-08-03
Payer: COMMERCIAL

## 2020-08-03 PROCEDURE — 400013 HH SOC

## 2020-08-03 PROCEDURE — G0299 HHS/HOSPICE OF RN EA 15 MIN: HCPCS

## 2020-08-04 VITALS
RESPIRATION RATE: 20 BRPM | HEART RATE: 76 BPM | TEMPERATURE: 97.8 F | OXYGEN SATURATION: 98 % | DIASTOLIC BLOOD PRESSURE: 80 MMHG | SYSTOLIC BLOOD PRESSURE: 110 MMHG

## 2020-08-06 ENCOUNTER — HOME CARE VISIT (OUTPATIENT)
Dept: SCHEDULING | Facility: HOME HEALTH | Age: 50
End: 2020-08-06
Payer: COMMERCIAL

## 2020-08-06 PROCEDURE — G0299 HHS/HOSPICE OF RN EA 15 MIN: HCPCS

## 2020-08-07 VITALS
HEART RATE: 87 BPM | OXYGEN SATURATION: 98 % | RESPIRATION RATE: 18 BRPM | TEMPERATURE: 97.2 F | SYSTOLIC BLOOD PRESSURE: 110 MMHG | DIASTOLIC BLOOD PRESSURE: 80 MMHG

## 2020-08-10 ENCOUNTER — HOME CARE VISIT (OUTPATIENT)
Dept: SCHEDULING | Facility: HOME HEALTH | Age: 50
End: 2020-08-10
Payer: COMMERCIAL

## 2020-08-10 VITALS
DIASTOLIC BLOOD PRESSURE: 68 MMHG | OXYGEN SATURATION: 98 % | TEMPERATURE: 97.3 F | RESPIRATION RATE: 18 BRPM | HEART RATE: 81 BPM | SYSTOLIC BLOOD PRESSURE: 122 MMHG

## 2020-08-10 PROCEDURE — G0299 HHS/HOSPICE OF RN EA 15 MIN: HCPCS

## 2020-08-13 ENCOUNTER — HOME CARE VISIT (OUTPATIENT)
Dept: SCHEDULING | Facility: HOME HEALTH | Age: 50
End: 2020-08-13
Payer: COMMERCIAL

## 2020-08-13 PROCEDURE — G0299 HHS/HOSPICE OF RN EA 15 MIN: HCPCS

## 2020-08-15 VITALS
OXYGEN SATURATION: 98 % | DIASTOLIC BLOOD PRESSURE: 74 MMHG | RESPIRATION RATE: 16 BRPM | TEMPERATURE: 98.2 F | SYSTOLIC BLOOD PRESSURE: 112 MMHG | HEART RATE: 76 BPM

## 2020-09-29 ENCOUNTER — HOSPITAL ENCOUNTER (EMERGENCY)
Age: 50
Discharge: HOME | End: 2020-09-29
Attending: EMERGENCY MEDICINE
Payer: COMMERCIAL

## 2020-09-29 ENCOUNTER — APPOINTMENT (OUTPATIENT)
Dept: CT IMAGING | Age: 50
End: 2020-09-29
Attending: EMERGENCY MEDICINE
Payer: COMMERCIAL

## 2020-09-29 VITALS
BODY MASS INDEX: 39.56 KG/M2 | HEART RATE: 76 BPM | TEMPERATURE: 98.2 F | SYSTOLIC BLOOD PRESSURE: 92 MMHG | RESPIRATION RATE: 16 BRPM | HEIGHT: 68 IN | WEIGHT: 261 LBS | DIASTOLIC BLOOD PRESSURE: 51 MMHG | OXYGEN SATURATION: 92 %

## 2020-09-29 DIAGNOSIS — R10.9 RIGHT FLANK PAIN: ICD-10-CM

## 2020-09-29 DIAGNOSIS — N20.1 RIGHT URETERAL STONE: Primary | ICD-10-CM

## 2020-09-29 PROCEDURE — 99284 EMERGENCY DEPT VISIT MOD MDM: CPT

## 2020-09-29 PROCEDURE — 96372 THER/PROPH/DIAG INJ SC/IM: CPT

## 2020-09-29 PROCEDURE — 74011250636 HC RX REV CODE- 250/636: Performed by: EMERGENCY MEDICINE

## 2020-09-29 PROCEDURE — 74176 CT ABD & PELVIS W/O CONTRAST: CPT

## 2020-09-29 RX ORDER — TAMSULOSIN HYDROCHLORIDE 0.4 MG/1
0.4 CAPSULE ORAL DAILY
COMMUNITY
Start: 2020-09-25 | End: 2020-09-29 | Stop reason: SDUPTHER

## 2020-09-29 RX ORDER — PROMETHAZINE HYDROCHLORIDE 25 MG/1
25 TABLET ORAL
Qty: 12 TAB | Refills: 0 | Status: SHIPPED | OUTPATIENT
Start: 2020-09-29 | End: 2021-02-15

## 2020-09-29 RX ORDER — HYDROCODONE BITARTRATE AND ACETAMINOPHEN 7.5; 325 MG/1; MG/1
1 TABLET ORAL
Qty: 17 TAB | Refills: 0 | Status: SHIPPED | OUTPATIENT
Start: 2020-09-29 | End: 2020-10-04

## 2020-09-29 RX ORDER — HYDROMORPHONE HYDROCHLORIDE 1 MG/ML
1 INJECTION, SOLUTION INTRAMUSCULAR; INTRAVENOUS; SUBCUTANEOUS
Status: COMPLETED | OUTPATIENT
Start: 2020-09-29 | End: 2020-09-29

## 2020-09-29 RX ORDER — KETOROLAC TROMETHAMINE 10 MG/1
10 TABLET, FILM COATED ORAL
Qty: 11 TAB | Refills: 0 | Status: SHIPPED | OUTPATIENT
Start: 2020-09-29 | End: 2021-07-04

## 2020-09-29 RX ORDER — KETOROLAC TROMETHAMINE 30 MG/ML
15 INJECTION, SOLUTION INTRAMUSCULAR; INTRAVENOUS
Status: COMPLETED | OUTPATIENT
Start: 2020-09-29 | End: 2020-09-29

## 2020-09-29 RX ORDER — LISINOPRIL AND HYDROCHLOROTHIAZIDE 20; 25 MG/1; MG/1
1 TABLET ORAL
COMMUNITY
Start: 2020-09-14 | End: 2021-02-19

## 2020-09-29 RX ORDER — TAMSULOSIN HYDROCHLORIDE 0.4 MG/1
0.4 CAPSULE ORAL DAILY
Qty: 15 CAP | Refills: 0 | Status: SHIPPED | OUTPATIENT
Start: 2020-09-29 | End: 2021-07-04

## 2020-09-29 RX ORDER — PROMETHAZINE HYDROCHLORIDE 25 MG/1
25 SUPPOSITORY RECTAL
Qty: 12 SUPPOSITORY | Refills: 0 | Status: SHIPPED | OUTPATIENT
Start: 2020-09-29 | End: 2021-02-15

## 2020-09-29 RX ORDER — ONDANSETRON 4 MG/1
4 TABLET, ORALLY DISINTEGRATING ORAL
Status: DISCONTINUED | OUTPATIENT
Start: 2020-09-29 | End: 2020-09-29 | Stop reason: HOSPADM

## 2020-09-29 RX ORDER — PROMETHAZINE HYDROCHLORIDE 25 MG/ML
25 INJECTION, SOLUTION INTRAMUSCULAR; INTRAVENOUS
Status: COMPLETED | OUTPATIENT
Start: 2020-09-29 | End: 2020-09-29

## 2020-09-29 RX ADMIN — PROMETHAZINE HYDROCHLORIDE 25 MG: 25 INJECTION INTRAMUSCULAR; INTRAVENOUS at 18:19

## 2020-09-29 RX ADMIN — HYDROMORPHONE HYDROCHLORIDE 1 MG: 1 INJECTION, SOLUTION INTRAMUSCULAR; INTRAVENOUS; SUBCUTANEOUS at 18:16

## 2020-09-29 RX ADMIN — KETOROLAC TROMETHAMINE 15 MG: 30 INJECTION, SOLUTION INTRAMUSCULAR at 18:16

## 2020-09-29 NOTE — ED NOTES
I have reviewed discharge instructions with the patient and spouse. The patient and spouse verbalized understanding. Patient left ED via Discharge Method: ambulatory to Home with spouse. Opportunity for questions and clarification provided. Patient given 5 scripts. To continue your aftercare when you leave the hospital, you may receive an automated call from our care team to check in on how you are doing. This is a free service and part of our promise to provide the best care and service to meet your aftercare needs.  If you have questions, or wish to unsubscribe from this service please call [phone]. Thank you for Choosing our [facility] Emergency Department.

## 2020-09-29 NOTE — ED TRIAGE NOTES
Pt states she was scanned at [facility] and told she has 2 kidney stones on the R side, states she is also constipated for 2 days and has a possible blockage.  Masked on arrival.

## 2020-09-29 NOTE — Clinical Note
[address] [address] EMERGENCY DEPT 
[address] [facility] 
[place] [id number] [phone] Work/School Note Date: 9/29/2020 To Whom It May concern: Fortunato Autsin was seen and treated today in the emergency room by the following provider(s): 
Attending Provider: Keanu Valenzuela MD.   
 
Fortunato Austin is excused from work/school on 9/29/2020 through 10/2/2020. She is medically clear to return to work/school on 10/3/2020.   
  
 
Sincerely, 
 
 
 
 
Stephanie Urias MD

## 2020-09-29 NOTE — ED PROVIDER NOTES
[facility] Emergency Department  Arrival Date/Time: 9/29/2020 @ 4232      Marietta Bauer  MRN: [id number]      52 y.o. female    YOB: 1970   Telephone Information:   Mobile [phone] (home)     [facility] EMERGENCY DEPT ER05/05  Seen on 9/29/2020 @ 5:24 PM      Today's Chief Complaint:   Chief Complaint   Patient presents with   [facility]     HPI: 47-year-old female presents to the emergency department with a sudden onset of right flank pain about 1:00 this afternoon after picking up her son to take him to the dentist    She went to urgent care. They performed an x-ray told her that she had 2 kidney stones 1 measuring 7 mm and that she had a lot of constipation was probably impacted    She came directly here. Continues to have flank pain. No alleviating no aggravating. No fever or chills. No vomiting or diarrhea. HPI    Review of Systems: Review of Systems   Constitutional: Negative for activity change, appetite change, chills and fever. HENT: Negative. Respiratory: Negative for shortness of breath. Cardiovascular: Negative for chest pain. Gastrointestinal: Negative. Genitourinary: Positive for difficulty urinating and flank pain. Neurological: Negative. Psychiatric/Behavioral: Negative. Past Medical History: Primary Care Doctor: Colleen Soriano MD  Meds, PMH, PSHx, SocHx at end of this note     Allergies: Allergies   Allergen Reactions    Pcn [Penicillins] Hives    Norco [Hydrocodone-Acetaminophen] Nausea and Vomiting         Key Anti-Platelet Anticoagulant Meds             aspirin 81 mg chewable tablet (Taking) Take 81 mg by mouth. Physical Exam:  Nursing documentation reviewed.      Patient Vitals for the past 24 hrs:   Temp Pulse Resp BP SpO2   09/29/20 1940 98.2 °F (36.8 °C) 76 16 (!) 92/51 92 %   09/29/20 1935  71  (!) 90/52 93 %   09/29/20 1930     95 %   09/29/20 1901  70  (!) 95/55 95 % 09/29/20 1628 98 °F (36.7 °C) 73 18 (!) 106/59 97 %    Vital signs were reviewed. Physical Exam  Vitals signs and nursing note reviewed. Constitutional:       General: She is in acute distress. Appearance: Normal appearance. She is not ill-appearing. HENT:      Head: Normocephalic. Eyes:      Pupils: Pupils are equal, round, and reactive to light. Cardiovascular:      Rate and Rhythm: Normal rate and regular rhythm. Pulses: Normal pulses. Heart sounds: Normal heart sounds. Pulmonary:      Effort: Pulmonary effort is normal.      Breath sounds: Normal breath sounds. Abdominal:      General: There is no distension. Tenderness: There is no abdominal tenderness. There is no guarding. Skin:     General: Skin is warm. Capillary Refill: Capillary refill takes less than 2 seconds. Neurological:      Mental Status: She is alert and oriented to person, place, and time. Psychiatric:         Mood and Affect: Mood normal.         Behavior: Behavior normal.         MEDICAL DECISION MAKING: (Including Differential Dx, and Plan)   MDM  Number of Diagnoses or Management Options  Right flank pain:   Right ureteral stone:   Diagnosis management comments: -year-old female with history of renal stones most recently in the summer of this year presents to the emergency department with sudden severe right flank pain    Plain radiograph at an outside facility suggested 2 stones 1 measuring approximately 7 mm and she is referred to the ED for further evaluation    She appears uncomfortable. Holding her side    Differential includes pyelonephritis renal stone colitis or diverticulitis among other diseases not listed    We will obtain a CT of the abdomen and pelvis. Treat her with pain medication and reassess.        Amount and/or Complexity of Data Reviewed  Clinical lab tests: ordered and reviewed  Tests in the radiology section of CPT®: ordered and reviewed  Independent visualization of images, tracings, or specimens: yes    Risk of Complications, Morbidity, and/or Mortality  Presenting problems: high  Diagnostic procedures: minimal  Management options: high         Data/Management:  (.addold)   Lab findings during this visit: No results found for this or any previous visit (from the past 48 hour(s)). Radiology studies during this visit: Ct Urogram Wo Cont    Result Date: 9/29/2020  IMPRESSION: 4 mm proximal right ureter stone. No significant hydronephrosis. ** If there are any questions about this report, I can be reached on [employer] or at [phone] **    Medications given in the ED:   Medications   ondansetron (ZOFRAN ODT) tablet 4 mg (4 mg Oral Refused 9/29/20 1735)   ketorolac (TORADOL) injection 15 mg (15 mg IntraMUSCular Given 9/29/20 1816)   HYDROmorphone (PF) (DILAUDID) injection 1 mg (1 mg IntraMUSCular Given 9/29/20 1816)   promethazine (PHENERGAN) injection 25 mg (25 mg IntraMUSCular Given 9/29/20 1819)        Procedure Documentation:    (.addlac  .addabscess   . addreduction   . addintubation    . addprocdoc)      Procedures    Recheck and Additional Documentation:  (use .addrecheck  . addsepsis   . addstroke   . addhip  . addhandoff  . addcctime . emergcnt )     CT images reviewed by me. Approximately 4 mm stone in the proximal right ureter with a little bit of hydronephrosis. No stranding    CT report reviewed as well    Discussed results with the patient and her     She feels much more comfortable. We will treat her at home with pain medication nausea medication anti-inflammatories and Flomax. Follow-up with urology in the next 2 to 5 days. Other ED Course Notes:        I wore appropriate PPE throughout this patient's ED encounter. Assessment and Plan:      Disposition:   Discharged    Condition @ Disposition   stable   Time of Disposition   1928         Impression:     ICD-10-CM ICD-9-CM   1. Right ureteral stone  N20.1 592.1   2.  Right flank pain  R10.9 789.09 Follow-up:   Follow-up Information     Follow up With Specialties Details Why Contact Info    Dora Tinoco MD [facility] Medicine   [address] Urology  Call in the morning, for an appointment in 3-5 days [address] [facility] [address]  [phone] [facility]  Come back to the ED if you get worse or develop any new problems [address] [address] [phone]         Discharge Medications:   Discharge Medication List as of 9/29/2020  7:28 PM      START taking these medications    Details   HYDROcodone-acetaminophen (NORCO) 7.5-325 mg per tablet Take 1 Tab by mouth every six (6) hours as needed for Pain for up to 5 days. Max Daily Amount: 4 Tabs., Normal, Disp-17 Tab,R-0      promethazine (PHENERGAN) 25 mg tablet Take 1 Tab by mouth every six (6) hours as needed for Nausea for up to 15 doses. , Normal, Disp-12 Tab,R-0      promethazine (PHENERGAN) 25 mg suppository Insert 1 Suppository into rectum every six (6) hours as needed for Nausea for up to 10 doses. , Normal, Disp-12 Suppository,R-0      ketorolac (TORADOL) 10 mg tablet Take 1 Tab by mouth every six (6) hours as needed for Pain., Normal, Disp-11 Tab,R-0         CONTINUE these medications which have CHANGED    Details   tamsulosin (FLOMAX) 0.4 mg capsule Take 1 Cap by mouth daily. , Normal, Disp-15 Cap,R-0         CONTINUE these medications which have NOT CHANGED    Details   lisinopril-hydroCHLOROthiazide (PRINZIDE, ZESTORETIC) 20-25 mg per tablet Take 1 Tab by mouth., Historical Med      buPROPion XL (WELLBUTRIN XL) 150 mg tablet Take 150 mg by mouth daily. , Historical Med      DULoxetine (CYMBALTA) 60 mg capsule Take 60 mg by mouth two (2) times a day., Historical Med      aspirin 81 mg chewable tablet Take 81 mg by mouth., Historical Med traZODone (DESYREL) 100 mg tablet Take 100 mg by mouth., Historical Med      OMEPRAZOLE PO Take 20 mg by mouth daily. , Historical Med              Past Medical History:      Past Medical History:   Diagnosis Date    Anemia     Chronic pain     due to RA    Depression     GERD (gastroesophageal reflux disease)     Hx of renal calculi          Hypercholesterolemia     Hypertension     Migraine     [facility]bid [facility] [address]     Ovarian cyst     Reflex sympathetic dystrophy since 2005    right arm; s/p injury - laceration in hand    Rheumatoid arthritis ([facility] [address] [facility]          Rheumatoid arthritis ([facility] [address] 9/26/2019    Sinus problem     TIA (transient ischemic attack) 9/26/2019     Past Surgical History:   Procedure Laterality Date    HX APPENDECTOMY      HX BREAST BIOPSY Left 4/27/2015    LEFT NIPPLE EXPLORATION WITH REMOVAL OF LATTISIMUS DUCT performed by Sameer Fontanez MD at [address] [place] [address] HX CHOLECYSTECTOMY      HX ORTHOPAEDIC      rt hand    HX ORTHOPAEDIC      neck    HX SEPTOPLASTY  08/28/2019    FESS,SMRIT's    HX TOTAL ABDOMINAL HYSTERECTOMY           HX TUBAL LIGATION       Social History     Tobacco Use    Smoking status: Never Smoker    Smokeless tobacco: Never Used   Substance Use Topics    Alcohol use: Yes     Comment: \"once every couple years\"    Drug use: No     Prior to Admission Medications   Prescriptions Last Dose Informant Patient Reported? Taking? DULoxetine (CYMBALTA) 60 mg capsule   Yes Yes   Sig: Take 60 mg by mouth two (2) times a day. OMEPRAZOLE PO   Yes No   Sig: Take 20 mg by mouth daily. aspirin 81 mg chewable tablet   Yes Yes   Sig: Take 81 mg by mouth. buPROPion XL (WELLBUTRIN XL) 150 mg tablet   Yes Yes   Sig: Take 150 mg by mouth daily. lisinopril-hydroCHLOROthiazide (PRINZIDE, ZESTORETIC) 20-25 mg per tablet   Yes Yes   Sig: Take 1 Tab by mouth. tamsulosin (FLOMAX) 0.4 mg capsule   Yes Yes   Sig: Take 0.4 mg by mouth daily.    traZODone (DESYREL) 100 mg tablet Yes Yes   Sig: Take 100 mg by mouth.       Facility-Administered Medications: None

## 2020-09-29 NOTE — DISCHARGE INSTRUCTIONS
CT UROGRAM WO CONT   Final Result   IMPRESSION: 4 mm proximal right ureter stone. No significant hydronephrosis.          ** If there are any questions about this report, I can be reached on   [employer] or at [phone] **

## 2021-02-15 ENCOUNTER — APPOINTMENT (OUTPATIENT)
Dept: GENERAL RADIOLOGY | Age: 51
DRG: 638 | End: 2021-02-15
Attending: EMERGENCY MEDICINE
Payer: COMMERCIAL

## 2021-02-15 ENCOUNTER — HOSPITAL ENCOUNTER (INPATIENT)
Age: 51
LOS: 3 days | Discharge: HOME | DRG: 638 | End: 2021-02-19
Attending: EMERGENCY MEDICINE | Admitting: FAMILY MEDICINE
Payer: COMMERCIAL

## 2021-02-15 DIAGNOSIS — L03.032 CELLULITIS OF SECOND TOE, LEFT: ICD-10-CM

## 2021-02-15 DIAGNOSIS — M86.8X7 OTHER OSTEOMYELITIS OF LEFT FOOT (HCC): Primary | ICD-10-CM

## 2021-02-15 LAB
ALBUMIN SERPL-MCNC: 3.7 G/DL (ref 3.5–5)
ALBUMIN/GLOB SERPL: 0.8 {RATIO} (ref 1.2–3.5)
ALP SERPL-CCNC: 92 U/L (ref 50–130)
ALT SERPL-CCNC: 23 U/L (ref 12–65)
ANION GAP SERPL CALC-SCNC: 9 MMOL/L (ref 7–16)
AST SERPL-CCNC: 14 U/L (ref 15–37)
BASOPHILS # BLD: 0 K/UL (ref 0–0.2)
BASOPHILS NFR BLD: 0 % (ref 0–2)
BILIRUB SERPL-MCNC: 0.4 MG/DL (ref 0.2–1.1)
BUN SERPL-MCNC: 18 MG/DL (ref 6–23)
CALCIUM SERPL-MCNC: 9.7 MG/DL (ref 8.3–10.4)
CHLORIDE SERPL-SCNC: 99 MMOL/L (ref 98–107)
CO2 SERPL-SCNC: 28 MMOL/L (ref 21–32)
CREAT SERPL-MCNC: 0.91 MG/DL (ref 0.6–1)
DIFFERENTIAL METHOD BLD: ABNORMAL
EOSINOPHIL # BLD: 0.2 K/UL (ref 0–0.8)
EOSINOPHIL NFR BLD: 2 % (ref 0.5–7.8)
ERYTHROCYTE [DISTWIDTH] IN BLOOD BY AUTOMATED COUNT: 15.4 % (ref 11.9–14.6)
GLOBULIN SER CALC-MCNC: 4.6 G/DL (ref 2.3–3.5)
GLUCOSE SERPL-MCNC: 183 MG/DL (ref 65–100)
HCT VFR BLD AUTO: 36.1 % (ref 35.8–46.3)
HGB BLD-MCNC: 11.5 G/DL (ref 11.7–15.4)
IMM GRANULOCYTES # BLD AUTO: 0 K/UL (ref 0–0.5)
IMM GRANULOCYTES NFR BLD AUTO: 0 % (ref 0–5)
LACTATE SERPL-SCNC: 2.3 MMOL/L (ref 0.4–2)
LYMPHOCYTES # BLD: 1.8 K/UL (ref 0.5–4.6)
LYMPHOCYTES NFR BLD: 22 % (ref 13–44)
MCH RBC QN AUTO: 25.3 PG (ref 26.1–32.9)
MCHC RBC AUTO-ENTMCNC: 31.9 G/DL (ref 31.4–35)
MCV RBC AUTO: 79.3 FL (ref 79.6–97.8)
MONOCYTES # BLD: 0.5 K/UL (ref 0.1–1.3)
MONOCYTES NFR BLD: 6 % (ref 4–12)
NEUTS SEG # BLD: 5.9 K/UL (ref 1.7–8.2)
NEUTS SEG NFR BLD: 71 % (ref 43–78)
NRBC # BLD: 0 K/UL (ref 0–0.2)
PLATELET # BLD AUTO: 194 K/UL (ref 150–450)
PMV BLD AUTO: 9.8 FL (ref 9.4–12.3)
POTASSIUM SERPL-SCNC: 3.7 MMOL/L (ref 3.5–5.1)
PROT SERPL-MCNC: 8.3 G/DL (ref 6.3–8.2)
RBC # BLD AUTO: 4.55 M/UL (ref 4.05–5.2)
SODIUM SERPL-SCNC: 136 MMOL/L (ref 136–145)
WBC # BLD AUTO: 8.4 K/UL (ref 4.3–11.1)

## 2021-02-15 PROCEDURE — 85025 COMPLETE CBC W/AUTO DIFF WBC: CPT

## 2021-02-15 PROCEDURE — 87040 BLOOD CULTURE FOR BACTERIA: CPT

## 2021-02-15 PROCEDURE — 96372 THER/PROPH/DIAG INJ SC/IM: CPT

## 2021-02-15 PROCEDURE — 96374 THER/PROPH/DIAG INJ IV PUSH: CPT

## 2021-02-15 PROCEDURE — 99283 EMERGENCY DEPT VISIT LOW MDM: CPT

## 2021-02-15 PROCEDURE — 80053 COMPREHEN METABOLIC PANEL: CPT

## 2021-02-15 PROCEDURE — 83605 ASSAY OF LACTIC ACID: CPT

## 2021-02-15 PROCEDURE — 74011250636 HC RX REV CODE- 250/636: Performed by: EMERGENCY MEDICINE

## 2021-02-15 PROCEDURE — 73660 X-RAY EXAM OF TOE(S): CPT

## 2021-02-15 RX ORDER — CLINDAMYCIN HYDROCHLORIDE 150 MG/1
300 CAPSULE ORAL 3 TIMES DAILY
Qty: 60 CAP | Refills: 0 | Status: SHIPPED | OUTPATIENT
Start: 2021-02-15 | End: 2021-02-19

## 2021-02-15 RX ORDER — PROMETHAZINE HYDROCHLORIDE 25 MG/ML
25 INJECTION, SOLUTION INTRAMUSCULAR; INTRAVENOUS
Status: COMPLETED | OUTPATIENT
Start: 2021-02-15 | End: 2021-02-15

## 2021-02-15 RX ORDER — ONDANSETRON 2 MG/ML
4 INJECTION INTRAMUSCULAR; INTRAVENOUS
Status: COMPLETED | OUTPATIENT
Start: 2021-02-15 | End: 2021-02-15

## 2021-02-15 RX ORDER — METRONIDAZOLE 500 MG/100ML
500 INJECTION, SOLUTION INTRAVENOUS EVERY 8 HOURS
Status: DISCONTINUED | OUTPATIENT
Start: 2021-02-16 | End: 2021-02-19 | Stop reason: HOSPADM

## 2021-02-15 RX ORDER — PROMETHAZINE HYDROCHLORIDE 25 MG/1
25 TABLET ORAL
Qty: 12 TAB | Refills: 0 | Status: SHIPPED | OUTPATIENT
Start: 2021-02-15 | End: 2021-07-04

## 2021-02-15 RX ORDER — PRAVASTATIN SODIUM 40 MG/1
40 TABLET ORAL DAILY
COMMUNITY
Start: 2020-09-14

## 2021-02-15 RX ADMIN — PROMETHAZINE HYDROCHLORIDE 25 MG: 25 INJECTION INTRAMUSCULAR; INTRAVENOUS at 23:54

## 2021-02-15 RX ADMIN — ONDANSETRON 4 MG: 2 INJECTION INTRAMUSCULAR; INTRAVENOUS at 23:16

## 2021-02-16 ENCOUNTER — APPOINTMENT (OUTPATIENT)
Dept: MRI IMAGING | Age: 51
DRG: 638 | End: 2021-02-16
Attending: HOSPITALIST
Payer: COMMERCIAL

## 2021-02-16 ENCOUNTER — APPOINTMENT (OUTPATIENT)
Dept: ULTRASOUND IMAGING | Age: 51
DRG: 638 | End: 2021-02-16
Attending: NURSE PRACTITIONER
Payer: COMMERCIAL

## 2021-02-16 PROBLEM — R29.810 FACIAL DROOP: Status: RESOLVED | Noted: 2019-09-26 | Resolved: 2021-02-16

## 2021-02-16 PROBLEM — M86.9 TOE OSTEOMYELITIS, LEFT (HCC): Status: ACTIVE | Noted: 2021-02-16

## 2021-02-16 PROBLEM — E87.6 HYPOKALEMIA: Status: RESOLVED | Noted: 2020-06-29 | Resolved: 2021-02-16

## 2021-02-16 PROBLEM — D50.9 MICROCYTIC ANEMIA: Status: ACTIVE | Noted: 2021-02-16

## 2021-02-16 LAB
ALBUMIN SERPL-MCNC: 3.2 G/DL (ref 3.5–5)
ALBUMIN/GLOB SERPL: 0.8 {RATIO} (ref 1.2–3.5)
ALP SERPL-CCNC: 76 U/L (ref 50–136)
ALT SERPL-CCNC: 20 U/L (ref 12–65)
ANION GAP SERPL CALC-SCNC: 7 MMOL/L (ref 7–16)
AST SERPL-CCNC: 10 U/L (ref 15–37)
BASOPHILS # BLD: 0 K/UL (ref 0–0.2)
BASOPHILS NFR BLD: 0 % (ref 0–2)
BILIRUB SERPL-MCNC: 0.3 MG/DL (ref 0.2–1.1)
BUN SERPL-MCNC: 15 MG/DL (ref 6–23)
CALCIUM SERPL-MCNC: 9.1 MG/DL (ref 8.3–10.4)
CHLORIDE SERPL-SCNC: 101 MMOL/L (ref 98–107)
CO2 SERPL-SCNC: 28 MMOL/L (ref 21–32)
CREAT SERPL-MCNC: 0.75 MG/DL (ref 0.6–1)
DIFFERENTIAL METHOD BLD: ABNORMAL
EOSINOPHIL # BLD: 0.1 K/UL (ref 0–0.8)
EOSINOPHIL NFR BLD: 2 % (ref 0.5–7.8)
ERYTHROCYTE [DISTWIDTH] IN BLOOD BY AUTOMATED COUNT: 15.5 % (ref 11.9–14.6)
EST. AVERAGE GLUCOSE BLD GHB EST-MCNC: 137 MG/DL
GLOBULIN SER CALC-MCNC: 3.9 G/DL (ref 2.3–3.5)
GLUCOSE BLD STRIP.AUTO-MCNC: 150 MG/DL (ref 65–100)
GLUCOSE SERPL-MCNC: 146 MG/DL (ref 65–100)
HBA1C MFR BLD: 6.4 % (ref 4.2–6.3)
HCT VFR BLD AUTO: 30.5 % (ref 35.8–46.3)
HGB BLD-MCNC: 9.9 G/DL (ref 11.7–15.4)
IMM GRANULOCYTES # BLD AUTO: 0 K/UL (ref 0–0.5)
IMM GRANULOCYTES NFR BLD AUTO: 0 % (ref 0–5)
LACTATE SERPL-SCNC: 1.4 MMOL/L (ref 0.4–2)
LYMPHOCYTES # BLD: 1.5 K/UL (ref 0.5–4.6)
LYMPHOCYTES NFR BLD: 24 % (ref 13–44)
MCH RBC QN AUTO: 25.5 PG (ref 26.1–32.9)
MCHC RBC AUTO-ENTMCNC: 32.5 G/DL (ref 31.4–35)
MCV RBC AUTO: 78.6 FL (ref 79.6–97.8)
MONOCYTES # BLD: 0.4 K/UL (ref 0.1–1.3)
MONOCYTES NFR BLD: 7 % (ref 4–12)
NEUTS SEG # BLD: 4.3 K/UL (ref 1.7–8.2)
NEUTS SEG NFR BLD: 67 % (ref 43–78)
NRBC # BLD: 0 K/UL (ref 0–0.2)
PLATELET # BLD AUTO: 159 K/UL (ref 150–450)
PMV BLD AUTO: 9.6 FL (ref 9.4–12.3)
POTASSIUM SERPL-SCNC: 3.6 MMOL/L (ref 3.5–5.1)
PROT SERPL-MCNC: 7.1 G/DL (ref 6.3–8.2)
RBC # BLD AUTO: 3.88 M/UL (ref 4.05–5.2)
SODIUM SERPL-SCNC: 136 MMOL/L (ref 136–145)
WBC # BLD AUTO: 6.4 K/UL (ref 4.3–11.1)

## 2021-02-16 PROCEDURE — 85025 COMPLETE CBC W/AUTO DIFF WBC: CPT

## 2021-02-16 PROCEDURE — 80053 COMPREHEN METABOLIC PANEL: CPT

## 2021-02-16 PROCEDURE — 83605 ASSAY OF LACTIC ACID: CPT

## 2021-02-16 PROCEDURE — 74011250636 HC RX REV CODE- 250/636: Performed by: FAMILY MEDICINE

## 2021-02-16 PROCEDURE — 36415 COLL VENOUS BLD VENIPUNCTURE: CPT

## 2021-02-16 PROCEDURE — 82962 GLUCOSE BLOOD TEST: CPT

## 2021-02-16 PROCEDURE — 87040 BLOOD CULTURE FOR BACTERIA: CPT

## 2021-02-16 PROCEDURE — 74011250637 HC RX REV CODE- 250/637: Performed by: HOSPITALIST

## 2021-02-16 PROCEDURE — 74011250636 HC RX REV CODE- 250/636: Performed by: EMERGENCY MEDICINE

## 2021-02-16 PROCEDURE — 74011250637 HC RX REV CODE- 250/637: Performed by: FAMILY MEDICINE

## 2021-02-16 PROCEDURE — 83036 HEMOGLOBIN GLYCOSYLATED A1C: CPT

## 2021-02-16 PROCEDURE — 65270000029 HC RM PRIVATE

## 2021-02-16 PROCEDURE — 93922 UPR/L XTREMITY ART 2 LEVELS: CPT

## 2021-02-16 RX ORDER — GUAIFENESIN 100 MG/5ML
81 LIQUID (ML) ORAL DAILY
Status: DISCONTINUED | OUTPATIENT
Start: 2021-02-16 | End: 2021-02-19 | Stop reason: HOSPADM

## 2021-02-16 RX ORDER — SODIUM CHLORIDE 0.9 % (FLUSH) 0.9 %
5-40 SYRINGE (ML) INJECTION EVERY 8 HOURS
Status: DISCONTINUED | OUTPATIENT
Start: 2021-02-16 | End: 2021-02-19 | Stop reason: HOSPADM

## 2021-02-16 RX ORDER — DULOXETIN HYDROCHLORIDE 30 MG/1
60 CAPSULE, DELAYED RELEASE ORAL 2 TIMES DAILY
Status: DISCONTINUED | OUTPATIENT
Start: 2021-02-16 | End: 2021-02-19 | Stop reason: HOSPADM

## 2021-02-16 RX ORDER — ACETAMINOPHEN 325 MG/1
650 TABLET ORAL
Status: DISCONTINUED | OUTPATIENT
Start: 2021-02-16 | End: 2021-02-19 | Stop reason: HOSPADM

## 2021-02-16 RX ORDER — PROMETHAZINE HYDROCHLORIDE 25 MG/1
12.5 TABLET ORAL
Status: DISCONTINUED | OUTPATIENT
Start: 2021-02-16 | End: 2021-02-19 | Stop reason: HOSPADM

## 2021-02-16 RX ORDER — VANCOMYCIN 2 GRAM/500 ML IN 0.9 % SODIUM CHLORIDE INTRAVENOUS
2000 ONCE
Status: COMPLETED | OUTPATIENT
Start: 2021-02-16 | End: 2021-02-16

## 2021-02-16 RX ORDER — OXYCODONE HYDROCHLORIDE 5 MG/1
10 TABLET ORAL
Status: DISCONTINUED | OUTPATIENT
Start: 2021-02-16 | End: 2021-02-19 | Stop reason: HOSPADM

## 2021-02-16 RX ORDER — VANCOMYCIN 1.75 GRAM/500 ML IN 0.9 % SODIUM CHLORIDE INTRAVENOUS
1750 EVERY 12 HOURS
Status: DISCONTINUED | OUTPATIENT
Start: 2021-02-17 | End: 2021-02-19 | Stop reason: HOSPADM

## 2021-02-16 RX ORDER — PRAVASTATIN SODIUM 20 MG/1
40 TABLET ORAL DAILY
Status: DISCONTINUED | OUTPATIENT
Start: 2021-02-16 | End: 2021-02-19 | Stop reason: HOSPADM

## 2021-02-16 RX ORDER — POLYETHYLENE GLYCOL 3350 17 G/17G
17 POWDER, FOR SOLUTION ORAL DAILY PRN
Status: DISCONTINUED | OUTPATIENT
Start: 2021-02-16 | End: 2021-02-19 | Stop reason: HOSPADM

## 2021-02-16 RX ORDER — BUPROPION HYDROCHLORIDE 150 MG/1
150 TABLET, EXTENDED RELEASE ORAL 2 TIMES DAILY
Status: DISCONTINUED | OUTPATIENT
Start: 2021-02-16 | End: 2021-02-19 | Stop reason: HOSPADM

## 2021-02-16 RX ORDER — LISINOPRIL AND HYDROCHLOROTHIAZIDE 20; 25 MG/1; MG/1
1 TABLET ORAL DAILY
Status: DISCONTINUED | OUTPATIENT
Start: 2021-02-16 | End: 2021-02-19 | Stop reason: HOSPADM

## 2021-02-16 RX ORDER — ONDANSETRON 2 MG/ML
4 INJECTION INTRAMUSCULAR; INTRAVENOUS
Status: DISCONTINUED | OUTPATIENT
Start: 2021-02-16 | End: 2021-02-19 | Stop reason: HOSPADM

## 2021-02-16 RX ORDER — MORPHINE SULFATE 2 MG/ML
2 INJECTION, SOLUTION INTRAMUSCULAR; INTRAVENOUS ONCE
Status: COMPLETED | OUTPATIENT
Start: 2021-02-16 | End: 2021-02-16

## 2021-02-16 RX ORDER — UREA 10 %
2 LOTION (ML) TOPICAL DAILY
Status: DISCONTINUED | OUTPATIENT
Start: 2021-02-16 | End: 2021-02-19 | Stop reason: HOSPADM

## 2021-02-16 RX ORDER — ENOXAPARIN SODIUM 100 MG/ML
40 INJECTION SUBCUTANEOUS DAILY
Status: DISCONTINUED | OUTPATIENT
Start: 2021-02-16 | End: 2021-02-19 | Stop reason: HOSPADM

## 2021-02-16 RX ORDER — ZOLPIDEM TARTRATE 5 MG/1
5 TABLET ORAL
Status: DISCONTINUED | OUTPATIENT
Start: 2021-02-16 | End: 2021-02-19 | Stop reason: HOSPADM

## 2021-02-16 RX ORDER — TAMSULOSIN HYDROCHLORIDE 0.4 MG/1
0.4 CAPSULE ORAL DAILY
Status: DISCONTINUED | OUTPATIENT
Start: 2021-02-16 | End: 2021-02-19 | Stop reason: HOSPADM

## 2021-02-16 RX ORDER — MORPHINE SULFATE 2 MG/ML
2 INJECTION, SOLUTION INTRAMUSCULAR; INTRAVENOUS
Status: DISCONTINUED | OUTPATIENT
Start: 2021-02-16 | End: 2021-02-19 | Stop reason: HOSPADM

## 2021-02-16 RX ORDER — CIPROFLOXACIN 2 MG/ML
400 INJECTION, SOLUTION INTRAVENOUS EVERY 12 HOURS
Status: DISCONTINUED | OUTPATIENT
Start: 2021-02-16 | End: 2021-02-19 | Stop reason: HOSPADM

## 2021-02-16 RX ORDER — SODIUM CHLORIDE 9 MG/ML
75 INJECTION, SOLUTION INTRAVENOUS CONTINUOUS
Status: DISCONTINUED | OUTPATIENT
Start: 2021-02-16 | End: 2021-02-16

## 2021-02-16 RX ORDER — TRAZODONE HYDROCHLORIDE 50 MG/1
100 TABLET ORAL
Status: DISCONTINUED | OUTPATIENT
Start: 2021-02-16 | End: 2021-02-19 | Stop reason: HOSPADM

## 2021-02-16 RX ORDER — PANTOPRAZOLE SODIUM 40 MG/1
40 TABLET, DELAYED RELEASE ORAL
Status: DISCONTINUED | OUTPATIENT
Start: 2021-02-16 | End: 2021-02-19 | Stop reason: HOSPADM

## 2021-02-16 RX ORDER — SODIUM CHLORIDE 0.9 % (FLUSH) 0.9 %
5-40 SYRINGE (ML) INJECTION AS NEEDED
Status: DISCONTINUED | OUTPATIENT
Start: 2021-02-16 | End: 2021-02-19 | Stop reason: HOSPADM

## 2021-02-16 RX ORDER — ACETAMINOPHEN 650 MG/1
650 SUPPOSITORY RECTAL
Status: DISCONTINUED | OUTPATIENT
Start: 2021-02-16 | End: 2021-02-19 | Stop reason: HOSPADM

## 2021-02-16 RX ADMIN — Medication 1 TABLET: at 12:39

## 2021-02-16 RX ADMIN — LACTOBACILLUS TAB 2 TABLET: TAB at 12:39

## 2021-02-16 RX ADMIN — METRONIDAZOLE 500 MG: 500 INJECTION, SOLUTION INTRAVENOUS at 18:22

## 2021-02-16 RX ADMIN — BUPROPION HYDROCHLORIDE 150 MG: 150 TABLET, EXTENDED RELEASE ORAL at 17:09

## 2021-02-16 RX ADMIN — Medication 10 ML: at 20:53

## 2021-02-16 RX ADMIN — MORPHINE SULFATE 2 MG: 2 INJECTION, SOLUTION INTRAMUSCULAR; INTRAVENOUS at 02:26

## 2021-02-16 RX ADMIN — BUPROPION HYDROCHLORIDE 150 MG: 150 TABLET, EXTENDED RELEASE ORAL at 09:06

## 2021-02-16 RX ADMIN — ACETAMINOPHEN 650 MG: 325 TABLET, FILM COATED ORAL at 04:10

## 2021-02-16 RX ADMIN — VANCOMYCIN HYDROCHLORIDE 2000 MG: 10 INJECTION, POWDER, LYOPHILIZED, FOR SOLUTION INTRAVENOUS at 02:26

## 2021-02-16 RX ADMIN — DULOXETINE HYDROCHLORIDE 60 MG: 30 CAPSULE, DELAYED RELEASE ORAL at 17:09

## 2021-02-16 RX ADMIN — ASPIRIN 81 MG: 81 TABLET, CHEWABLE ORAL at 09:06

## 2021-02-16 RX ADMIN — TRAZODONE HYDROCHLORIDE 100 MG: 50 TABLET ORAL at 20:53

## 2021-02-16 RX ADMIN — LISINOPRIL AND HYDROCHLOROTHIAZIDE 1 TABLET: 25; 20 TABLET ORAL at 09:34

## 2021-02-16 RX ADMIN — Medication 10 ML: at 06:13

## 2021-02-16 RX ADMIN — CIPROFLOXACIN 400 MG: 2 INJECTION, SOLUTION INTRAVENOUS at 16:58

## 2021-02-16 RX ADMIN — METRONIDAZOLE 500 MG: 500 INJECTION, SOLUTION INTRAVENOUS at 00:26

## 2021-02-16 RX ADMIN — VANCOMYCIN HYDROCHLORIDE 1000 MG: 1 INJECTION, POWDER, LYOPHILIZED, FOR SOLUTION INTRAVENOUS at 14:03

## 2021-02-16 RX ADMIN — PANTOPRAZOLE SODIUM 40 MG: 40 TABLET, DELAYED RELEASE ORAL at 06:28

## 2021-02-16 RX ADMIN — METRONIDAZOLE 500 MG: 500 INJECTION, SOLUTION INTRAVENOUS at 23:43

## 2021-02-16 RX ADMIN — MORPHINE SULFATE 2 MG: 2 INJECTION, SOLUTION INTRAMUSCULAR; INTRAVENOUS at 09:34

## 2021-02-16 RX ADMIN — TRAZODONE HYDROCHLORIDE 100 MG: 50 TABLET ORAL at 03:32

## 2021-02-16 RX ADMIN — SODIUM CHLORIDE 75 ML/HR: 900 INJECTION, SOLUTION INTRAVENOUS at 03:34

## 2021-02-16 RX ADMIN — METRONIDAZOLE 500 MG: 500 INJECTION, SOLUTION INTRAVENOUS at 09:06

## 2021-02-16 RX ADMIN — MORPHINE SULFATE 2 MG: 2 INJECTION, SOLUTION INTRAMUSCULAR; INTRAVENOUS at 17:06

## 2021-02-16 RX ADMIN — DULOXETINE HYDROCHLORIDE 60 MG: 30 CAPSULE, DELAYED RELEASE ORAL at 12:39

## 2021-02-16 RX ADMIN — MORPHINE SULFATE 2 MG: 2 INJECTION, SOLUTION INTRAMUSCULAR; INTRAVENOUS at 06:29

## 2021-02-16 RX ADMIN — TAMSULOSIN HYDROCHLORIDE 0.4 MG: 0.4 CAPSULE ORAL at 09:06

## 2021-02-16 RX ADMIN — Medication 10 ML: at 13:42

## 2021-02-16 RX ADMIN — MORPHINE SULFATE 2 MG: 2 INJECTION, SOLUTION INTRAMUSCULAR; INTRAVENOUS at 20:59

## 2021-02-16 RX ADMIN — CIPROFLOXACIN 400 MG: 2 INJECTION, SOLUTION INTRAVENOUS at 03:32

## 2021-02-16 RX ADMIN — ENOXAPARIN SODIUM 40 MG: 40 INJECTION SUBCUTANEOUS at 09:07

## 2021-02-16 RX ADMIN — MORPHINE SULFATE 2 MG: 2 INJECTION, SOLUTION INTRAMUSCULAR; INTRAVENOUS at 14:09

## 2021-02-16 NOTE — PROGRESS NOTES
Vancomycin Consult MD ordering: Lucien Lewis ID following? no Indication: Osteomyelitis/Diabetic foot infection DOT:  7 days Goal level(s): 15 - 20 Ht Readings from Last 1 Encounters:  
02/15/21 5' 8\" (1.727 m) Wt Readings from Last 1 Encounters:  
02/15/21 103 kg (227 lb) Temp (24hrs), Av.9 °F (37.2 °C), Min:98.3 °F (36.8 °C), Max:99.6 °F (37.6 °C) Dosing weight: 103 kg 
52 y.o. Date:  Dose/Freq Admin Times Level/Time:  
21 Vanc 2 gm IV x 1 dose 0226   
21 Vanc 1 gm IV q12h Dose change 1403 21 Vanc 1750 mg IV q12h (0200) (1400) Recent Labs  
  21 
0502 21 
0141 02/15/21 
2256 BUN 15  --  18  
CREA 0.75  --  0.91 WBC 6.4  --  8.4 LAC  --  1.4 2.3* Estimated Creatinine Clearance: 115.1 mL/min (based on SCr of 0.75 mg/dL). Day 1 Assessment and Plan: 
Based on pt wt and indication for use, changed dose to Vancomycin 1750 mg IV every 12 hours. Next dose is due tomorrow at 0200. Pharmacy will continue to follow. Thank you, Mayte Olguin, PharmD

## 2021-02-16 NOTE — ED PROVIDER NOTES
Patient with heel blister and infection. Healing well. Today noticed a blood blister to the top of her left second toe. Had some drainage when it ruptured and is noticing some redness to the toe extending up the foot. Mild swelling. Concerned about infection so came here. The history is provided by the patient. No  was used. Foot Pain   This is a new problem. The current episode started 6 to 12 hours ago. The problem occurs constantly. The problem has been gradually worsening. The pain is present in the left toes. The quality of the pain is described as aching. The pain is mild. Pertinent negatives include no numbness, no back pain and no neck pain. She has tried nothing for the symptoms. There has been no history of extremity trauma.         Past Medical History:   Diagnosis Date    Anemia     Chronic pain     due to RA    Depression     GERD (gastroesophageal reflux disease)     Hx of renal calculi          Hypercholesterolemia     Hypertension     Migraine     Morbid obesity ([address]     Ovarian cyst     Reflex sympathetic dystrophy since 2005    right arm; s/p injury - laceration in hand    Rheumatoid arthritis ([address]          Rheumatoid arthritis ([address] 9/26/2019    Sinus problem     TIA (transient ischemic attack) 9/26/2019       Past Surgical History:   Procedure Laterality Date    HX APPENDECTOMY      HX BREAST BIOPSY Left 4/27/2015    LEFT NIPPLE EXPLORATION WITH REMOVAL OF LATTISIMUS DUCT performed by Jyoti Han MD at [facility] [address] HX CHOLECYSTECTOMY      HX ORTHOPAEDIC      rt hand    HX ORTHOPAEDIC      neck    HX SEPTOPLASTY  08/28/2019    FESS,SMRIT's    HX TOTAL ABDOMINAL HYSTERECTOMY           HX TUBAL LIGATION           Family History:   Problem Relation Age of Onset    Breast Cancer Paternal Aunt 39    Cancer Paternal Aunt         brst    Cancer Maternal Grandfather         colon    Colon Cancer Maternal Grandfather     Breast Cancer Paternal Grandmother 46    Cancer Paternal Grandmother         breast    Ovarian Cancer Paternal Grandmother     Cancer Mother         throat    Hypertension Mother     Cancer Father         non hodgkins lymphoma    Cancer Maternal Grandmother         ovarian    Breast Cancer Maternal Grandmother     Heart Disease Paternal Grandfather     Diabetes Paternal Grandfather     Breast Cancer Sister        Social History     Socioeconomic History    Marital status:      Spouse name: Not on file    Number of children: Not on file    Years of education: Not on file    Highest education level: Not on file   Occupational History    Not on file   Social Needs    Financial resource strain: Not on file    Food insecurity     Worry: Not on file     Inability: Not on file    Transportation needs     Medical: Not on file     Non-medical: Not on file   Tobacco Use    Smoking status: Never Smoker    Smokeless tobacco: Never Used   Substance and Sexual Activity    Alcohol use: Yes     Comment: \"once every couple years\"    Drug use: No    Sexual activity: Yes     Partners: Male     Birth control/protection: Surgical   Lifestyle    Physical activity     Days per week: Not on file     Minutes per session: Not on file    Stress: Not on file   Relationships    Social connections     Talks on phone: Not on file     Gets together: Not on file     Attends Jainism service: Not on file     Active member of club or organization: Not on file     Attends meetings of clubs or organizations: Not on file     Relationship status: Not on file    Intimate partner violence     Fear of current or ex partner: Not on file     Emotionally abused: Not on file     Physically abused: Not on file     Forced sexual activity: Not on file   Other Topics Concern    Not on file   Social History Narrative    Not on file         ALLERGIES: Pcn [penicillins] and Norco [hydrocodone-acetaminophen]    Review of Systems   Constitutional: Negative for chills and fever. Respiratory: Negative for chest tightness, shortness of breath and wheezing. Cardiovascular: Negative for chest pain and leg swelling. Gastrointestinal: Positive for nausea. Negative for abdominal pain, diarrhea and vomiting. Musculoskeletal: Negative for back pain, gait problem, neck pain and neck stiffness. Skin: Positive for color change and wound. Negative for rash. Neurological: Negative for weakness, numbness and headaches. Vitals:    02/15/21 2237   BP: 105/62   Pulse: (!) 107   Resp: 16   Temp: 99.6 °F (37.6 °C)   SpO2: 96%   Weight: 103 kg (227 lb)   Height: 5' 8\" (1.727 m)            Physical Exam  Constitutional:       Appearance: She is well-developed. HENT:      Head: Normocephalic and atraumatic. Cardiovascular:      Rate and Rhythm: Normal rate and regular rhythm. Pulmonary:      Effort: Pulmonary effort is normal.      Breath sounds: Normal breath sounds. Abdominal:      General: Bowel sounds are normal.      Palpations: Abdomen is soft. Tenderness: There is no abdominal tenderness. Musculoskeletal: Normal range of motion. General: Swelling present. Feet:    Skin:     General: Skin is warm and dry. Findings: Erythema present. Neurological:      Mental Status: She is alert and oriented to person, place, and time. MDM  Number of Diagnoses or Management Options  Other osteomyelitis of left foot ([address]  Diagnosis management comments: Lucency on x-ray his distal to the open wound on the toe. Will treat as cellulitis and have patient close follow-up with wound healing center for further evaluation. Will start antibiotics. Patient is very concerned about osteomyelitis possibly in the toe. With a red streak going up the foot and erythema of toe with open wound and purulence will admit for IV antibiotics and further treatment.        Amount and/or Complexity of Data Reviewed  Clinical lab tests: ordered and reviewed  Tests in the radiology section of CPT®: ordered and reviewed    Patient Progress  Patient progress: stable         Procedures        Results Include:    Recent Results (from the past 24 hour(s))   CBC WITH AUTOMATED DIFF    Collection Time: 02/15/21 10:56 PM   Result Value Ref Range    WBC 8.4 4.3 - 11.1 K/uL    RBC 4.55 4.05 - 5.2 M/uL    HGB 11.5 (L) 11.7 - 15.4 g/dL    HCT 36.1 35.8 - 46.3 %    MCV 79.3 (L) 79.6 - 97.8 FL    MCH 25.3 (L) 26.1 - 32.9 PG    MCHC 31.9 31.4 - 35.0 g/dL    RDW 15.4 (H) 11.9 - 14.6 %    PLATELET 400 026 - 970 K/uL    MPV 9.8 9.4 - 12.3 FL    ABSOLUTE NRBC 0.00 0.0 - 0.2 K/uL    DF AUTOMATED      NEUTROPHILS 71 43 - 78 %    LYMPHOCYTES 22 13 - 44 %    MONOCYTES 6 4.0 - 12.0 %    EOSINOPHILS 2 0.5 - 7.8 %    BASOPHILS 0 0.0 - 2.0 %    IMMATURE GRANULOCYTES 0 0.0 - 5.0 %    ABS. NEUTROPHILS 5.9 1.7 - 8.2 K/UL    ABS. LYMPHOCYTES 1.8 0.5 - 4.6 K/UL    ABS. MONOCYTES 0.5 0.1 - 1.3 K/UL    ABS. EOSINOPHILS 0.2 0.0 - 0.8 K/UL    ABS. BASOPHILS 0.0 0.0 - 0.2 K/UL    ABS. IMM. GRANS. 0.0 0.0 - 0.5 K/UL   METABOLIC PANEL, COMPREHENSIVE    Collection Time: 02/15/21 10:56 PM   Result Value Ref Range    Sodium 136 136 - 145 mmol/L    Potassium 3.7 3.5 - 5.1 mmol/L    Chloride 99 98 - 107 mmol/L    CO2 28 21 - 32 mmol/L    Anion gap 9 7 - 16 mmol/L    Glucose 183 (H) 65 - 100 mg/dL    BUN 18 6 - 23 MG/DL    Creatinine 0.91 0.6 - 1.0 MG/DL    GFR est AA >60 >60 ml/min/1.73m2    GFR est non-AA >60 >60 ml/min/1.73m2    Calcium 9.7 8.3 - 10.4 MG/DL    Bilirubin, total 0.4 0.2 - 1.1 MG/DL    ALT (SGPT) 23 12 - 65 U/L    AST (SGOT) 14 (L) 15 - 37 U/L    Alk.  phosphatase 92 50 - 130 U/L    Protein, total 8.3 (H) 6.3 - 8.2 g/dL    Albumin 3.7 3.5 - 5.0 g/dL    Globulin 4.6 (H) 2.3 - 3.5 g/dL    A-G Ratio 0.8 (L) 1.2 - 3.5     LACTIC ACID    Collection Time: 02/15/21 10:56 PM   Result Value Ref Range    Lactic acid 2.3 (H) 0.4 - 2.0 MMOL/L       XR 2ND TOE LT MIN 2 V (Final result)  Result time 02/15/21 23:27:57  Final result by Husam Storey MD (02/15/21 23:27:57)                Impression:      1. Apparent lucency at the tip of the second toe distal phalanx, on the lateral   view, raising the question of osteomyelitis. Note that MRI is more sensitive for   evaluation of osteomyelitis. 2. No acute fracture. Narrative:    Clinical history: Left second toe infection. TECHNIQUE: 3 views of the left second toe. FINDINGS:     There is lucency at the tip of the second toe distal phalanx, on the lateral   view. No soft tissue gas is seen. No acute fracture or dislocation. Alignment is maintained. Joint spaces are   preserved. No radiopaque foreign body.

## 2021-02-16 NOTE — H&P
[facility] Initial History and Physical Note    Patient: Devyn Reynaga Date: 2/16/2021  female, 52 y.o. Admit Date: 2/15/2021  Attending: Krys Patterson MD     ASSESSMENT AND PLAN:     Principal Problem:    Osteomyelitis of second toe of left foot ([address] (2/16/2021)    Concerning findings on CXR for possible osteo. IV Vanc/Cipro. Consult vascular surgery, wound care. Active Problems:    Acute osteomyelitis ([address] (6/30/2020)    Per above. Microcytic anemia (2/16/2021)    Follow CBC      Hypertension (9/26/2019)    Stable. Continue home meds. Rheumatoid arthritis ([address] (9/26/2019)    Stable. Continue home meds. Depression (9/26/2019)    Stable. Continue home meds. Class 1 obesity in adult (6/29/2020)    Stable. Peripheral neuropathy (6/30/2020)    Stable. DVT Prophylaxis: Lovenox      Code Status: FULL CODE      Disposition: Admit to med/surg for evaluation and treatment as per above.       Anticipated discharge: 2-3 days     CHIEF COMPLAINT:  L 2nd toe pain/redness    HISTORY OF PRESENT ILLNESS:      Patient Active Problem List   Diagnosis Code    Ovarian cyst N83.209    Right lower quadrant abdominal pain R10.31    TIA (transient ischemic attack) G45.9    Reflex sympathetic dystrophy G90.50    Hypertension I10    Rheumatoid arthritis ([address] M06.9    Chronic pain G89.29    Depression F32.9    Cellulitis and abscess of toe of right foot L03.031, L02.611    H/O TIA (transient ischemic attack) and stroke Z86.73    Class 1 obesity in adult E66.9    Acute osteomyelitis of right foot (HCC) M86.171    Peripheral neuropathy G62.9    Acute osteomyelitis (HCC) M86.10    Osteomyelitis of second toe of left foot (HCC) M86.9    Microcytic anemia D50.9       Devyn Reynaga is a 52 y.o. female, with a history of  has a past medical history of Anemia, Chronic pain, Depression, GERD (gastroesophageal reflux disease), renal calculi, Hypercholesterolemia, Hypertension, Migraine, Morbid obesity ([facility] Ovarian cyst, Reflex sympathetic dystrophy (since 2005), Rheumatoid arthritis ([facility] Rheumatoid arthritis ([address] (9/26/2019), Sinus problem, and TIA (transient ischemic attack) (9/26/2019). She also has no past medical history of Aneurysm ([address] Arrhythmia, Asthma, CAD (coronary artery disease), Cancer ([address] Chronic kidney disease, Chronic obstructive pulmonary disease ([address] Coagulation disorder ([address] Diabetes ([address] Difficult intubation, Heart failure ([address] Liver disease, Malignant hyperthermia due to anesthesia, Nausea & vomiting, Pseudocholinesterase deficiency, PUD (peptic ulcer disease), Seizures ([address] Thromboembolus ([address] Thyroid disease, Unspecified adverse effect of anesthesia, or Unspecified sleep apnea. ,  has a past surgical history that includes hx cholecystectomy; hx appendectomy; hx total abdominal hysterectomy; hx tubal ligation; hx breast biopsy (Left, 4/27/2015); hx orthopaedic; hx orthopaedic; and hx septoplasty (08/28/2019). who presents to the ER with report of worsening redness, pain, and blistering of her L 2nd toe starting about 2-3 days ago. The blister ruptured yesterday and has become more painful and red since that time. She denies any fevers, chills, nausea, vomiting. Allergy  Allergies   Allergen Reactions    Pcn [Penicillins] Hives    Norco [Hydrocodone-Acetaminophen] Nausea and Vomiting       Medication list  Prior to Admission Medications   Prescriptions Last Dose Informant Patient Reported? Taking? DULoxetine (CYMBALTA) 60 mg capsule   Yes Yes   Sig: Take 60 mg by mouth two (2) times a day. OMEPRAZOLE PO   Yes Yes   Sig: Take 20 mg by mouth daily. aspirin 81 mg chewable tablet   Yes Yes   Sig: Take 81 mg by mouth. buPROPion XL (WELLBUTRIN XL) 150 mg tablet   Yes Yes   Sig: Take 150 mg by mouth daily. ketorolac (TORADOL) 10 mg tablet   No Yes   Sig: Take 1 Tab by mouth every six (6) hours as needed for Pain. lisinopril-hydroCHLOROthiazide (PRINZIDE, ZESTORETIC) 20-25 mg per tablet   Yes Yes   Sig: Take 1 Tab by mouth.   pravastatin (PRAVACHOL) 40 mg tablet   Yes Yes   Sig: Take 40 mg by mouth daily. promethazine (PHENERGAN) 25 mg suppository Not Taking at Unknown time  No No   Sig: Insert 1 Suppository into rectum every six (6) hours as needed for Nausea for up to 10 doses. promethazine (PHENERGAN) 25 mg tablet   No No   Sig: Take 1 Tab by mouth every six (6) hours as needed for Nausea for up to 15 doses. tamsulosin (FLOMAX) 0.4 mg capsule Not Taking at Unknown time  No No   Sig: Take 1 Cap by mouth daily. traZODone (DESYREL) 100 mg tablet   Yes Yes   Sig: Take 100 mg by mouth.       Facility-Administered Medications: None       Past Medical History   Past Medical History:   Diagnosis Date    Anemia     Chronic pain     due to RA    Depression     GERD (gastroesophageal reflux disease)     Hx of renal calculi          Hypercholesterolemia     Hypertension     Migraine     Morbid obesity ([address]     Ovarian cyst     Reflex sympathetic dystrophy since 2005    right arm; s/p injury - laceration in hand    Rheumatoid arthritis ([address]          Rheumatoid arthritis ([facility] 9/26/2019    Sinus problem     TIA (transient ischemic attack) 9/26/2019       Past Surgical History:   Procedure Laterality Date    HX APPENDECTOMY      HX BREAST BIOPSY Left 4/27/2015    LEFT NIPPLE EXPLORATION WITH REMOVAL OF LATTISIMUS DUCT [facility] [address] [facility] HX CHOLECYSTECTOMY      HX ORTHOPAEDIC      rt hand    HX ORTHOPAEDIC      neck    HX SEPTOPLASTY  08/28/2019    FESS,SMRIT's    HX TOTAL ABDOMINAL HYSTERECTOMY           HX TUBAL LIGATION         Social History   Social History     Socioeconomic History    Marital status:      Spouse name: Not on file    Number of children: Not on file    Years of education: Not on file    Highest education level: Not on file   Tobacco Use    Smoking status: Never Smoker    Smokeless tobacco: Never Used   Substance and Sexual Activity    Alcohol use: Yes     Comment: \"once every couple years\"    Drug use: No    Sexual activity: Yes     Partners: Male     Birth control/protection: Surgical       Family History:   Family History   Problem Relation Age of Onset    Breast Cancer Paternal Aunt 39    Cancer Paternal Aunt         brst    Cancer Maternal Grandfather         colon    Colon Cancer Maternal Grandfather     Breast Cancer Paternal Grandmother 46    Cancer Paternal Grandmother         breast    Ovarian Cancer Paternal Grandmother     Cancer Mother         throat    Hypertension Mother     Cancer Father         non hodgkins lymphoma    Cancer Maternal Grandmother         ovarian    Breast Cancer Maternal Grandmother     Heart Disease Paternal Grandfather     Diabetes Paternal Grandfather     Breast Cancer Sister        REVIEW OF SYSTEMS:   A 14 point review of systems was taken and pertinent positive as per HPI. PHYSICAL EXAMINATION:  Vital 24 Hour Range Most Recent Value  Temperature Temp  Min: 99.6 °F (37.6 °C)  Max: 99.6 °F (37.6 °C) 99.6 °F (37.6 °C)    Pulse Pulse  Min: 107  Max: 107 (!) 107  Respiratory Resp  Min: 16  Max: 16 16  Blood Pressure BP  Min: 105/62  Max: 105/62 105/62  Pulse Oximetry SpO2  Min: 96 %  Max: 96 % 96 %  O2 No data recorded      Vital Most Recent Value First Value  Weight 103 kg (227 lb) Weight: 103 kg (227 lb)  Height 5' 8\" (172.7 cm) Height: 5' 8\" (172.7 cm)  BMI   N/A    Physical Exam:   General:     No acute distress, speaking in full sentences  Eyes:   No palpebral pallor or scleral icterus. ENT:   External auricular and nasal exam within normal limits. Mucous membranes are moist.  Cardiovascular: Regular rate and rhythm, with normal S1 and S2, no JVD. No cyanosis or edema of extremities. Radial and dorsalis pedis pulses present 2+ bilaterally. Capillary refill <2s.    Respiratory: No respiratory distress or accessory muscle use. Clear to auscultation bilaterally without wheezes or crackles. Gastrointestinal:  No active vomiting or retching. Abdomen soft, non-tender, non-distended with normoactive bowel sounds. Skin:      L 2nd digit with circumferential erythema with ruptured vesicle over lateral dorsal toe. Neurologic:  CN II-XII grossly intact and symmetrical.   Moving all four extremities well with no focal deficits. Psychiatric:  Pleasant demeanor, appropriate affect. Alert and oriented x 3      Intake/Output last 3 shifts:      Labs:  magnesium:7,phos:7)CMP:   Lab Results   Component Value Date/Time     02/15/2021 10:56 PM    K 3.7 02/15/2021 10:56 PM    CL 99 02/15/2021 10:56 PM    CO2 28 02/15/2021 10:56 PM    AGAP 9 02/15/2021 10:56 PM     (H) 02/15/2021 10:56 PM    BUN 18 02/15/2021 10:56 PM    CREA 0.91 02/15/2021 10:56 PM    GFRAA >60 02/15/2021 10:56 PM    GFRNA >60 02/15/2021 10:56 PM    CA 9.7 02/15/2021 10:56 PM    ALB 3.7 02/15/2021 10:56 PM    TBILI 0.4 02/15/2021 10:56 PM    TP 8.3 (H) 02/15/2021 10:56 PM    GLOB 4.6 (H) 02/15/2021 10:56 PM    AGRAT 0.8 (L) 02/15/2021 10:56 PM    ALT 23 02/15/2021 10:56 PM         CBC:    Lab Results   Component Value Date/Time    WBC 8.4 02/15/2021 10:56 PM    HGB 11.5 (L) 02/15/2021 10:56 PM    HCT 36.1 02/15/2021 10:56 PM     02/15/2021 10:56 PM       Lab Results   Component Value Date/Time    INR (POC) 1.2 09/26/2019 08:20 PM       ABG:  No results found for: PH, PHI, PCO2, PCO2I, PO2, PO2I, HCO3, HCO3I, FIO2, FIO2I        Lab Results   Component Value Date/Time    Troponin-I <0.05 04/18/2012 02:26 AM    Troponin-I, Qt. <0.02 (L) 09/26/2019 09:23 PM    BNP 12 04/18/2012 02:26 AM       Imagining & Other Studies    XR Results (maximum last 3): Results from Hospital Encounter encounter on 02/15/21   XR 2ND TOE LT MIN 2 V    Narrative Clinical history: Left second toe infection.     TECHNIQUE: 3 views of the left second toe. FINDINGS:    There is lucency at the tip of the second toe distal phalanx, on the lateral  view. No soft tissue gas is seen. No acute fracture or dislocation. Alignment is maintained. Joint spaces are  preserved. No radiopaque foreign body. Impression 1. Apparent lucency at the tip of the second toe distal phalanx, on the lateral  view, raising the question of osteomyelitis. Note that MRI is more sensitive for  evaluation of osteomyelitis. 2. No acute fracture. Results from [place] encounter on 06/29/20   XR FOOT RT MIN 3 V    Narrative Right foot    CLINICAL INDICATION: Chronic wound to the right great toe    FINDINGS: Three views of the right great toe show no obvious destructive bone  change appreciated to indicate osteomyelitis by plain radiography. Soft tissue  swelling is evident. The IP and first MTP joint spaces are well-maintained. There is no fracture. Impression IMPRESSION: Soft tissue swelling about the right great toe. No definite bony  abnormality. If there strong clinical concern for osteomyelitis, consider  further evaluation with right forefoot MRI. Results from [place] encounter on 06/07/20   XR CALCANEUS LT    Narrative TWO-VIEW LEFT CALCANEUS:    CLINICAL HISTORY:  Pain after audible pop while stretching. COMPARISON:  None. FINDINGS:  No definite fracture, malalignment, or dennis bone destruction is  evident. No persistent radiopaque foreign body is seen. Moderate plantar  calcaneal spur is noted. Impression IMPRESSION:  Plantar calcaneal spur with no acute abnormality. CT Results (maximum last 3):   Results from [place] encounter on 09/29/20   CT UROGRAM WO CONT    Narrative CT ABDOMEN AND PELVIS    INDICATION:  Right flank pain    Multiple axial images were obtained through the abdomen and pelvis without  intravenous or oral contrast.  Radiation dose reduction techniques were used for  this study: All CT scans performed at this facility use one or all of the  following: Automated exposure control, adjustment of the mA and/or kVp according  to patient's size, iterative reconstruction. COMPARISON: None    FINDINGS:  -KIDNEYS/URETERS: 4 mm stone in the proximal right ureter at the L3 level. Several small nonobstructing stones in the left kidney. No significant  hydronephrosis. No renal mass. -BLADDER: Normal.    -LUNG BASES: No infiltrates or masses. -LIVER: Normal in size and appearance. -GALLBLADDER/BILE DUCTS: Post cholecystectomy. No bile duct dilatation. -PANCREAS: Normal.  -SPLEEN: Normal.  -ADRENALS: Normal.    -REPRODUCTIVE ORGANS: Post hysterectomy. -BOWEL: Normal caliber. No inflammatory changes. -LYMPH NODES: No significant retroperitoneal, mesenteric, or pelvic adenopathy. -BONES: No fracture or significant bone lesion. -VASCULATURE: Normal  -OTHER: No ascites. Impression IMPRESSION: 4 mm proximal right ureter stone. No significant hydronephrosis. ** If there are any questions about this report, I can be reached on  [employer] or at [phone] **   Results from [place] encounter on 01/30/20   CT HEAD WO CONT    Narrative EXAMINATION: HEAD CT WITHOUT CONTRAST 1/30/2020 10:23 PM    ACCESSION NUMBER: [id number]    COMPARISON: None available    INDICATION: Left-sided headache with left-sided numbness and tingling onset 1  PM, headache for several days- CVA? ICH? TECHNIQUE: Multiple-row detector helical CT examination of the head without  intravenous contrast.     Radiation dose reduction techniques were used for this study. Our CT scanners  use one or all of the following: Automated exposure control, adjustment of the  mA and/or kV according to patient size, iterative reconstruction. FINDINGS:    Brain: There is no mass, mass effect, evidence of an acute stroke, or  intracranial hemorrhage.     Ventricles: Normal    Vasculature: Normal    Bones: Normal    Surrounding soft tissues:  A small air-fluid level is seen in the right  maxillary sinus. Mucous retention cysts are present in the left maxillary sinus. The mastoid air cells are patent. Impression IMPRESSION:      1. No acute intracranial abnormality. 2. Right maxillary sinusitis with chronic left maxillary sinus disease. Results from [place] encounter on 09/26/19   CT PERF W CBF    Narrative HEAD CT with PERFUSION IMAGING    INDICATION:  Slurred speech. Multiple axial images obtained through the brain without intravenous contrast.   CT perfusion imaging of the brain was then performed after the administration of  intravenous contrast.  Perfusion maps and perfusion analysis output were  generated using the RAPID perfusion processing software algorithm. Radiation  dose reduction techniques were used for this study: All CT scans performed at  this facility use one or all of the following: Automated exposure control,  adjustment of the mA and/or kVp according to patient's size, iterative  reconstruction. FINDINGS: No areas of abnormal attenuation are seen in the brain. There is no CT  evidence of acute hemorrhage or infarction. The ventricles are normal in size. There are no extra-axial fluid collections. No masses are seen. The sinuses are  clear. There are no bony lesions. RAPID Output Values:     CBF < 30% volume:  0 ml   (core infarction volume greater than 50 cc associated  with poor outcomes)  Tmax > 6 seconds: 0 ml  Tmax/CBF Mismatch Volume: 0 ml  Tmax/CBF Mismatch Ratio: NA  Hypoperfusion Intensity Ratio (Tmax > 10 seconds/Tmax > 6 seconds): 0   (values  greater than 0.5 associated with poor outcome)  Tmax > 10 seconds Volume: 0 ml   (volume greater than 100 mL is associated with  poor outcome)      Impression IMPRESSION: No CT evidence of acute intracranial perfusion abnormality.     Please note that the determination of patient treatment is not based solely upon  imaging factors or calculation values. Management of ischemia is at the  discretion of the primary physician and is based upon a combination of clinical  and imaging data, along other factors.         MRI Results (maximum last 3):  Results from Hospital Encounter encounter on 06/29/20   MRI FOOT RT WO CONT    Narrative MRI of the right forefoot without contrast    CLINICAL INDICATION: Right great toe swelling with cellulitis and abscess,  concern for osteomyelitis    PROCEDURE: Multiplanar and multisequence MR imaging performed through the right  forefoot without the administration of intravenous gadolinium contrast.    COMPARISON: Right foot radiographs dated 6/29/2020    FINDINGS:    Generalized soft tissue swelling is appreciated diffusely about the distal  aspect of the great toe with low T1 and bright T2 signal changes in the distal  phalanx that meet imaging criteria for osteomyelitis. The marrow signal in the  proximal phalanx is normal. There is no joint effusion at the IP joint or the  first MTP joint to suspect septic arthritis. There is mild inflammation along  the phalangeal attachment of the flexor hallucis longus tendon without dennis  rupture. This is likely a mild tenosynovitis.      Impression IMPRESSION:  1. Acute osteomyelitis of the distal phalanx of the great toe with surrounding  soft tissue cellulitis.  2. Mild tenosynovitis along the flexor hallucis longus tendon at its phalangeal  attachment without tendon rupture.   Results from Hospital Encounter encounter on 09/26/19   MRI CERV SPINE WO CONT    Narrative MRI CERVICAL SPINE WITHOUT CONTRAST 9/27/2019    HISTORY: 49-year-old female with new onset left facial droop dysarthria and left  arm numbness    TECHNIQUE: Sagittal T2-weighted, T1-weighted and STIR images were obtained from  the craniocervical junction through T3. Axial T2-weighted images were obtained  from C2-C3 through C7-T1.    COMPARISON: CT angiogram of the neck September  26, 2012    FINDINGS: The cerebellar tonsils are in a normal position. The cervical spinal  cord is normal in caliber and signal intensity. Artifact is present from an  anterior cervical spine fusion with a plate and screws extending into the C4,  C5, C6 and C7 vertebrae. The cervical vertebrae are normal in height and  alignment. Cervical marrow signal is normal visible. Axial images: Motion artifact on axial images limits evaluation of the spine and  may obscure spinal pathology. C2-C3: No significant spinal stenosis or foraminal stenosis. C3-C4: No significant spinal stenosis. Mild right foraminal stenosis. C4-C5: Anterior fusion. No significant spinal stenosis or foraminal stenosis. C5-C6: Anterior fusion. No significant spinal stenosis or foraminal stenosis. C6-C7: Anterior fusion. No significant spinal stenosis or foraminal stenosis. C7-T1: No significant spinal stenosis or foraminal stenosis. Impression IMPRESSION:    1. No cervical cord lesions visible. 2. Anterior cervical fusion from C4 to C7.    3. Motion artifact. 4. Mild right foraminal stenosis at C3-C4. MRI BRAIN WO CONT    Narrative MRI BRAIN WITHOUT CONTRAST 9/27/2019    HISTORY: 65-year-old female with new onset left facial droop and dysarthria with  facial numbness and left arm numbness    TECHNIQUE: Sagittal and axial T1-weighted, axial T2-weighted, axial and coronal  FLAIR, axial T2-weighted gradient-echo, axial diffusion weighted images with ADC  maps of the brain. COMPARISON: CT head, CT perfusion, CT angiogram 9/26/2019    FINDINGS: There is no acute infarction, acute intracranial hemorrhage, or  significant mass effect. The left maxillary sinus and multiple ethmoid air cells are opacified. Debris is  present in the right maxillary sinus. On the T2-weighted and FLAIR sequences, there are a few occasional punctate  white matter hyperintensities within the supratentorial brain.  This is not an  uncommon finding which may be present with asymptomatic patients, with migraine  headaches or with mild chronic small vessel ischemic disease. Impression IMPRESSION:    1. No acute infarction. 2. Paranasal sinus disease. 3. Occasional nonspecific punctate white matter hyperintensities present as  described. This pattern may be present with migraine headaches or mild chronic  small vessel ischemic disease. Nuclear Medicine Results (maximum last 3): No results found for this or any previous visit. US Results (maximum last 3): Results from [place] encounter on 06/13/18   US ABD LTD    Narrative RIGHT UPPER QUADRANT ULTRASOUND. HISTORY: Right upper quadrant abdominal pain. COMPARISON: None. FINDINGS: It is post cholecystectomy. The common bile duct is not dilated, 3.8  mm. Intrahepatic biliary tree is not dilated. Included portion of the pancreas  and right kidney are unremarkable. There is diffuse fatty change of the liver. Impression IMPRESSION: Diffuse fatty change liver. Status post cholecystectomy. Results from [place] encounter on 08/29/16   US PELV NON OB W TV    Narrative PELVIC ULTRASOUND  8/29/2016 2:27 PM     Clinical Information: 68-year-old with right lower quadrant pain. Comparison: CT at 12:35 PM.    Findings: Transabdominal and transvaginal pelvic ultrasound performed. The  bladder is suboptimally distended on initial transabdominal images. The uterus is surgically absent. The vaginal cuff is unremarkable. The right ovary measures 5.1 x 2.8 x 3.8 cm in conglomerate, containing  follicular changes, the dominant of which measures 4.2 x 3.2 x 2.4 cm. Doppler  flow is present within. The left ovary is not visualized. No dependent free pelvic fluid. Impression IMPRESSION:    1. Multifocal cystic change in the right ovary, likely physiologic. No specific  signs to suggest torsion.             Results from [facility] Encounter encounter on 02/23/15   US BREAST LT LIMITED=<3 QUAD    Narrative BILATERAL DIAGNOSTIC MAMMOGRAM and UNILATERAL BREAST ULTRASOUND, 2/23/2015. CLINICAL HISTORY: Left breast lump. In addition, the patient notes bloody left  nipple discharge. Comparison studies:  Bilateral mammogram 6/4/2014. FINDINGS:  BILATERAL DIAGNOSTIC MAMMOGRAM:  Please note that although the patient complains of a left breast lump, a  bilateral study was performed due to the fact that it has been just under one  year since the patient's prior mammogram.  Bilateral digital mammography was  performed, and interpreted in conjunction with CAD overread. A skin marker was  placed at the 2 o'clock position of the left breast 19 cm from the nipple. The  breasts are heterogeneously dense which can obscure a subtle lesion. The axilla  contain benign appearing lymph nodes. No evidence of skin thickening, or nipple  retraction is seen. A few scattered faint punctate appearing calcifications  are seen without evolving worrisome cluster. No evolving dominant mass,  spiculated density, or architectural distortion is seen. Specifically, no  evolving left retroareolar asymmetry or asymmetry underlying the left breast  skin marker is seen. Impression IMPRESSION:  1. No worrisome findings by mammography in the area of lump or otherwise of the  bilateral breasts to suggest malignancy. Given that the patient complains of a  focal palpable abnormality, I would recommend further evaluation with focused  ultrasound imaging. In addition, ultrasound imaging of the retroareolar soft  tissues of the left breast should be performed to assess for a dilated duct or  intraductal lesion given the history of bloody nipple discharge. LEFT BREAST ULTRASOUND, 2/23/2015. CLINICAL HISTORY: Left breast lump and bloody nipple discharge.     Technique: Grayscale, and Doppler imaging of the left breast soft tissues was  performed using a 12 MHz transducer. FINDINGS:    Focused ultrasound imaging at the     o'clock position of the     breast in the  area of palpable abnormality as indicated by the patient shows no worrisome  solid or cystic masses. No pathologic appearing lymph nodes, or abnormal fluid  collections are seen. There is an unremarkable appearance of breast fatty, and  fibroglandular elements at this level. Focused ultrasound imaging over the retroareolar soft tissues of the left breast  shows no worrisome solid or cystic masses. No pathologic appearing lymph nodes,  or abnormal fluid collections are seen. There is an unremarkable appearance of  breast fatty, and fibroglandular elements at this level. IMPRESSION:   1. Unremarkable ultrasound findings in the area of palpable abnormality. Therefore, routine follow-up is recommended based on imaging findings with  screening mammogram in one year unless clinically indicated sooner. It should  be noted that a very small percentage of malignancy fails to demonstrate  findings by ultrasound or mammography and therefore could be missed by a  combination of these two modalities. Therefore the followup of any persistently  palpable abnormality in the absence of imaging findings should be clinical with  consideration of biopsy if indicated. 2.  No abnormality in the retroareolar soft tissues of the left breast to  suggest an etiology for the patient's bloody nipple discharge. Given that this  is bloody, the patient will be referred to a breast surgeon for further workup  and management. BI-RADS Assessment Category 2: Benign finding(s). DEXA Results (maximum last 3): No results found for this or any previous visit. HUGO Results (maximum last 3):   Results from [place] encounter on 09/21/18   [facility] 3D PREETHI W MAMMO BI SCREENING INCL CAD    Narrative STUDY:  Bilateral digital screening mammogram with CAD and Tomosynthesis    INDICATION:  Screening;    patient's sister diagnosed with breast carcinoma in  her 45s. COMPARISON:  07/08/2017, 06/27/2016        FINDINGS: Bilateral digital screening mammography was performed, and is  interpreted in conjunction with a computer assisted detection (CAD) system. The  breast parenchyma is heterogeneously dense, which limits the sensitivity of  mammography. No suspicious masses, calcifications, or architectural distortion  are identified. There is no skin thickening or nipple retraction. Bilateral breast tomosynthesis was performed, and is interpreted in conjunction  with a computer assisted detection (CAD) system. No suspicious masses,  calcifications, or architectural distortion are identified otherwise. Impression IMPRESSION:  No mammographic evidence of malignancy. The patient's calculated  five-year and lifetime risk scores for developing breast carcinoma were 2.4 and  20.5%, respectively. In the setting of heterogeneously dense breast tissue,  breast MRI should be considered to evaluate for mammographically occult  neoplasm. Recommend annual mammogram in one year. A reminder letter will be scheduled. BI-RADS Assessment Category 1: Negative. We are mailing breast density information to the patient along with the  mammogram report. BD3     Results from [place] encounter on 07/08/17   [facility] MAMMO BI SCREENING INCL CAD    Narrative BILATERAL SCREENING DIGITAL MAMMOGRAPHY:    CLINICAL HISTORY:  Prior benign left biopsy with a strong family history of  breast cancer including a sister at 50. TECHNIQUE:  Craniocaudal and mediolateral oblique views of both breasts were  performed and are interpreted in conjunction with a computer assisted detection  (CAD) system. COMPARISON:  [facility] dating to February 23, 2015, including MRI of March 16, 2015. FINDINGS:  CC and MLO views of both breasts demonstrate heterogeneously dense  fibroglandular tissue in a fairly symmetric pattern bilaterally.   No new or  enlarging soft tissue mass, suspicious calcifications, or other definite  evidence for malignancy is seen. No significant change is seen from prior  study. Impression IMPRESSION:         1. NO SPECIFIC MAMMOGRAPHIC EVIDENCE FOR MALIGNANCY. FOLLOW UP BILATERAL  SCREENING MAMMOGRAPHY IS RECOMMENDED IN ONE YEAR. 2.  HIGH-RISK SCREENING BREAST MRI SHOULD ALSO BE CONSIDERED AT THIS TIME IN  FOLLOWUP OF THE PRIOR STUDY IN [facility] 2015 FOR THIS PATIENT WITH HETEROGENEOUSLY  DENSE BREAST PARENCHYMA AND AT LEAST ONE FIRST-DEGREE RELATIVE WITH  PREMENOPAUSAL BREAST CANCER. BI-RADS Assessment Category 1: Negative. BD3       Results from [place] encounter on 06/27/16   [facility] [address]    Narrative Bilateral screening mammogram   6/27/2016 8:21 AM    HISTORY: Routine Screening study; maternal grandmother with breast cancer    Technique: CC and MLO views of the breasts were obtained and interpreted in  conjunction with a computer assisted detection (CAD) system        COMPARISON: 2/23/2015 and 6/4/2014    FINDINGS: The breast parenchyma is composed of heterogeneously dense  fibroglandular elements. This density may obscure lesions on mammography. There are no new masses, areas of architectural distortion or suspicious  pleomorphic microcalcifications. Impression IMPRESSION: No mammographic findings suspicious for malignancy. BI-RADS Assessment Category 1: Negative. A 1 yr screening mammogram is  recommended. A reminder letter will be sent to the patient. IR Results (maximum last 3): No results found for this or any previous visit. VAS/US Results (maximum last 3): Results from [place] encounter on 06/29/20   DUPLEX LOWER EXT VENOUS RIGHT    Narrative Right lower extremity Doppler evaluation: 06/29/2020    HISTORY: Pain, palpable cords swelling.   Symptoms are moderate and have been  present x1 week    Grayscale, color-flow and Doppler evaluation of the major veins of the right  lower extremity was performed. Comparison: None    FINDINGS: There is normal compression and augmentation involving the right  common femoral, superficial femoral and popliteal veins. No grayscale or  color-flow findings within these vessels or within the distal greater saphenous  or profunda femoral veins were noted to suggest deep venous thrombosis. Interrogated portions of the peroneal and posterior tibial veins were also  unremarkable. No popliteal cyst is identified. Cursory imaging of the left common femoral vein reveals it to be patent with  normal color-flow and augmentation. There is a 2.7 cm lymph node within the  right groin which may be reactive. Impression IMPRESSION: Negative evaluation for deep venous thrombosis within the right  lower extremity. PET Results (maximum last 3): No results found for this or any previous visit.       EKG Results     None          Corinne Blind, MD  2/16/2021 12:18 AM

## 2021-02-16 NOTE — PROGRESS NOTES
[facility] Hospitalist Service Progress Note    Subjective: Her pain control is somewhat adequate, the left 2nd toe appears worse to her in terms of inflammation and swelling     Assessment / Plan:  82-year-old female with past medical history of rheumatoid arthritis, peripheral neuropathy, chronic microcytic anemia, hypertension, obesity, depression. Admitted with osteomyelitis of the left second toe.       Osteomyelitis of the left second toe  showing changes consistent with possible  Osteomyelitis -->  lucency at the tip of the second toe distal phalanx  Note she has had a history of osteomyelitis in the past where she had failed intravenous antibiotics and wound care this is of the right foot/toe   She has been started on IV Cipro, IV vancomycin, IV metronidazole  Vascular surgery and wound care consult placed  -02/16 ABIs performed so no e/o PAD, request MRI W/WO Contrast to evaluate for confirmation of  osteomyelitis, check inflammatory markers ESR, wound culture , request Orthopedic surgery consult     Rheumatoid arthritis with peripheral neuropathy  Adding complexity to medical case  - She is not being treated with DMARDs, and she can not confirm if she was sero positive diagnosed     Pre Diabetes Mellitus Type II  Check hemoglobin A1c    Hypertension  Resume lisinoprilhydrochlorothiazide    Chronic microcytic anemia  Resume multivitamins with minerals    Additional medical history  Depression: Resume bupropion  Obesity      Chief Complaint : Foot Pain          Objective:  Visit Vitals  /62   Pulse 82   Temp 98.4 °F (36.9 °C)   Resp 18   Ht 5' 8\" (1.727 m)   Wt 103 kg (227 lb)   SpO2 93%   BMI 34.52 kg/m²                 Physical Exam:  General: No acute distress, speaking in full sentences, no use of accessory muscles   HEENT: Pupils equal and reactive to light and accommodation, oropharynx is clear   Neck: Supple, no lymphadenopathy, no JVD   Lungs: Clear to auscultation bilaterally Cardiovascular: Regular rate and rhythm with normal S1 and S2   Abdomen: Soft, nontender, nondistended, normoactive bowel sounds   Extremities: Left heel ulcer chronic, left 2nd toe ulcer, foul smelling   Neuro: Nonfocal, A&O x3   Psych: Normal affect     Intake and Output:      LAB:  Admission on 02/15/2021   Component Date Value    WBC 02/15/2021 8.4     RBC 02/15/2021 4.55     HGB 02/15/2021 11.5*    HCT 02/15/2021 36.1     MCV 02/15/2021 79.3*    MCH 02/15/2021 25.3*    MCHC 02/15/2021 31.9     RDW 02/15/2021 15.4*    PLATELET 46/48/5508 840     MPV 02/15/2021 9.8     ABSOLUTE NRBC 02/15/2021 0.00     DF 02/15/2021 AUTOMATED     NEUTROPHILS 02/15/2021 71     LYMPHOCYTES 02/15/2021 22     MONOCYTES 02/15/2021 6     EOSINOPHILS 02/15/2021 2     BASOPHILS 02/15/2021 0     IMMATURE GRANULOCYTES 02/15/2021 0     ABS. NEUTROPHILS 02/15/2021 5.9     ABS. LYMPHOCYTES 02/15/2021 1.8     ABS. MONOCYTES 02/15/2021 0.5     ABS. EOSINOPHILS 02/15/2021 0.2     ABS. BASOPHILS 02/15/2021 0.0     ABS. IMM. GRANS. 02/15/2021 0.0     Sodium 02/15/2021 136     Potassium 02/15/2021 3.7     Chloride 02/15/2021 99     CO2 02/15/2021 28     Anion gap 02/15/2021 9     Glucose 02/15/2021 183*    BUN 02/15/2021 18     Creatinine 02/15/2021 0.91     GFR est AA 02/15/2021 >60     GFR est non-AA 02/15/2021 >60     Calcium 02/15/2021 9.7     Bilirubin, total 02/15/2021 0.4     ALT (SGPT) 02/15/2021 23     AST (SGOT) 02/15/2021 14*    Alk.  phosphatase 02/15/2021 92     Protein, total 02/15/2021 8.3*    Albumin 02/15/2021 3.7     Globulin 02/15/2021 4.6*    A-G Ratio 02/15/2021 0.8*    Lactic acid 02/15/2021 2.3*    Lactic acid 02/16/2021 1.4     Sodium 02/16/2021 136     Potassium 02/16/2021 3.6     Chloride 02/16/2021 101     CO2 02/16/2021 28     Anion gap 02/16/2021 7     Glucose 02/16/2021 146*    BUN 02/16/2021 15     Creatinine 02/16/2021 0.75     GFR est AA 02/16/2021 >60     GFR est non-AA 02/16/2021 >60     Calcium 02/16/2021 9.1     Bilirubin, total 02/16/2021 0.3     ALT (SGPT) 02/16/2021 20     AST (SGOT) 02/16/2021 10*    Alk. phosphatase 02/16/2021 76     Protein, total 02/16/2021 7.1     Albumin 02/16/2021 3.2*    Globulin 02/16/2021 3.9*    A-G Ratio 02/16/2021 0.8*    WBC 02/16/2021 6.4     RBC 02/16/2021 3.88*    HGB 02/16/2021 9.9*    HCT 02/16/2021 30.5*    MCV 02/16/2021 78.6*    MCH 02/16/2021 25.5*    MCHC 02/16/2021 32.5     RDW 02/16/2021 15.5*    PLATELET 43/08/0944 661     MPV 02/16/2021 9.6     ABSOLUTE NRBC 02/16/2021 0.00     DF 02/16/2021 AUTOMATED     NEUTROPHILS 02/16/2021 67     LYMPHOCYTES 02/16/2021 24     MONOCYTES 02/16/2021 7     EOSINOPHILS 02/16/2021 2     BASOPHILS 02/16/2021 0     IMMATURE GRANULOCYTES 02/16/2021 0     ABS. NEUTROPHILS 02/16/2021 4.3     ABS. LYMPHOCYTES 02/16/2021 1.5     ABS. MONOCYTES 02/16/2021 0.4     ABS. EOSINOPHILS 02/16/2021 0.1     ABS. BASOPHILS 02/16/2021 0.0     ABS. IMM. GRANS. 02/16/2021 0.0     Glucose (POC) 02/16/2021 150*       IMAGING:  Xr 2nd Toe Lt Min 2 V    Result Date: 2/15/2021  1. Apparent lucency at the tip of the second toe distal phalanx, on the lateral view, raising the question of osteomyelitis. Note that MRI is more sensitive for evaluation of osteomyelitis. 2. No acute fracture. EKG:  No results found for this or any previous visit.           Wilton Candelaria MD  2/16/2021 9:58 AM

## 2021-02-16 NOTE — ED NOTES
TRANSFER - OUT REPORT:    Verbal report given to [facility] on Doneta Child  being transferred to [address] [place] [address] for routine progression of care       Report consisted of patients Situation, Background, Assessment and   Recommendations(SBAR). Information from the following report(s) SBAR, Kardex, ED Summary, [facility] and Recent Results was reviewed with the receiving nurse. Lines:   Peripheral IV 02/15/21 Left Antecubital (Active)   Site Assessment Clean, dry, & intact 02/15/21 2255   Phlebitis Assessment 0 02/15/21 2255   Infiltration Assessment 0 02/15/21 2255   Dressing Status Clean, dry, & intact 02/15/21 2255   Hub Color/Line Status Pink;Flushed 02/15/21 2255   Action Taken Blood drawn 02/15/21 2255       Peripheral IV 02/16/21 Right Forearm (Active)        Opportunity for questions and clarification was provided.       Patient transported with:   Registered Nurse

## 2021-02-16 NOTE — PROGRESS NOTES
02/16/21 01:45-  Admission assessment complete. Patient received from the ED alert and oriented. Respirations are even and unlabored, bowel sounds are present. IV antibiotics are infusing per order. Patient has a blister on left second toe, as well as a wound on left heel. This wound is not new, and is healing. Approximately quarter sized, patient states that it was much bigger. Great toe on patient's right foot has been amputated. Patient has scattered scars on her great ankle, she states that appeared during previous infection. Patient does have some neuropathy in lower extremities. +2 Edema noted starting at left ankle and continuing throughout foot. Patient is able to ambulate and has gauze wrap and boot on foot at this time. Oriented to call light, and bed functions. Bed low and locked, call light within reach.      02/16/21 0130   Dual Skin Pressure Injury Assessment   Dual Skin Pressure Injury Assessment X   Second Care Provider (Based on [address] [place] [address]) Ivania Hugo RN   Heel  Left   Skin Integumentary   Skin Integumentary (WDL) X   Skin Integrity Blister;Scars (comment); Wound (add Wound LDA)  (left second toe, scars right ankle, wound left heel)    Pressure  Injury Documentation Pressure Injury Noted-See Wound LDA to Document  (left heel)   Wound Prevention and Protection Methods   Orientation of Wound Prevention Posterior; Left   Location of Wound Prevention Heel   Dressing Present  No   Wound Offloading (Prevention Methods) Heel boots

## 2021-02-16 NOTE — PROGRESS NOTES
Vancomycin Consult    MD ordering: Dorothea JONES following? no  Indication: Osteomyelitis/Diabetic foot infection  DOT:  7 days  Goal level(s): 15 - 20    Ht Readings from Last 1 Encounters:   02/15/21 5' 8\" (1.727 m)      Wt Readings from Last 1 Encounters:   02/15/21 103 kg (227 lb)         Temp (24hrs), Av.9 °F (37.2 °C), Min:98.3 °F (36.8 °C), Max:99.6 °F (37.6 °C)    Dosing weight: 103 kg  52 y.o. Date:  Dose/Freq Admin Times Level/Time:   21 Vanc 2 gm IV x 1 dose 0226    21 Vanc 1 gm IV q12h  Dose change 1403    21 Vanc 1750 mg IV q12h (0200) (1400)                  Recent Labs     21  0502 21  0141 02/15/21  2256   BUN 15  --  18   CREA 0.75  --  0.91   WBC 6.4  --  8.4   LAC  --  1.4 2.3*     Estimated Creatinine Clearance: 115.1 mL/min (based on SCr of 0.75 mg/dL). Day 1 Assessment and Plan:  Based on pt wt and indication for use, changed dose to Vancomycin 1750 mg IV every 12 hours. Next dose is due tomorrow at 0200. Pharmacy will continue to follow.       Thank you,    Denise Austin, PharmD

## 2021-02-16 NOTE — PROGRESS NOTES
Care Management Interventions  PCP Verified by CM: Yes(Alejandrina)  Mode of Transport at Discharge: (Family)  Transition of Care Consult (CM Consult): Discharge Planning  MyChart Signup: No  Discharge Durable Medical Equipment: No  Physical Therapy Consult: No  Occupational Therapy Consult: No  Speech Therapy Consult: No  Current Support Network: Lives with Spouse, Own Home  Confirm Follow Up Transport: Family  Discharge Location  Discharge Placement: Home with family assistance    SW met with patient/ while social distancing w/appropriate PPE. Patient has a rolling walker and wheelchair available at home, but she does not use this equipment. Patient has been receiving care at the [facility] (wound on left heel) prior to admission. Patient's  will be her support person at discharge. SW will continue to follow and provide support.     YVAN Orlando-ISRAEL  [address] [place] [address]   [phone]

## 2021-02-16 NOTE — ED TRIAGE NOTES
Pt states a blister popped up on top of her left second tonight that has already busted. Pt also with red streak going from left second toe up her foot. Pt states she irrigated toe with normal saline. Pt states she is seeing wound care for a wound on her left heel that started two months ago. Pt states she is borderline diabetic.

## 2021-02-16 NOTE — MANAGEMENT PLAN
Pharmacokinetic Consult to Pharmacist    Amador Shaggy is a 52 y.o. female being treated for SST infection with vancomycin. Height: 5' 8\" (172.7 cm)  Weight: 103 kg (227 lb)  Lab Results   Component Value Date/Time    BUN 18 02/15/2021 10:56 PM    Creatinine 0.91 02/15/2021 10:56 PM    WBC 8.4 02/15/2021 10:56 PM    Procalcitonin <0.05 06/29/2020 04:34 PM    Lactic acid 1.4 02/16/2021 01:41 AM    Lactic Acid (POC) 1.8 06/13/2018 11:27 PM      Estimated Creatinine Clearance: 94.9 mL/min (based on SCr of 0.91 mg/dL). CULTURES:  Results     Procedure Component Value Units Date/Time    CULTURE, BLOOD [[id number]] Collected: 02/16/21 0021    Order Status: Completed Specimen: Blood Updated: 02/16/21 0049    CULTURE, BLOOD [[id number]] Collected: 02/15/21 2256    Order Status: Completed Specimen: Blood Updated: 02/15/21 2310            Day 1 of vancomycin. Goal trough is 15-20. Vancomycin dose initiated at 2,000 mg load, followed by 1,000 mg q12h. Will continue to follow patient and order levels when clinically indicated.     Thank you,  Benji Valentine, BillyD

## 2021-02-16 NOTE — CONSULTS
Chart reviewed and discussed via [employer]. She was admitted last night for a blister of her left 2nd toe with xray findings suspicious of osteo. She is being followed for a left heel wound by [facility]. She is not septic, afebrile and WBC 6.4. Arterial duplex was ordered and reviewed by Dr. Jesús Macias. The patient has no arterial disease that requires vascular surgery intervention. Would recommend evaluation by general surgery. Please do not hesitate to call with any questions and/or concerns. No charge will be billed to this patient. Candace Smith NP/Dr. June Cárdenas.  Jes[facility]s [facility]

## 2021-02-17 ENCOUNTER — APPOINTMENT (OUTPATIENT)
Dept: MRI IMAGING | Age: 51
DRG: 638 | End: 2021-02-17
Attending: HOSPITALIST
Payer: COMMERCIAL

## 2021-02-17 LAB
ALBUMIN SERPL-MCNC: 2.9 G/DL (ref 3.5–5)
ALBUMIN/GLOB SERPL: 0.7 {RATIO} (ref 1.2–3.5)
ALP SERPL-CCNC: 77 U/L (ref 50–136)
ALT SERPL-CCNC: 20 U/L (ref 12–65)
ANION GAP SERPL CALC-SCNC: 4 MMOL/L (ref 7–16)
AST SERPL-CCNC: 16 U/L (ref 15–37)
BASOPHILS # BLD: 0 K/UL (ref 0–0.2)
BASOPHILS NFR BLD: 0 % (ref 0–2)
BILIRUB SERPL-MCNC: 0.3 MG/DL (ref 0.2–1.1)
BUN SERPL-MCNC: 12 MG/DL (ref 6–23)
CALCIUM SERPL-MCNC: 8.7 MG/DL (ref 8.3–10.4)
CHLORIDE SERPL-SCNC: 104 MMOL/L (ref 98–107)
CO2 SERPL-SCNC: 30 MMOL/L (ref 21–32)
CREAT SERPL-MCNC: 0.78 MG/DL (ref 0.6–1)
DIFFERENTIAL METHOD BLD: ABNORMAL
EOSINOPHIL # BLD: 0.2 K/UL (ref 0–0.8)
EOSINOPHIL NFR BLD: 4 % (ref 0.5–7.8)
ERYTHROCYTE [DISTWIDTH] IN BLOOD BY AUTOMATED COUNT: 15.3 % (ref 11.9–14.6)
ERYTHROCYTE [SEDIMENTATION RATE] IN BLOOD: 73 MM/HR (ref 0–20)
GLOBULIN SER CALC-MCNC: 4 G/DL (ref 2.3–3.5)
GLUCOSE SERPL-MCNC: 105 MG/DL (ref 65–100)
HCT VFR BLD AUTO: 31 % (ref 35.8–46.3)
HGB BLD-MCNC: 9.5 G/DL (ref 11.7–15.4)
IMM GRANULOCYTES # BLD AUTO: 0 K/UL (ref 0–0.5)
IMM GRANULOCYTES NFR BLD AUTO: 1 % (ref 0–5)
LYMPHOCYTES # BLD: 1.2 K/UL (ref 0.5–4.6)
LYMPHOCYTES NFR BLD: 29 % (ref 13–44)
MCH RBC QN AUTO: 25 PG (ref 26.1–32.9)
MCHC RBC AUTO-ENTMCNC: 30.6 G/DL (ref 31.4–35)
MCV RBC AUTO: 81.6 FL (ref 79.6–97.8)
MONOCYTES # BLD: 0.4 K/UL (ref 0.1–1.3)
MONOCYTES NFR BLD: 10 % (ref 4–12)
NEUTS SEG # BLD: 2.2 K/UL (ref 1.7–8.2)
NEUTS SEG NFR BLD: 56 % (ref 43–78)
NRBC # BLD: 0 K/UL (ref 0–0.2)
PLATELET # BLD AUTO: 150 K/UL (ref 150–450)
PMV BLD AUTO: 9.4 FL (ref 9.4–12.3)
POTASSIUM SERPL-SCNC: 3.9 MMOL/L (ref 3.5–5.1)
PROT SERPL-MCNC: 6.9 G/DL (ref 6.3–8.2)
RBC # BLD AUTO: 3.8 M/UL (ref 4.05–5.2)
SODIUM SERPL-SCNC: 138 MMOL/L (ref 136–145)
WBC # BLD AUTO: 4 K/UL (ref 4.3–11.1)

## 2021-02-17 PROCEDURE — 87205 SMEAR GRAM STAIN: CPT

## 2021-02-17 PROCEDURE — 74011250636 HC RX REV CODE- 250/636: Performed by: FAMILY MEDICINE

## 2021-02-17 PROCEDURE — A9575 INJ GADOTERATE MEGLUMI 0.1ML: HCPCS | Performed by: HOSPITALIST

## 2021-02-17 PROCEDURE — 74011250637 HC RX REV CODE- 250/637: Performed by: FAMILY MEDICINE

## 2021-02-17 PROCEDURE — 65270000029 HC RM PRIVATE

## 2021-02-17 PROCEDURE — 74011250636 HC RX REV CODE- 250/636: Performed by: EMERGENCY MEDICINE

## 2021-02-17 PROCEDURE — 74011250637 HC RX REV CODE- 250/637: Performed by: HOSPITALIST

## 2021-02-17 PROCEDURE — 74011250636 HC RX REV CODE- 250/636: Performed by: HOSPITALIST

## 2021-02-17 PROCEDURE — 85652 RBC SED RATE AUTOMATED: CPT

## 2021-02-17 PROCEDURE — 36415 COLL VENOUS BLD VENIPUNCTURE: CPT

## 2021-02-17 PROCEDURE — 87077 CULTURE AEROBIC IDENTIFY: CPT

## 2021-02-17 PROCEDURE — 85025 COMPLETE CBC W/AUTO DIFF WBC: CPT

## 2021-02-17 PROCEDURE — 80053 COMPREHEN METABOLIC PANEL: CPT

## 2021-02-17 PROCEDURE — 87186 SC STD MICRODIL/AGAR DIL: CPT

## 2021-02-17 PROCEDURE — 73720 MRI LWR EXTREMITY W/O&W/DYE: CPT

## 2021-02-17 RX ORDER — SODIUM CHLORIDE 0.9 % (FLUSH) 0.9 %
10 SYRINGE (ML) INJECTION
Status: COMPLETED | OUTPATIENT
Start: 2021-02-17 | End: 2021-02-17

## 2021-02-17 RX ORDER — SODIUM CHLORIDE, SODIUM LACTATE, POTASSIUM CHLORIDE, CALCIUM CHLORIDE 600; 310; 30; 20 MG/100ML; MG/100ML; MG/100ML; MG/100ML
100 INJECTION, SOLUTION INTRAVENOUS CONTINUOUS
Status: DISCONTINUED | OUTPATIENT
Start: 2021-02-17 | End: 2021-02-19 | Stop reason: HOSPADM

## 2021-02-17 RX ORDER — GADOTERATE MEGLUMINE 376.9 MG/ML
20 INJECTION INTRAVENOUS
Status: COMPLETED | OUTPATIENT
Start: 2021-02-17 | End: 2021-02-17

## 2021-02-17 RX ADMIN — VANCOMYCIN HYDROCHLORIDE 1750 MG: 10 INJECTION, POWDER, LYOPHILIZED, FOR SOLUTION INTRAVENOUS at 01:56

## 2021-02-17 RX ADMIN — LACTOBACILLUS TAB 2 TABLET: TAB at 08:30

## 2021-02-17 RX ADMIN — MORPHINE SULFATE 2 MG: 2 INJECTION, SOLUTION INTRAMUSCULAR; INTRAVENOUS at 08:34

## 2021-02-17 RX ADMIN — POLYETHYLENE GLYCOL 3350 17 G: 17 POWDER, FOR SOLUTION ORAL at 08:44

## 2021-02-17 RX ADMIN — TRAZODONE HYDROCHLORIDE 100 MG: 50 TABLET ORAL at 21:11

## 2021-02-17 RX ADMIN — ACETAMINOPHEN 650 MG: 325 TABLET, FILM COATED ORAL at 21:11

## 2021-02-17 RX ADMIN — METRONIDAZOLE 500 MG: 500 INJECTION, SOLUTION INTRAVENOUS at 08:31

## 2021-02-17 RX ADMIN — SODIUM CHLORIDE, SODIUM LACTATE, POTASSIUM CHLORIDE, AND CALCIUM CHLORIDE 100 ML/HR: 600; 310; 30; 20 INJECTION, SOLUTION INTRAVENOUS at 21:27

## 2021-02-17 RX ADMIN — Medication 10 ML: at 11:37

## 2021-02-17 RX ADMIN — MORPHINE SULFATE 2 MG: 2 INJECTION, SOLUTION INTRAMUSCULAR; INTRAVENOUS at 13:35

## 2021-02-17 RX ADMIN — CIPROFLOXACIN 400 MG: 2 INJECTION, SOLUTION INTRAVENOUS at 03:23

## 2021-02-17 RX ADMIN — OXYCODONE 10 MG: 5 TABLET ORAL at 03:54

## 2021-02-17 RX ADMIN — Medication 10 ML: at 13:35

## 2021-02-17 RX ADMIN — CIPROFLOXACIN 400 MG: 2 INJECTION, SOLUTION INTRAVENOUS at 16:22

## 2021-02-17 RX ADMIN — OXYCODONE 10 MG: 5 TABLET ORAL at 23:33

## 2021-02-17 RX ADMIN — DULOXETINE HYDROCHLORIDE 60 MG: 30 CAPSULE, DELAYED RELEASE ORAL at 08:31

## 2021-02-17 RX ADMIN — TAMSULOSIN HYDROCHLORIDE 0.4 MG: 0.4 CAPSULE ORAL at 08:31

## 2021-02-17 RX ADMIN — BUPROPION HYDROCHLORIDE 150 MG: 150 TABLET, EXTENDED RELEASE ORAL at 08:31

## 2021-02-17 RX ADMIN — PRAVASTATIN SODIUM 40 MG: 20 TABLET ORAL at 08:30

## 2021-02-17 RX ADMIN — VANCOMYCIN HYDROCHLORIDE 1750 MG: 10 INJECTION, POWDER, LYOPHILIZED, FOR SOLUTION INTRAVENOUS at 14:25

## 2021-02-17 RX ADMIN — PANTOPRAZOLE SODIUM 40 MG: 40 TABLET, DELAYED RELEASE ORAL at 05:01

## 2021-02-17 RX ADMIN — SODIUM CHLORIDE, SODIUM LACTATE, POTASSIUM CHLORIDE, AND CALCIUM CHLORIDE 1000 ML: 600; 310; 30; 20 INJECTION, SOLUTION INTRAVENOUS at 20:01

## 2021-02-17 RX ADMIN — GADOTERATE MEGLUMINE 20 ML: 376.9 INJECTION INTRAVENOUS at 11:37

## 2021-02-17 RX ADMIN — ACETAMINOPHEN 650 MG: 325 TABLET, FILM COATED ORAL at 14:33

## 2021-02-17 RX ADMIN — Medication 10 ML: at 21:31

## 2021-02-17 RX ADMIN — PROMETHAZINE HYDROCHLORIDE 12.5 MG: 25 TABLET ORAL at 21:25

## 2021-02-17 RX ADMIN — ASPIRIN 81 MG: 81 TABLET, CHEWABLE ORAL at 08:31

## 2021-02-17 RX ADMIN — Medication 10 ML: at 04:59

## 2021-02-17 RX ADMIN — Medication 10 ML: at 06:00

## 2021-02-17 RX ADMIN — METRONIDAZOLE 500 MG: 500 INJECTION, SOLUTION INTRAVENOUS at 16:21

## 2021-02-17 RX ADMIN — ACETAMINOPHEN 650 MG: 325 TABLET, FILM COATED ORAL at 03:54

## 2021-02-17 RX ADMIN — BUPROPION HYDROCHLORIDE 150 MG: 150 TABLET, EXTENDED RELEASE ORAL at 16:22

## 2021-02-17 RX ADMIN — DULOXETINE HYDROCHLORIDE 60 MG: 30 CAPSULE, DELAYED RELEASE ORAL at 16:22

## 2021-02-17 RX ADMIN — METRONIDAZOLE 500 MG: 500 INJECTION, SOLUTION INTRAVENOUS at 23:34

## 2021-02-17 RX ADMIN — Medication 1 TABLET: at 08:31

## 2021-02-17 RX ADMIN — ENOXAPARIN SODIUM 40 MG: 40 INJECTION SUBCUTANEOUS at 08:31

## 2021-02-17 NOTE — PROGRESS NOTES
[facility] Progress Note    Subjective:     No acute events overnight, patient has no new complaints today,  Cardiovascular, respiratory, GI review of system negative except mentioned above      Assessment / Plan:  70-year-old female with past medical history of rheumatoid arthritis, peripheral neuropathy, chronic microcytic anemia, hypertension, obesity, depression.   Admitted with osteomyelitis of the left second toe.       left second toe cellulitis   showing changes consistent with possible  Osteomyelitis -->  lucency at the tip of the second toe distal phalanx  Note she has had a history of osteomyelitis in the past where she had failed intravenous antibiotics and wound care this is of the right foot/toe   She has been started on IV Cipro, IV vancomycin, IV metronidazole  Vascular surgery and wound care consult placed  -02/16 ABIs performed so no e/o PAD, request MRI W/WO Contrast to evaluate for confirmation of  osteomyelitis, check inflammatory markers ESR, wound culture , request Orthopedic surgery consult   February 17appreciate orthopedic surgery team, osteomyelitis as well clinically, continue IV antibiotics, consider transitioning to oral antibiotics soon    Rheumatoid arthritis with peripheral neuropathy  Adding complexity to medical case  - She is not being treated with DMARDs, and she can not confirm if she was sero positive diagnosed     Pre Diabetes Mellitus Type II   hemoglobin A1c, 6.5     Hypertension  Resume lisinoprilhydrochlorothiazide    Chronic microcytic anemia  Resume multivitamins with minerals    Additional medical history  Depression: Resume bupropion  Obesity      Chief Complaint : Foot Pain          Objective:  Visit Vitals  BP (!) 98/55 (BP 1 Location: Right upper arm)   Pulse 78   Temp 98 °F (36.7 °C)   Resp 16   Ht 5' 8\" (1.727 m)   Wt 103 kg (227 lb)   SpO2 95%   BMI 34.52 kg/m²                 Physical Exam:  General: No acute distress, speaking in full sentences, no use of accessory muscles   HEENT: Pupils equal and reactive to light and accommodation, oropharynx is clear   Neck: Supple, no lymphadenopathy, no JVD   Lungs: Clear to auscultation bilaterally   Cardiovascular: Regular rate and rhythm with normal S1 and S2   Abdomen: Soft, nontender, nondistended, normoactive bowel sounds   Extremities: Left heel ulcer chronic, left 2nd toe ulcer, foul smelling   Neuro: Nonfocal, A&O x3   Psych: Normal affect     Intake and Output:  Date 02/16/21 0700 - 02/17/21 0659 02/17/21 0700 - 02/18/21 0659   Shift [id number] 1900-0659 24 Hour Total 4855-2211 2949-7450 24 Hour Total   INTAKE   P.O.    240  240     P. O.    240  240   Shift Total(mL/kg)    240(2.3)  240(2.3)   OUTPUT   Shift Total(mL/kg)         NET    240  240   Weight (kg) 103 103 103 103 103 103       LAB:  Admission on 02/15/2021   Component Date Value    WBC 02/15/2021 8.4     RBC 02/15/2021 4.55     HGB 02/15/2021 11.5*    HCT 02/15/2021 36.1     MCV 02/15/2021 79.3*    MCH 02/15/2021 25.3*    MCHC 02/15/2021 31.9     RDW 02/15/2021 15.4*    PLATELET 32/32/6900 918     MPV 02/15/2021 9.8     ABSOLUTE NRBC 02/15/2021 0.00     DF 02/15/2021 AUTOMATED     NEUTROPHILS 02/15/2021 71     LYMPHOCYTES 02/15/2021 22     MONOCYTES 02/15/2021 6     EOSINOPHILS 02/15/2021 2     BASOPHILS 02/15/2021 0     IMMATURE GRANULOCYTES 02/15/2021 0     ABS. NEUTROPHILS 02/15/2021 5.9     ABS. LYMPHOCYTES 02/15/2021 1.8     ABS. MONOCYTES 02/15/2021 0.5     ABS. EOSINOPHILS 02/15/2021 0.2     ABS. BASOPHILS 02/15/2021 0.0     ABS. IMM.  GRANS. 02/15/2021 0.0     Sodium 02/15/2021 136     Potassium 02/15/2021 3.7     Chloride 02/15/2021 99     CO2 02/15/2021 28     Anion gap 02/15/2021 9     Glucose 02/15/2021 183*    BUN 02/15/2021 18     Creatinine 02/15/2021 0.91     GFR est AA 02/15/2021 >60     GFR est non-AA 02/15/2021 >60     Calcium 02/15/2021 9.7     Bilirubin, total 02/15/2021 0.4     ALT (SGPT) 02/15/2021 23     AST (SGOT) 02/15/2021 14*    Alk. phosphatase 02/15/2021 92     Protein, total 02/15/2021 8.3*    Albumin 02/15/2021 3.7     Globulin 02/15/2021 4.6*    A-G Ratio 02/15/2021 0.8*    Lactic acid 02/15/2021 2.3*    Special Requests: 02/15/2021                      Value:LEFT  Antecubital      Culture result: 02/15/2021 NO GROWTH 2 DAYS     Special Requests: 02/16/2021 RIGHT FOREARM     Culture result: 02/16/2021 NO GROWTH 1 DAY     Lactic acid 02/16/2021 1.4     Sodium 02/16/2021 136     Potassium 02/16/2021 3.6     Chloride 02/16/2021 101     CO2 02/16/2021 28     Anion gap 02/16/2021 7     Glucose 02/16/2021 146*    BUN 02/16/2021 15     Creatinine 02/16/2021 0.75     GFR est AA 02/16/2021 >60     GFR est non-AA 02/16/2021 >60     Calcium 02/16/2021 9.1     Bilirubin, total 02/16/2021 0.3     ALT (SGPT) 02/16/2021 20     AST (SGOT) 02/16/2021 10*    Alk. phosphatase 02/16/2021 76     Protein, total 02/16/2021 7.1     Albumin 02/16/2021 3.2*    Globulin 02/16/2021 3.9*    A-G Ratio 02/16/2021 0.8*    WBC 02/16/2021 6.4     RBC 02/16/2021 3.88*    HGB 02/16/2021 9.9*    HCT 02/16/2021 30.5*    MCV 02/16/2021 78.6*    MCH 02/16/2021 25.5*    MCHC 02/16/2021 32.5     RDW 02/16/2021 15.5*    PLATELET 27/03/2851 107     MPV 02/16/2021 9.6     ABSOLUTE NRBC 02/16/2021 0.00     DF 02/16/2021 AUTOMATED     NEUTROPHILS 02/16/2021 67     LYMPHOCYTES 02/16/2021 24     MONOCYTES 02/16/2021 7     EOSINOPHILS 02/16/2021 2     BASOPHILS 02/16/2021 0     IMMATURE GRANULOCYTES 02/16/2021 0     ABS. NEUTROPHILS 02/16/2021 4.3     ABS. LYMPHOCYTES 02/16/2021 1.5     ABS. MONOCYTES 02/16/2021 0.4     ABS. EOSINOPHILS 02/16/2021 0.1     ABS. BASOPHILS 02/16/2021 0.0     ABS. IMM.  GRANS. 02/16/2021 0.0     Glucose (POC) 02/16/2021 150*    Hemoglobin A1c 02/16/2021 6.4*    Est. average glucose 02/16/2021 137     Sodium 02/17/2021 138     Potassium 02/17/2021 3.9     Chloride 02/17/2021 104     CO2 02/17/2021 30     Anion gap 02/17/2021 4*    Glucose 02/17/2021 105*    BUN 02/17/2021 12     Creatinine 02/17/2021 0.78     GFR est AA 02/17/2021 >60     GFR est non-AA 02/17/2021 >60     Calcium 02/17/2021 8.7     Bilirubin, total 02/17/2021 0.3     ALT (SGPT) 02/17/2021 20     AST (SGOT) 02/17/2021 16     Alk. phosphatase 02/17/2021 77     Protein, total 02/17/2021 6.9     Albumin 02/17/2021 2.9*    Globulin 02/17/2021 4.0*    A-G Ratio 02/17/2021 0.7*    WBC 02/17/2021 4.0*    RBC 02/17/2021 3.80*    HGB 02/17/2021 9.5*    HCT 02/17/2021 31.0*    MCV 02/17/2021 81.6     MCH 02/17/2021 25.0*    MCHC 02/17/2021 30.6*    RDW 02/17/2021 15.3*    PLATELET 28/71/2060 144     MPV 02/17/2021 9.4     ABSOLUTE NRBC 02/17/2021 0.00     DF 02/17/2021 AUTOMATED     NEUTROPHILS 02/17/2021 56     LYMPHOCYTES 02/17/2021 29     MONOCYTES 02/17/2021 10     EOSINOPHILS 02/17/2021 4     BASOPHILS 02/17/2021 0     IMMATURE GRANULOCYTES 02/17/2021 1     ABS. NEUTROPHILS 02/17/2021 2.2     ABS. LYMPHOCYTES 02/17/2021 1.2     ABS. MONOCYTES 02/17/2021 0.4     ABS. EOSINOPHILS 02/17/2021 0.2     ABS. BASOPHILS 02/17/2021 0.0     ABS. IMM. GRANS. 02/17/2021 0.0     Sed rate, automated 02/17/2021 73*       IMAGING:  Xr 2nd Toe Lt Min 2 V    Result Date: 2/15/2021  1. Apparent lucency at the tip of the second toe distal phalanx, on the lateral view, raising the question of osteomyelitis. Note that MRI is more sensitive for evaluation of osteomyelitis. 2. No acute fracture. Mri Foot Lt W Wo Cont    Result Date: 2/17/2021  Soft tissue infection involving the second toe without evidence of acute osteomyelitis. Duplex Low Ext Arteries With Raquel    Result Date: 2/16/2021  No high-grade stenosis or large vessel occlusion identified. RAQUEL measurements are within normal limits.        EKG:  No results found for this or any previous visit.           Carlyn Blas DO  2/17/2021 9:58 AM

## 2021-02-17 NOTE — WOUND CARE
Patient has chronic ulcer on left posterior heel, sees [facility] wound Clinic Dr. Chris Clark for this. Patient states she has \"borderline\" diabetes and has had elevated A1c in the past 6.4 on 2/16/21. Noted recent vascular lower extremity duplex with no large vessel occlusion. Noted Dr. Sophia Pickard recommended general or orthopedic surgery. Abigail Plummer NP will discuss with Ortho. Patient stated to me that amputation is not her first choice. Patient requests callous pairing on chronic heel ulcer, not able to offer this at this facility due to lack of tools, also patient is being treated for infection in the foot with the callous and would not be in the patients best interest to perform this until the infection is better under control, patient verbalized understanding however patients spouse states \" we need every thing done to heal these wounds as soon as possible to avoid her loosing her foot\". Patients spouse also verbalized he felt the ER should not have sent her home at there last visit on PO antibiotics. Callous on left heel is 4x6cm with open 2x3.5x0.2cm wound in center. Left 2nd toe with unroofed blister 2x1x0.2cm with slough in center. MRI of forefoot does not reveal osteomyelitis. Recommend opticel ag, dry dressing every other day. Recommend considering involving ID to help with antibiotic coverage, Dr. Choi Staff notified. Also recommend culture of both wounds, Dr. Choi Staff notified. Will plan dressing changes every other day. Will monitor, nursing to change the bandages with wound team following as needed. Will need follow up with wound clinic. Will monitor.

## 2021-02-17 NOTE — PROGRESS NOTES
Orthopedic progress note:    Reviewed patient's chart and clinical findings with Dr. Lucy Tracy via [facility] and remotely from the office. There is no evidence of any sepsis. Patient has no leukocytosis. She has a slightly elevated sedimentation rate. She is afebrile. Patient's MRI revealed no evidence of osteomyelitis. There is just soft tissue cellulitis. There is no need for any orthopedic intervention at this time. Her cellulitis can be treated by her primary care provider or wound care.

## 2021-02-17 NOTE — PROGRESS NOTES
Chart reviewed. 72-year-old female who was admitted with left second toe wound and possible osteomyelitis. Has a history of right toe amputation by Dr. Dina Bautista in 6/2020 for osteomyelitis. Vascular was consulted, studies revealed no PAD. Patient is afebrile, no leukocytosis present. CRP and sed rate pending. MRI with and without IV contrast has been ordered by hospitalist. Updated Dr. Noni Berkowitz. Patient does not appear to be actively septic by labs and vitals. Will discuss with Dina Tatum in morning to determine if to be treated medically or surgically. Patient failed medical treatment in past for osteomyelitis.

## 2021-02-17 NOTE — PROGRESS NOTES
Problem: Falls - Risk of  Goal: *Absence of Falls  Description: Document Destin Cease Fall Risk and appropriate interventions in the flowsheet.   Outcome: Progressing Towards Goal  Note: Fall Risk Interventions:            Medication Interventions: Teach patient to arise slowly                   Problem: Patient Education: Go to Patient Education Activity  Goal: Patient/Family Education  Outcome: Progressing Towards Goal

## 2021-02-18 PROBLEM — L03.032 CELLULITIS OF SECOND TOE, LEFT: Status: ACTIVE | Noted: 2020-06-29

## 2021-02-18 LAB
25(OH)D3 SERPL-MCNC: 18 NG/ML (ref 30–100)
ALBUMIN SERPL-MCNC: 2.8 G/DL (ref 3.5–5)
ALBUMIN/GLOB SERPL: 0.8 {RATIO} (ref 1.2–3.5)
ALP SERPL-CCNC: 73 U/L (ref 50–130)
ALT SERPL-CCNC: 21 U/L (ref 12–65)
ANION GAP SERPL CALC-SCNC: 4 MMOL/L (ref 7–16)
AST SERPL-CCNC: 19 U/L (ref 15–37)
BASOPHILS # BLD: 0 K/UL (ref 0–0.2)
BASOPHILS NFR BLD: 0 % (ref 0–2)
BILIRUB SERPL-MCNC: 0.2 MG/DL (ref 0.2–1.1)
BUN SERPL-MCNC: 12 MG/DL (ref 6–23)
CALCIUM SERPL-MCNC: 8.8 MG/DL (ref 8.3–10.4)
CHLORIDE SERPL-SCNC: 107 MMOL/L (ref 98–107)
CO2 SERPL-SCNC: 30 MMOL/L (ref 21–32)
CREAT SERPL-MCNC: 0.74 MG/DL (ref 0.6–1)
DIFFERENTIAL METHOD BLD: ABNORMAL
EOSINOPHIL # BLD: 0.2 K/UL (ref 0–0.8)
EOSINOPHIL NFR BLD: 5 % (ref 0.5–7.8)
ERYTHROCYTE [DISTWIDTH] IN BLOOD BY AUTOMATED COUNT: 15.2 % (ref 11.9–14.6)
FOLATE SERPL-MCNC: 13.3 NG/ML (ref 3.1–17.5)
GLOBULIN SER CALC-MCNC: 3.7 G/DL (ref 2.3–3.5)
GLUCOSE SERPL-MCNC: 147 MG/DL (ref 65–100)
HCT VFR BLD AUTO: 30.2 % (ref 35.8–46.3)
HGB BLD-MCNC: 9.3 G/DL (ref 11.7–15.4)
IMM GRANULOCYTES # BLD AUTO: 0 K/UL (ref 0–0.5)
IMM GRANULOCYTES NFR BLD AUTO: 0 % (ref 0–5)
LYMPHOCYTES # BLD: 1.3 K/UL (ref 0.5–4.6)
LYMPHOCYTES NFR BLD: 41 % (ref 13–44)
MCH RBC QN AUTO: 25.1 PG (ref 26.1–32.9)
MCHC RBC AUTO-ENTMCNC: 30.8 G/DL (ref 31.4–35)
MCV RBC AUTO: 81.4 FL (ref 79.6–97.8)
MONOCYTES # BLD: 0.3 K/UL (ref 0.1–1.3)
MONOCYTES NFR BLD: 11 % (ref 4–12)
NEUTS SEG # BLD: 1.3 K/UL (ref 1.7–8.2)
NEUTS SEG NFR BLD: 43 % (ref 43–78)
NRBC # BLD: 0 K/UL (ref 0–0.2)
PLATELET # BLD AUTO: 148 K/UL (ref 150–450)
PMV BLD AUTO: 9.7 FL (ref 9.4–12.3)
POTASSIUM SERPL-SCNC: 3.9 MMOL/L (ref 3.5–5.1)
PROT SERPL-MCNC: 6.5 G/DL (ref 6.3–8.2)
RBC # BLD AUTO: 3.71 M/UL (ref 4.05–5.2)
SODIUM SERPL-SCNC: 141 MMOL/L (ref 136–145)
TSH SERPL DL<=0.005 MIU/L-ACNC: 4.61 UIU/ML
VANCOMYCIN TROUGH SERPL-MCNC: 16.2 UG/ML (ref 5–20)
VIT B12 SERPL-MCNC: 977 PG/ML (ref 193–986)
WBC # BLD AUTO: 3.1 K/UL (ref 4.3–11.1)

## 2021-02-18 PROCEDURE — 82306 VITAMIN D 25 HYDROXY: CPT

## 2021-02-18 PROCEDURE — 74011250636 HC RX REV CODE- 250/636: Performed by: EMERGENCY MEDICINE

## 2021-02-18 PROCEDURE — 36415 COLL VENOUS BLD VENIPUNCTURE: CPT

## 2021-02-18 PROCEDURE — 82746 ASSAY OF FOLIC ACID SERUM: CPT

## 2021-02-18 PROCEDURE — 74011250637 HC RX REV CODE- 250/637: Performed by: HOSPITALIST

## 2021-02-18 PROCEDURE — 74011250637 HC RX REV CODE- 250/637: Performed by: FAMILY MEDICINE

## 2021-02-18 PROCEDURE — 87389 HIV-1 AG W/HIV-1&-2 AB AG IA: CPT

## 2021-02-18 PROCEDURE — 74011250636 HC RX REV CODE- 250/636: Performed by: FAMILY MEDICINE

## 2021-02-18 PROCEDURE — 65270000029 HC RM PRIVATE

## 2021-02-18 PROCEDURE — 84443 ASSAY THYROID STIM HORMONE: CPT

## 2021-02-18 PROCEDURE — 80202 ASSAY OF VANCOMYCIN: CPT

## 2021-02-18 PROCEDURE — 85025 COMPLETE CBC W/AUTO DIFF WBC: CPT

## 2021-02-18 PROCEDURE — 82607 VITAMIN B-12: CPT

## 2021-02-18 PROCEDURE — 80053 COMPREHEN METABOLIC PANEL: CPT

## 2021-02-18 RX ADMIN — METRONIDAZOLE 500 MG: 500 INJECTION, SOLUTION INTRAVENOUS at 08:20

## 2021-02-18 RX ADMIN — POLYETHYLENE GLYCOL 3350 17 G: 17 POWDER, FOR SOLUTION ORAL at 08:20

## 2021-02-18 RX ADMIN — SODIUM CHLORIDE, SODIUM LACTATE, POTASSIUM CHLORIDE, AND CALCIUM CHLORIDE 100 ML/HR: 600; 310; 30; 20 INJECTION, SOLUTION INTRAVENOUS at 21:39

## 2021-02-18 RX ADMIN — TAMSULOSIN HYDROCHLORIDE 0.4 MG: 0.4 CAPSULE ORAL at 08:20

## 2021-02-18 RX ADMIN — OXYCODONE 10 MG: 5 TABLET ORAL at 05:52

## 2021-02-18 RX ADMIN — METRONIDAZOLE 500 MG: 500 INJECTION, SOLUTION INTRAVENOUS at 23:22

## 2021-02-18 RX ADMIN — LACTOBACILLUS TAB 2 TABLET: TAB at 08:20

## 2021-02-18 RX ADMIN — Medication 1 TABLET: at 08:20

## 2021-02-18 RX ADMIN — CIPROFLOXACIN 400 MG: 2 INJECTION, SOLUTION INTRAVENOUS at 15:10

## 2021-02-18 RX ADMIN — PRAVASTATIN SODIUM 40 MG: 20 TABLET ORAL at 08:20

## 2021-02-18 RX ADMIN — Medication 10 ML: at 21:40

## 2021-02-18 RX ADMIN — TRAZODONE HYDROCHLORIDE 100 MG: 50 TABLET ORAL at 21:38

## 2021-02-18 RX ADMIN — DULOXETINE HYDROCHLORIDE 60 MG: 30 CAPSULE, DELAYED RELEASE ORAL at 16:38

## 2021-02-18 RX ADMIN — CIPROFLOXACIN 400 MG: 2 INJECTION, SOLUTION INTRAVENOUS at 03:41

## 2021-02-18 RX ADMIN — MORPHINE SULFATE 2 MG: 2 INJECTION, SOLUTION INTRAMUSCULAR; INTRAVENOUS at 11:56

## 2021-02-18 RX ADMIN — VANCOMYCIN HYDROCHLORIDE 1750 MG: 10 INJECTION, POWDER, LYOPHILIZED, FOR SOLUTION INTRAVENOUS at 15:02

## 2021-02-18 RX ADMIN — BUPROPION HYDROCHLORIDE 150 MG: 150 TABLET, EXTENDED RELEASE ORAL at 16:38

## 2021-02-18 RX ADMIN — DULOXETINE HYDROCHLORIDE 60 MG: 30 CAPSULE, DELAYED RELEASE ORAL at 08:20

## 2021-02-18 RX ADMIN — PANTOPRAZOLE SODIUM 40 MG: 40 TABLET, DELAYED RELEASE ORAL at 05:53

## 2021-02-18 RX ADMIN — OXYCODONE 10 MG: 5 TABLET ORAL at 21:38

## 2021-02-18 RX ADMIN — ENOXAPARIN SODIUM 40 MG: 40 INJECTION SUBCUTANEOUS at 08:20

## 2021-02-18 RX ADMIN — METRONIDAZOLE 500 MG: 500 INJECTION, SOLUTION INTRAVENOUS at 15:11

## 2021-02-18 RX ADMIN — Medication 10 ML: at 14:00

## 2021-02-18 RX ADMIN — ASPIRIN 81 MG: 81 TABLET, CHEWABLE ORAL at 08:20

## 2021-02-18 RX ADMIN — MORPHINE SULFATE 2 MG: 2 INJECTION, SOLUTION INTRAMUSCULAR; INTRAVENOUS at 16:43

## 2021-02-18 RX ADMIN — SODIUM CHLORIDE, SODIUM LACTATE, POTASSIUM CHLORIDE, AND CALCIUM CHLORIDE 100 ML/HR: 600; 310; 30; 20 INJECTION, SOLUTION INTRAVENOUS at 06:14

## 2021-02-18 RX ADMIN — BUPROPION HYDROCHLORIDE 150 MG: 150 TABLET, EXTENDED RELEASE ORAL at 08:20

## 2021-02-18 RX ADMIN — VANCOMYCIN HYDROCHLORIDE 1750 MG: 10 INJECTION, POWDER, LYOPHILIZED, FOR SOLUTION INTRAVENOUS at 01:50

## 2021-02-18 NOTE — PROGRESS NOTES
Problem: Falls - Risk of  Goal: *Absence of Falls  Description: Document Starleen Freeze Fall Risk and appropriate interventions in the flowsheet.   Outcome: Progressing Towards Goal  Note: Fall Risk Interventions:            Medication Interventions: Teach patient to arise slowly                   Problem: Patient Education: Go to Patient Education Activity  Goal: Patient/Family Education  Outcome: Progressing Towards Goal

## 2021-02-18 NOTE — ADT AUTH CERT NOTES
[facility] [place] 
  
FACILITY NPI :[id number] 
  
[facility] [place] 
SFE 4 SURGE MIU 
[address] [facility] [address] [phone] 
  
  
   
Patient Name :Isabel Avila  : 1970 (50 yrs) MRN : [id number] 
  
Patient Mailing Address Musa [address] [place] [address] Rd [41] , [address]      
  
  . 
  
   
Insurance Plan Payor: BLUE CROSS / Plan[place] [facility] / Product Type: PPO /  
  
Primary Coverage Subscriber ID : [id number] 
  
   
Current Patient Class : INPATIENT Admit Date : 2/15/2021 
  
REQUESTED LEVEL OF CARE: INPATIENT [101] Diagnosis : Toe osteomyelitis, left ([address] 
                    
  
ICD10 Code : Toe osteomyelitis, left ([facility] [address] [M86.9]   
Current Room and Bed S440/01 
  
Admitting and Attending Info: 
Admitting Provider : Janice Morgan MD   NPI: [id number] Admitting Provider Phone. [phone] Admitting Provider Address: same as facility 
  
   
 
 
 
Facility Name: [address] [place] [address] Patient Demographics Patient Name Sejal Conte Sex Female   
1970 Address [address] Phone [phone] (Home) [phone] (Mobile) *Preferred* Hospital Account Name Acct ID Class Status Primary Coverage Sejal Conte [id number] INPATIENT Open BLUE CROSS - [address] Guarantor Account (for Hospital Account [de-identified]) Name Relation to Pt Service Area Active? Acct Type Sejal Conte Self BONSC Yes Personal/Family Address Phone [facility]  
[facility], [address] [place] [address] [phone]) Coverage Information (for Hospital Account [de-identified]) F/O Payor/Plan Subscriber  Subscriber Sex Precert # BLUE CROSS/[place] BLUE CROSS [facility] 70 M Subscriber Subscriber # Malia Coy [id number] [facility] # Group Name [id number] Address Phone [address] [facility] Policy Number 
 
Status Effective Date Benefits Phone 
 
 
- -  - Auth/Cert REF# [id number] Diagnosis Codes Comments Other osteomyelitis of left foot ([facility] [address]  ICD-10-CM: Z06.9Q2 ICD-9-CM: 730.27 Admission Information Arrival Date/Time: 02/15/2021 2207 Admit Date/Time: 02/15/2021 2242 IP Adm. Date/Time: 2021 7174 Admission Type: Emergency Point of Origin: Non-health Care Facility/self Admit Category:   
Means of Arrival: Car Primary Service: Medicine Secondary Service: N/A Transfer Source:  Service Area:  [address] [place] [address] Unit: [facility] SURGE [facility]U Admit Provider: Liliana Basilio MD Attending Provider: Nahid Santiago MD Referring Provider:   
 
 
 
 
 
Admission Information Attending Provider Admission Dx Admitted on 
 
 
Otis Gandhi DO Toe osteomyelitis, left [facility]) 02/15/21 Service Isolation Code Status MEDICINE  Full Code Allergies Advance Care Planning Pcn [Penicillins], Norco [Hydrocodone-acetaminophen] Jump to the Activity Admission Information Unit/Bed: SFE 4 SURGE MIU/01 Service: MEDICINE Admitting provider: Liliana Basilio MD Phone: [phone] Attending provider: Otis Gandhi DO Phone: [phone] PCP: Rod Michel MD Phone: [phone] Admission dx:  Patient class: I Admission type: ER    
 
 
 
 
 
Patient Demographics Patient Name Adolfo To  
[id number] Sex Female   
1970 Address [place]  
[address] [place] [address] Phone [phone] (Home) [phone] (Mobile) *Preferred* H&P Notes H&P by Darcia Bloch, MD at 02/16/21 0018 documented on ED to Hosp-Admission (Current) from 2/15/2021 in SFE 4 SURGE MIU Author: Darcia Bloch, MD Author Type: Physician Filed: 02/16/21 8063 Note Status: Signed Cosign: Cosign Not Required Date of Service: 02/16/21 0018 : Darcia Bloch, MD (Physician) [facility] and Physical Note Patient: Jessica Norris Date: 2/16/2021 
 
female, 52 y.o. Admit Date: 2/15/2021 Attending: Darcia Bloch, MD  
 
  
 
ASSESSMENT AND PLAN:  
 
  
 
Principal Problem: 
 
  Osteomyelitis of second toe of left foot ([address] (2/16/2021) Concerning findings on CXR for possible osteo. IV Vanc/Cipro. Consult vascular surgery, wound care. Active Problems: 
 
  Acute osteomyelitis ([address] (6/30/2020) Per above. Microcytic anemia (2/16/2021) Follow CBC Hypertension (9/26/2019) Stable. Continue home meds. Rheumatoid arthritis ([address] (9/26/2019) Stable. Continue home meds. Depression (9/26/2019) Stable. Continue home meds. Class 1 obesity in adult (6/29/2020) Stable. Peripheral neuropathy (6/30/2020) Stable. DVT Prophylaxis: Lovenox Code Status: FULL CODE Disposition: Admit to med/surg for evaluation and treatment as per above. Anticipated discharge: 2-3 days CHIEF COMPLAINT:  L 2nd toe pain/redness HISTORY OF PRESENT ILLNESS:   
 
  
 
    
 
Patient Active Problem List  
 
 
Diagnosis Code  Ovarian cyst  
 
N83.209  Right lower quadrant abdominal pain R10.31  
 
 
  
 
TIA (transient ischemic attack) G45.9  Reflex sympathetic dystrophy G90.50  Hypertension I10  Rheumatoid arthritis ([address] M06.9  Chronic pain G89.29  Depression F32.9  Cellulitis and abscess of toe of right foot L03.031, L02.611  
 
 
 H/O TIA (transient ischemic attack) and stroke Z86.73  
 
 
 Class 1 obesity in adult E66.9  Acute osteomyelitis of right foot ([address]  
 
M86.171  Peripheral neuropathy G62.9  Acute osteomyelitis ([address]  
 
M86.10  
 
 
 Osteomyelitis of second toe of left foot ([address]  
 
M86.9  Microcytic anemia  
 
D50.9 Jacy Alexandra is a 52 y.o. female, with a history of  has a past medical history of Anemia, Chronic pain, Depression, GERD (gastroesophageal reflux disease), renal calculi, Hypercholesterolemia, Hypertension, Migraine, Morbid obesity ([address] Ovarian cyst, Reflex sympathetic dystrophy (since 2005), Rheumatoid arthritis ([address] Rheumatoid arthritis ([address] (9/26/2019), Sinus problem, and TIA (transient ischemic attack) (9/26/2019). She also has no past medical history of Aneurysm ([address] Arrhythmia, Asthma, CAD (coronary artery disease), Cancer ([address] Chronic kidney disease, Chronic obstructive pulmonary disease ([address] Coagulation disorder ([address] Diabetes ([address] Difficult intubation, Heart failure ([address] Liver disease, Malignant hyperthermia due to anesthesia, Nausea & vomiting, Pseudocholinesterase deficiency, PUD (peptic ulcer disease), Seizures ([address] Thromboembolus ([address] Thyroid disease, Unspecified adverse effect of anesthesia, or Unspecified sleep apnea. ,  has a past surgical history that includes hx cholecystectomy; hx appendectomy; hx total abdominal hysterectomy; hx tubal ligation; hx breast biopsy (Left, 4/27/2015); hx orthopaedic; hx orthopaedic; and hx septoplasty (08/28/2019). who presents to the ER with report of worsening redness, pain, and blistering of her L 2nd toe starting about 2-3 days ago. The blister ruptured yesterday and has become more painful and red since that time. She denies any fevers, chills, nausea, vomiting. Allergy Allergies Allergen Reactions  Pcn [Penicillins] Hives  Norco [Hydrocodone-Acetaminophen] Nausea and Vomiting Medication list 
 
 
Prior to Admission Medications Prescriptions Last Dose Informant Patient Reported? Taking? DULoxetine (CYMBALTA) 60 mg capsule Yes Yes Sig: Take 60 mg by mouth two (2) times a day.   
 
 
OMEPRAZOLE PO  
 
   
 
 Yes  
 
Yes Sig: Take 20 mg by mouth daily. aspirin 81 mg chewable tablet Yes Yes Sig: Take 81 mg by mouth. buPROPion XL (WELLBUTRIN XL) 150 mg tablet Yes Yes Sig: Take 150 mg by mouth daily. ketorolac (TORADOL) 10 mg tablet No  
 
Yes Sig: Take 1 Tab by mouth every six (6) hours as needed for Pain. lisinopril-hydroCHLOROthiazide (PRINZIDE, ZESTORETIC) 20-25 mg per tablet Yes Yes Sig: Take 1 Tab by mouth.  
 
 
pravastatin (PRAVACHOL) 40 mg tablet Yes Yes Sig: Take 40 mg by mouth daily. promethazine (PHENERGAN) 25 mg suppository Not Taking at Unknown time No  
 
No  
 
 
Sig: Insert 1 Suppository into rectum every six (6) hours as needed for Nausea for up to 10 doses. promethazine (PHENERGAN) 25 mg tablet No  
 
No  
 
 
Sig: Take 1 Tab by mouth every six (6) hours as needed for Nausea for up to 15 doses. tamsulosin (FLOMAX) 0.4 mg capsule Not Taking at Unknown time No  
 
No  
 
 
Sig: Take 1 Cap by mouth daily. traZODone (DESYREL) 100 mg tablet Yes Yes Sig: Take 100 mg by mouth. Facility-Administered Medications: None Past Medical History Past Medical History:  
 
 
Diagnosis Date  Anemia   
 
Chronic pain  
 
   
 
 
   
 
due to RA  Depression   
 
GERD (gastroesophageal reflux disease)   
 
Hx of renal calculi   
 
Hypercholesterolemia   
 
Hypertension   
 
Migraine   
 
Morbid obesity ([address]   
 
Ovarian cyst  
 
   
 
 
  
 
Reflex sympathetic dystrophy  
 
since 2005  
 
 
   
 
right arm; s/p injury - laceration in hand  Rheumatoid arthritis ([facility]   
 
Rheumatoid arthritis ([facility] [address]  
 
9/26/2019  Sinus problem  TIA (transient ischemic attack) 9/26/2019 Past Surgical History:  
 
 
Procedure Laterality Date  HX APPENDECTOMY  HX BREAST BIOPSY Left 4/27/2015 LEFT NIPPLE EXPLORATION WITH REMOVAL OF LATTISIMUS DUCT performed by Arti Solano MD at [facility] HX CHOLECYSTECTOMY   
 
HX ORTHOPAEDIC  
 
   
 
   
 
 
   
 
rt hand  HX ORTHOPAEDIC  
 
   
 
   
 
 
   
 
neck  HX SEPTOPLASTY  
 
   
 
08/28/2019 FESS,SMRIT's  
 
 
 HX TOTAL ABDOMINAL HYSTERECTOMY   
 
HX TUBAL LIGATION Social History Social History Family History:  
 
     
 
Family History Problem Relation Age of Onset  Breast Cancer Paternal Aunt 39  Cancer Paternal Aunt  
 
   
 
 
   
 
    brst  
 
 
 Cancer Maternal Grandfather  
 
   
 
 
   
 
    colon  Colon Cancer Maternal Grandfather   
 
Breast Cancer Paternal Grandmother 46  Cancer Paternal Grandmother  
 
   
 
 
   
 
    breast  
 
 
 Ovarian Cancer Paternal Grandmother   
 
Cancer Mother  
 
   
 
 
   
 
    throat  Hypertension Mother   
 
Cancer Father  
 
   
 
 
   
 
    non hodgkins lymphoma  Cancer Maternal Grandmother  
 
   
 
 
   
 
    ovarian  Breast Cancer Maternal Grandmother   
 
Heart Disease Paternal Grandfather   
 
Diabetes Paternal Grandfather   
 
Breast Cancer Sister REVIEW OF SYSTEMS:  
 
A 14 point review of systems was taken and pertinent positive as per HPI. PHYSICAL EXAMINATION: 
 
Vital 24 Hour Range Most Recent Value Temperature Temp  Min: 99.6 °F (37.6 °C)  Max: 99.6 °F (37.6 °C) 99.6 °F (37.6 °C) Pulse Pulse  Min: 107  Max: 107 (!) 107 Respiratory Resp  Min: 16  Max: 16 16 Blood Pressure BP  Min: 105/62  Max: 105/62 105/62 Pulse Oximetry SpO2  Min: 96 %  Max: 96 % 96 % O2 No data recorded Vital Most Recent Value First Value Weight 103 kg (227 lb) Weight: 103 kg (227 lb) Height 5' 8\" (172.7 cm) Height: 5' 8\" (172.7 cm) BMI   N/A Physical Exam:  
 
General:                      No acute distress, speaking in full sentences Eyes:                           No palpebral pallor or scleral icterus. ENT:                            External auricular and nasal exam within normal limits. Mucous membranes are moist. 
 
Cardiovascular:           Regular rate and rhythm, with normal S1 and S2, no JVD. No cyanosis or edema of extremities. Radial and dorsalis pedis pulses present 2+ bilaterally. Capillary refill <2s. Respiratory:                 No respiratory distress or accessory muscle use. Clear to auscultation bilaterally without wheezes or crackles. Gastrointestinal:          No active vomiting or retching. Abdomen soft, non-tender, non-distended with normoactive bowel sounds. Skin:                            L 2nd digit with circumferential erythema with ruptured vesicle over lateral dorsal toe.               
 
Neurologic:                  CN II-XII grossly intact and symmetrical.  
 
 Moving all four extremities well with no focal deficits. Psychiatric:                  Pleasant demeanor, appropriate affect. Alert and oriented x 3 Intake/Output last 3 shifts: 
 
 
 
  
 
Labs: 
 
magnesium:7,phos:7)CMP:  
 
     
 
Lab Results Component Value Date/Time NA  
 
136  
 
02/15/2021 10:56 PM  
 
 
   
 
K  
 
3.7  
 
02/15/2021 10:56 PM  
 
 
   
 
CL  
 
99  
 
02/15/2021 10:56 PM  
 
 
   
 
CO2  
 
28  
 
02/15/2021 10:56 PM  
 
 
   
 
AGAP  
 
9  
 
02/15/2021 10:56 PM  
 
 
   
 
GLU  
 
183 (H)  
 
02/15/2021 10:56 PM  
 
 
   
 
BUN  
 
18  
 
02/15/2021 10:56 PM  
 
 
   
 
CREA  
 
0.91  
 
02/15/2021 10:56 PM  
 
 
   
 
GFRAA  
 
>60  
 
02/15/2021 10:56 PM  
 
 
   
 
GFRNA  
 
>60  
 
02/15/2021 10:56 PM  
 
 
   
 
CA  
 
9.7  
 
02/15/2021 10:56 PM  
 
 
   
 
ALB 3.7  
 
02/15/2021 10:56 PM  
 
 
   
 
TBILI  
 
0.4  
 
02/15/2021 10:56 PM  
 
 
   
 
TP 8.3 (H)  
 
02/15/2021 10:56 PM  
 
 
   
 
GLOB  
 
4.6 (H)  
 
02/15/2021 10:56 PM  
 
 
   
 
AGRAT  
 
0.8 (L)  
 
02/15/2021 10:56 PM  
 
 
   
 
ALT  
 
23  
 
02/15/2021 10:56 PM  
 
 
  
 
  
 
  
 
CBC:   
 
     
 
Lab Results Component Value Date/Time WBC  
 
8.4  
 
02/15/2021 10:56 PM  
 
 
   
 
HGB  
 
11.5 (L)  
 
02/15/2021 10:56 PM  
 
 
   
 
HCT  
 
36.1  
 
02/15/2021 10:56 PM  
 
 
   
 
  
 
02/15/2021 10:56 PM  
 
 
  
 
  
 
     
 
Lab Results Component Value Date/Time INR (POC) 1.2  
 
09/26/2019 08:20 PM  
 
 
  
 
  
 
ABG:  No results found for: PH, PHI, PCO2, PCO2I, PO2, PO2I, HCO3, HCO3I, FIO2, FIO2I Lab Results Component Value Date/Time Troponin-I  
 
<0.05  
 
04/18/2012 02:26 AM  
 
 
   
 
Troponin-I, Qt.  
 
<0.02 (L)  
 
09/26/2019 09:23 PM  
 
 
   
 
BNP  
 
12  
 
04/18/2012 02:26 AM  
 
 
  
 
  
 
Imagining & Other Studies XR Results (maximum last 3): [place] [facility] [place] XR 2ND TOE LT MIN 2 V Narrative Clinical history: Left second toe infection. TECHNIQUE: 3 views of the left second toe. FINDINGS: 
 
  
 
There is lucency at the tip of the second toe distal phalanx, on the lateral 
 
view. No soft tissue gas is seen. No acute fracture or dislocation. Alignment is maintained. Joint spaces are 
 
preserved. No radiopaque foreign body. Impression 1. Apparent lucency at the tip of the second toe distal phalanx, on the lateral 
 
view, raising the question of osteomyelitis. Note that MRI is more sensitive for 
 
evaluation of osteomyelitis. 2. No acute fracture. [place] [facility]20 XR FOOT RT MIN 3 V Narrative Right foot CLINICAL INDICATION: Chronic wound to the right great toe FINDINGS: Three views of the right great toe show no obvious destructive bone 
 
change appreciated to indicate osteomyelitis by plain radiography. Soft tissue 
 
swelling is evident. The IP and first MTP joint spaces are well-maintained. There is no fracture. Impression IMPRESSION: Soft tissue swelling about the right great toe. No definite bony 
 
abnormality. If there strong clinical concern for osteomyelitis, consider 
 
further evaluation with right forefoot MRI. [place] [facility] on 06/07/20 XR CALCANEUS LT Narrative TWO-VIEW LEFT CALCANEUS: 
 
  
 
CLINICAL HISTORY:  Pain after audible pop while stretching. COMPARISON:  None. FINDINGS:  No definite fracture, malalignment, or dennis bone destruction is 
 
evident. No persistent radiopaque foreign body is seen. Moderate plantar 
 
calcaneal spur is noted. Impression IMPRESSION:  Plantar calcaneal spur with no acute abnormality. CT Results (maximum last 3): [place] [facility] [place]20 CT UROGRAM WO CONT Narrative CT ABDOMEN AND PELVIS 
 
  
 
INDICATION:  Right flank pain Multiple axial images were obtained through the abdomen and pelvis without 
 
intravenous or oral contrast.  Radiation dose reduction techniques were used for 
 
this study: All CT scans performed at this facility use one or all of the 
 
following: Automated exposure control, adjustment of the mA and/or kVp according 
 
to patient's size, iterative reconstruction. COMPARISON: None FINDINGS: 
 
-KIDNEYS/URETERS: 4 mm stone in the proximal right ureter at the L3 level. Several small nonobstructing stones in the left kidney. No significant 
 
hydronephrosis. No renal mass. -BLADDER: Normal. 
 
  
 
-LUNG BASES: No infiltrates or masses. -LIVER: Normal in size and appearance. -GALLBLADDER/BILE DUCTS: Post cholecystectomy. No bile duct dilatation. -PANCREAS: Normal. 
 
-SPLEEN: Normal. 
 
-ADRENALS: Normal. 
 
  
 
-REPRODUCTIVE ORGANS: Post hysterectomy. -BOWEL: Normal caliber. No inflammatory changes. -LYMPH NODES: No significant retroperitoneal, mesenteric, or pelvic adenopathy. -BONES: No fracture or significant bone lesion. -VASCULATURE: Normal 
 
-OTHER: No ascites. Impression IMPRESSION: 4 mm proximal right ureter stone. No significant hydronephrosis. ** If there are any questions about this report, I can be reached on 
 
[employer] or at [phone] ** [place] [facility] [place] 01/30/20 CT HEAD WO CONT Narrative EXAMINATION: HEAD CT WITHOUT CONTRAST 1/30/2020 10:23 PM 
 
  
 
ACCESSION NUMBER: [id number] COMPARISON: None available INDICATION: Left-sided headache with left-sided numbness and tingling onset 1 PM, headache for several days- CVA? ICH? TECHNIQUE: Multiple-row detector helical CT examination of the head without 
 
intravenous contrast.  
 
  
 
Radiation dose reduction techniques were used for this study. Our CT scanners 
 
use one or all of the following: Automated exposure control, adjustment of the 
 
mA and/or kV according to patient size, iterative reconstruction. FINDINGS: 
 
  
 
Brain: There is no mass, mass effect, evidence of an acute stroke, or 
 
intracranial hemorrhage. Ventricles: Normal 
 
  
 
Vasculature: Normal 
 
  
 
Bones: Normal 
 
  
 
Surrounding soft tissues:  A small air-fluid level is seen in the right 
 
maxillary sinus. Mucous retention cysts are present in the left maxillary sinus. The mastoid air cells are patent. Impression IMPRESSION: 
 
  
 
  
 
1. No acute intracranial abnormality. 2. Right maxillary sinusitis with chronic left maxillary sinus disease. [place] [facility] [place] 09/26/19 CT PERF W CBF Narrative HEAD CT with PERFUSION IMAGING 
 
  
 
INDICATION:  Slurred speech. Multiple axial images obtained through the brain without intravenous contrast.  
 
CT perfusion imaging of the brain was then performed after the administration of 
 
intravenous contrast.  Perfusion maps and perfusion analysis output were 
 
generated using the RAPID perfusion processing software algorithm. Radiation 
 
dose reduction techniques were used for this study: All CT scans performed at 
 
this facility use one or all of the following: Automated exposure control, 
 
adjustment of the mA and/or kVp according to patient's size, iterative 
 
reconstruction. FINDINGS: No areas of abnormal attenuation are seen in the brain.  There is no CT 
 
 evidence of acute hemorrhage or infarction. The ventricles are normal in size. There are no extra-axial fluid collections. No masses are seen. The sinuses are 
 
clear. There are no bony lesions. RAPID Output Values: CBF < 30% volume:  0 ml   (core infarction volume greater than 50 cc associated 
 
with poor outcomes) Tmax > 6 seconds: 0 ml Tmax/CBF Mismatch Volume: 0 ml Tmax/CBF Mismatch Ratio: NA 
 
Hypoperfusion Intensity Ratio (Tmax > 10 seconds/Tmax > 6 seconds): 0   (values 
 
greater than 0.5 associated with poor outcome) Tmax > 10 seconds Volume: 0 ml   (volume greater than 100 mL is associated with 
 
poor outcome) Impression IMPRESSION: No CT evidence of acute intracranial perfusion abnormality. Please note that the determination of patient treatment is not based solely upon 
 
imaging factors or calculation values. Management of ischemia is at the 
 
discretion of the primary physician and is based upon a combination of clinical 
 
and imaging data, along other factors. MRI Results (maximum last 3): Results from [facility] [place] encounter on 06/29/20 MRI FOOT RT WO CONT Narrative MRI of the right forefoot without contrast 
 
  
 
CLINICAL INDICATION: Right great toe swelling with cellulitis and abscess, 
 
concern for osteomyelitis PROCEDURE: Multiplanar and multisequence MR imaging performed through the right 
 
forefoot without the administration of intravenous gadolinium contrast. 
 
  
 
COMPARISON: Right foot radiographs dated 6/29/2020 FINDINGS: 
 
  
 
Generalized soft tissue swelling is appreciated diffusely about the distal 
 
aspect of the great toe with low T1 and bright T2 signal changes in the distal 
 
phalanx that meet imaging criteria for osteomyelitis.  The marrow signal in the 
 
proximal phalanx is normal. There is no joint effusion at the IP joint or the 
 
 first MTP joint to suspect septic arthritis. There is mild inflammation along 
 
the phalangeal attachment of the flexor hallucis longus tendon without dennis 
 
rupture. This is likely a mild tenosynovitis. Impression IMPRESSION: 
 
1. Acute osteomyelitis of the distal phalanx of the great toe with surrounding 
 
soft tissue cellulitis. 2. Mild tenosynovitis along the flexor hallucis longus tendon at its phalangeal 
 
attachment without tendon rupture. [place] [facility] [place] on [place]26/19 MRI CERV SPINE WO CONT Narrative MRI CERVICAL SPINE WITHOUT CONTRAST 9/27/2019 HISTORY: 28-year-old female with new onset left facial droop dysarthria and left 
 
arm numbness TECHNIQUE: Sagittal T2-weighted, T1-weighted and STIR images were obtained from 
 
the craniocervical junction through T3. Axial T2-weighted images were obtained 
 
from C2-C3 through C7-T1. COMPARISON: CT angiogram of the neck September 26, 2012 FINDINGS: The cerebellar tonsils are in a normal position. The cervical spinal 
 
cord is normal in caliber and signal intensity. Artifact is present from an 
 
anterior cervical spine fusion with a plate and screws extending into the C4, C5, C6 and C7 vertebrae. The cervical vertebrae are normal in height and 
 
alignment. Cervical marrow signal is normal visible. Axial images: Motion artifact on axial images limits evaluation of the spine and 
 
may obscure spinal pathology. C2-C3: No significant spinal stenosis or foraminal stenosis. C3-C4: No significant spinal stenosis. Mild right foraminal stenosis. C4-C5: Anterior fusion. No significant spinal stenosis or foraminal stenosis. C5-C6: Anterior fusion. No significant spinal stenosis or foraminal stenosis. C6-C7: Anterior fusion. No significant spinal stenosis or foraminal stenosis. C7-T1: No significant spinal stenosis or foraminal stenosis. Impression IMPRESSION: 
 
  
 
1. No cervical cord lesions visible. 2. Anterior cervical fusion from C4 to C7. 
 
  
 
3. Motion artifact. 4. Mild right foraminal stenosis at C3-C4. MRI BRAIN WO CONT Narrative MRI BRAIN WITHOUT CONTRAST 9/27/2019 HISTORY: 42-year-old female with new onset left facial droop and dysarthria with 
 
facial numbness and left arm numbness TECHNIQUE: Sagittal and axial T1-weighted, axial T2-weighted, axial and coronal 
 
FLAIR, axial T2-weighted gradient-echo, axial diffusion weighted images with ADC 
 
maps of the brain. COMPARISON: CT head, CT perfusion, CT angiogram 9/26/2019 FINDINGS: There is no acute infarction, acute intracranial hemorrhage, or 
 
significant mass effect. The left maxillary sinus and multiple ethmoid air cells are opacified. Debris is 
 
present in the right maxillary sinus. On the T2-weighted and FLAIR sequences, there are a few occasional punctate 
 
white matter hyperintensities within the supratentorial brain. This is not an 
 
uncommon finding which may be present with asymptomatic patients, with migraine 
 
headaches or with mild chronic small vessel ischemic disease. Impression IMPRESSION: 
 
  
 
1. No acute infarction. 2. Paranasal sinus disease. 3. Occasional nonspecific punctate white matter hyperintensities present as 
 
described. This pattern may be present with migraine headaches or mild chronic 
 
small vessel ischemic disease. Nuclear Medicine Results (maximum last 3): No results found for this or any previous visit. US Results (maximum last 3): Results from Hospital Encounter encounter on 06/13/18 US ABD LTD Narrative RIGHT UPPER QUADRANT ULTRASOUND. HISTORY: Right upper quadrant abdominal pain. COMPARISON: None. FINDINGS: It is post cholecystectomy. The common bile duct is not dilated, 3.8 
 
mm. Intrahepatic biliary tree is not dilated. Included portion of the pancreas 
 
and right kidney are unremarkable. There is diffuse fatty change of the liver. Impression IMPRESSION: Diffuse fatty change liver. Status post cholecystectomy. Results from [facility] Encounter encounter on 08/29/16 US PELV NON OB W TV  
 
 
   
 
Narrative PELVIC ULTRASOUND  8/29/2016 2:27 PM  
 
  
 
Clinical Information: 29-year-old with right lower quadrant pain. Comparison: CT at 12:35 PM. Findings: Transabdominal and transvaginal pelvic ultrasound performed. The 
 
bladder is suboptimally distended on initial transabdominal images. The uterus is surgically absent. The vaginal cuff is unremarkable. The right ovary measures 5.1 x 2.8 x 3.8 cm in conglomerate, containing 
 
follicular changes, the dominant of which measures 4.2 x 3.2 x 2.4 cm. Doppler 
 
flow is present within. The left ovary is not visualized. No dependent free pelvic fluid. Impression IMPRESSION: 
 
  
 
1. Multifocal cystic change in the right ovary, likely physiologic. No specific 
 
signs to suggest torsion. Results from [facility] Encounter encounter on 02/23/15 US BREAST LT LIMITED=<3 QUAD Narrative BILATERAL DIAGNOSTIC MAMMOGRAM and UNILATERAL BREAST ULTRASOUND, 2/23/2015. CLINICAL HISTORY: Left breast lump. In addition, the patient notes bloody left 
 
nipple discharge. Comparison studies:  Bilateral mammogram 6/4/2014.  
 
  
 
FINDINGS: 
 
BILATERAL DIAGNOSTIC MAMMOGRAM: 
 
Please note that although the patient complains of a left breast lump, a 
 
 bilateral study was performed due to the fact that it has been just under one 
 
year since the patient's prior mammogram.  Bilateral digital mammography was 
 
performed, and interpreted in conjunction with CAD overread. A skin marker was 
 
placed at the 2 o'clock position of the left breast 19 cm from the nipple. The 
 
breasts are heterogeneously dense which can obscure a subtle lesion. The axilla 
 
contain benign appearing lymph nodes. No evidence of skin thickening, or nipple 
 
retraction is seen. A few scattered faint punctate appearing calcifications 
 
are seen without evolving worrisome cluster. No evolving dominant mass, 
 
spiculated density, or architectural distortion is seen. Specifically, no 
 
evolving left retroareolar asymmetry or asymmetry underlying the left breast 
 
skin marker is seen. Impression IMPRESSION: 
 
1. No worrisome findings by mammography in the area of lump or otherwise of the 
 
bilateral breasts to suggest malignancy. Given that the patient complains of a 
 
focal palpable abnormality, I would recommend further evaluation with focused 
 
ultrasound imaging. In addition, ultrasound imaging of the retroareolar soft 
 
tissues of the left breast should be performed to assess for a dilated duct or 
 
intraductal lesion given the history of bloody nipple discharge. LEFT BREAST ULTRASOUND, 2/23/2015. CLINICAL HISTORY: Left breast lump and bloody nipple discharge. Technique: Grayscale, and Doppler imaging of the left breast soft tissues was 
 
performed using a 12 MHz transducer. FINDINGS: 
 
  
 
Focused ultrasound imaging at the     o'clock position of the     breast in the 
 
area of palpable abnormality as indicated by the patient shows no worrisome 
 
solid or cystic masses. No pathologic appearing lymph nodes, or abnormal fluid collections are seen. There is an unremarkable appearance of breast fatty, and 
 
fibroglandular elements at this level. Focused ultrasound imaging over the retroareolar soft tissues of the left breast 
 
shows no worrisome solid or cystic masses. No pathologic appearing lymph nodes, 
 
or abnormal fluid collections are seen. There is an unremarkable appearance of 
 
breast fatty, and fibroglandular elements at this level. IMPRESSION:  
 
1. Unremarkable ultrasound findings in the area of palpable abnormality. Therefore, routine follow-up is recommended based on imaging findings with 
 
screening mammogram in one year unless clinically indicated sooner. It should 
 
be noted that a very small percentage of malignancy fails to demonstrate 
 
findings by ultrasound or mammography and therefore could be missed by a 
 
combination of these two modalities. Therefore the followup of any persistently 
 
palpable abnormality in the absence of imaging findings should be clinical with 
 
consideration of biopsy if indicated. 2.  No abnormality in the retroareolar soft tissues of the left breast to 
 
suggest an etiology for the patient's bloody nipple discharge. Given that this 
 
is bloody, the patient will be referred to a breast surgeon for further workup 
 
and management. BI-RADS Assessment Category 2: Benign finding(s). DEXA Results (maximum last 3): No results found for this or any previous visit. HUGO Results (maximum last 3): Results from [facility] NATHANAEL  CECILIA Encounter encounter on 09/21/18 [facility] 3D PREETHI W MAMMO BI SCREENING INCL CAD Narrative STUDY:  Bilateral digital screening mammogram with CAD and Tomosynthesis INDICATION:  Screening;    patient's sister diagnosed with breast carcinoma in 
 
her 45s. COMPARISON:  07/08/2017, 06/27/2016 FINDINGS: Bilateral digital screening mammography was performed, and is 
 
interpreted in conjunction with a computer assisted detection (CAD) system. The 
 
breast parenchyma is heterogeneously dense, which limits the sensitivity of 
 
mammography. No suspicious masses, calcifications, or architectural distortion 
 
are identified. There is no skin thickening or nipple retraction. Bilateral breast tomosynthesis was performed, and is interpreted in conjunction 
 
with a computer assisted detection (CAD) system. No suspicious masses, 
 
calcifications, or architectural distortion are identified otherwise. Impression IMPRESSION:  No mammographic evidence of malignancy. The patient's calculated 
 
five-year and lifetime risk scores for developing breast carcinoma were 2.4 and 
 
20.5%, respectively. In the setting of heterogeneously dense breast tissue, 
 
breast MRI should be considered to evaluate for mammographically occult 
 
neoplasm. Recommend annual mammogram in one year. A reminder letter will be scheduled. BI-RADS Assessment Category 1: Negative. We are mailing breast density information to the patient along with the 
 
mammogram report. BD3 Results from Hospital Encounter encounter on 07/08/17 HUGO MAMMO BI SCREENING INCL CAD Narrative BILATERAL SCREENING DIGITAL MAMMOGRAPHY: 
 
  
 
CLINICAL HISTORY:  Prior benign left biopsy with a strong family history of 
 
breast cancer including a sister at 50. TECHNIQUE:  Craniocaudal and mediolateral oblique views of both breasts were 
 
performed and are interpreted in conjunction with a computer assisted detection 
 
(CAD) system. COMPARISON:  Priors dating to February 23, 2015, including MRI of March 16, 2015. FINDINGS:  CC and MLO views of both breasts demonstrate heterogeneously dense fibroglandular tissue in a fairly symmetric pattern bilaterally. No new or 
 
enlarging soft tissue mass, suspicious calcifications, or other definite 
 
evidence for malignancy is seen. No significant change is seen from prior 
 
study. Impression IMPRESSION:      
 
  
 
1.  NO SPECIFIC MAMMOGRAPHIC EVIDENCE FOR MALIGNANCY. FOLLOW UP BILATERAL 
 
SCREENING MAMMOGRAPHY IS RECOMMENDED IN ONE YEAR. 2.  HIGH-RISK SCREENING BREAST MRI SHOULD ALSO BE CONSIDERED AT THIS TIME IN 
 
FOLLOWUP OF THE PRIOR STUDY IN [facility] 2015 FOR THIS PATIENT WITH HETEROGENEOUSLY DENSE BREAST PARENCHYMA AND AT LEAST ONE FIRST-DEGREE RELATIVE WITH PREMENOPAUSAL BREAST CANCER. BI-RADS Assessment Category 1: Negative. BD3 Results from [facility] Encounter encounter on 06/27/16 HUGO MAMMO BI SCREENING DIGTL Narrative Bilateral screening mammogram   6/27/2016 8:21 AM 
 
  
 
HISTORY: Routine Screening study; maternal grandmother with breast cancer Technique: CC and MLO views of the breasts were obtained and interpreted in 
 
conjunction with a computer assisted detection (CAD) system COMPARISON: 2/23/2015 and 6/4/2014 FINDINGS: The breast parenchyma is composed of heterogeneously dense 
 
fibroglandular elements. This density may obscure lesions on mammography. There are no new masses, areas of architectural distortion or suspicious 
 
pleomorphic microcalcifications. Impression IMPRESSION: No mammographic findings suspicious for malignancy. BI-RADS Assessment Category 1: Negative. A 1 yr screening mammogram is 
 
recommended. A reminder letter will be sent to the patient. IR Results (maximum last 3): No results found for this or any previous visit. VAS/US Results (maximum last 3): [place] [facility] [place] 20 DUPLEX LOWER EXT VENOUS RIGHT Narrative Right lower extremity Doppler evaluation: 2020 HISTORY: Pain, palpable cords swelling. Symptoms are moderate and have been 
 
present x1 week Grayscale, color-flow and Doppler evaluation of the major veins of the right 
 
lower extremity was performed. Comparison: None FINDINGS: There is normal compression and augmentation involving the right 
 
common femoral, superficial femoral and popliteal veins. No grayscale or 
 
color-flow findings within these vessels or within the distal greater saphenous 
 
or profunda femoral veins were noted to suggest deep venous thrombosis. Interrogated portions of the peroneal and posterior tibial veins were also 
 
unremarkable. No popliteal cyst is identified. Cursory imaging of the left common femoral vein reveals it to be patent with 
 
normal color-flow and augmentation. There is a 2.7 cm lymph node within the 
 
right groin which may be reactive. Impression IMPRESSION: Negative evaluation for deep venous thrombosis within the right 
 
lower extremity. PET Results (maximum last 3): No results found for this or any previous visit. EKG Results None Hillary Cheung MD 
 
2021 12:18 AM  
 
  
 
 
 
 
 
Patient Demographics Patient Name Adolfo To  
[id number] Sex Female   
1970 Address [address] Phone [phone] (Home) [phone] (Mobile) *Preferred*  
 
 
 
 
 
 
CSN: 
 
 
[id number] Admit Date: 
 
Admit Time Room Bed Feb 15, 2021 10:42 PM [id number] [1941] 01 [[id number]] Attending Providers Provider Pager From To 
 
 
Nahid Santiago MD  02/15/21 02/16/21 Juan M Nazario MD  02/16/21 02/16/21 Cheriton MD Krishna  02/16/21 02/17/21 Leah Kellogg DO  02/17/21 Emergency Contact(s) Name Relation Home Work Mobile Sandra Guidry [phone] Utilization Reviews MD Progress Note for 2/17/21 by Ryan Reno Review Entered Review Status 2/18/2021 08:39 In Primary Criteria Review MD Progress Note for 2/17/21 Not filed by MD until 9631 on 2/17 so Day 2 review completed without it [employer] Hospitalist Service Progress Note Subjective: No acute events overnight, patient has no new complaints today, 
 
Cardiovascular, respiratory, GI review of system negative except mentioned above Assessment / Plan: 
 
28-year-old female with past medical history of rheumatoid arthritis, peripheral neuropathy, chronic microcytic anemia, hypertension, obesity, depression. Admitted with osteomyelitis of the left second toe. 
 
  
 
  
 
 left second toe cellulitis showing changes consistent with possible  Osteomyelitis -->  lucency at the tip of the second toe distal phalanx Note she has had a history of osteomyelitis in the past where she had failed intravenous antibiotics and wound care this is of the right foot/toe She has been started on IV Cipro, IV vancomycin, IV metronidazole Vascular surgery and wound care consult placed 
 
-02/16 ABIs performed so no e/o PAD, request MRI W/WO Contrast to evaluate for confirmation of  osteomyelitis, check inflammatory markers ESR, wound culture , request Orthopedic surgery consult February 17appreciate orthopedic surgery team, osteomyelitis as well clinically, continue IV antibiotics, consider transitioning to oral antibiotics soon Rheumatoid arthritis with peripheral neuropathy Adding complexity to medical case - She is not being treated with DMARDs, and she can not confirm if she was sero positive diagnosed Pre Diabetes Mellitus Type II 
 
 hemoglobin A1c, 6.5 Hypertension Resume lisinoprilhydrochlorothiazide Chronic microcytic anemia Resume multivitamins with minerals Additional medical history Depression: Resume bupropion [email or web address] Chief Complaint : Foot Pain Objective: 
 
Visit Vitals BP  
 
(!) 98/55 (BP 1 Location: Right upper arm) Pulse 66 Temp 98 °F (36.7 °C) Resp 12 Ht  
 
5' 8\" (1.727 m) Wt  
 
103 kg (227 lb) SpO2  
 
95% BMI  
 
34.52 kg/m² Physical Exam: 
 
General: No acute distress, speaking in full sentences, no use of accessory muscles HEENT: Pupils equal and reactive to light and accommodation, oropharynx is clear Neck: Supple, no lymphadenopathy, no JVD Lungs: Clear to auscultation bilaterally Cardiovascular: Regular rate and rhythm with normal S1 and S2 Abdomen: Soft, nontender, nondistended, normoactive bowel sounds Extremities: Left heel ulcer chronic, left 2nd toe ulcer, foul smelling Neuro: Nonfocal, A&O x3  
 
Psych: Normal affect Intake and Output: 
 
 
   
 
   
 
   
 
   
 
   
 
   
 
   
 
 
Date  
 
02/16/21 0700 - 02/17/21 0659  
 
02/17/21 0700 - 02/18/21 2288 Shift  
 
[id number] 1900-0659  
 
24 Hour Total  
 
[id number] 1900-0659  
 
24 Hour Total  
 
 
INTAKE  
 
 
P.O.  
 
   
 
   
 
   
 
240  
 
   
 
240  
 
 
  P. O.  
 
   
 
   
 
   
 
240  
 
   
 
240 Shift Total(mL/kg) 240(2.3) 240(2.3) OUTPUT Shift Total(mL/kg) NET  
 
   
 
   
 
   
 
240  
 
   
 
240 Weight (kg) 103  
 
103  
 
103  
 
103  
 
103  
 
103 LAB: 
 
 
   
 
   
 
   
 
   
 
   
 
 Admission on 02/15/2021 Component Date Value   
 
WBC  
 
02/15/2021  
 
8.4  RBC  
 
02/15/2021  
 
4.55   
 
 
  
 
HGB  
 
02/15/2021  
 
11.5*  
 
 
  
 
HCT  
 
02/15/2021  
 
36.1  MCV  
 
02/15/2021  
 
79.3*  
 
 
  
 
MCH  
 
02/15/2021  
 
25.3*  
 
 
  
 
MCHC  
 
02/15/2021  
 
31.9  RDW  
 
02/15/2021  
 
15.4*  PLATELET  
 
66/24/7998  
 
194  MPV  
 
02/15/2021  
 
9.8  ABSOLUTE NRBC  
 
02/15/2021  
 
0.00   
 
DF  
 
02/15/2021 AUTOMATED   
 
NEUTROPHILS  
 
02/15/2021  
 
71   
 
 
 LYMPHOCYTES  
 
02/15/2021  
 
22   
 
 
 MONOCYTES  
 
02/15/2021  
 
6   
 
 
  
 
EOSINOPHILS  
 
02/15/2021 2   
 
 
  
 
BASOPHILS  
 
02/15/2021  
 
0   
 
 
 IMMATURE GRANULOCYTES  
 
02/15/2021  
 
0   
 
ABS. NEUTROPHILS  
 
02/15/2021  
 
5.9  ABS. LYMPHOCYTES  
 
02/15/2021  
 
1.8  ABS. MONOCYTES  
 
02/15/2021  
 
0.5   
 
ABS. EOSINOPHILS  
 
02/15/2021  
 
0.2   
 
ABS. BASOPHILS  
 
02/15/2021  
 
0.0  ABS. IMM. GRANS.  
 
02/15/2021  
 
0.0  Sodium 02/15/2021  
 
136  Potassium 02/15/2021  
 
3.7  Chloride 02/15/2021  
 
99   
 
 
  
 
CO2  
 
02/15/2021  
 
28   
 
 
 Anion gap  
 
02/15/2021  
 
9  Glucose 02/15/2021  
 
183*   
 
BUN  
 
02/15/2021  
 
18   
 
 
 Creatinine 02/15/2021  
 
0.91   
 
 
 GFR est AA  
 
02/15/2021  
 
>60   
 
 
 GFR est non-AA  
 
02/15/2021  
 
>60   
 
 
 Calcium 02/15/2021  
 
9.7  Bilirubin, total  
 
02/15/2021  
 
0.4  ALT (SGPT)  
 
02/15/2021  
 
23   
 
 
  
 
AST (SGOT)  
 
02/15/2021  
 
14*  Alk. phosphatase 02/15/2021  
 
92  Protein, total  
 
02/15/2021  
 
8.3*  
 
 
 Albumin 02/15/2021 3.7   
 
 
•  
 
Globulin  
 
02/15/2021  
 
4.6*  
 
 
•  
 
A-G Ratio  
 
02/15/2021  
 
0.8*  
 
 
•  
 
Lactic acid  
 
02/15/2021  
 
2.3*  
 
 
•  
 
Special Requests:  
 
02/15/2021  
 
   
 
 
                 Value:LEFT 
 
Antecubital 
 
   
 
 
•  
 
Culture result:  
 
02/15/2021  
 
NO GROWTH 2 DAYS   
 
 
•  
 
Special Requests:  
 
02/16/2021  
 
RIGHT FOREARM   
 
 
•  
 
Culture result:  
 
02/16/2021  
 
NO GROWTH 1 DAY   
 
 
•  
 
Lactic acid  
 
02/16/2021  
 
1.4   
 
 
•  
 
Sodium  
 
02/16/2021  
 
136   
 
 
•  
 
Potassium  
 
02/16/2021  
 
3.6   
 
 
•  
 
Chloride  
 
02/16/2021  
 
101   
 
 
•  
 
CO2  
 
02/16/2021  
 
28   
 
 
•  
 
Anion gap  
 
02/16/2021  
 
7   
 
 
•  
 
Glucose  
 
02/16/2021  
 
146*  
 
 
•  
 
BUN  
 
02/16/2021  
 
15   
 
 
•  
 
Creatinine  
 
02/16/2021  
 
0.75   
 
 
•  
 
GFR est AA  
 
02/16/2021  
 
>60   
 
 
•  
 
GFR est non-AA  
 
02/16/2021  
 
>60   
 
 
•  
 
Calcium  
 
02/16/2021  
 
9.1   
 
 
•  
 
Bilirubin, total  
 
02/16/2021  
 
0.3   
 
 
•  
 
ALT (SGPT)  
 
02/16/2021  
 
20   
 
 
•  
 
AST (SGOT)  
 
02/16/2021  
 
10*  
 
 
•  
 
Alk. phosphatase  
 
02/16/2021  
 
76   
 
 
•  
 
Protein, total  
 
02/16/2021  
 
7.1   
 
 
•  
 
Albumin  
 
02/16/2021  
 
3.2*  
 
 
•  
 
Globulin  
 
02/16/2021  
 
3.9*  
 
 
•  
 
A-G Ratio  
 
02/16/2021  
 
0.8*  
 
 
•  
 
WBC  
 
02/16/2021  
 
6.4   
 
 
•  
 
RBC  
 
02/16/2021  
 
3.88*  
 
 
•  
 
HGB  
 
02/16/2021  
 
9.9*  
 
 
•  
 
HCT  
 
02/16/2021  
 
30.5*  
 
 
•  
 
MCV  
 
02/16/2021  
 
78.6*  
 
 
•  
 
MCH  
 
02/16/2021  
 
25.5*  
 
 
•  
 
MCHC  
 
02/16/2021  
 
32.5   
 
 
•  
 
RDW  
 
02/16/2021  
 
15.5*  
 
 
•  
 
PLATELET  
 
02/16/2021  
 
159   
 
 
•  
 
MPV  
 
02/16/2021  
 
9.6   
 
 
•  
 
ABSOLUTE NRBC  
 
02/16/2021  
 
0.00   
 
 
•  
 
DF  
 
02/16/2021  
 
AUTOMATED   
 
 
•  
 
NEUTROPHILS  
 
02/16/2021  
 
67   
 
 
•  
 
LYMPHOCYTES  
 
 02/16/2021  
 
24   
 
 
 MONOCYTES  
 
02/16/2021  
 
7   
 
 
  
 
EOSINOPHILS  
 
02/16/2021 2   
 
 
  
 
BASOPHILS  
 
02/16/2021  
 
0   
 
 
 IMMATURE GRANULOCYTES  
 
02/16/2021  
 
0   
 
ABS. NEUTROPHILS  
 
02/16/2021  
 
4.3   
 
ABS. LYMPHOCYTES  
 
02/16/2021  
 
1.5   
 
ABS. MONOCYTES  
 
02/16/2021  
 
0.4   
 
ABS. EOSINOPHILS  
 
02/16/2021  
 
0.1   
 
ABS. BASOPHILS  
 
02/16/2021  
 
0.0  ABS. IMM. GRANS.  
 
02/16/2021  
 
0.0  Glucose (POC)  
 
02/16/2021  
 
150*  Hemoglobin A1c  
 
02/16/2021  
 
6.4*  
 
 
  
 
Est. average glucose 02/16/2021  
 
137  Sodium 02/17/2021  
 
138  Potassium 02/17/2021  
 
3.9  Chloride 02/17/2021  
 
104   
 
 
  
 
CO2  
 
02/17/2021  
 
30   
 
 
 Anion gap  
 
02/17/2021 4*  Glucose 02/17/2021  
 
105*   
 
BUN  
 
02/17/2021  
 
12   
 
 
 Creatinine 02/17/2021  
 
0.78  GFR est AA  
 
02/17/2021  
 
>60   
 
 
 GFR est non-AA  
 
02/17/2021  
 
>60   
 
 
 Calcium 02/17/2021  
 
8.7  Bilirubin, total  
 
02/17/2021  
 
0.3  ALT (SGPT)  
 
02/17/2021  
 
20   
 
 
  
 
AST (SGOT)  
 
02/17/2021  
 
16  Alk. phosphatase 02/17/2021  
 
77   
 
 
 Protein, total  
 
02/17/2021  
 
6.9  Albumin 02/17/2021  
 
2.9*  Globulin 02/17/2021  
 
4.0*  
 
 
  
 
A-G Ratio 02/17/2021  
 
0.7*   
 
WBC  
 
02/17/2021  
 
4.0*  
 
 
  
 
RBC  
 
02/17/2021  
 
3.80*   
 
HGB  
 
02/17/2021  
 
9.5*  
 
 
  
 
HCT  
 
02/17/2021  
 
31.0*  
 
 
 MCV  
 
02/17/2021  
 
81.6  [address]  
 
02/17/2021  
 
25.0*  
 
 
 MCHC  
 
02/17/2021  
 
30.6*   
 
RDW  
 
02/17/2021  
 
15.3*  PLATELET  
 
89/30/1843  
 
150  MPV  
 
 02/17/2021  
 
9.4  ABSOLUTE NRBC  
 
02/17/2021  
 
0.00   
 
DF  
 
02/17/2021 AUTOMATED   
 
NEUTROPHILS  
 
02/17/2021 [address]  [facility] LYMPHOCYTES  
 
02/17/2021  
 
29   
 
 
 MONOCYTES  
 
02/17/2021  
 
10   
 
 
  
 
EOSINOPHILS  
 
02/17/2021  
 
4   
 
BASOPHILS  
 
02/17/2021  
 
0   
 
 
 IMMATURE GRANULOCYTES  
 
02/17/2021  
 
1   
 
ABS. NEUTROPHILS  
 
02/17/2021  
 
2.2   
 
ABS. LYMPHOCYTES  
 
02/17/2021  
 
1.2   
 
ABS. MONOCYTES  
 
02/17/2021  
 
0.4   
 
ABS. EOSINOPHILS  
 
02/17/2021  
 
0.2   
 
ABS. BASOPHILS  
 
02/17/2021  
 
0.0  ABS. IMM. GRANS.  
 
02/17/2021  
 
0.0  Sed rate, automated 02/17/2021  
 
73* IMAGING: 
 
Xr 2nd Toe Lt Min 2 V Result Date: 2/15/2021 1. Apparent lucency at the tip of the second toe distal phalanx, on the lateral view, raising the question of osteomyelitis. Note that MRI is more sensitive for evaluation of osteomyelitis. 2. No acute fracture. Mri Foot Lt W Wo Cont Result Date: 2/17/2021 Soft tissue infection involving the second toe without evidence of acute osteomyelitis. Duplex Low Ext Arteries With Rachel Ying Result Date: 2/16/2021 No high-grade stenosis or large vessel occlusion identified. RAQUEL measurements are within normal limits. EKG: 
 
No results found for this or any previous visit. Osteomyelitis - Care Day 2 (2/17/2021) by Adalberto Ochoa RN Review Entered Review Status 2/17/2021 16:02 Completed Criteria Review Care Day: 2 Care Date: 2/17/2021 Level of Care: Inpatient Floor Guideline Day 2 Level Of Care (X) Floor 2/17/2021 16:02:10 EST by Denzil Mcburney IP medical  
  
Clinical Status  
  
(X) * Afebrile or fever improved 2/17/2021 16:02:10 EST by Marina Carrasco  
  
  temp 98  
  
(X) * Oral intake tolerated 2/17/2021 16:02:10 EST by Marina Carrasco  
  
  regular diet Activity (X) As tolerated 2/17/2021 16:02:10 EST by Madi Flores  
  
  w/ assistance Routes  
  
(X) * Oral hydration 2/17/2021 16:02:10 EST by Marina Carrasco  
  
  regular diet Lactobacillus 2 tab po qday Morphine 2mg iv q3 prn x2 Optisource 1 tab po qday Roxicodone 10mg po q6 prn x1 Pravachol 40mg po qday Flomax 0.4mg po qday  
  
(X) Usual diet 2/17/2021 16:02:10 EST by Marina Carrasco  
  
  regular diet Interventions (X) Possible WBC, ESR, C-reactive protein 2/17/2021 16:02:10 EST by Marina Carrasco  
  
  wound cx pending Medications (X) Parenteral antibiotics 2/17/2021 16:02:10 EST by Madi Flores Vancomycin 1,750mg iv q12 Flagyl 500mg iv q8 * Milestone Additional Notes 2/17/2021 LOC IP medical  
Vs  98 78 98/55 16 95% room air LABS    
WBC: 4.0 (L) RBC: 3.80 (L) HGB: 9.5 (L) HCT: 31.0 (L) MCV: 81.6 MCH: 25.0 (L) MCHC: 30.6 (L) RDW: 15.3 (H) PLATELET: 850 Anion gap: 4 (L) Glucose: 105 (H) BUN: 12 Creatinine: 0.78 Calcium: 8.7 GFR est non-AA: >60  
GFR est AA: >60 Bilirubin, total: 0.3 Protein, total: 6.9 Albumin: 2.9 (L) Globulin: 4.0 (H) A-G Ratio: 0.7 (L) MRI IMPRESSION Soft tissue infection involving the second toe without evidence of acute  
osteomyelitis. MEDS Lactobacillus 2 tab po qday Morphine 2mg iv q3 prn x2 Optisource 1 tab po qday Roxicodone 10mg po q6 prn x1 Pravachol 40mg po qday Flomax 0.4mg po qday ORTHO CONSULT Reviewed patient's chart and clinical findings with Dr. Dina Bautista via [facility] and remotely from the office. There is no evidence of any sepsis. Patient has no leukocytosis. She has a slightly elevated sedimentation rate. She is afebrile. Patient's MRI revealed no evidence of osteomyelitis. There is just soft tissue cellulitis. There is no need for any orthopedic intervention at this time. Her cellulitis can be treated by her primary care provider or wound care. Osteomyelitis - Care Day 1 (2/16/2021) by John Duncan Review Entered Review Status 2/17/2021 11:57 Completed Criteria Review Care Day: 1 Care Date: 2/16/2021 Level of Care: Inpatient Floor Guideline Day 1 Level Of Care (X) Floor 2/17/2021 11:57:29 EST by Checo Spivey Medical floor Clinical Status  
  
(X) * Clinical Indications met [B] 2/17/2021 11:57:29 EST by Checo Spivey 55yr old female w/mult comorbid conditions with second toe OM per imaging/inflammation, swelling. Tmax 99.1 (99.6 in ED last night at 2230), 74, 99/52, 16, RA sat 95%. 103kg Routes  
  
(X) Oral or IV fluids 2/17/2021 11:57:29 EST by Checo Spivey NS at 75cc/hr off at 1724 (X) Parenteral medications 2/17/2021 11:57:29 EST by Checo Spivey  
  
  morphine 2mg IV Q3H PRN x6, ASA 81mg po qd, wellbutrin sr 150mg po bid, cymbalta 60mg po bid, lovenox 40mg sq qd, tylenol 650mg po q6H PRN x1, probiotic po qd, mvi qd, prinizide 20/25 po qd, protonix 40mg po qd, flomax 0.4mg po qd (X) Diet as tolerated 2/17/2021 11:57:29 EST by Checo Spivey  
  
  reg diet Interventions (X) WBC, ESR, C-reactive protein 2/17/2021 11:57:29 EST by Checo Spivey Hgb 9.9, hct 30.5. LA 2.3 at 2256 on 2/15/1.4 today at 0141. HgbA1c 6.4. GLuc 146, alb 3.2, glob 3.9, ast 10. (X) Blood culture 2/17/2021 11:57:29 EST by Marine Bowens  
  
  bld cx x2 pend  
  
(X) Possible MRI, CT scan, or radionuclide imaging 2/17/2021 11:57:29 EST by Marine Bowens XR left 2nd toe: apparent lucency at tip of 2nd toe distal phalanx on lat view, raising question of OM. Rec MRI. PVI/RAQUEL of BLE: no stenosis/RAQUEL are wnl. MRI pending ( ) Possible surgical drainage or debridement 2/17/2021 11:57:29 EST by Marine Bowens Vasc eval rec gen surgery eval. Ortho sx: pt failed medical treatment in past for OM. Will discuss in am with Dr. Nettie Albarran Medications (X) Parenteral antibiotics 2/17/2021 11:57:29 EST by Marine Bowens  
  
  cipro 400mg IV Q12H, flagyl 500mg IV Q8H, vancomycin 2000mg IV x1 and 1000mg IV Q12H * Milestone Additional Notes Assessment / Plan:  
61-year-old female with past medical history of rheumatoid arthritis, peripheral neuropathy, chronic microcytic anemia, hypertension, obesity, depression. Admitted with osteomyelitis of the left second toe. Osteomyelitis of the left second toe  
-showing changes consistent with possible  Osteomyelitis -->  lucency at the tip of the second toe distal phalanx  
-Note she has had a history of osteomyelitis in the past where she had failed intravenous antibiotics and wound care this is of the right foot/toe   
-She has been started on IV Cipro, IV vancomycin, IV metronidazole  
-Vascular surgery and wound care consult placed  
-02/16 ABIs performed so no e/o PAD, request MRI W/WO Contrast to evaluate for confirmation of  osteomyelitis, check inflammatory markers ESR, wound culture , request Orthopedic surgery consult Rheumatoid arthritis with peripheral neuropathy  
-Adding complexity to medical case - She is not being treated with DMARDs, and she can not confirm if she was sero positive diagnosed Pre Diabetes Mellitus Type II  
-Check hemoglobin A1c Hypertension -Resume lisinopril-hydrochlorothiazide Chronic microcytic anemia  
-Resume multivitamins with minerals Additional medical history  
-Depression: Resume bupropion  
-Obesity Physical Exam:  
General: No acute distress, speaking in full sentences, no use of accessory muscles HEENT: Pupils equal and reactive to light and accommodation, oropharynx is clear Neck: Supple, no lymphadenopathy, no JVD Lungs: Clear to auscultation bilaterally Cardiovascular: Regular rate and rhythm with normal S1 and S2 Abdomen: Soft, nontender, nondistended, normoactive bowel sounds Extremities: Left heel ulcer chronic, left 2nd toe ulcer, foul smelling Neuro: Nonfocal, A&O x3   
Psych: Normal affect Osteomyelitis - Clinical Indications for Admission to Inpatient Care by Sheyla Bermudez Review Entered Review Status 2/17/2021 11:44 Completed Criteria Review Clinical Indications for Admission to Inpatient Care Most Recent : Amena Payton Most Recent Date: 2/17/2021 11:44:10 EST  
  
(X) Admission is indicated by  1 or more  of the following  (1) (2) (3) (4): (X) Appropriate monitoring and therapy (IV antibiotics) cannot be immediately arranged for home  
  
   or outpatient setting 2/17/2021 11:44:10 EST by Amena Payton IV abx to be started immediately Notes:  
  
2/17/2021 11:44:10 EST by Amena Payton Subject: Additional Clinical Information * 57yr old female with PMH RA, peripheral neuropathy, microcytic anemia, HTN, obesity, depression, OM s/p toe amputation in the past and now with imaging c/w OM left foot, second toe admitted for IV abx, vascular and wound care consults

## 2021-02-18 NOTE — ADT AUTH CERT NOTES
[facility] NPI :[id number] 
  
[facility] 
SFE 4 SURGE MIU 
[address] [facility] [address] [phone] 
  
  
   
Patient Name :Frederick Mares  : 1970 (50 yrs) MRN : [id number] 
  
Patient Mailing Address Musa Frost [facility] [address] [place] [address] [41] , [phone]      
  
  . 
  
   
Insurance Plan Payor: BLUE CROSS / Plan: [place] [facility] / Product Type: PPO /  
  
Primary Coverage Subscriber ID : [id number] 
  
   
Current Patient Class : INPATIENT Admit Date : 2/15/2021 
  
REQUESTED LEVEL OF CARE: INPATIENT [101] Diagnosis : Toe osteomyelitis, left ([facility] [address] 
                    
  
ICD10 Code : Toe osteomyelitis, left ([facility] [address] [M86.9]   
Current Room and Bed S440/01 
  
Admitting and Attending Info: 
Admitting Provider : Marcos Suresh MD   NPI: [id number] Admitting Provider Phone. [phone] Admitting Provider Address: same as facility

## 2021-02-18 NOTE — PROGRESS NOTES
Pt rounded on hourly and PRN. No complaints of vomiting. Nausea and pain treated per MAR. BP was initially low at beginning of shift. MD notified and bolus and continuous fluids given per orders. Bed is low, locked, call bell within reach. All needs met this shift. Will continue to monitor until next RN comes on. Date 02/17/21 0700 - 02/18/21 0659 02/18/21 0700 - 02/19/21 0659   Shift 5192-7569 2251-7223 24 Hour Total 5416-7958 8370-3316 24 Hour Total   INTAKE   P.O. 480 500 980        P. O. 480 500 980      I. V.(mL/kg/hr)  4700 4700        I.V.  4700 4700      Shift Total(mL/kg) 480(4.7) 5200(50.5) 4724(61.1)      OUTPUT   Urine(mL/kg/hr)           Urine Occurrence(s) 1 x 3 x 4 x      Shift Total(mL/kg)          5200 5680      Weight (kg) 103 103 103 103 103 103

## 2021-02-18 NOTE — PROGRESS NOTES
Vancomycin Consult    MD ordering: Bijal Alas   Indication: Osteomyelitis  Goal level(s): 15 - 20    Ht Readings from Last 1 Encounters:   02/15/21 5' 8\" (1.727 m)      Wt Readings from Last 1 Encounters:   02/15/21 103 kg (227 lb)         Temp (24hrs), Av.2 °F (36.8 °C), Min:97.8 °F (36.6 °C), Max:98.4 °F (36.9 °C)    Dosing weight: 103 kg  52 y.o. Date:  Dose/Freq Admin Times Level/Time:   21 Vanc 2 gm IV x 1 dose 0226     21 Vanc 1 gm IV q12h  Dose change 1403     21 Vanc 1750 mg IV q12h 0156, 1425     21  Vanc 1750 mg IV q12h 0150, 1502 Tr at 1240 = 16.2   21  Vanc 1750 mg IV q12h (0300) (1500)                   Recent Labs     21  0528 21  0349 21  0502 21  0141 02/15/21  2256   BUN 12 12 15  --  18   CREA 0.74 0.78 0.75  --  0.91   WBC 3.1* 4.0* 6.4  --  8.4   LAC  --   --   --  1.4 2.3*     Estimated Creatinine Clearance: 116.7 mL/min (based on SCr of 0.74 mg/dL). Lab Results   Component Value Date/Time    Vancomycin,trough 16.2 2021 12:40 PM       Day 3 Assessment and Plan:  Based on trough level, continue current dose of Vancomycin 1750 mg IV every 12 hours. Next dose is due tomorrow at 0300. Repeat trough due 21 (not ordered). Pharmacy will continue to follow.       Thank you,    Juliana Brown, PharmD

## 2021-02-19 VITALS
TEMPERATURE: 97.8 F | HEART RATE: 71 BPM | DIASTOLIC BLOOD PRESSURE: 65 MMHG | OXYGEN SATURATION: 98 % | BODY MASS INDEX: 34.4 KG/M2 | RESPIRATION RATE: 16 BRPM | HEIGHT: 68 IN | SYSTOLIC BLOOD PRESSURE: 110 MMHG | WEIGHT: 227 LBS

## 2021-02-19 LAB
HIV 1+2 AB+HIV1 P24 AG SERPL QL IA: NONREACTIVE
HIV12 RESULT COMMENT, HHIVC: ABNORMAL

## 2021-02-19 PROCEDURE — 74011250636 HC RX REV CODE- 250/636: Performed by: FAMILY MEDICINE

## 2021-02-19 PROCEDURE — 74011250636 HC RX REV CODE- 250/636: Performed by: EMERGENCY MEDICINE

## 2021-02-19 PROCEDURE — 74011250637 HC RX REV CODE- 250/637: Performed by: HOSPITALIST

## 2021-02-19 PROCEDURE — 74011250637 HC RX REV CODE- 250/637: Performed by: FAMILY MEDICINE

## 2021-02-19 RX ORDER — PROMETHAZINE HYDROCHLORIDE 12.5 MG/1
12.5 TABLET ORAL
Qty: 20 TAB | Refills: 0 | Status: SHIPPED | OUTPATIENT
Start: 2021-02-19 | End: 2021-07-04

## 2021-02-19 RX ORDER — CEPHALEXIN 500 MG/1
500 CAPSULE ORAL 4 TIMES DAILY
Qty: 28 CAP | Refills: 0 | Status: SHIPPED | OUTPATIENT
Start: 2021-02-19 | End: 2021-02-26

## 2021-02-19 RX ORDER — HYDROCODONE BITARTRATE AND ACETAMINOPHEN 5; 325 MG/1; MG/1
1 TABLET ORAL
Qty: 9 TAB | Refills: 0 | Status: SHIPPED | OUTPATIENT
Start: 2021-02-19 | End: 2021-02-22

## 2021-02-19 RX ORDER — ERGOCALCIFEROL 1.25 MG/1
50000 CAPSULE ORAL
Qty: 4 CAP | Refills: 0 | Status: SHIPPED | OUTPATIENT
Start: 2021-02-19

## 2021-02-19 RX ORDER — DOXYCYCLINE HYCLATE 200 MG/1
200 TABLET, DELAYED RELEASE ORAL 2 TIMES DAILY
Qty: 14 TAB | Refills: 0 | Status: SHIPPED | OUTPATIENT
Start: 2021-02-19 | End: 2021-02-26

## 2021-02-19 RX ADMIN — LACTOBACILLUS TAB 2 TABLET: TAB at 09:36

## 2021-02-19 RX ADMIN — PROMETHAZINE HYDROCHLORIDE 12.5 MG: 25 TABLET ORAL at 09:45

## 2021-02-19 RX ADMIN — CIPROFLOXACIN 400 MG: 2 INJECTION, SOLUTION INTRAVENOUS at 03:50

## 2021-02-19 RX ADMIN — SODIUM CHLORIDE, SODIUM LACTATE, POTASSIUM CHLORIDE, AND CALCIUM CHLORIDE 100 ML/HR: 600; 310; 30; 20 INJECTION, SOLUTION INTRAVENOUS at 06:18

## 2021-02-19 RX ADMIN — Medication 1 TABLET: at 09:36

## 2021-02-19 RX ADMIN — ASPIRIN 81 MG: 81 TABLET, CHEWABLE ORAL at 09:35

## 2021-02-19 RX ADMIN — Medication 10 ML: at 05:41

## 2021-02-19 RX ADMIN — ENOXAPARIN SODIUM 40 MG: 40 INJECTION SUBCUTANEOUS at 09:35

## 2021-02-19 RX ADMIN — OXYCODONE 10 MG: 5 TABLET ORAL at 03:50

## 2021-02-19 RX ADMIN — BUPROPION HYDROCHLORIDE 150 MG: 150 TABLET, EXTENDED RELEASE ORAL at 09:35

## 2021-02-19 RX ADMIN — PRAVASTATIN SODIUM 40 MG: 20 TABLET ORAL at 09:36

## 2021-02-19 RX ADMIN — VANCOMYCIN HYDROCHLORIDE 1750 MG: 10 INJECTION, POWDER, LYOPHILIZED, FOR SOLUTION INTRAVENOUS at 02:06

## 2021-02-19 RX ADMIN — TAMSULOSIN HYDROCHLORIDE 0.4 MG: 0.4 CAPSULE ORAL at 09:47

## 2021-02-19 RX ADMIN — ACETAMINOPHEN 650 MG: 325 TABLET, FILM COATED ORAL at 09:44

## 2021-02-19 RX ADMIN — DULOXETINE HYDROCHLORIDE 60 MG: 30 CAPSULE, DELAYED RELEASE ORAL at 09:36

## 2021-02-19 RX ADMIN — OXYCODONE 10 MG: 5 TABLET ORAL at 09:44

## 2021-02-19 RX ADMIN — METRONIDAZOLE 500 MG: 500 INJECTION, SOLUTION INTRAVENOUS at 09:35

## 2021-02-19 RX ADMIN — PANTOPRAZOLE SODIUM 40 MG: 40 TABLET, DELAYED RELEASE ORAL at 04:49

## 2021-02-19 NOTE — DISCHARGE SUMMARY
Hospitalist Discharge Summary     Patient ID:  Bhargavi Hopper  [id number]  9 y.o.  1970  Admit date: 2/15/2021 10:42 PM  Discharge date and time: 2/19/2021  Attending: Reese Ennis DO  PCP:  Conchis Ziegler MD  Treatment Team: Attending Provider: Reese Ennis DO; Care Manager: Sarai Walker; Consulting Provider: Nini Briones NP; Utilization Review: Sheyla Bermudez; Primary Nurse: Samina Faustin    Principal Diagnosis Cellulitis of second toe, left   Principal Problem:    Cellulitis of second toe, left (6/29/2020)    Active Problems:    Hypertension (9/26/2019)      Rheumatoid arthritis ([address] (9/26/2019)      Overview:        Depression (9/26/2019)      Class 1 obesity in adult (6/29/2020)      Peripheral neuropathy (6/30/2020)      Microcytic anemia (2/16/2021)       Coni Flower a 52 y. o. female, with a history of  has a past medical history of Anemia, Chronic pain, Depression, GERD (gastroesophageal reflux disease), renal calculi, Hypercholesterolemia, Hypertension, Migraine, Morbid obesity ([address] Ovarian cyst, Reflex sympathetic dystrophy (since 2005), Rheumatoid arthritis ([address] Rheumatoid arthritis ([address] (9/26/2019), Sinus problem, and TIA (transient ischemic attack) (9/26/2019). She also has no past medical history of Aneurysm ([address] Arrhythmia, Asthma, CAD (coronary artery disease), Cancer ([address] Chronic kidney disease, Chronic obstructive pulmonary disease ([address] Coagulation disorder ([address] Diabetes ([address] Difficult intubation, Heart failure ([address] Liver disease, Malignant hyperthermia due to anesthesia, Nausea & vomiting, Pseudocholinesterase deficiency, PUD (peptic ulcer disease), Seizures ([address] Thromboembolus ([address] Thyroid disease, Unspecified adverse effect of anesthesia, or Unspecified sleep apnea. ,Marcelino Mejia a past surgical history that includes hx cholecystectomy; hx appendectomy; hx total abdominal hysterectomy; hx tubal ligation; hx breast biopsy (Left, 4/27/2015); hx orthopaedic; hx orthopaedic; and hx septoplasty (08/28/2019).  who presents to the ER with report of worsening redness, pain, and blistering of her L 2nd toe starting about 2-3 days ago. The blister ruptured yesterday and has become more painful and red since that time. She denies any fevers, chills, nausea, vomiting.      Interval history (2/19): patient examined at bedside. No acute overnight events. Having some nausea that she thinks \"is probably from all these antibiotics I'm getting. \" No fevers/chills, chest pain, shortness of breath, or abdominal pain. Having some toe pain after getting it bandaged. Hospital Course:  Please refer to the admission H&P for details of presentation. In summary, the patient is admitted with concern for osteomyelitis of the left second toe. MRI imaging did not show osteomyelitis but cellulitis, and evaluate by orthopedic surgery confirmed this observation. She was treated with broad spectrum antibiotics and wound consult with clinical improvement overall. Will switch to (and provide 7-day course of) doxycycline and Kelflex, wound cultures growing GPCs. She has an appointment with the wound clinic this afternoon. Also recommend [facility] with wound care as well as podiatry referral and PCP follow-up. Of note, patient with significant peripheral neuropathy. Patient with known pre-diabetes/DM with HgbA1c of 6.4%. Noted serum vitamin D level of 18, will provided ergocalciferol 50k international units x4 weeks. Given chronicity, recommend outpatient referral to neurology for further workup (?nerve conduction studies, etc) but will defer to her PCP. She is currently clinically stable for hospital discharge. Details of hospitalization discussed with patient who understands and agrees with plan of care and discharge home. Return precautions provided. All questions answered.       Significant Diagnostic Studies:     EXAMINATION: MRI of the left foot with and without contrast     INDICATION: Eval for Left 2nd toe osteomyelitis     COMPARISON: Left toe radiographs, 2/15/2021 and 6/18/2019     TECHNIQUE: Multiplanar multisequence MRI of the left forefoot before and after  the intravenous administration of 20 mL Dotarem contrast material     FINDINGS:  There is a small soft tissue wound along the dorsal and lateral aspect of the  distal second toe with soft tissue induration and enhancement extending to  contact the middle and distal phalanges. No evidence of abnormal marrow signal  to suggest acute osteomyelitis at this location or elsewhere within the  forefoot. No evidence of abscess. Intrinsic foot muscles demonstrate fatty  infiltration and edema compatible with diabetic myopathy. Included flexor and  extensor tendons are intact.        IMPRESSION  Soft tissue infection involving the second toe without evidence of acute  Osteomyelitis. TITLE: BILATERAL LOWER extremity arterial ultrasound examination.     INDICATION: Nonhealing wound to left foot     Additional clinical history: History of right first toe amputation.     TECHNIQUE: Grayscale, color, and Doppler interrogation performed.     COMPARISON: None.     SEGMENTAL BP:  The right and left lower extremity segmental blood pressures are  fairly symmetric and unremarkable.      RAQUEL:  Right = 1.09             Left = 1.18     RIGHT LOWER EXTREMITY:  Peak systolic velocities-- 83, 71, 88, 97, 56, 65, 82,  71, 98, and 41 cm/sec. Normal triphasic waveform throughout.     LEFT LOWER EXTREMITY:  Peak systolic velocities-- 805, 74, 115, 119, 100, 92,  105, 72, 89, and 80 cm/sec. Normal triphasic waveforms throughout.     ABDOMEN:  The aorta is without aneurysm.     IMPRESSION  No high-grade stenosis or large vessel occlusion identified. RAQUEL  measurements are within normal limits.     Labs: Results:       Chemistry Recent Labs     02/18/21  0528 02/17/21  0349   * 105*    138   K 3.9 3.9    104   CO2 30 30   BUN 12 12   CREA 0.74 0.78   CA 8.8 8.7   AGAP 4* 4*   AP 73 77   TP 6.5 6.9   ALB 2.8* 2.9*   GLOB 3.7* 4.0*   AGRAT 0.8* 0.7*      CBC w/Diff Recent Labs     02/18/21  0528 02/17/21  0349   WBC 3.1* 4.0*   RBC 3.71* 3.80*   HGB 9.3* 9.5*   HCT 30.2* 31.0*   * 150   GRANS 43 56   LYMPH 41 29   EOS 5 4      Cardiac Enzymes No results for input(s): CPK, CKND1, JENNIFER in the last 72 hours. No lab exists for component: CKRMB, TROIP   Coagulation No results for input(s): PTP, INR, APTT, INREXT in the last 72 hours. Lipid Panel Lab Results   Component Value Date/Time    Cholesterol, total 181 09/28/2019 04:32 AM    HDL Cholesterol 40 09/28/2019 04:32 AM    LDL, calculated 66.4 09/28/2019 04:32 AM    VLDL, calculated 74.6 (H) 09/28/2019 04:32 AM    Triglyceride 373 (H) 09/28/2019 04:32 AM    CHOL/HDL Ratio 4.5 09/28/2019 04:32 AM      BNP No results for input(s): BNPP in the last 72 hours. Liver Enzymes Recent Labs     02/18/21  0528   TP 6.5   ALB 2.8*   AP 73      Thyroid Studies Lab Results   Component Value Date/Time    T4, Total 9.8 01/29/2009 05:33 AM    T3 Uptake 34 01/29/2009 05:33 AM    TSH 4.610 02/18/2021 09:57 PM            Discharge Exam:  Visit Vitals  /65 (BP 1 Location: Right upper arm, BP Patient Position: At rest)   Pulse 71   Temp 97.8 °F (36.6 °C)   Resp 16   Ht 5' 8\" (1.727 m)   Wt 103 kg (227 lb)   SpO2 98%   BMI 34.52 kg/m²     General:          No acute distress    Lungs:             CTA Bilaterally.    Heart:              Regular rate and rhythm,  No murmur, rub, or gallop  Abdomen:        Soft, Non distended, Non tender, Positive bowel sounds  Extremities:     No cyanosis, clubbing or edema  Skin:                Healing blister with surrounding erythema  Neurologic:      Markedly decreased sensation to light touch of entire left foot to the ankle without interval change    Disposition: home  Discharge Condition: stable  Patient Instructions:   Current Discharge Medication List START taking these medications    Details   HYDROcodone-acetaminophen (Norco) 5-325 mg per tablet Take 1 Tab by mouth every eight (8) hours as needed for Pain for up to 3 days. Max Daily Amount: 3 Tabs. Qty: 9 Tab, Refills: 0    Associated Diagnoses: Cellulitis of second toe, left      doxycycline hyclate (DORYX) 200 mg TbEC delayed release tab Take 1 Tab by mouth two (2) times a day for 7 days. Qty: 14 Tab, Refills: 0      cephALEXin (KEFLEX) 500 mg capsule Take 1 Cap by mouth four (4) times daily for 7 days. Qty: 28 Cap, Refills: 0      ergocalciferol (ERGOCALCIFEROL) 1,250 mcg (50,000 unit) capsule Take 1 Cap by mouth every seven (7) days. Qty: 4 Cap, Refills: 0         CONTINUE these medications which have CHANGED    Details   ! ! promethazine (PHENERGAN) 12.5 mg tablet Take 1 Tab by mouth every eight (8) hours as needed for Nausea. Qty: 20 Tab, Refills: 0      !! promethazine (PHENERGAN) 25 mg tablet Take 1 Tab by mouth every six (6) hours as needed (for headache and/or nausea). Qty: 12 Tab, Refills: 0       !! - Potential duplicate medications found. Please discuss with provider. CONTINUE these medications which have NOT CHANGED    Details   pravastatin (PRAVACHOL) 40 mg tablet Take 40 mg by mouth daily. ketorolac (TORADOL) 10 mg tablet Take 1 Tab by mouth every six (6) hours as needed for Pain. Qty: 11 Tab, Refills: 0      OMEPRAZOLE PO Take 20 mg by mouth daily. buPROPion XL (WELLBUTRIN XL) 150 mg tablet Take 150 mg by mouth daily. DULoxetine (CYMBALTA) 60 mg capsule Take 60 mg by mouth two (2) times a day. aspirin 81 mg chewable tablet Take 81 mg by mouth. traZODone (DESYREL) 100 mg tablet Take 100 mg by mouth. tamsulosin (FLOMAX) 0.4 mg capsule Take 1 Cap by mouth daily.   Qty: 15 Cap, Refills: 0         STOP taking these medications       lisinopril-hydroCHLOROthiazide (PRINZIDE, ZESTORETIC) 20-25 mg per tablet Comments:   Reason for Stopping:         promethazine (PHENERGAN) 25 mg suppository Comments:   Reason for Stopping:               Activity: Activity as tolerated  Diet: Diabetic Diet  Wound Care: As directed    Follow-up  ·   With PCP within 1 week. Needs referral to podiatry within 1 week. Will follow-up with wound care this afternoon. Time spent to discharge patient 35 minutes  Signed:   Hakan Singh DO  2/19/2021  9:11 AM

## 2021-02-19 NOTE — PROGRESS NOTES
Hospitalist Progress Note    2021  Admit Date: 2/15/2021 10:42 PM   NAME: Frank Petit   :  1970   MRN:  [id number]   Attending: Brionna Fang DO  PCP:  Olesya Osborne MD    SUBJECTIVE:   Frank Petit is a 52 y.o. female, with a history of  has a past medical history of Anemia, Chronic pain, Depression, GERD (gastroesophageal reflux disease), renal calculi, Hypercholesterolemia, Hypertension, Migraine, Morbid obesity ([address] Ovarian cyst, Reflex sympathetic dystrophy (since ), Rheumatoid arthritis ([address] Rheumatoid arthritis ([address] (2019), Sinus problem, and TIA (transient ischemic attack) (2019). She also has no past medical history of Aneurysm ([address] Arrhythmia, Asthma, CAD (coronary artery disease), Cancer ([address] Chronic kidney disease, Chronic obstructive pulmonary disease ([address] Coagulation disorder ([address] Diabetes ([address] Difficult intubation, Heart failure ([address] Liver disease, Malignant hyperthermia due to anesthesia, Nausea & vomiting, Pseudocholinesterase deficiency, PUD (peptic ulcer disease), Seizures ([address] Thromboembolus ([address] Thyroid disease, Unspecified adverse effect of anesthesia, or Unspecified sleep apnea. ,  has a past surgical history that includes hx cholecystectomy; hx appendectomy; hx total abdominal hysterectomy; hx tubal ligation; hx breast biopsy (Left, 2015); hx orthopaedic; hx orthopaedic; and hx septoplasty (2019). who presents to the ER with report of worsening redness, pain, and blistering of her L 2nd toe starting about 2-3 days ago. The blister ruptured yesterday and has become more painful and red since that time. She denies any fevers, chills, nausea, vomiting.      Interval history (): patient examined at bedside. No acute overnight events. Patient anxious about \"whether or not I have a bone infection, I don't want it to get worse. \" Denies fevers/chills, chest pain, shortness of breath.  Notes that redness in her left foot is improving. Has numbness/tingling that is unchanged in her foot. Review of Systems negative with exception of pertinent positives noted above  PHYSICAL EXAM     Visit Vitals  BP (!) 118/48 (BP 1 Location: Right upper arm, BP Patient Position: At rest)   Pulse 76   Temp 97.9 °F (36.6 °C)   Resp 18   Ht 5' 8\" (1.727 m)   Wt 103 kg (227 lb)   SpO2 92%   BMI 34.52 kg/m²      Temp (24hrs), Av.1 °F (36.7 °C), Min:97.8 °F (36.6 °C), Max:98.4 °F (36.9 °C)    Oxygen Therapy  O2 Sat (%): 92 % (21 0730)  O2 Device: Room air (21 1620)    Intake/Output Summary (Last 24 hours) at 2021 2206  Last data filed at 2021 0605  Gross per 24 hour   Intake 5200 ml   Output    Net 5200 ml      General: No acute distress    Lungs:  CTA Bilaterally.    Heart:  Regular rate and rhythm,  No murmur, rub, or gallop  Abdomen: Soft, Non distended, Non tender, Positive bowel sounds  Extremities: No cyanosis, clubbing or edema  Skin:  Healing blister with surrounding erythema  Neurologic:  Markedly decreased sensation to light touch of entire left foot to the ankle    ASSESSMENT      Active Hospital Problems    Diagnosis Date Noted    Microcytic anemia 2021    Peripheral neuropathy 2020    Class 1 obesity in adult 2020    Cellulitis of second toe, left 2020    Depression 2019    Hypertension 2019    Rheumatoid arthritis ([facility] 2019             Plan:    # Cellulitis of the left second toe  - negative MRI evaluation for osteomyelitis  - evaluated by orthopedic surgery with no concern for bone infection as well and no indication for surgical intervention  - appears to be healing clinically  - will continue current antibiotic regimen for one more day  - can likely go home with oral antibiotics tomorrow  - needs outpatient wound care follow-up as well as podiatry and PCP    # Peripheral neuropathy  - long-standing but patient has had several wounds on her feet  - will get labs tests (HgbA1c, vitamin B12, RPR, folate, TSH, vitamin D level, HIV)  - would benefit from outpatient neurology referral, ?may need nerve conduction studies to assess for etiology    # HTN  - continue current regimen    F/E/N: no fluids, replete electrolytes as needed, regular diet    Ppx: Lovenox for VTE    Code Status: FULL CODE    Disposition: pending clinica improvement with plan as above, anticipate discharge home tomorrow. Discussed with patient and  at bedside. All questions answered.      Signed By: Kylah He DO     February 18, 2021

## 2021-02-19 NOTE — PROGRESS NOTES
Pt discharged to home per order. IV site removed and instructions reviewed with pt and spouse at bedside. Pt taken to lobby in w/chair by unit staff.

## 2021-02-19 NOTE — PROGRESS NOTES
Care Management Interventions  PCP Verified by CM: Yes([place])  Mode of Transport at Discharge: (Family)  Transition of Care Consult (CM Consult): Discharge Planning  MyChart Signup: No  Discharge Durable Medical Equipment: No  Physical Therapy Consult: No  Occupational Therapy Consult: No  Speech Therapy Consult: No  Current Support Network: Lives with Spouse, Own Home  Confirm Follow Up Transport: Family  Discharge Location  Discharge Placement: Home with family assistance    Patient with orders to d/c today; no d/c needs identified.     REYNALDO Osullivan  [facility]   [phone]

## 2021-02-19 NOTE — PROGRESS NOTES
Pt rounded on hourly and PRN. No complaints of  Nausea or vomiting. Pain tx per MAR. Dsg to L heel and toe changed per order. Bed is low, locked, call bell within reach. All needs met this shift. Will continue to monitor until next RN comes on.

## 2021-02-20 LAB
BACTERIA SPEC CULT: ABNORMAL
BACTERIA SPEC CULT: ABNORMAL
BACTERIA SPEC CULT: NORMAL
GRAM STN SPEC: ABNORMAL
SERVICE CMNT-IMP: ABNORMAL
SERVICE CMNT-IMP: ABNORMAL
SERVICE CMNT-IMP: NORMAL

## 2021-02-21 LAB
BACTERIA SPEC CULT: NORMAL
SERVICE CMNT-IMP: NORMAL

## 2021-02-24 NOTE — PROGRESS NOTES
Physician Progress Note      PATIENT:               Frank Dillon  CSN #:                  [id number]  :                       1970  ADMIT DATE:       2/15/2021 10:42 PM  [address] DATE:        2021 12:19 PM  RESPONDING  PROVIDER #:        RUBEN MENDEZ DO          QUERY TEXT:    Pt admitted with cellulitis of left toe. Noted to have pre-diabetes/DM with HgbA1c of 6.4% documented on the d/c summary. After study, can the suspected etiology the patient's cellulitis be further specified as: The medical record reflects the following:  Risk Factors: 52 yr, hx peripheral neuropathy, rheumatoid arthritis    Clinical Indicators: HgbA1c of 6.4%, documentation on  progress note would benefit from outpatient neurology referral, may need nerve conduction studies to assess for etiology, Diabetes educator was not consulted, documented on the d/c summary patient with significant peripheral neuropathy. Patient with known pre-diabetes/DM with HgbA1c of 6.4%    Treatment:  regular diet, IV antibiotics, lab test  Options provided:  -- Cellulitis of left toe from DM  -- Cellulitis of left toe not from DM  -- Cellulitis of left toe unclear etiology  -- Cellulitis of left toe from DM peripheral neuropathy  -- Other - I will add my own diagnosis  -- Disagree - Not applicable / Not valid  -- Disagree - Clinically unable to determine / Unknown  -- Refer to Clinical Documentation Reviewer    PROVIDER RESPONSE TEXT:    Cellulitis of left toe from DM peripheral neuropathy.     Query created by: Mei Whitlock on 2021 11:59 AM      Electronically signed by:  Derick Hilton DO 2021 12:05 PM

## 2021-07-04 ENCOUNTER — HOSPITAL ENCOUNTER (EMERGENCY)
Age: 51
Discharge: HOME | End: 2021-07-05
Attending: EMERGENCY MEDICINE
Payer: COMMERCIAL

## 2021-07-04 DIAGNOSIS — R10.13 ABDOMINAL PAIN, EPIGASTRIC: Primary | ICD-10-CM

## 2021-07-04 DIAGNOSIS — K62.5 RECTAL BLEEDING: ICD-10-CM

## 2021-07-04 LAB
ALBUMIN SERPL-MCNC: 3.9 G/DL (ref 3.5–5)
ALBUMIN/GLOB SERPL: 0.9 {RATIO} (ref 1.2–3.5)
ALP SERPL-CCNC: 92 U/L (ref 50–136)
ALT SERPL-CCNC: 25 U/L (ref 12–65)
ANION GAP SERPL CALC-SCNC: 8 MMOL/L (ref 7–16)
AST SERPL-CCNC: 14 U/L (ref 15–37)
BASOPHILS # BLD: 0 K/UL (ref 0–0.2)
BASOPHILS NFR BLD: 0 % (ref 0–2)
BILIRUB SERPL-MCNC: 0.3 MG/DL (ref 0.2–1.1)
BUN SERPL-MCNC: 17 MG/DL (ref 6–23)
CALCIUM SERPL-MCNC: 9.8 MG/DL (ref 8.3–10.4)
CHLORIDE SERPL-SCNC: 101 MMOL/L (ref 98–107)
CO2 SERPL-SCNC: 27 MMOL/L (ref 21–32)
CREAT SERPL-MCNC: 0.88 MG/DL (ref 0.6–1)
DIFFERENTIAL METHOD BLD: ABNORMAL
EOSINOPHIL # BLD: 0.2 K/UL (ref 0–0.8)
EOSINOPHIL NFR BLD: 2 % (ref 0.5–7.8)
ERYTHROCYTE [DISTWIDTH] IN BLOOD BY AUTOMATED COUNT: 15.4 % (ref 11.9–14.6)
GLOBULIN SER CALC-MCNC: 4.5 G/DL (ref 2.3–3.5)
GLUCOSE SERPL-MCNC: 119 MG/DL (ref 65–100)
HCT VFR BLD AUTO: 38.3 % (ref 35.8–46.3)
HGB BLD-MCNC: 12.9 G/DL (ref 11.7–15.4)
IMM GRANULOCYTES # BLD AUTO: 0 K/UL (ref 0–0.5)
IMM GRANULOCYTES NFR BLD AUTO: 0 % (ref 0–5)
LIPASE SERPL-CCNC: 109 U/L (ref 73–393)
LYMPHOCYTES # BLD: 2.6 K/UL (ref 0.5–4.6)
LYMPHOCYTES NFR BLD: 36 % (ref 13–44)
MCH RBC QN AUTO: 26.9 PG (ref 26.1–32.9)
MCHC RBC AUTO-ENTMCNC: 33.7 G/DL (ref 31.4–35)
MCV RBC AUTO: 80 FL (ref 79.6–97.8)
MONOCYTES # BLD: 0.5 K/UL (ref 0.1–1.3)
MONOCYTES NFR BLD: 7 % (ref 4–12)
NEUTS SEG # BLD: 4 K/UL (ref 1.7–8.2)
NEUTS SEG NFR BLD: 55 % (ref 43–78)
NRBC # BLD: 0 K/UL (ref 0–0.2)
PLATELET # BLD AUTO: 213 K/UL (ref 150–450)
PMV BLD AUTO: 9.4 FL (ref 9.4–12.3)
POTASSIUM SERPL-SCNC: 3.6 MMOL/L (ref 3.5–5.1)
PROT SERPL-MCNC: 8.4 G/DL (ref 6.3–8.2)
RBC # BLD AUTO: 4.79 M/UL (ref 4.05–5.2)
SODIUM SERPL-SCNC: 136 MMOL/L (ref 136–145)
WBC # BLD AUTO: 7.2 K/UL (ref 4.3–11.1)

## 2021-07-04 PROCEDURE — 83690 ASSAY OF LIPASE: CPT

## 2021-07-04 PROCEDURE — 99283 EMERGENCY DEPT VISIT LOW MDM: CPT

## 2021-07-04 PROCEDURE — 80053 COMPREHEN METABOLIC PANEL: CPT

## 2021-07-04 PROCEDURE — 85025 COMPLETE CBC W/AUTO DIFF WBC: CPT

## 2021-07-04 PROCEDURE — 96374 THER/PROPH/DIAG INJ IV PUSH: CPT

## 2021-07-04 PROCEDURE — 99284 EMERGENCY DEPT VISIT MOD MDM: CPT

## 2021-07-04 PROCEDURE — 96375 TX/PRO/DX INJ NEW DRUG ADDON: CPT

## 2021-07-04 PROCEDURE — 96361 HYDRATE IV INFUSION ADD-ON: CPT

## 2021-07-04 PROCEDURE — 96376 TX/PRO/DX INJ SAME DRUG ADON: CPT

## 2021-07-04 RX ORDER — SODIUM CHLORIDE 0.9 % (FLUSH) 0.9 %
5-10 SYRINGE (ML) INJECTION EVERY 8 HOURS
Status: DISCONTINUED | OUTPATIENT
Start: 2021-07-04 | End: 2021-07-05 | Stop reason: HOSPADM

## 2021-07-04 RX ORDER — ONDANSETRON 2 MG/ML
4 INJECTION INTRAMUSCULAR; INTRAVENOUS
Status: COMPLETED | OUTPATIENT
Start: 2021-07-04 | End: 2021-07-05

## 2021-07-04 RX ORDER — HYDROMORPHONE HYDROCHLORIDE 1 MG/ML
1 INJECTION, SOLUTION INTRAMUSCULAR; INTRAVENOUS; SUBCUTANEOUS ONCE
Status: COMPLETED | OUTPATIENT
Start: 2021-07-04 | End: 2021-07-05

## 2021-07-04 RX ORDER — SODIUM CHLORIDE 0.9 % (FLUSH) 0.9 %
5-10 SYRINGE (ML) INJECTION AS NEEDED
Status: DISCONTINUED | OUTPATIENT
Start: 2021-07-04 | End: 2021-07-05 | Stop reason: HOSPADM

## 2021-07-05 ENCOUNTER — APPOINTMENT (OUTPATIENT)
Dept: CT IMAGING | Age: 51
End: 2021-07-05
Attending: EMERGENCY MEDICINE
Payer: COMMERCIAL

## 2021-07-05 VITALS
TEMPERATURE: 99.2 F | SYSTOLIC BLOOD PRESSURE: 92 MMHG | BODY MASS INDEX: 34.41 KG/M2 | OXYGEN SATURATION: 90 % | HEIGHT: 68 IN | DIASTOLIC BLOOD PRESSURE: 53 MMHG | HEART RATE: 106 BPM | WEIGHT: 227.07 LBS | RESPIRATION RATE: 16 BRPM

## 2021-07-05 PROCEDURE — 74011000258 HC RX REV CODE- 258: Performed by: EMERGENCY MEDICINE

## 2021-07-05 PROCEDURE — 74011250636 HC RX REV CODE- 250/636: Performed by: EMERGENCY MEDICINE

## 2021-07-05 PROCEDURE — 74011250637 HC RX REV CODE- 250/637: Performed by: EMERGENCY MEDICINE

## 2021-07-05 PROCEDURE — 74177 CT ABD & PELVIS W/CONTRAST: CPT

## 2021-07-05 PROCEDURE — 74011000636 HC RX REV CODE- 636: Performed by: EMERGENCY MEDICINE

## 2021-07-05 RX ORDER — ONDANSETRON 8 MG/1
8 TABLET, ORALLY DISINTEGRATING ORAL
Qty: 12 TABLET | Refills: 1 | Status: SHIPPED | OUTPATIENT
Start: 2021-07-05

## 2021-07-05 RX ORDER — DICYCLOMINE HYDROCHLORIDE 20 MG/1
20 TABLET ORAL
Qty: 20 TABLET | Refills: 0 | Status: SHIPPED | OUTPATIENT
Start: 2021-07-05

## 2021-07-05 RX ORDER — DICYCLOMINE HYDROCHLORIDE 10 MG/1
20 CAPSULE ORAL
Status: COMPLETED | OUTPATIENT
Start: 2021-07-05 | End: 2021-07-05

## 2021-07-05 RX ORDER — DICYCLOMINE HYDROCHLORIDE 10 MG/1
CAPSULE ORAL
Status: DISCONTINUED
Start: 2021-07-05 | End: 2021-07-05 | Stop reason: HOSPADM

## 2021-07-05 RX ORDER — SODIUM CHLORIDE 0.9 % (FLUSH) 0.9 %
10 SYRINGE (ML) INJECTION
Status: COMPLETED | OUTPATIENT
Start: 2021-07-05 | End: 2021-07-05

## 2021-07-05 RX ORDER — METOCLOPRAMIDE HYDROCHLORIDE 5 MG/ML
10 INJECTION INTRAMUSCULAR; INTRAVENOUS
Status: COMPLETED | OUTPATIENT
Start: 2021-07-05 | End: 2021-07-05

## 2021-07-05 RX ORDER — DIPHENHYDRAMINE HYDROCHLORIDE 50 MG/ML
25 INJECTION, SOLUTION INTRAMUSCULAR; INTRAVENOUS
Status: COMPLETED | OUTPATIENT
Start: 2021-07-05 | End: 2021-07-05

## 2021-07-05 RX ORDER — ONDANSETRON 2 MG/ML
4 INJECTION INTRAMUSCULAR; INTRAVENOUS
Status: COMPLETED | OUTPATIENT
Start: 2021-07-05 | End: 2021-07-05

## 2021-07-05 RX ADMIN — ONDANSETRON 4 MG: 2 INJECTION INTRAMUSCULAR; INTRAVENOUS at 01:32

## 2021-07-05 RX ADMIN — Medication 10 ML: at 00:40

## 2021-07-05 RX ADMIN — DIPHENHYDRAMINE HYDROCHLORIDE 25 MG: 50 INJECTION INTRAMUSCULAR; INTRAVENOUS at 02:16

## 2021-07-05 RX ADMIN — ONDANSETRON 4 MG: 2 INJECTION INTRAMUSCULAR; INTRAVENOUS at 00:15

## 2021-07-05 RX ADMIN — SODIUM CHLORIDE 100 ML: 900 INJECTION, SOLUTION INTRAVENOUS at 00:40

## 2021-07-05 RX ADMIN — IOPAMIDOL 100 ML: 755 INJECTION, SOLUTION INTRAVENOUS at 00:40

## 2021-07-05 RX ADMIN — Medication 5 ML: at 01:32

## 2021-07-05 RX ADMIN — SODIUM CHLORIDE 1000 ML: 900 INJECTION, SOLUTION INTRAVENOUS at 00:15

## 2021-07-05 RX ADMIN — METOCLOPRAMIDE 10 MG: 5 INJECTION, SOLUTION INTRAMUSCULAR; INTRAVENOUS at 02:17

## 2021-07-05 RX ADMIN — DICYCLOMINE HYDROCHLORIDE 20 MG: 10 CAPSULE ORAL at 02:18

## 2021-07-05 RX ADMIN — HYDROMORPHONE HYDROCHLORIDE 1 MG: 1 INJECTION, SOLUTION INTRAMUSCULAR; INTRAVENOUS; SUBCUTANEOUS at 00:15

## 2021-07-05 NOTE — DISCHARGE INSTRUCTIONS
Continue your omeprazole daily,  Maalox or Mylanta as needed  Try the Bentyl to see if that helps with the pain  Zofran as needed for nausea or vomiting    [facility] will probably call you Tuesday, set up an appointment for later in the week once you are back from [place]    Return to the nearest ER if worse in any way, particularly for larger amounts of pure blood passed rectally,  Uncontrollable nausea or vomiting  Significantly worsened abdominal pain

## 2021-07-05 NOTE — ED TRIAGE NOTES
Pt states that she has had mid abd pain for several days. Had 3 BM's in the last 2 days and noticed flank blood in the toilet.   Masked on arrival

## 2021-07-05 NOTE — ED NOTES
I have reviewed discharge instructions with the patient. The patient verbalized understanding. Patient left ED via Discharge Method: ambulatory to Home with  family, self,). Opportunity for questions and clarification provided. Patient given 2 scripts. To continue your aftercare when you leave the hospital, you may receive an automated call from our care team to check in on how you are doing. This is a free service and part of our promise to provide the best care and service to meet your aftercare needs.  If you have questions, or wish to unsubscribe from this service please call [phone]. Thank you for Choosing our [facility] Emergency Department.

## 2021-07-07 NOTE — ED PROVIDER NOTES
Chief complaint : Abdominal pain    HISTORY OF PRESENT ILLNESS :  Location : Periumbilical    Quality : Sharp to crampy    Quantity : Intermittent    Timing : 2 days    Severity : Mild    Context : Has passed 3 stools in 2 days with dennis liquid blood in the bowl    Alleviating / exacerbating factors : None    Associated Symptoms : No nausea, no vomiting             Past Medical History:   Diagnosis Date    Anemia     Chronic pain     due to RA    Depression     GERD (gastroesophageal reflux disease)     Hx of renal calculi          Hypercholesterolemia     Hypertension     Migraine     [facility] [address]     Ovarian cyst     Reflex sympathetic dystrophy since 2005    right arm; s/p injury - laceration in hand    Rheumatoid arthritis ([facility] [address]          Rheumatoid arthritis ([facility] [address] 9/26/2019    Sinus problem     TIA (transient ischemic attack) 9/26/2019       Past Surgical History:   Procedure Laterality Date    HX APPENDECTOMY      HX BREAST BIOPSY Left 4/27/2015    LEFT NIPPLE EXPLORATION WITH REMOVAL OF LATTISIMUS DUCT performed by Yohana Martinez MD at [address] [place] [address] Road HX CHOLECYSTECTOMY      HX ORTHOPAEDIC      rt hand    HX ORTHOPAEDIC      neck    HX SEPTOPLASTY  08/28/2019    FESS,SMRIT's    HX TOTAL ABDOMINAL HYSTERECTOMY           HX TUBAL LIGATION           Family History:   Problem Relation Age of Onset    Breast Cancer Paternal Aunt 39    Cancer Paternal Aunt         brst    Cancer Maternal Grandfather         colon    Colon Cancer Maternal Grandfather     Breast Cancer Paternal Grandmother 46    Cancer Paternal Grandmother         breast    Ovarian Cancer Paternal Grandmother     Cancer Mother         throat    Hypertension Mother     Cancer Father         non hodgkins lymphoma    Cancer Maternal Grandmother         ovarian    Breast Cancer Maternal Grandmother     Heart Disease Paternal Grandfather     Diabetes Paternal Grandfather     Breast Cancer Sister        Social History     Socioeconomic History    Marital status:      Spouse name: Not on file    Number of children: Not on file    Years of education: Not on file    Highest education level: Not on file   Occupational History    Not on file   Tobacco Use    Smoking status: Never Smoker    Smokeless tobacco: Never Used   Substance and Sexual Activity    Alcohol use: Yes     Comment: \"once every couple years\"    Drug use: No    Sexual activity: Yes     Partners: Male     Birth control/protection: Surgical   Other Topics Concern    Not on file   Social History Narrative    Not on file     Social Determinants of Health     Financial Resource Strain:     Difficulty of Paying Living Expenses:    Food Insecurity:     Worried About Running Out of Food in the Last Year:     [address] in the Last Year:    Transportation Needs:     Lack of Transportation (Medical):  Lack of Transportation (Non-Medical):    Physical Activity:     Days of Exercise per Week:     Minutes of Exercise per Session:    Stress:     Feeling of Stress :    Social Connections:     Frequency of Communication with Friends and Family:     Frequency of Social Gatherings with Friends and Family:     Attends Hindu Services:     Active Member of Clubs or Organizations:     Attends Club or Organization Meetings:     Marital Status:    Intimate Partner Violence:     Fear of Current or Ex-Partner:     Emotionally Abused:     Physically Abused:     Sexually Abused: ALLERGIES: Pcn [penicillins] and Norco [hydrocodone-acetaminophen]    Review of Systems   Constitutional: Negative for chills and fever. HENT: Negative for rhinorrhea and sore throat. Eyes: Negative for discharge and redness. Respiratory: Negative for cough and shortness of breath. Cardiovascular: Negative for chest pain and palpitations. Gastrointestinal: Positive for abdominal pain and blood in stool.  Negative for diarrhea, nausea and vomiting. Genitourinary: Negative for difficulty urinating and dysuria. Musculoskeletal: Negative for arthralgias and back pain. Skin: Negative for rash. Neurological: Negative for dizziness and headaches. All other systems reviewed and are negative. Vitals:    07/05/21 0154 07/05/21 0214 07/05/21 0234 07/05/21 0254   BP: (!) 101/52 (!) 102/56 (!) 97/55 (!) 92/53   Pulse:       Resp:       Temp:       SpO2: 94% 94% 94% 90%   Weight:       Height:                Physical Exam  Vitals and nursing note reviewed. Constitutional:       General: She is not in acute distress. Appearance: Normal appearance. She is well-developed. She is not ill-appearing, toxic-appearing or diaphoretic. HENT:      Head: Normocephalic and atraumatic. Right Ear: External ear normal.      Left Ear: External ear normal.   Eyes:      General: No scleral icterus. Right eye: No discharge. Left eye: No discharge. Extraocular Movements: Extraocular movements intact. Conjunctiva/sclera: Conjunctivae normal.      Pupils: Pupils are equal, round, and reactive to light. Neck:      Thyroid: No thyromegaly. Trachea: Trachea normal.   Cardiovascular:      Rate and Rhythm: Normal rate and regular rhythm. Heart sounds: Normal heart sounds. No murmur heard. No gallop. Pulmonary:      Effort: Pulmonary effort is normal. No respiratory distress. Breath sounds: Normal breath sounds. No wheezing or rales. Abdominal:      General: Bowel sounds are normal.      Palpations: Abdomen is soft. There is no hepatomegaly, splenomegaly or pulsatile mass. Tenderness: There is abdominal tenderness. There is no guarding. Genitourinary:     Comments: Photos on smart phone reveal formed stool seemingly bloodless with liquid blood in the toilet bowl  Musculoskeletal:         General: Normal range of motion. Cervical back: Normal range of motion and neck supple. Normal range of motion. Lymphadenopathy:      Cervical: No cervical adenopathy. Skin:     General: Skin is warm and dry. Neurological:      General: No focal deficit present. Mental Status: She is alert and oriented to person, place, and time. Mental status is at baseline. Motor: No abnormal muscle tone. Comments: cni 2-12 grossly   Psychiatric:         Mood and Affect: Mood normal.         Behavior: Behavior normal.          MDM  Number of Diagnoses or Management Options  Abdominal pain, epigastric: new and requires workup  Rectal bleeding: new and requires workup  Diagnosis management comments: Medical decision making note:  Rectal bleeding probably diverticular, mild abdominal pain, suspect colic related to intraluminal blood  Labs stable CT unrevealing, follow-up with GI  This concludes the \"medical decision making note\" part of this emergency department visit note. Amount and/or Complexity of Data Reviewed  Clinical lab tests: reviewed and ordered (Labs Reviewed  CBC WITH AUTOMATED DIFF - Abnormal; Notable for the following components:     RDW                           15.4 (*)            All other components within normal limits  METABOLIC PANEL, COMPREHENSIVE - Abnormal; Notable for the following components:     Glucose                       119 (*)                AST (SGOT)                    14 (*)                 Protein, total                8.4 (*)                Globulin                      4.5 (*)                A-G Ratio                     0.9 (*)             All other components within normal limits  LIPASE  )  Tests in the radiology section of CPT®: reviewed and ordered (CT ABD PELV W CONT   Final Result    1. There are a few loops of jejunum with questionable mild wall thickening,    raising the possibility of acute enteritis. 2. Nonobstructing bilateral kidney stones. 3. Prior cholecystectomy, appendectomy and hysterectomy.     4. Stable splenosis in the left abdomen.         )  Decide to obtain previous medical records or to obtain history from someone other than the patient: yes  Obtain history from someone other than the patient: yes ( at bedside)  Discuss the patient with other providers: yes (Referred to GI by Sheba)    Risk of Complications, Morbidity, and/or Mortality  Presenting problems: moderate  Diagnostic procedures: low  Management options: low    Patient Progress  Patient progress: stable         Procedures

## 2021-08-06 ENCOUNTER — HOSPITAL ENCOUNTER (OUTPATIENT)
Dept: LAB | Age: 51
Discharge: HOME | End: 2021-08-06

## 2021-08-06 PROCEDURE — 88305 TISSUE EXAM BY PATHOLOGIST: CPT

## 2022-03-18 PROBLEM — Z86.73 H/O TIA (TRANSIENT ISCHEMIC ATTACK) AND STROKE: Status: ACTIVE | Noted: 2020-06-29

## 2022-03-19 PROBLEM — M86.171 ACUTE OSTEOMYELITIS OF RIGHT FOOT (HCC): Status: ACTIVE | Noted: 2020-06-30

## 2022-03-19 PROBLEM — I10 HYPERTENSION: Status: ACTIVE | Noted: 2019-09-26

## 2022-03-19 PROBLEM — G45.9 TIA (TRANSIENT ISCHEMIC ATTACK): Status: ACTIVE | Noted: 2019-09-26

## 2022-03-19 PROBLEM — F32.A DEPRESSION: Status: ACTIVE | Noted: 2019-09-26

## 2022-03-19 PROBLEM — G90.50 REFLEX SYMPATHETIC DYSTROPHY: Status: ACTIVE | Noted: 2019-09-26

## 2022-03-19 PROBLEM — M06.9 RHEUMATOID ARTHRITIS (HCC): Status: ACTIVE | Noted: 2019-09-26

## 2022-03-19 PROBLEM — L03.032 CELLULITIS OF SECOND TOE, LEFT: Status: ACTIVE | Noted: 2020-06-29

## 2022-03-19 PROBLEM — G89.29 CHRONIC PAIN: Status: ACTIVE | Noted: 2019-09-26

## 2022-03-19 PROBLEM — D50.9 MICROCYTIC ANEMIA: Status: ACTIVE | Noted: 2021-02-16

## 2022-03-20 PROBLEM — E66.811 CLASS 1 OBESITY IN ADULT: Status: ACTIVE | Noted: 2020-06-29

## 2022-03-20 PROBLEM — E66.9 CLASS 1 OBESITY IN ADULT: Status: ACTIVE | Noted: 2020-06-29

## 2022-03-20 PROBLEM — G62.9 PERIPHERAL NEUROPATHY: Status: ACTIVE | Noted: 2020-06-30

## 2022-07-16 ENCOUNTER — HOSPITAL ENCOUNTER (EMERGENCY)
Age: 52
Discharge: HOME | End: 2022-07-16
Attending: EMERGENCY MEDICINE
Payer: COMMERCIAL

## 2022-07-16 VITALS
SYSTOLIC BLOOD PRESSURE: 132 MMHG | HEART RATE: 74 BPM | OXYGEN SATURATION: 95 % | WEIGHT: 221 LBS | RESPIRATION RATE: 17 BRPM | DIASTOLIC BLOOD PRESSURE: 70 MMHG | BODY MASS INDEX: 33.49 KG/M2 | HEIGHT: 68 IN | TEMPERATURE: 97.6 F

## 2022-07-16 DIAGNOSIS — R10.9 ACUTE RIGHT FLANK PAIN: Primary | ICD-10-CM

## 2022-07-16 LAB
ANION GAP SERPL CALC-SCNC: 16 MMOL/L (ref 7–16)
APPEARANCE UR: ABNORMAL
BACTERIA URNS QL MICRO: NORMAL /HPF
BILIRUB UR QL: NEGATIVE
BUN SERPL-MCNC: 15 MG/DL (ref 7–18)
CALCIUM SERPL-MCNC: 9.6 MG/DL (ref 8.3–10.4)
CASTS URNS QL MICRO: 0 /LPF
CHLORIDE SERPL-SCNC: 102 MMOL/L (ref 98–107)
CO2 SERPL-SCNC: 24 MMOL/L (ref 21–32)
COLOR UR: ABNORMAL
CREAT SERPL-MCNC: 0.53 MG/DL (ref 0.6–1)
CRYSTALS URNS QL MICRO: 0 /LPF
EPI CELLS #/AREA URNS HPF: NORMAL /HPF
ERYTHROCYTE [DISTWIDTH] IN BLOOD BY AUTOMATED COUNT: 14.7 % (ref 11.9–14.6)
GLUCOSE SERPL-MCNC: 96 MG/DL (ref 65–100)
GLUCOSE UR STRIP.AUTO-MCNC: NEGATIVE MG/DL
HCT VFR BLD AUTO: 37.2 % (ref 35.8–46.3)
HGB BLD-MCNC: 12.1 G/DL (ref 11.7–15.4)
HGB UR QL STRIP: ABNORMAL
KETONES UR QL STRIP.AUTO: NEGATIVE MG/DL
LEUKOCYTE ESTERASE UR QL STRIP.AUTO: ABNORMAL
MCH RBC QN AUTO: 26.7 PG (ref 26.1–32.9)
MCHC RBC AUTO-ENTMCNC: 32.5 G/DL (ref 31.4–35)
MCV RBC AUTO: 81.9 FL (ref 79.6–97.8)
MUCOUS THREADS URNS QL MICRO: 0 /LPF
NITRITE UR QL STRIP.AUTO: NEGATIVE
NRBC # BLD: 0 K/UL (ref 0–0.2)
OTHER OBSERVATIONS: NORMAL
PH UR STRIP: 6.5 [PH] (ref 5–9)
PLATELET # BLD AUTO: 184 K/UL (ref 150–450)
PMV BLD AUTO: 9.3 FL (ref 9.4–12.3)
POTASSIUM SERPL-SCNC: 4.3 MMOL/L (ref 3.5–5.1)
PROT UR STRIP-MCNC: NEGATIVE MG/DL
RBC # BLD AUTO: 4.54 M/UL (ref 4.05–5.2)
RBC #/AREA URNS HPF: NORMAL /HPF
SODIUM SERPL-SCNC: 142 MMOL/L (ref 136–145)
SP GR UR REFRACTOMETRY: 1.02 (ref 1–1.02)
UROBILINOGEN UR QL STRIP.AUTO: 0.2 EU/DL (ref 0.2–1)
WBC # BLD AUTO: 5.1 K/UL (ref 4.3–11.1)
WBC URNS QL MICRO: NORMAL /HPF

## 2022-07-16 PROCEDURE — 6360000002 HC RX W HCPCS: Performed by: EMERGENCY MEDICINE

## 2022-07-16 PROCEDURE — 81015 MICROSCOPIC EXAM OF URINE: CPT

## 2022-07-16 PROCEDURE — 85027 COMPLETE CBC AUTOMATED: CPT

## 2022-07-16 PROCEDURE — 96374 THER/PROPH/DIAG INJ IV PUSH: CPT

## 2022-07-16 PROCEDURE — 99284 EMERGENCY DEPT VISIT MOD MDM: CPT

## 2022-07-16 PROCEDURE — 80048 BASIC METABOLIC PNL TOTAL CA: CPT

## 2022-07-16 PROCEDURE — 81001 URINALYSIS AUTO W/SCOPE: CPT

## 2022-07-16 PROCEDURE — 96375 TX/PRO/DX INJ NEW DRUG ADDON: CPT

## 2022-07-16 RX ORDER — OMEPRAZOLE 20 MG/1
40 CAPSULE, DELAYED RELEASE ORAL DAILY
COMMUNITY

## 2022-07-16 RX ORDER — MORPHINE SULFATE 15 MG/1
7.5-15 TABLET ORAL 3 TIMES DAILY
Qty: 11 TABLET | Refills: 0 | Status: SHIPPED | OUTPATIENT
Start: 2022-07-16 | End: 2022-07-19

## 2022-07-16 RX ORDER — KETOROLAC TROMETHAMINE 30 MG/ML
15 INJECTION, SOLUTION INTRAMUSCULAR; INTRAVENOUS ONCE
Status: COMPLETED | OUTPATIENT
Start: 2022-07-16 | End: 2022-07-16

## 2022-07-16 RX ORDER — MORPHINE SULFATE 4 MG/ML
4 INJECTION INTRAVENOUS ONCE
Status: COMPLETED | OUTPATIENT
Start: 2022-07-16 | End: 2022-07-16

## 2022-07-16 RX ORDER — LISINOPRIL AND HYDROCHLOROTHIAZIDE 20; 12.5 MG/1; MG/1
1 TABLET ORAL DAILY
COMMUNITY

## 2022-07-16 RX ORDER — NITROFURANTOIN MACROCRYSTALS 50 MG/1
50 CAPSULE ORAL 2 TIMES DAILY
COMMUNITY

## 2022-07-16 RX ORDER — TAMSULOSIN HYDROCHLORIDE 0.4 MG/1
0.4 CAPSULE ORAL DAILY
Qty: 7 CAPSULE | Refills: 0 | Status: SHIPPED | OUTPATIENT
Start: 2022-07-16 | End: 2022-07-23

## 2022-07-16 RX ORDER — BUPROPION HYDROCHLORIDE 100 MG/1
300 TABLET ORAL DAILY
COMMUNITY

## 2022-07-16 RX ORDER — ONDANSETRON 2 MG/ML
4 INJECTION INTRAMUSCULAR; INTRAVENOUS ONCE
Status: DISCONTINUED | OUTPATIENT
Start: 2022-07-16 | End: 2022-07-16

## 2022-07-16 RX ORDER — PROCHLORPERAZINE EDISYLATE 5 MG/ML
10 INJECTION INTRAMUSCULAR; INTRAVENOUS ONCE
Status: COMPLETED | OUTPATIENT
Start: 2022-07-16 | End: 2022-07-16

## 2022-07-16 RX ORDER — ASPIRIN 81 MG/1
81 TABLET, CHEWABLE ORAL DAILY
COMMUNITY

## 2022-07-16 RX ORDER — PROMETHAZINE HYDROCHLORIDE 25 MG/1
25 TABLET ORAL 3 TIMES DAILY PRN
Qty: 21 TABLET | Refills: 0 | Status: SHIPPED | OUTPATIENT
Start: 2022-07-16 | End: 2022-07-23

## 2022-07-16 RX ADMIN — PROCHLORPERAZINE EDISYLATE 10 MG: 5 INJECTION INTRAMUSCULAR; INTRAVENOUS at 15:26

## 2022-07-16 RX ADMIN — MORPHINE SULFATE 4 MG: 4 INJECTION INTRAVENOUS at 15:25

## 2022-07-16 RX ADMIN — KETOROLAC TROMETHAMINE 15 MG: 30 INJECTION, SOLUTION INTRAMUSCULAR; INTRAVENOUS at 15:26

## 2022-07-16 ASSESSMENT — PAIN DESCRIPTION - LOCATION
LOCATION: FLANK

## 2022-07-16 ASSESSMENT — PAIN DESCRIPTION - ORIENTATION
ORIENTATION: RIGHT
ORIENTATION: RIGHT

## 2022-07-16 ASSESSMENT — PAIN SCALES - GENERAL
PAINLEVEL_OUTOF10: 10
PAINLEVEL_OUTOF10: 5
PAINLEVEL_OUTOF10: 5

## 2022-07-16 ASSESSMENT — ENCOUNTER SYMPTOMS
BLOOD IN STOOL: 0
NAUSEA: 1
VOMITING: 1
COUGH: 0
SHORTNESS OF BREATH: 0
DIARRHEA: 0

## 2022-07-16 ASSESSMENT — LIFESTYLE VARIABLES: HOW OFTEN DO YOU HAVE A DRINK CONTAINING ALCOHOL: NEVER

## 2022-07-16 ASSESSMENT — PAIN - FUNCTIONAL ASSESSMENT: PAIN_FUNCTIONAL_ASSESSMENT: 0-10

## 2022-07-16 NOTE — ED NOTES
I have reviewed discharge instructions with the patient. The patient verbalized understanding. Patient left ED via Discharge Method: ambulatory to Home with spouse    Opportunity for questions and clarification provided. Patient given 3 scripts. To continue your aftercare when you leave the hospital, you may receive an automated call from our care team to check in on how you are doing. This is a free service and part of our promise to provide the best care and service to meet your aftercare needs.  If you have questions, or wish to unsubscribe from this service please call [phone]. Thank you for Choosing our [facility] Emergency Department.         Cheryle Rothman, RN  07/16/22 5851

## 2022-07-16 NOTE — ED TRIAGE NOTES
Pt states she has had severe flank pain believes it is kidney stone, has had kidney stone in past few months. Some nausea as well.

## 2022-07-16 NOTE — ED PROVIDER NOTES
pain.   Skin:  Negative for rash. Neurological:  Negative for dizziness and light-headedness. Hematological:  Negative for adenopathy. Psychiatric/Behavioral:  Negative for confusion. Past Medical History:   Diagnosis Date    Anemia     Chronic pain     due to RA    Depression     GERD (gastroesophageal reflux disease)     Hx of renal calculi          Hypercholesterolemia     Hypertension     Migraine     Morbid obesity ([address]     Ovarian cyst     Reflex sympathetic dystrophy since 2005    right arm; s/p injury - laceration in hand    Rheumatoid arthritis ([facility] [address] 9/26/2019    Rheumatoid arthritis ([address]          Sinus problem     TIA (transient ischemic attack) 9/26/2019        Past Surgical History:   Procedure Laterality Date    APPENDECTOMY      BREAST BIOPSY Left 4/27/2015    LEFT NIPPLE EXPLORATION WITH REMOVAL OF LATTISIMUS DUCT [facility], TOTAL ABDOMINAL (CERVIX REMOVED)           ORTHOPEDIC SURGERY      rt hand    ORTHOPEDIC SURGERY      neck    SEPTOPLASTY  08/28/2019    FESS,SMRIT's    TUBAL LIGATION          Family History   Problem Relation Age of Onset    Cancer Paternal Aunt         brst    Cancer Father         non hodgkins lymphoma    Breast Cancer Sister     Diabetes Paternal Grandfather     Heart Disease Paternal Grandfather     Breast Cancer Maternal Grandmother     Cancer Maternal Grandmother         ovarian    Cancer Maternal Grandfather         colon    Colon Cancer Maternal Grandfather     Breast Cancer Paternal Grandmother 46    Cancer Paternal Grandmother         breast    Ovarian Cancer Paternal Grandmother     Cancer Mother         throat    Hypertension Mother     Breast Cancer Paternal Aunt 39        Social Connections: Not on file        Allergies   Allergen Reactions    Penicillins Hives        Vitals signs and nursing note reviewed.    Patient Vitals for the past 4 hrs:   Temp Pulse Resp BP SpO2 07/16/22 1620 -- -- -- -- 95 %   07/16/22 1615 -- -- -- -- 94 %   07/16/22 1600 -- -- -- -- 94 %   07/16/22 1516 97.6 °F (36.4 °C) -- -- 135/73 --   07/16/22 1514 -- 77 24 -- 99 %          Physical Exam  Vitals and nursing note reviewed. Constitutional:       Appearance: Normal appearance. Cardiovascular:      Rate and Rhythm: Normal rate and regular rhythm. Pulmonary:      Effort: Pulmonary effort is normal.      Breath sounds: Normal breath sounds. Abdominal:      General: Bowel sounds are normal.      Palpations: Abdomen is soft. Tenderness: There is no abdominal tenderness. There is no rebound. Neurological:      Mental Status: She is alert. Procedures      Labs Reviewed   CBC - Abnormal; Notable for the following components:       Result Value    RDW 14.7 (*)     MPV 9.3 (*)     All other components within normal limits   BASIC METABOLIC PANEL - Abnormal; Notable for the following components:    CREATININE 0.53 (*)     All other components within normal limits   URINALYSIS - Abnormal; Notable for the following components:    Specific Gravity, UA 1.025 (*)     Blood, Urine MODERATE (*)     Leukocyte Esterase, Urine TRACE (*)     All other components within normal limits   URINALYSIS, MICRO        No orders to display                    ED Course as of 07/16/22 1647   Sat Jul 16, 2022   1645 Patient's urine shows no signs of significant infection. Her blood work is unremarkable. I do not think she needs any imaging at this time. I will discharge her home with some morphine, Flomax, and Phenergan. [AC]      ED Course User Index  [AC] Herminio Mackey MD        Voice dictation software was used during the making of this note. This software is not perfect and grammatical and other typographical errors may be present. This note has not been completely proofread for errors.         Herminio Mackey MD  07/16/22 2087

## 2022-11-18 ENCOUNTER — HOSPITAL ENCOUNTER (OUTPATIENT)
Dept: MAMMOGRAPHY | Age: 52
Discharge: HOME | End: 2022-11-21
Payer: COMMERCIAL

## 2022-11-18 DIAGNOSIS — Z12.31 VISIT FOR SCREENING MAMMOGRAM: ICD-10-CM

## 2022-11-18 PROCEDURE — 77063 BREAST TOMOSYNTHESIS BI: CPT

## 2022-12-10 ENCOUNTER — HOSPITAL ENCOUNTER (EMERGENCY)
Age: 52
Discharge: ANOTHER HOSPITAL | DRG: 660 | End: 2022-12-11
Attending: EMERGENCY MEDICINE
Payer: COMMERCIAL

## 2022-12-10 ENCOUNTER — APPOINTMENT (OUTPATIENT)
Dept: CT IMAGING | Age: 52
DRG: 660 | End: 2022-12-10
Payer: COMMERCIAL

## 2022-12-10 DIAGNOSIS — R19.00 INTRAABDOMINAL MASS: Primary | ICD-10-CM

## 2022-12-10 DIAGNOSIS — N20.1 RIGHT URETERAL STONE: ICD-10-CM

## 2022-12-10 LAB
ALBUMIN SERPL-MCNC: 4.3 G/DL (ref 3.5–5)
ALBUMIN/GLOB SERPL: 1.3 (ref 0.4–1.6)
ALP SERPL-CCNC: 100 U/L (ref 45–117)
ALT SERPL-CCNC: 14 U/L (ref 13–61)
ANION GAP SERPL CALC-SCNC: 13 MMOL/L (ref 2–11)
APPEARANCE UR: CLEAR
AST SERPL-CCNC: 17 U/L (ref 15–37)
BACTERIA URNS QL MICRO: ABNORMAL /HPF
BASOPHILS # BLD: 0 K/UL (ref 0–0.2)
BASOPHILS NFR BLD: 0 % (ref 0–2)
BILIRUB SERPL-MCNC: 0.5 MG/DL (ref 0.2–1.1)
BILIRUB UR QL: ABNORMAL
BUN SERPL-MCNC: 18 MG/DL (ref 7–18)
CALCIUM SERPL-MCNC: 9.8 MG/DL (ref 8.3–10.4)
CASTS URNS QL MICRO: 0 /LPF
CHLORIDE SERPL-SCNC: 99 MMOL/L (ref 98–107)
CO2 SERPL-SCNC: 27 MMOL/L (ref 21–32)
COLOR UR: YELLOW
CREAT SERPL-MCNC: 0.86 MG/DL (ref 0.6–1)
CRYSTALS URNS QL MICRO: 0 /LPF
DIFFERENTIAL METHOD BLD: ABNORMAL
EOSINOPHIL # BLD: 0.2 K/UL (ref 0–0.8)
EOSINOPHIL NFR BLD: 2 % (ref 0.5–7.8)
EPI CELLS #/AREA URNS HPF: ABNORMAL /HPF
ERYTHROCYTE [DISTWIDTH] IN BLOOD BY AUTOMATED COUNT: 15.1 % (ref 11.9–14.6)
GLOBULIN SER CALC-MCNC: 3.4 G/DL (ref 2.8–4.5)
GLUCOSE SERPL-MCNC: 152 MG/DL (ref 65–100)
GLUCOSE UR STRIP.AUTO-MCNC: NEGATIVE MG/DL
HCT VFR BLD AUTO: 33.8 % (ref 35.8–46.3)
HGB BLD-MCNC: 11.2 G/DL (ref 11.7–15.4)
HGB UR QL STRIP: NEGATIVE
IMM GRANULOCYTES # BLD AUTO: 0.1 K/UL (ref 0–0.5)
IMM GRANULOCYTES NFR BLD AUTO: 1 % (ref 0–5)
KETONES UR QL STRIP.AUTO: NEGATIVE MG/DL
LEUKOCYTE ESTERASE UR QL STRIP.AUTO: NEGATIVE
LIPASE SERPL-CCNC: 21 U/L (ref 13–60)
LYMPHOCYTES # BLD: 2.3 K/UL (ref 0.5–4.6)
LYMPHOCYTES NFR BLD: 21 % (ref 13–44)
MCH RBC QN AUTO: 26.4 PG (ref 26.1–32.9)
MCHC RBC AUTO-ENTMCNC: 33.1 G/DL (ref 31.4–35)
MCV RBC AUTO: 79.5 FL (ref 82–102)
MONOCYTES # BLD: 0.7 K/UL (ref 0.1–1.3)
MONOCYTES NFR BLD: 7 % (ref 4–12)
MUCOUS THREADS URNS QL MICRO: ABNORMAL /LPF
NEUTS SEG # BLD: 7.6 K/UL (ref 1.7–8.2)
NEUTS SEG NFR BLD: 70 % (ref 43–78)
NITRITE UR QL STRIP.AUTO: POSITIVE
NRBC # BLD: 0 K/UL (ref 0–0.2)
OTHER OBSERVATIONS: ABNORMAL
PH UR STRIP: 6 (ref 5–9)
PLATELET # BLD AUTO: 228 K/UL (ref 150–450)
PMV BLD AUTO: 9 FL (ref 9.4–12.3)
POTASSIUM SERPL-SCNC: 3.2 MMOL/L (ref 3.5–5.1)
PROT SERPL-MCNC: 7.7 G/DL (ref 6.4–8.2)
PROT UR STRIP-MCNC: 30 MG/DL
RBC # BLD AUTO: 4.25 M/UL (ref 4.05–5.2)
RBC #/AREA URNS HPF: 0 /HPF
SODIUM SERPL-SCNC: 139 MMOL/L (ref 133–143)
SP GR UR REFRACTOMETRY: >=1.03 (ref 1–1.02)
UROBILINOGEN UR QL STRIP.AUTO: 1 EU/DL (ref 0.2–1)
WBC # BLD AUTO: 10.9 K/UL (ref 4.3–11.1)
WBC URNS QL MICRO: 0 /HPF

## 2022-12-10 PROCEDURE — 99285 EMERGENCY DEPT VISIT HI MDM: CPT

## 2022-12-10 PROCEDURE — 96375 TX/PRO/DX INJ NEW DRUG ADDON: CPT

## 2022-12-10 PROCEDURE — 80053 COMPREHEN METABOLIC PANEL: CPT

## 2022-12-10 PROCEDURE — 85025 COMPLETE CBC W/AUTO DIFF WBC: CPT

## 2022-12-10 PROCEDURE — 83605 ASSAY OF LACTIC ACID: CPT

## 2022-12-10 PROCEDURE — 96366 THER/PROPH/DIAG IV INF ADDON: CPT

## 2022-12-10 PROCEDURE — 81001 URINALYSIS AUTO W/SCOPE: CPT

## 2022-12-10 PROCEDURE — 6360000002 HC RX W HCPCS: Performed by: EMERGENCY MEDICINE

## 2022-12-10 PROCEDURE — 87040 BLOOD CULTURE FOR BACTERIA: CPT

## 2022-12-10 PROCEDURE — 74176 CT ABD & PELVIS W/O CONTRAST: CPT

## 2022-12-10 PROCEDURE — 83690 ASSAY OF LIPASE: CPT

## 2022-12-10 PROCEDURE — 96365 THER/PROPH/DIAG IV INF INIT: CPT

## 2022-12-10 RX ORDER — ONDANSETRON 2 MG/ML
4 INJECTION INTRAMUSCULAR; INTRAVENOUS
Status: COMPLETED | OUTPATIENT
Start: 2022-12-10 | End: 2022-12-10

## 2022-12-10 RX ORDER — KETOROLAC TROMETHAMINE 15 MG/ML
15 INJECTION, SOLUTION INTRAMUSCULAR; INTRAVENOUS ONCE
Status: COMPLETED | OUTPATIENT
Start: 2022-12-10 | End: 2022-12-10

## 2022-12-10 RX ORDER — DULOXETIN HYDROCHLORIDE 60 MG/1
60 CAPSULE, DELAYED RELEASE ORAL DAILY
COMMUNITY

## 2022-12-10 RX ORDER — LEVOFLOXACIN 5 MG/ML
750 INJECTION, SOLUTION INTRAVENOUS
Status: COMPLETED | OUTPATIENT
Start: 2022-12-10 | End: 2022-12-11

## 2022-12-10 RX ADMIN — KETOROLAC TROMETHAMINE 15 MG: 15 INJECTION, SOLUTION INTRAMUSCULAR; INTRAVENOUS at 23:02

## 2022-12-10 RX ADMIN — ONDANSETRON 4 MG: 2 INJECTION INTRAMUSCULAR; INTRAVENOUS at 23:06

## 2022-12-10 ASSESSMENT — ENCOUNTER SYMPTOMS
BACK PAIN: 1
NAUSEA: 1
VOMITING: 1
ABDOMINAL PAIN: 1
DIARRHEA: 0
SHORTNESS OF BREATH: 0
FACIAL SWELLING: 0

## 2022-12-10 ASSESSMENT — PAIN DESCRIPTION - LOCATION: LOCATION: ABDOMEN

## 2022-12-10 ASSESSMENT — PAIN DESCRIPTION - DESCRIPTORS: DESCRIPTORS: SHOOTING

## 2022-12-10 ASSESSMENT — PAIN SCALES - GENERAL: PAINLEVEL_OUTOF10: 8

## 2022-12-11 ENCOUNTER — ANESTHESIA EVENT (OUTPATIENT)
Dept: SURGERY | Age: 52
End: 2022-12-11
Payer: COMMERCIAL

## 2022-12-11 ENCOUNTER — HOSPITAL ENCOUNTER (INPATIENT)
Age: 52
LOS: 4 days | Discharge: HOME | DRG: 660 | End: 2022-12-15
Attending: HOSPITALIST | Admitting: HOSPITALIST
Payer: COMMERCIAL

## 2022-12-11 ENCOUNTER — ANESTHESIA (OUTPATIENT)
Dept: SURGERY | Age: 52
End: 2022-12-11
Payer: COMMERCIAL

## 2022-12-11 VITALS
HEART RATE: 88 BPM | TEMPERATURE: 98.2 F | SYSTOLIC BLOOD PRESSURE: 111 MMHG | HEIGHT: 68 IN | DIASTOLIC BLOOD PRESSURE: 58 MMHG | OXYGEN SATURATION: 95 % | RESPIRATION RATE: 17 BRPM | WEIGHT: 232 LBS | BODY MASS INDEX: 35.16 KG/M2

## 2022-12-11 DIAGNOSIS — M79.89 LEFT ARM SWELLING: Primary | ICD-10-CM

## 2022-12-11 DIAGNOSIS — N20.1 URETERIC STONE: ICD-10-CM

## 2022-12-11 DIAGNOSIS — G89.28 OTHER CHRONIC POSTPROCEDURAL PAIN: ICD-10-CM

## 2022-12-11 PROBLEM — N30.00 ACUTE CYSTITIS WITHOUT HEMATURIA: Status: ACTIVE | Noted: 2022-12-11

## 2022-12-11 LAB
CRP SERPL-MCNC: 16.4 MG/DL (ref 0–0.9)
ERYTHROCYTE [SEDIMENTATION RATE] IN BLOOD: 102 MM/HR (ref 0–20)
LACTATE SERPL-SCNC: 1.1 MMOL/L (ref 0.4–2)
MAGNESIUM SERPL-MCNC: 2 MG/DL (ref 1.8–2.4)
PROCALCITONIN SERPL-MCNC: <0.05 NG/ML (ref 0–0.49)

## 2022-12-11 PROCEDURE — 2709999900 HC NON-CHARGEABLE SUPPLY: Performed by: UROLOGY

## 2022-12-11 PROCEDURE — 6370000000 HC RX 637 (ALT 250 FOR IP): Performed by: ANESTHESIOLOGY

## 2022-12-11 PROCEDURE — 3600000012 HC SURGERY LEVEL 2 ADDTL 15MIN: Performed by: UROLOGY

## 2022-12-11 PROCEDURE — 99223 1ST HOSP IP/OBS HIGH 75: CPT | Performed by: UROLOGY

## 2022-12-11 PROCEDURE — C1769 GUIDE WIRE: HCPCS | Performed by: UROLOGY

## 2022-12-11 PROCEDURE — 1100000000 HC RM PRIVATE

## 2022-12-11 PROCEDURE — 96375 TX/PRO/DX INJ NEW DRUG ADDON: CPT

## 2022-12-11 PROCEDURE — 84145 PROCALCITONIN (PCT): CPT

## 2022-12-11 PROCEDURE — 2500000003 HC RX 250 WO HCPCS: Performed by: HOSPITALIST

## 2022-12-11 PROCEDURE — 6360000002 HC RX W HCPCS: Performed by: HOSPITALIST

## 2022-12-11 PROCEDURE — 2580000003 HC RX 258: Performed by: ANESTHESIOLOGY

## 2022-12-11 PROCEDURE — 85652 RBC SED RATE AUTOMATED: CPT

## 2022-12-11 PROCEDURE — 96365 THER/PROPH/DIAG IV INF INIT: CPT

## 2022-12-11 PROCEDURE — 7100000001 HC PACU RECOVERY - ADDTL 15 MIN: Performed by: UROLOGY

## 2022-12-11 PROCEDURE — 3700000000 HC ANESTHESIA ATTENDED CARE: Performed by: UROLOGY

## 2022-12-11 PROCEDURE — 2500000003 HC RX 250 WO HCPCS: Performed by: NURSE ANESTHETIST, CERTIFIED REGISTERED

## 2022-12-11 PROCEDURE — 6360000002 HC RX W HCPCS: Performed by: NURSE ANESTHETIST, CERTIFIED REGISTERED

## 2022-12-11 PROCEDURE — 86140 C-REACTIVE PROTEIN: CPT

## 2022-12-11 PROCEDURE — 2580000003 HC RX 258: Performed by: HOSPITALIST

## 2022-12-11 PROCEDURE — 0T768DZ DILATION OF RIGHT URETER WITH INTRALUMINAL DEVICE, VIA NATURAL OR ARTIFICIAL OPENING ENDOSCOPIC: ICD-10-PCS | Performed by: UROLOGY

## 2022-12-11 PROCEDURE — 52332 CYSTOSCOPY AND TREATMENT: CPT | Performed by: UROLOGY

## 2022-12-11 PROCEDURE — 6370000000 HC RX 637 (ALT 250 FOR IP): Performed by: HOSPITALIST

## 2022-12-11 PROCEDURE — C2617 STENT, NON-COR, TEM W/O DEL: HCPCS | Performed by: UROLOGY

## 2022-12-11 PROCEDURE — 7100000000 HC PACU RECOVERY - FIRST 15 MIN: Performed by: UROLOGY

## 2022-12-11 PROCEDURE — 3600000002 HC SURGERY LEVEL 2 BASE: Performed by: UROLOGY

## 2022-12-11 PROCEDURE — 96366 THER/PROPH/DIAG IV INF ADDON: CPT

## 2022-12-11 PROCEDURE — 3700000001 HC ADD 15 MINUTES (ANESTHESIA): Performed by: UROLOGY

## 2022-12-11 PROCEDURE — 83735 ASSAY OF MAGNESIUM: CPT

## 2022-12-11 PROCEDURE — 36415 COLL VENOUS BLD VENIPUNCTURE: CPT

## 2022-12-11 PROCEDURE — 6360000002 HC RX W HCPCS: Performed by: EMERGENCY MEDICINE

## 2022-12-11 PROCEDURE — 6360000002 HC RX W HCPCS: Performed by: ANESTHESIOLOGY

## 2022-12-11 DEVICE — URETERAL STENT
Type: IMPLANTABLE DEVICE | Site: URETER | Status: FUNCTIONAL
Brand: PERCUFLEX™ PLUS

## 2022-12-11 RX ORDER — SODIUM CHLORIDE, SODIUM LACTATE, POTASSIUM CHLORIDE, CALCIUM CHLORIDE 600; 310; 30; 20 MG/100ML; MG/100ML; MG/100ML; MG/100ML
INJECTION, SOLUTION INTRAVENOUS CONTINUOUS
Status: DISCONTINUED | OUTPATIENT
Start: 2022-12-11 | End: 2022-12-11 | Stop reason: HOSPADM

## 2022-12-11 RX ORDER — SODIUM CHLORIDE, SODIUM LACTATE, POTASSIUM CHLORIDE, CALCIUM CHLORIDE 600; 310; 30; 20 MG/100ML; MG/100ML; MG/100ML; MG/100ML
INJECTION, SOLUTION INTRAVENOUS CONTINUOUS
Status: DISCONTINUED | OUTPATIENT
Start: 2022-12-11 | End: 2022-12-13

## 2022-12-11 RX ORDER — APREPITANT 40 MG/1
40 CAPSULE ORAL ONCE
Status: COMPLETED | OUTPATIENT
Start: 2022-12-11 | End: 2022-12-11

## 2022-12-11 RX ORDER — TEMAZEPAM 15 MG/1
15 CAPSULE ORAL NIGHTLY PRN
Status: DISCONTINUED | OUTPATIENT
Start: 2022-12-11 | End: 2022-12-15 | Stop reason: HOSPADM

## 2022-12-11 RX ORDER — MORPHINE SULFATE 2 MG/ML
2 INJECTION, SOLUTION INTRAMUSCULAR; INTRAVENOUS EVERY 4 HOURS PRN
Status: DISCONTINUED | OUTPATIENT
Start: 2022-12-11 | End: 2022-12-15 | Stop reason: HOSPADM

## 2022-12-11 RX ORDER — METOCLOPRAMIDE HYDROCHLORIDE 5 MG/ML
10 INJECTION INTRAMUSCULAR; INTRAVENOUS ONCE
Status: COMPLETED | OUTPATIENT
Start: 2022-12-11 | End: 2022-12-11

## 2022-12-11 RX ORDER — HYDROMORPHONE HCL 110MG/55ML
0.5 PATIENT CONTROLLED ANALGESIA SYRINGE INTRAVENOUS EVERY 10 MIN PRN
Status: DISCONTINUED | OUTPATIENT
Start: 2022-12-11 | End: 2022-12-11 | Stop reason: HOSPADM

## 2022-12-11 RX ORDER — ACETAMINOPHEN 325 MG/1
650 TABLET ORAL EVERY 6 HOURS PRN
Status: DISCONTINUED | OUTPATIENT
Start: 2022-12-11 | End: 2022-12-15 | Stop reason: HOSPADM

## 2022-12-11 RX ORDER — POLYETHYLENE GLYCOL 3350 17 G/17G
17 POWDER, FOR SOLUTION ORAL DAILY PRN
Status: DISCONTINUED | OUTPATIENT
Start: 2022-12-11 | End: 2022-12-15 | Stop reason: HOSPADM

## 2022-12-11 RX ORDER — CLONIDINE HYDROCHLORIDE 0.2 MG/1
0.2 TABLET ORAL 2 TIMES DAILY PRN
Status: DISCONTINUED | OUTPATIENT
Start: 2022-12-11 | End: 2022-12-15 | Stop reason: HOSPADM

## 2022-12-11 RX ORDER — SUCCINYLCHOLINE CHLORIDE 20 MG/ML
INJECTION INTRAMUSCULAR; INTRAVENOUS PRN
Status: DISCONTINUED | OUTPATIENT
Start: 2022-12-11 | End: 2022-12-11 | Stop reason: SDUPTHER

## 2022-12-11 RX ORDER — SODIUM CHLORIDE 0.9 % (FLUSH) 0.9 %
5-40 SYRINGE (ML) INJECTION EVERY 12 HOURS SCHEDULED
Status: DISCONTINUED | OUTPATIENT
Start: 2022-12-11 | End: 2022-12-15 | Stop reason: HOSPADM

## 2022-12-11 RX ORDER — METRONIDAZOLE 500 MG/100ML
500 INJECTION, SOLUTION INTRAVENOUS EVERY 12 HOURS
Status: DISCONTINUED | OUTPATIENT
Start: 2022-12-11 | End: 2022-12-15 | Stop reason: HOSPADM

## 2022-12-11 RX ORDER — ONDANSETRON 2 MG/ML
4 INJECTION INTRAMUSCULAR; INTRAVENOUS EVERY 6 HOURS PRN
Status: DISCONTINUED | OUTPATIENT
Start: 2022-12-11 | End: 2022-12-15 | Stop reason: HOSPADM

## 2022-12-11 RX ORDER — CEFAZOLIN SODIUM 1 G/3ML
INJECTION, POWDER, FOR SOLUTION INTRAMUSCULAR; INTRAVENOUS PRN
Status: DISCONTINUED | OUTPATIENT
Start: 2022-12-11 | End: 2022-12-11 | Stop reason: SDUPTHER

## 2022-12-11 RX ORDER — LIDOCAINE HYDROCHLORIDE 20 MG/ML
INJECTION, SOLUTION EPIDURAL; INFILTRATION; INTRACAUDAL; PERINEURAL PRN
Status: DISCONTINUED | OUTPATIENT
Start: 2022-12-11 | End: 2022-12-11 | Stop reason: SDUPTHER

## 2022-12-11 RX ORDER — PROCHLORPERAZINE EDISYLATE 5 MG/ML
5 INJECTION INTRAMUSCULAR; INTRAVENOUS
Status: DISCONTINUED | OUTPATIENT
Start: 2022-12-11 | End: 2022-12-11 | Stop reason: HOSPADM

## 2022-12-11 RX ORDER — HYDROCODONE BITARTRATE AND ACETAMINOPHEN 7.5; 325 MG/1; MG/1
1 TABLET ORAL EVERY 4 HOURS PRN
Status: DISCONTINUED | OUTPATIENT
Start: 2022-12-11 | End: 2022-12-15 | Stop reason: HOSPADM

## 2022-12-11 RX ORDER — NALBUPHINE HCL 10 MG/ML
5 AMPUL (ML) INJECTION
Status: COMPLETED | OUTPATIENT
Start: 2022-12-11 | End: 2022-12-11

## 2022-12-11 RX ORDER — ENOXAPARIN SODIUM 100 MG/ML
40 INJECTION SUBCUTANEOUS DAILY
Status: DISCONTINUED | OUTPATIENT
Start: 2022-12-11 | End: 2022-12-12

## 2022-12-11 RX ORDER — LOPERAMIDE HYDROCHLORIDE 2 MG/1
4 CAPSULE ORAL 2 TIMES DAILY PRN
Status: DISCONTINUED | OUTPATIENT
Start: 2022-12-11 | End: 2022-12-15 | Stop reason: HOSPADM

## 2022-12-11 RX ORDER — FENTANYL CITRATE 50 UG/ML
100 INJECTION, SOLUTION INTRAMUSCULAR; INTRAVENOUS
Status: DISCONTINUED | OUTPATIENT
Start: 2022-12-11 | End: 2022-12-11 | Stop reason: HOSPADM

## 2022-12-11 RX ORDER — ACETAMINOPHEN 650 MG/1
650 SUPPOSITORY RECTAL EVERY 6 HOURS PRN
Status: DISCONTINUED | OUTPATIENT
Start: 2022-12-11 | End: 2022-12-15 | Stop reason: HOSPADM

## 2022-12-11 RX ORDER — PRAVASTATIN SODIUM 40 MG
40 TABLET ORAL EVERY EVENING
COMMUNITY

## 2022-12-11 RX ORDER — SODIUM CHLORIDE 0.9 % (FLUSH) 0.9 %
5-40 SYRINGE (ML) INJECTION EVERY 12 HOURS SCHEDULED
Status: DISCONTINUED | OUTPATIENT
Start: 2022-12-11 | End: 2022-12-11 | Stop reason: HOSPADM

## 2022-12-11 RX ORDER — SODIUM CHLORIDE 0.9 % (FLUSH) 0.9 %
5-40 SYRINGE (ML) INJECTION PRN
Status: DISCONTINUED | OUTPATIENT
Start: 2022-12-11 | End: 2022-12-15 | Stop reason: HOSPADM

## 2022-12-11 RX ORDER — LIDOCAINE HYDROCHLORIDE 10 MG/ML
1 INJECTION, SOLUTION INFILTRATION; PERINEURAL
Status: DISCONTINUED | OUTPATIENT
Start: 2022-12-11 | End: 2022-12-11 | Stop reason: HOSPADM

## 2022-12-11 RX ORDER — HYDRALAZINE HYDROCHLORIDE 20 MG/ML
10 INJECTION INTRAMUSCULAR; INTRAVENOUS EVERY 6 HOURS PRN
Status: DISCONTINUED | OUTPATIENT
Start: 2022-12-11 | End: 2022-12-15 | Stop reason: HOSPADM

## 2022-12-11 RX ORDER — SODIUM CHLORIDE 9 MG/ML
INJECTION, SOLUTION INTRAVENOUS PRN
Status: DISCONTINUED | OUTPATIENT
Start: 2022-12-11 | End: 2022-12-11 | Stop reason: HOSPADM

## 2022-12-11 RX ORDER — POLYETHYLENE GLYCOL 3350 17 G/17G
17 POWDER, FOR SOLUTION ORAL DAILY
Status: DISPENSED | OUTPATIENT
Start: 2022-12-11 | End: 2022-12-13

## 2022-12-11 RX ORDER — DIPHENHYDRAMINE HYDROCHLORIDE 50 MG/ML
12.5 INJECTION INTRAMUSCULAR; INTRAVENOUS
Status: DISCONTINUED | OUTPATIENT
Start: 2022-12-11 | End: 2022-12-11 | Stop reason: HOSPADM

## 2022-12-11 RX ORDER — OXYCODONE HYDROCHLORIDE 5 MG/1
5 TABLET ORAL
Status: DISCONTINUED | OUTPATIENT
Start: 2022-12-11 | End: 2022-12-11 | Stop reason: HOSPADM

## 2022-12-11 RX ORDER — SODIUM CHLORIDE 0.9 % (FLUSH) 0.9 %
5-40 SYRINGE (ML) INJECTION PRN
Status: DISCONTINUED | OUTPATIENT
Start: 2022-12-11 | End: 2022-12-11 | Stop reason: HOSPADM

## 2022-12-11 RX ORDER — ROCURONIUM BROMIDE 10 MG/ML
INJECTION, SOLUTION INTRAVENOUS PRN
Status: DISCONTINUED | OUTPATIENT
Start: 2022-12-11 | End: 2022-12-11

## 2022-12-11 RX ORDER — ONDANSETRON 4 MG/1
4 TABLET, ORALLY DISINTEGRATING ORAL EVERY 8 HOURS PRN
Status: DISCONTINUED | OUTPATIENT
Start: 2022-12-11 | End: 2022-12-15 | Stop reason: HOSPADM

## 2022-12-11 RX ORDER — SENNA PLUS 8.6 MG/1
2 TABLET ORAL 2 TIMES DAILY
Status: COMPLETED | OUTPATIENT
Start: 2022-12-11 | End: 2022-12-12

## 2022-12-11 RX ORDER — SODIUM CHLORIDE AND POTASSIUM CHLORIDE 150; 900 MG/100ML; MG/100ML
INJECTION, SOLUTION INTRAVENOUS CONTINUOUS
Status: DISCONTINUED | OUTPATIENT
Start: 2022-12-11 | End: 2022-12-11

## 2022-12-11 RX ORDER — ACETAMINOPHEN 500 MG
1000 TABLET ORAL ONCE
Status: COMPLETED | OUTPATIENT
Start: 2022-12-11 | End: 2022-12-11

## 2022-12-11 RX ORDER — POTASSIUM CHLORIDE 7.45 MG/ML
10 INJECTION INTRAVENOUS
Status: COMPLETED | OUTPATIENT
Start: 2022-12-11 | End: 2022-12-11

## 2022-12-11 RX ORDER — DEXTROSE MONOHYDRATE 100 MG/ML
INJECTION, SOLUTION INTRAVENOUS CONTINUOUS PRN
Status: DISCONTINUED | OUTPATIENT
Start: 2022-12-11 | End: 2022-12-11 | Stop reason: HOSPADM

## 2022-12-11 RX ORDER — MIDAZOLAM HYDROCHLORIDE 2 MG/2ML
2 INJECTION, SOLUTION INTRAMUSCULAR; INTRAVENOUS
Status: DISCONTINUED | OUTPATIENT
Start: 2022-12-11 | End: 2022-12-11 | Stop reason: HOSPADM

## 2022-12-11 RX ORDER — PROPOFOL 10 MG/ML
INJECTION, EMULSION INTRAVENOUS PRN
Status: DISCONTINUED | OUTPATIENT
Start: 2022-12-11 | End: 2022-12-11 | Stop reason: SDUPTHER

## 2022-12-11 RX ORDER — SODIUM CHLORIDE 9 MG/ML
INJECTION, SOLUTION INTRAVENOUS PRN
Status: DISCONTINUED | OUTPATIENT
Start: 2022-12-11 | End: 2022-12-15 | Stop reason: HOSPADM

## 2022-12-11 RX ADMIN — Medication 160 MG: at 13:52

## 2022-12-11 RX ADMIN — CEFTRIAXONE 1000 MG: 1 INJECTION, POWDER, FOR SOLUTION INTRAMUSCULAR; INTRAVENOUS at 15:56

## 2022-12-11 RX ADMIN — PROPOFOL 200 MG: 10 INJECTION, EMULSION INTRAVENOUS at 13:52

## 2022-12-11 RX ADMIN — CEFAZOLIN SODIUM 2 G: 1 INJECTION, POWDER, FOR SOLUTION INTRAMUSCULAR; INTRAVENOUS at 13:57

## 2022-12-11 RX ADMIN — POTASSIUM CHLORIDE 10 MEQ: 7.46 INJECTION, SOLUTION INTRAVENOUS at 17:25

## 2022-12-11 RX ADMIN — NALBUPHINE HYDROCHLORIDE 5 MG: 10 INJECTION, SOLUTION INTRAMUSCULAR; INTRAVENOUS; SUBCUTANEOUS at 00:37

## 2022-12-11 RX ADMIN — SODIUM CHLORIDE, PRESERVATIVE FREE 10 ML: 5 INJECTION INTRAVENOUS at 08:20

## 2022-12-11 RX ADMIN — SODIUM CHLORIDE, PRESERVATIVE FREE 10 ML: 5 INJECTION INTRAVENOUS at 20:54

## 2022-12-11 RX ADMIN — HYDROCODONE BITARTRATE AND ACETAMINOPHEN 1 TABLET: 7.5; 325 TABLET ORAL at 03:18

## 2022-12-11 RX ADMIN — METRONIDAZOLE 500 MG: 500 INJECTION, SOLUTION INTRAVENOUS at 16:39

## 2022-12-11 RX ADMIN — SODIUM CHLORIDE, POTASSIUM CHLORIDE, SODIUM LACTATE AND CALCIUM CHLORIDE: 600; 310; 30; 20 INJECTION, SOLUTION INTRAVENOUS at 15:55

## 2022-12-11 RX ADMIN — HYDROCODONE BITARTRATE AND ACETAMINOPHEN 1 TABLET: 7.5; 325 TABLET ORAL at 22:04

## 2022-12-11 RX ADMIN — PROMETHAZINE HYDROCHLORIDE 25 MG: 25 INJECTION INTRAMUSCULAR; INTRAVENOUS at 04:18

## 2022-12-11 RX ADMIN — LIDOCAINE HYDROCHLORIDE 100 MG: 20 INJECTION, SOLUTION EPIDURAL; INFILTRATION; INTRACAUDAL; PERINEURAL at 13:52

## 2022-12-11 RX ADMIN — TEMAZEPAM 15 MG: 15 CAPSULE ORAL at 22:04

## 2022-12-11 RX ADMIN — SENNOSIDES 17.2 MG: 8.6 TABLET, FILM COATED ORAL at 04:17

## 2022-12-11 RX ADMIN — APREPITANT 40 MG: 40 CAPSULE ORAL at 13:01

## 2022-12-11 RX ADMIN — MORPHINE SULFATE 2 MG: 2 INJECTION, SOLUTION INTRAMUSCULAR; INTRAVENOUS at 16:27

## 2022-12-11 RX ADMIN — METOCLOPRAMIDE 10 MG: 5 INJECTION, SOLUTION INTRAMUSCULAR; INTRAVENOUS at 04:55

## 2022-12-11 RX ADMIN — POTASSIUM CHLORIDE AND SODIUM CHLORIDE: 900; 150 INJECTION, SOLUTION INTRAVENOUS at 03:36

## 2022-12-11 RX ADMIN — ACETAMINOPHEN 1000 MG: 500 TABLET ORAL at 13:01

## 2022-12-11 RX ADMIN — SENNOSIDES 17.2 MG: 8.6 TABLET, FILM COATED ORAL at 20:52

## 2022-12-11 RX ADMIN — POTASSIUM CHLORIDE 10 MEQ: 7.46 INJECTION, SOLUTION INTRAVENOUS at 15:55

## 2022-12-11 RX ADMIN — LEVOFLOXACIN 750 MG: 5 INJECTION, SOLUTION INTRAVENOUS at 00:02

## 2022-12-11 RX ADMIN — SODIUM CHLORIDE, POTASSIUM CHLORIDE, SODIUM LACTATE AND CALCIUM CHLORIDE: 600; 310; 30; 20 INJECTION, SOLUTION INTRAVENOUS at 13:01

## 2022-12-11 ASSESSMENT — PAIN SCALES - GENERAL
PAINLEVEL_OUTOF10: 7
PAINLEVEL_OUTOF10: 0
PAINLEVEL_OUTOF10: 8
PAINLEVEL_OUTOF10: 7
PAINLEVEL_OUTOF10: 8
PAINLEVEL_OUTOF10: 7

## 2022-12-11 ASSESSMENT — PAIN DESCRIPTION - DESCRIPTORS
DESCRIPTORS: SHARP
DESCRIPTORS: HEAVINESS;BURNING
DESCRIPTORS: SHARP;BURNING

## 2022-12-11 ASSESSMENT — PAIN - FUNCTIONAL ASSESSMENT: PAIN_FUNCTIONAL_ASSESSMENT: 0-10

## 2022-12-11 ASSESSMENT — PAIN DESCRIPTION - ORIENTATION
ORIENTATION: MID
ORIENTATION: MID;LOWER
ORIENTATION: LEFT;LOWER

## 2022-12-11 ASSESSMENT — PAIN DESCRIPTION - LOCATION
LOCATION: ABDOMEN
LOCATION: ABDOMEN;PERINEUM

## 2022-12-11 NOTE — PROGRESS NOTES
TRANSFER - IN REPORT:    Verbal report received from Melinda Shea.. RN on Will Valdes  being received from ED [facility] for routine progression of patient care      Report consisted of patient's Situation, Background, Assessment and   Recommendations(SBAR). Information from the following report(s) Nurse Handoff Report was reviewed with the receiving nurse. Opportunity for questions and clarification was provided. Assessment completed upon patient's arrival to unit and care assumed.

## 2022-12-11 NOTE — CONSULTS
H&P/Consult Note/Progress Note/Office Note:   Katarzyna Garcia  MRN: [id number]  [address]  Age:52 y.o.    HPI: 27-year-old female with history of IBS, recurrent kidney stones, HTN,  presents with lower abdominal pain radiating to her upper abdomen for the past 2 days. She reports nausea and vomiting a  Pain similar to prior kidney stone. She has urinary pressure, but denies frequency or blood. She had a CT scan showing a hypodensity area in the left mid abdomen concerning for a ischemic splenule or abscess.           Past Medical History:   Diagnosis Date    Anemia     Chronic pain     due to RA    Depression     GERD (gastroesophageal reflux disease)     Hx of renal calculi          Hypercholesterolemia     Hypertension     Migraine     Morbid obesity (HCC)     Ovarian cyst     Reflex sympathetic dystrophy since 2005    right arm; s/p injury - laceration in hand    Rheumatoid arthritis ([facility] [address] 9/26/2019    Rheumatoid arthritis ([facility] [address]          Sinus problem     TIA (transient ischemic attack) 9/26/2019     Past Surgical History:   Procedure Laterality Date    APPENDECTOMY      BREAST BIOPSY Left 4/27/2015    LEFT NIPPLE EXPLORATION WITH REMOVAL OF LATTISIMUS DUCT performed by Jose Mclaughlin MD at [address], TOTAL ABDOMINAL (CERVIX REMOVED)           ORTHOPEDIC SURGERY      rt hand    ORTHOPEDIC SURGERY      neck    SEPTOPLASTY  08/28/2019    FESS,SMRIT's    TUBAL LIGATION       Current Facility-Administered Medications   Medication Dose Route Frequency    sodium chloride flush 0.9 % injection 5-40 mL  5-40 mL IntraVENous 2 times per day    sodium chloride flush 0.9 % injection 5-40 mL  5-40 mL IntraVENous PRN    0.9 % sodium chloride infusion   IntraVENous PRN    enoxaparin (LOVENOX) injection 40 mg  40 mg SubCUTAneous Daily    ondansetron (ZOFRAN-ODT) disintegrating tablet 4 mg  4 mg Oral Q8H PRN    Or    ondansetron (ZOFRAN) injection 4 mg  4 mg IntraVENous Q6H PRN polyethylene glycol (GLYCOLAX) packet 17 g  17 g Oral Daily PRN    acetaminophen (TYLENOL) tablet 650 mg  650 mg Oral Q6H PRN    Or    acetaminophen (TYLENOL) suppository 650 mg  650 mg Rectal Q6H PRN    loperamide (IMODIUM) capsule 4 mg  4 mg Oral BID PRN    temazepam (RESTORIL) capsule 15 mg  15 mg Oral Nightly PRN    HYDROcodone-acetaminophen (NORCO) 7.5-325 MG per tablet 1 tablet  1 tablet Oral Q4H PRN    morphine injection 2 mg  2 mg IntraVENous Q4H PRN    cloNIDine (CATAPRES) tablet 0.2 mg  0.2 mg Oral BID PRN    hydrALAZINE (APRESOLINE) injection 10 mg  10 mg IntraVENous Q6H PRN    0.9% NaCl with KCl 20 mEq infusion   IntraVENous Continuous    promethazine (PHENERGAN) 25 mg in sodium chloride 0.9 % 50 mL IVPB  25 mg IntraVENous Q8H PRN    polyethylene glycol (GLYCOLAX) packet 17 g  17 g Oral Daily    senna (SENOKOT) tablet 17.2 mg  2 tablet Oral BID     ALLERGIES:  Penicillins    Social History     Socioeconomic History    Marital status:    Tobacco Use    Smoking status: Never    Smokeless tobacco: Never   Substance and Sexual Activity    Alcohol use: Yes    Drug use: No     Social History     Tobacco Use   Smoking Status Never   Smokeless Tobacco Never     Family History   Problem Relation Age of Onset    Cancer Paternal Aunt         brst    Cancer Father         non hodgkins lymphoma    Breast Cancer Sister     Diabetes Paternal Grandfather     Heart Disease Paternal Grandfather     Breast Cancer Maternal Grandmother     Cancer Maternal Grandmother         ovarian    Cancer Maternal Grandfather         colon    Colon Cancer Maternal Grandfather     Breast Cancer Paternal Grandmother 46    Cancer Paternal Grandmother         breast    Ovarian Cancer Paternal Grandmother     Cancer Mother         throat    Hypertension Mother     Breast Cancer Paternal Aunt 39     ROS: The patient has no difficulty with chest pain or shortness of breath. No fever or chills.   Comprehensive review of systems was otherwise unremarkable except as noted above. Physical Exam:   /60   Pulse 70   Temp 98.1 °F (36.7 °C) (Oral)   Resp 18   Ht 5' 8\" (1.727 m)   Wt 232 lb (105.2 kg)   SpO2 98%   BMI 35.28 kg/m²   Vitals:    12/11/22 0232 12/11/22 0829 12/11/22 1129   BP: (!) 103/56 98/68 104/60   Pulse: 83 71 70   Resp: 18 18 18   Temp: 97.9 °F (36.6 °C) 97.9 °F (36.6 °C) 98.1 °F (36.7 °C)   TempSrc: Oral Oral Oral   SpO2:  97% 98%   Weight: 232 lb (105.2 kg)     Height: 5' 8\" (1.727 m)       No intake/output data recorded. No intake/output data recorded. Constitutional: Alert, oriented, cooperative patient in no acute distress; appears stated age    Eyes:Sclera are clear. EOMs intact  ENMT: no external lesions gross hearing normal; no obvious neck masses, no ear or lip lesions, nares normal  CV: RRR. Normal perfusion  Resp: No JVD. Breathing is  non-labored; no audible wheezing. GI: soft and non-distended . Tender on both lower quadrants and right flank. No peritonitis    Musculoskeletal: unremarkable with normal function. No embolic signs or cyanosis. Neuro:  Oriented; moves all 4; no focal deficits  Psychiatric: normal affect and mood, no memory impairment    Recent vitals (if inpt):  Patient Vitals for the past 24 hrs:   BP Temp Temp src Pulse Resp SpO2 Height Weight   12/11/22 1129 104/60 98.1 °F (36.7 °C) Oral 70 18 98 % -- --   12/11/22 0829 98/68 97.9 °F (36.6 °C) Oral 71 18 97 % -- --   12/11/22 0232 (!) 103/56 97.9 °F (36.6 °C) Oral 83 18 -- 5' 8\" (1.727 m) 232 lb (105.2 kg)       Amount and/or Complexity of Data Reviewed and Analyzed:  I reviewed and analyzed all of the unique labs and radiologic studies that are shown below as well as any that are in the HPI, and any that are in the expanded problem list below  *Each unique test, order, or document contributes to the combination of 2 or combination of 3 in Category 1 below. For this visit I also reviewed old records and prior notes.       Recent Labs     12/10/22  2238   WBC 10.9   HGB 11.2*         K 3.2*   CL 99   CO2 27   BUN 18   ALT 14     Review of most recent CBC  Lab Results   Component Value Date    WBC 10.9 12/10/2022    HGB 11.2 (L) 12/10/2022    HCT 33.8 (L) 12/10/2022    MCV 79.5 (L) 12/10/2022     12/10/2022       Review of most recent BMP  Lab Results   Component Value Date/Time     12/10/2022 10:38 PM    K 3.2 12/10/2022 10:38 PM    CL 99 12/10/2022 10:38 PM    CO2 27 12/10/2022 10:38 PM    BUN 18 12/10/2022 10:38 PM    CREATININE 0.86 12/10/2022 10:38 PM    GLUCOSE 152 12/10/2022 10:38 PM    CALCIUM 9.8 12/10/2022 10:38 PM        Review of most recent LFTs (and lipase if done)  Lab Results   Component Value Date    ALT 14 12/10/2022    AST 17 12/10/2022    ALKPHOS 100 12/10/2022    BILITOT 0.5 12/10/2022     Lab Results   Component Value Date    LIPASE 21 12/10/2022       Lab Results   Component Value Date/Time    INR 1.2 09/26/2019 08:20 PM    APTT 31.0 09/26/2019 08:23 PM       Review of most recent HgbA1c  Hemoglobin A1C   Date Value Ref Range Status   02/16/2021 6.4 (H) 4.20 - 6.30 % Final       Nutritional assessment screen for wound healing issues:  Lab Results   Component Value Date    LABALBU 4.3 12/10/2022       @lastcovr@    Xray Result (most recent):  XR TOE LEFT (MIN 2 VIEWS) 02/15/2021    Narrative  Clinical history: Left second toe infection. TECHNIQUE: 3 views of the left second toe. FINDINGS:    There is lucency at the tip of the second toe distal phalanx, on the lateral  view. No soft tissue gas is seen. No acute fracture or dislocation. Alignment is maintained. Joint spaces are  preserved. No radiopaque foreign body. Impression  1. Apparent lucency at the tip of the second toe distal phalanx, on the lateral  view, raising the question of osteomyelitis. Note that MRI is more sensitive for  evaluation of osteomyelitis. 2. No acute fracture.     CT Result (most recent):  CT ABDOMEN PELVIS RENAL STONE 12/10/2022    Narrative  EXAM: Noncontrast CT abdomen and pelvis. INDICATION: Abdominal pain. COMPARISON: Prior CT abdomen and pelvis on July 5, 2021. TECHNIQUE: Axial CT images of the abdomen and pelvis were obtained without IV  contrast. Radiation dose reduction techniques were used for this study. Our CT  scanners use one or all of the following:  Automated exposure control,  adjustment of the mA or kV according to patient size, iterative reconstruction. FINDINGS:    - Liver: Within normal limits. - Gallbladder and bile ducts: Postcholecystectomy, with no irregular dilatation.  - Spleen: Within normal limits. - Urinary tract: There is a new 4 mm stone in the distal right ureter, located  approximately 2 cm from the ureterovesical junction, with no hydronephrosis. There are several 1-2 mm left kidney stones, with no left ureter stone or  left-sided hydronephrosis. The urinary bladder is collapsed. - Adrenals: Within normal limits. - Pancreas: Within normal limits. - Gastrointestinal tract: Within normal limits. Post appendectomy. - Retroperitoneum: No ascites or free air. Multiple adjacent soft tissue  densities in the left abdomen have been present on prior CT scans of the abdomen  dating back to 2014, presumably splenules. This most recent CT, there is new  hypodensity within one of the densities measuring 4.5 cm with new surrounding  soft tissue stranding.  - Peritoneal cavity and abdominal wall: Within normal limits. - Pelvis: Posthysterectomy. - Spine/bones: No acute process. - Other comments: The lung bases are clear. Impression  1. Nonobstructing 4 mm stone in the distal right ureter. 2. There is new hypodensity and surrounding stranding in one of the multiple  long-term left abdomen soft tissue densities, which are presumably splenules. The new hypodensity and surrounding stranding may relate to torsion although  superimposed infection is not excluded.  No associated gas or fluid is seen. 3. Left kidney stones. 4. Prior cholecystectomy, appendectomy and hysterectomy. US Result (most recent):  US DUP LOWER EXTREMITY RIGHT DIONISIO 06/29/2020    Narrative  Right lower extremity Doppler evaluation: 06/29/2020    HISTORY: Pain, palpable cords swelling. Symptoms are moderate and have been  present x1 week    Grayscale, color-flow and Doppler evaluation of the major veins of the right  lower extremity was performed. Comparison: None    FINDINGS: There is normal compression and augmentation involving the right  common femoral, superficial femoral and popliteal veins. No grayscale or  color-flow findings within these vessels or within the distal greater saphenous  or profunda femoral veins were noted to suggest deep venous thrombosis. Interrogated portions of the peroneal and posterior tibial veins were also  unremarkable. No popliteal cyst is identified. Cursory imaging of the left common femoral vein reveals it to be patent with  normal color-flow and augmentation. There is a 2.7 cm lymph node within the  right groin which may be reactive. Impression  IMPRESSION: Negative evaluation for deep venous thrombosis within the right  lower extremity. Admission date (for inpatients): 12/11/2022   * No surgery date entered *  Procedure(s):  CYSTOSCOPY/ RIGHT URETERAL STENT PLACEMENT        ASSESSMENT/PLAN:  @[employer]@  Principal Problem:    Ureteric stone  Resolved Problems:    * No resolved hospital problems.  *     Patient Active Problem List    Diagnosis Date Noted    Ureteric stone 12/11/2022     Priority: Medium    Microcytic anemia 02/16/2021    Acute osteomyelitis of right foot ([address] 06/30/2020    Peripheral neuropathy 06/30/2020    H/O TIA (transient ischemic attack) and stroke 06/29/2020    Cellulitis of second toe, left 06/29/2020    Class 1 obesity in adult 06/29/2020    Depression 09/26/2019    Rheumatoid arthritis ([address] 09/26/2019    TIA (transient ischemic attack) 09/26/2019    Reflex sympathetic dystrophy 09/26/2019     right arm; s/p injury - laceration in hand        Hypertension 09/26/2019    Chronic pain 09/26/2019     due to RA        Right lower quadrant abdominal pain 08/26/2016    Ovarian cyst           Number and Complexity of Problems addressed and   Risks of complications and/or morbidity of management                  Level of MDM (2/3 elements below)  Number and Complexity of Problems Addressed Amount and/or Complexity of Data to be Reviewed and Analyzed  *Each unique test, order, or document contributes to the combination of 2 or combination of 3 in Category 1 below. Risk of Complications and/or Morbidity or Mortality of pt Management     06912  97116 SF Minimal  ?1self-limited or minor problem Minimal or none Minimal risk of morbidity from additional diagnostic testing or Rx   15988  94575 Low Low  ? 2or more self-limited or minor problems;    or  ? 1stable chronic illness;    or  ?1acute, uncomplicated illness or injury   Limited  (Must meet the requirements of at least 1 of the 2 categories)  Category 1: Tests and documents   ? Any combination of 2 from the following:  ?Review of prior external note(s) from each unique source*;  ?review of the result(s) of each unique test*;   ?ordering of each unique test*    or   Category 2: Assessment requiring an independent historian(s)  (For the categories of independent interpretation of tests and discussion of management or test interpretation, see moderate or high) Low risk of morbidity from additional diagnostic testing or treatment     66442  76497 Mod Moderate  ? 1or more chronic illnesses with exacerbation, progression, or side effects of treatment;    or  ?2or more stable chronic illnesses;    or  ?1undiagnosed new problem with uncertain prognosis;    or  ?1acute illness with systemic symptoms;    or  ?1acute complicated injury   Moderate  (Must meet the requirements of at least 1 out of 3 categories)  Category 1: Tests, documents, or independent historian(s)  ? Any combination of 3 from the following:   ?Review of prior external note(s) from each unique source*;  ?Review of the result(s) of each unique test*;  ?Ordering of each unique test*;  ?Assessment requiring an independent historian(s)    or  Category 2: Independent interpretation of tests   ? Independent interpretation of a test performed by another physician/other qualified health care professional (not separately reported);     or  Category 3: Discussion of management or test interpretation  ? Discussion of management or test interpretation with external physician/other qualified health care professional/appropriate source (not separately reported)   Moderate risk of morbidity from additional diagnostic testing or treatment  Examples only:  ?Prescription drug management   ? Decision regarding minor surgery with identified patient or procedure risk factors  ? Decision regarding elective major surgery without identified patient or procedure risk factors   ? Diagnosis or treatment significantly limited by social determinants of health       77949  27063 High High  ? 1or more chronic illnesses with severe exacerbation, progression, or side effects of treatment;    or  ?1 acute or chronic illness or injury that poses a threat to life or bodily function   Extensive  (Must meet the requirements of at least 2 out of 3 categories)  Category 1: Tests, documents, or independent historian(s)  ? Any combination of 3 from the following:   ?Review of prior external note(s) from each unique source*;  ?Review of the result(s) of each unique test*;   ?Ordering of each unique test*;   ?Assessment requiring an independent historian(s)    or   Category 2: Independent interpretation of tests   ? Independent interpretation of a test performed by another physician/other qualified health care professional (not separately reported);     or  Category 3: Discussion of management or test interpretation  ? Discussion of management or test interpretation with external physician/other qualified health care professional/appropriate source (not separately reported)   High risk of morbidity from additional diagnostic testing or treatment  Examples only:  ?Drug therapy requiring intensive monitoring for toxicity  ? Decision regarding elective major surgery with identified patient or procedure risk factors  ? Decision regarding emergency major surgery  ? Decision regarding hospitalization  ? Decision not to resuscitate or to de-escalate care because of poor prognosis     A/P 74-year-old female  recurrent kidney stones, presents with lower abdominal pain radiating to her upper abdomen for the past 2 days. Pain similar to prior kidney stone. She had a CT scan showing a hypodensity area in the left mid abdomen concerning for a ischemic splenule or abscess. On exam she is tender on both lower quadrants and flank. Afebrile, normal WBC. The hypodensity showing in the CT scan it could certainly be a torsed splenule, but I think the lesion is bigger that what I expect. I would like to obtain more imaging of the area.  The CT performed was for kidney/stone protocol.    -Urology will address her stones this afternoon  -MRI      Signed: Merline Areas, MD  12/11/2022    11:56 AM

## 2022-12-11 NOTE — PROGRESS NOTES
Spoke to Nurse about screening requirements, Nurse stated patient just got out of surgery and will get form done once patient is alert, advised nurse to have patient NPO after midnight

## 2022-12-11 NOTE — ED NOTES
TRANSFER - OUT REPORT:    Verbal report given to Qi Edwards RN on Evan Tamez  being transferred to [facility] for routine progression of patient care       Report consisted of patient's Situation, Background, Assessment and   Recommendations(SBAR). Information from the following report(s) Nurse Handoff Report, ED Encounter Summary, ED SBAR, [facility] and Recent Results was reviewed with the receiving nurse. [place] Assessment: No data recorded  Lines:   Peripheral IV 12/10/22 Left Antecubital (Active)       Peripheral IV 12/11/22 Right Antecubital (Active)   Site Assessment Clean, dry & intact 12/11/22 0008   Line Status Blood return noted; Flushed 12/11/22 0008   Line Care Line pulled back 12/11/22 0008   Phlebitis Assessment No symptoms 12/11/22 0008   Infiltration Assessment 0 12/11/22 0008   Alcohol Cap Used No 12/11/22 0008   Dressing Status Clean, dry & intact; New dressing applied 12/11/22 0008   Dressing Type Transparent 12/11/22 0008   Dressing Intervention New 12/11/22 0008        Opportunity for questions and clarification was provided.       Patient transported with:  Monitor with Vita Hodgson RN  12/11/22 0120

## 2022-12-11 NOTE — PROGRESS NOTES
Pt resting in bed comfortably at this time, alert and oriented times 4. No distress noted, respirations even and unlabored on room air. IVF infusing at this time. Pt instructed to call for assistance if needed, call light in place, will continue to monitor. Will prepare for bedside shift report.

## 2022-12-11 NOTE — BRIEF OP NOTE
Brief Postoperative Note      Patient: Jamie Jara  YOB: 1970  MRN: [id number]    Date of Procedure: 12/11/2022    Pre-Op Diagnosis: Ureteral stone [N20.1]    Post-Op Diagnosis: Same       Procedure(s):  CYSTOSCOPY/ RIGHT URETERAL STENT PLACEMENT    Surgeon(s):  Lydia De León MD    Assistant:  * No surgical staff found *    Anesthesia: General    Estimated Blood Loss (mL): Minimal    Complications: None    Specimens:   * No specimens in log *    Implants:  Implant Name Type Inv. Item Serial No.  Lot No. LRB No. Used Action   Jodie Linn 6FR L26CM HYDR+ PGTL Antonio Alamin MRK LO - [id number]  Jodie Linn 6FR L26CM HYDR+ PGTL TAPR TIP GRAD BLDR MRK LO  [employer] [facility]- [id number] Right 1 Implanted         Drains: * No LDAs found *    Findings: Cloudy urine.   Unremarkable right ureteral stent placement     Electronically signed by Lydia De León MD on 12/11/2022 at 2:16 PM

## 2022-12-11 NOTE — PROGRESS NOTES
Pt resting in bed comfortably at this time, alert and oriented times 4. No distress noted, respirations even and unlabored on room air. IVF infusing at this time. NPO at this time. Pt instructed to call for assistance if needed, call light in place, will continue to monitor.

## 2022-12-11 NOTE — CONSULTS
[address] Urology Consult Note                                           12/11/22     Patient: Jitendra Caro  MRN: [id number]    Admission Date:  12/11/2022, 0  Admission Diagnosis: Ureteric stone [N20.1]  Reason for Consult: Right Ureter Stone    ASSESSMENT: 50 y.o.  female with infected urine and 4 mm R distal ureter stone on CT scan + R flank pain, nausea, emesis. PLAN:  -Discussed MET + antibiotics since no fever / tachycardia / hypotension vs. Proceeding to OR for cystoscopy, right ureteral stent placement with interval Cystoscopy, RIGHT Ureteroscopy, Laser Lithotripsy, RIGHT Ureteral Stent Placement in 1-2 weeks after infection has been fully treated with antibiotics. Risks/benefits of each reviewed. She wants to proceed with cystoscopy, right ureteral stent placement today due to pain / nausea from the stone as well as to reduce her risk for developing sepsis due to UTI  -NPO for procedure  -Consented  -Antibiotic on call to OR        __________________________________________________________________________________    HPI:     Jitendra Caro is a 50 y.o.  female with a PMH of kidney stones who presented to the ER today with right abdominal pain, nausea and emesis. Reports urinary pressure. No hematuria. No fever/chills. U/A from ER with 3+ bacteria. CT A/P Stone protocol showed a 4 mm RIGHT distal ureter stone and non-obstructing left kidney stones. Urology consulted for management recommendations.      Past Medical History:  Past Medical History:   Diagnosis Date    Anemia     Chronic pain     due to RA    Depression     GERD (gastroesophageal reflux disease)     Hx of renal calculi          Hypercholesterolemia     Hypertension     Migraine     Morbid obesity ([address]     Ovarian cyst     Reflex sympathetic dystrophy since 2005    right arm; s/p injury - laceration in hand    Rheumatoid arthritis ([facility] 9/26/2019 Rheumatoid arthritis ([facility] [address]          Sinus problem     TIA (transient ischemic attack) 9/26/2019       Past Surgical History:  Past Surgical History:   Procedure Laterality Date    APPENDECTOMY      BREAST BIOPSY Left 4/27/2015    LEFT NIPPLE EXPLORATION WITH REMOVAL OF LATTISIMUS DUCT performed by Marek Yoo MD at [address], TOTAL ABDOMINAL (CERVIX REMOVED)           ORTHOPEDIC SURGERY      rt hand    ORTHOPEDIC SURGERY      neck    SEPTOPLASTY  08/28/2019    FESS,SMRIT's    TUBAL LIGATION         Medications:  No current facility-administered medications on file prior to encounter. Current Outpatient Medications on File Prior to Encounter   Medication Sig Dispense Refill    pravastatin (PRAVACHOL) 40 MG tablet Take 40 mg by mouth every evening      DULoxetine (CYMBALTA) 60 MG extended release capsule Take 60 mg by mouth daily      lisinopril-hydroCHLOROthiazide (PRINZIDE;ZESTORETIC) 20-12.5 MG per tablet Take 1 tablet by mouth in the morning. buPROPion (WELLBUTRIN) 100 MG tablet Take 300 mg by mouth in the morning. omeprazole (PRILOSEC) 20 MG delayed release capsule Take 40 mg by mouth in the morning. aspirin 81 MG chewable tablet Take 81 mg by mouth in the morning. nitrofurantoin (MACRODANTIN) 50 MG capsule Take 50 mg by mouth in the morning and 50 mg in the evening. tamsulosin (FLOMAX) 0.4 MG capsule Take 1 capsule by mouth in the morning for 7 days. 7 capsule 0       Allergies:   Allergies   Allergen Reactions    Penicillins Hives       Social History:  Social History     Socioeconomic History    Marital status:      Spouse name: Not on file    Number of children: Not on file    Years of education: Not on file    Highest education level: Not on file   Occupational History    Not on file   Tobacco Use    Smoking status: Never    Smokeless tobacco: Never   Substance and Sexual Activity    Alcohol use: Yes    Drug use: No    Sexual activity: Not on file   Other Topics Concern    Not on file   Social History Narrative    Not on file     Social Determinants of Health     Financial Resource Strain: Not on file   Food Insecurity: Not on file   Transportation Needs: Not on file   Physical Activity: Not on file   Stress: Not on file   Social Connections: Not on file   Intimate Partner Violence: Not on file   Housing Stability: Not on file       Family History:  Family History   Problem Relation Age of Onset    Cancer Paternal Aunt         brst    Cancer Father         non hodgkins lymphoma    Breast Cancer Sister     Diabetes Paternal Grandfather     Heart Disease Paternal Grandfather     Breast Cancer Maternal Grandmother     Cancer Maternal Grandmother         ovarian    Cancer Maternal Grandfather         colon    Colon Cancer Maternal Grandfather     Breast Cancer Paternal Grandmother 46    Cancer Paternal Grandmother         breast    Ovarian Cancer Paternal Grandmother     Cancer Mother         throat    Hypertension Mother     Breast Cancer Paternal Aunt 39       ROS:  Review of Systems - General ROS: negative for - chills, fatigue, or fever  Respiratory ROS: no cough, shortness of breath, or wheezing  Cardiovascular ROS: no chest pain or dyspnea on exertion  Gastrointestinal ROS: positive for - abdominal pain  negative for - constipation, diarrhea, gas/bloating, hematemesis, or nausea/vomiting  Genito-Urinary ROS: no dysuria, trouble voiding, or hematuria  Musculoskeletal ROS: negative for - muscular weakness    Vitals:  Vitals:    12/11/22 1129   BP: 104/60   Pulse: 70   Resp: 18   Temp: 98.1 °F (36.7 °C)   SpO2: 98%       Intake/Output:  No intake or output data in the 24 hours ending 12/11/22 1209     Physical Exam:   /60   Pulse 70   Temp 98.1 °F (36.7 °C) (Oral)   Resp 18   Ht 5' 8\" (1.727 m)   Wt 232 lb (105.2 kg)   SpO2 98%   BMI 35.28 kg/m²      GENERAL: No acute distress, Awake, Alert, Oriented X 3,  CARDIAC: regular rate and rhythm  CHEST AND LUNG: Easy work of breathing,  ABDOMEN: soft, non tender, non-distended,     Lab/Radiology/Other Diagnostic Tests:  Recent Labs     12/10/22  2238 12/11/22  1022   HGB 11.2*  --    HCT 33.8*  --    WBC 10.9  --      --    K 3.2*  --    CL 99  --    CO2 27  --    BUN 18  --    MG  --  2.0   ALBUMIN 1.3  --    AST 17  --    ALT 14  --    ALKPHOS 100  --    LIPASE 21  --      CT A/P [place] Protocol:     1. Nonobstructing 4 mm stone in the distal right ureter. 2. There is new hypodensity and surrounding stranding in one of the multiple   long-term left abdomen soft tissue densities, which are presumably splenules. The new hypodensity and surrounding stranding may relate to torsion although   superimposed infection is not excluded. No associated gas or fluid is seen. 3. Left kidney stones. 4. Prior cholecystectomy, appendectomy and hysterectomy. Carlton Ott M.D.     [facility]  [address] [place] [address], [address]  Phone: [phone]  Fax: [phone]

## 2022-12-11 NOTE — PROGRESS NOTES
Hospitalist Progress Note   Admit Date:  2022  2:21 AM   Name:  Marta Peterson   Age:  50 y.o. Sex:  female  :  1970   MRN:  [id number]   Room:  [facility] [place] [place]/    Presenting Complaint: No chief complaint on file. Reason(s) for Admission: Ureteric stone [N20.1]     Hospital Course: This is a 59-year-old female with past medical history significant for irritable bowel syndrome, recurrent kidney stones, hypertension presented to the ER with lower abdominal pain radiating to the upper abdomen for the past 2 days. She also has nausea vomiting and flank pain. Her pain is similar to prior kidney stone pain. She had a CT of the abdomen and pelvis done which showed nonobstructing 4 mm distal right ureteral stone. Urology consulted. There is a new hypodensity and surrounding stranding in the left abdomen. General surgery was consulted. Subjective & 24hr Events (22): Today's postop day 0 status post surgery with right ureteral stent placement. No fever no chills. No chest pain or shortness of breath. Still has abdominal pain this morning. Assessment & Plan: This is a 59-year-old female with    Right ureteric stone  Urology consulted. Appreciate recommendations. Patient underwent cystoscopy with right ureteral stent today. Hyperdensity in the abdomen  Infection versus lesion  General surgery consulted. Plans to obtain MRI of the abdomen pelvis. Ceftriaxone, Flagyl pending results. Hypokalemia  Repleted. Hypertension  Blood pressure fairly well controlled. Anticipated discharge needs:    Anticipate inpatient hospital stay for 48 hours. Diet:  ADULT DIET; Regular  DVT PPx: Lovenox  Code status: Full Code    Hospital Problems:  Principal Problem:    Ureteric stone  Active Problems:    Acute cystitis without hematuria  Resolved Problems:    * No resolved hospital problems.  *      Objective:   Patient Vitals for the past 24 hrs:   Temp Pulse Resp BP SpO2 12/11/22 1446 -- 66 -- (!) 108/58 100 %   12/11/22 1441 -- 69 -- (!) 108/55 100 %   12/11/22 1435 -- 71 -- (!) 110/58 99 %   12/11/22 1430 -- 70 -- (!) 110/57 100 %   12/11/22 1425 -- 67 -- (!) 106/58 100 %   12/11/22 1422 98.3 °F (36.8 °C) 69 16 (!) 112/57 100 %   12/11/22 1254 97.8 °F (36.6 °C) 68 16 (!) 104/58 96 %   12/11/22 1129 98.1 °F (36.7 °C) 70 18 104/60 98 %   12/11/22 0829 97.9 °F (36.6 °C) 71 18 98/68 97 %   12/11/22 0232 97.9 °F (36.6 °C) 83 18 (!) 103/56 --       Oxygen Therapy  SpO2: 100 %  Pulse via Oximetry: 67 beats per minute  Pulse Oximeter Device Mode: Continuous    Estimated body mass index is 35.28 kg/m² as calculated from the following:    Height as of this encounter: 5' 8\" (1.727 m). Weight as of this encounter: 232 lb (105.2 kg). Intake/Output Summary (Last 24 hours) at 12/11/2022 1507  Last data filed at 12/11/2022 1405  Gross per 24 hour   Intake --   Output 0 ml   Net 0 ml         Physical Exam:     Blood pressure (!) 108/58, pulse 66, temperature 98.3 °F (36.8 °C), temperature source Infrared, resp. rate 16, height 5' 8\" (1.727 m), weight 232 lb (105.2 kg), SpO2 100 %. General:    Well nourished. Alert awake female,  Head:  Normocephalic, atraumatic  Eyes:  Sclerae appear normal.  Pupils equally round. ENT:  Nares appear normal, no drainage. Moist oral mucosa  Neck:  No restricted ROM. Trachea midline   CV:   RRR. No m/r/g. No jugular venous distension. Lungs:   CTAB. No wheezing, rhonchi, or rales. Symmetric expansion. Abdomen: Bowel sounds present. Soft, nondistended, tenderness noticed diffusely, no guarding no rigidity,  Extremities: No cyanosis or clubbing. No edema  Skin:     No rashes and normal coloration. Warm and dry. Neuro:  CN II-XII grossly intact. Sensation intact. A&Ox3  Psych:  Normal mood and affect.       I have personally reviewed labs and tests showing:  Recent Labs:  Recent Results (from the past 48 hour(s))   CBC with Diff Collection Time: 12/10/22 10:38 PM   Result Value Ref Range    WBC 10.9 4.3 - 11.1 K/uL    RBC 4.25 4.05 - 5.20 M/uL    Hemoglobin 11.2 (L) 11.7 - 15.4 g/dL    Hematocrit 33.8 (L) 35.8 - 46.3 %    MCV 79.5 (L) 82.0 - 102.0 FL    MCH 26.4 26.1 - 32.9 PG    MCHC 33.1 31.4 - 35.0 g/dL    RDW 15.1 (H) 11.9 - 14.6 %    Platelets 910 486 - 291 K/uL    MPV 9.0 (L) 9.4 - 12.3 FL    nRBC 0.00 0.0 - 0.2 K/uL    Differential Type AUTOMATED      Seg Neutrophils 70 43 - 78 %    Lymphocytes 21 13 - 44 %    Monocytes 7 4.0 - 12.0 %    Eosinophils % 2 0.5 - 7.8 %    Basophils 0 0.0 - 2.0 %    Immature Granulocytes 1 0.0 - 5.0 %    Segs Absolute 7.6 1.7 - 8.2 K/UL    Absolute Lymph # 2.3 0.5 - 4.6 K/UL    Absolute Mono # 0.7 0.1 - 1.3 K/UL    Absolute Eos # 0.2 0.0 - 0.8 K/UL    Basophils Absolute 0.0 0.0 - 0.2 K/UL    Absolute Immature Granulocyte 0.1 0.0 - 0.5 K/UL   CMP    Collection Time: 12/10/22 10:38 PM   Result Value Ref Range    Sodium 139 133 - 143 mmol/L    Potassium 3.2 (L) 3.5 - 5.1 mmol/L    Chloride 99 98 - 107 mmol/L    CO2 27 21 - 32 mmol/L    Anion Gap 13 (H) 2 - 11 mmol/L    Glucose 152 (H) 65 - 100 mg/dL    BUN 18 7.0 - 18.0 MG/DL    Creatinine 0.86 0.6 - 1.0 MG/DL    Est, Glom Filt Rate >60 >60 ml/min/1.73m2    Calcium 9.8 8.3 - 10.4 MG/DL    Total Bilirubin 0.5 0.2 - 1.1 MG/DL    ALT 14 13.0 - 61.0 U/L    AST 17 15 - 37 U/L    Alk Phosphatase 100 45.0 - 117.0 U/L    Total Protein 7.7 6.4 - 8.2 g/dL    Albumin 4.3 3.5 - 5.0 g/dL    Globulin 3.4 2.8 - 4.5 g/dL    Albumin/Globulin Ratio 1.3 0.4 - 1.6     Lipase    Collection Time: 12/10/22 10:38 PM   Result Value Ref Range    Lipase 21 13 - 60 U/L   Urinalysis w rflx microscopic    Collection Time: 12/10/22 10:38 PM   Result Value Ref Range    Color, UA YELLOW      Appearance CLEAR      Specific Gravity, UA >=1.030 1.001 - 1.023    pH, Urine 6.0 5.0 - 9.0      Protein, UA 30 (A) NEG mg/dL    Glucose, UA Negative mg/dL    Ketones, Urine Negative NEG mg/dL Bilirubin Urine SMALL (A) NEG      Blood, Urine Negative NEG      Urobilinogen, Urine 1.0 0.2 - 1.0 EU/dL    Nitrite, Urine Positive (A) NEG      Leukocyte Esterase, Urine Negative NEG     Urinalysis, Micro    Collection Time: 12/10/22 10:38 PM   Result Value Ref Range    WBC, UA 0 0 /hpf    RBC, UA 0 0 /hpf    Epithelial Cells UA 5-10 0 /hpf    BACTERIA, URINE 3+ (H) 0 /hpf    Casts 0 0 /lpf    Crystals 0 0 /LPF    Mucus, UA 4+ (H) 0 /lpf    Other observations RESULTS VERIFIED MANUALLY     Lactic Acid    Collection Time: 12/10/22 11:46 PM   Result Value Ref Range    Lactic Acid 1.10 0.4 - 2.0 mmol/L   Blood Culture 1    Collection Time: 12/10/22 11:47 PM    Specimen: Blood   Result Value Ref Range    Special Requests RIGHT  Antecubital        Culture PENDING    Blood Culture 2    Collection Time: 12/10/22 11:59 PM    Specimen: Blood   Result Value Ref Range    Special Requests RIGHT  ARM        Culture PENDING    C-Reactive Protein    Collection Time: 12/11/22 10:22 AM   Result Value Ref Range    CRP 16.4 (H) 0.0 - 0.9 mg/dL   Sedimentation Rate    Collection Time: 12/11/22 10:22 AM   Result Value Ref Range    Sed Rate, Automated 102 (H) 0 - 20 mm/hr   Procalcitonin    Collection Time: 12/11/22 10:22 AM   Result Value Ref Range    Procalcitonin <0.05 0.00 - 0.49 ng/mL   Magnesium    Collection Time: 12/11/22 10:22 AM   Result Value Ref Range    Magnesium 2.0 1.8 - 2.4 mg/dL       I have personally reviewed imaging studies showing:   Other Studies:  MRI ABDOMEN W CONTRAST    (Results Pending)       Current Meds:  Current Facility-Administered Medications   Medication Dose Route Frequency    sodium chloride flush 0.9 % injection 5-40 mL  5-40 mL IntraVENous 2 times per day    sodium chloride flush 0.9 % injection 5-40 mL  5-40 mL IntraVENous PRN    0.9 % sodium chloride infusion   IntraVENous PRN    enoxaparin (LOVENOX) injection 40 mg  40 mg SubCUTAneous Daily    ondansetron (ZOFRAN-ODT) disintegrating tablet 4 mg  4 mg Oral Q8H PRN    Or    ondansetron (ZOFRAN) injection 4 mg  4 mg IntraVENous Q6H PRN    polyethylene glycol (GLYCOLAX) packet 17 g  17 g Oral Daily PRN    acetaminophen (TYLENOL) tablet 650 mg  650 mg Oral Q6H PRN    Or    acetaminophen (TYLENOL) suppository 650 mg  650 mg Rectal Q6H PRN    loperamide (IMODIUM) capsule 4 mg  4 mg Oral BID PRN    temazepam (RESTORIL) capsule 15 mg  15 mg Oral Nightly PRN    HYDROcodone-acetaminophen (NORCO) 7.5-325 MG per tablet 1 tablet  1 tablet Oral Q4H PRN    morphine injection 2 mg  2 mg IntraVENous Q4H PRN    cloNIDine (CATAPRES) tablet 0.2 mg  0.2 mg Oral BID PRN    hydrALAZINE (APRESOLINE) injection 10 mg  10 mg IntraVENous Q6H PRN    0.9% NaCl with KCl 20 mEq infusion   IntraVENous Continuous    promethazine (PHENERGAN) 25 mg in sodium chloride 0.9 % 50 mL IVPB  25 mg IntraVENous Q8H PRN    polyethylene glycol (GLYCOLAX) packet 17 g  17 g Oral Daily    senna (SENOKOT) tablet 17.2 mg  2 tablet Oral BID    lactated ringers infusion   IntraVENous Continuous    sodium chloride flush 0.9 % injection 5-40 mL  5-40 mL IntraVENous 2 times per day    sodium chloride flush 0.9 % injection 5-40 mL  5-40 mL IntraVENous PRN    0.9 % sodium chloride infusion   IntraVENous PRN    HYDROmorphone (DILAUDID) injection 0.5 mg  0.5 mg IntraVENous Q10 Min PRN    oxyCODONE (ROXICODONE) immediate release tablet 5 mg  5 mg Oral Once PRN    prochlorperazine (COMPAZINE) injection 5 mg  5 mg IntraVENous Once PRN    diphenhydrAMINE (BENADRYL) injection 12.5 mg  12.5 mg IntraVENous Once PRN    glucose chewable tablet 16 g  4 tablet Oral PRN    dextrose bolus 10% 125 mL  125 mL IntraVENous PRN    Or    dextrose bolus 10% 250 mL  250 mL IntraVENous PRN    glucagon (rDNA) injection 1 mg  1 mg SubCUTAneous PRN    dextrose 10 % infusion   IntraVENous Continuous PRN    ceFAZolin (ANCEF) 2000 mg in sterile water 20 mL IV syringe  2,000 mg IntraVENous On Call to OR       Signed:  Alyson Varghese MD    Part of this note may have been written by using a voice dictation software. The note has been proof read but may still contain some grammatical/other typographical errors.

## 2022-12-11 NOTE — ANESTHESIA PRE PROCEDURE
Department of Anesthesiology  Preprocedure Note       Name:  Susana Penaloza   Age:  50 y.o.  :  1970                                          MRN:  [id number]         Date:  2022      Surgeon: Yisel Cummings):  Vinicius Hurst MD    Procedure: Procedure(s):  CYSTOSCOPY/ RIGHT URETERAL STENT PLACEMENT    Medications prior to admission:   Prior to Admission medications    Medication Sig Start Date End Date Taking? Authorizing Provider   pravastatin (PRAVACHOL) 40 MG tablet Take 40 mg by mouth every evening   Yes Historical Provider, MD   DULoxetine (CYMBALTA) 60 MG extended release capsule Take 60 mg by mouth daily    Historical Provider, MD   lisinopril-hydroCHLOROthiazide (PRINZIDE;ZESTORETIC) 20-12.5 MG per tablet Take 1 tablet by mouth in the morning. Historical Provider, MD   buPROPion (WELLBUTRIN) 100 MG tablet Take 300 mg by mouth in the morning. Historical Provider, MD   omeprazole (PRILOSEC) 20 MG delayed release capsule Take 40 mg by mouth in the morning. Historical Provider, MD   aspirin 81 MG chewable tablet Take 81 mg by mouth in the morning. Historical Provider, MD   nitrofurantoin (MACRODANTIN) 50 MG capsule Take 50 mg by mouth in the morning and 50 mg in the evening. Historical Provider, MD   tamsulosin (FLOMAX) 0.4 MG capsule Take 1 capsule by mouth in the morning for 7 days.  22  Alex Joyner MD       Current medications:    Current Facility-Administered Medications   Medication Dose Route Frequency Provider Last Rate Last Admin    sodium chloride flush 0.9 % injection 5-40 mL  5-40 mL IntraVENous 2 times per day Kun Reyes MD   10 mL at 22 0820    sodium chloride flush 0.9 % injection 5-40 mL  5-40 mL IntraVENous PRN Kun Reyes MD        0.9 % sodium chloride infusion   IntraVENous PRN Kun Reyes MD        enoxaparin (LOVENOX) injection 40 mg  40 mg SubCUTAneous Daily Kun Reyes MD        ondansetron (ZOFRAN-ODT) disintegrating tablet 4 mg  4 mg Oral Q8H PRN Kristi Narayanan MD        Or    [facility] injection 4 mg  4 mg IntraVENous Q6H PRN Kristi Narayanan MD        polyethylene glycol [facility]) packet 17 g  17 g Oral Daily PRN Kristi Narayanan MD        acetaminophen (TYLENOL) tablet 650 mg  650 mg Oral Q6H PRN Kristi Narayanan MD        Or    acetaminophen (TYLENOL) suppository 650 mg  650 mg Rectal Q6H PRN Kristi Narayanan MD        loperamide (IMODIUM) capsule 4 mg  4 mg Oral BID PRN Kristi Narayanan MD        temazepam (RESTORIL) capsule 15 mg  15 mg Oral Nightly PRN Kristi Narayanan MD        HYDROcodone-acetaminophen (NORCO) 7.5-325 MG per tablet 1 tablet  1 tablet Oral Q4H PRN Kristi Narayanan MD        morphine injection 2 mg  2 mg IntraVENous Q4H PRN Kristi Narayanan MD        cloNIDine (CATAPRES) tablet 0.2 mg  0.2 mg Oral BID PRN Kristi Narayanan MD        hydrALAZINE (APRESOLINE) injection 10 mg  10 mg IntraVENous Q6H PRN Kristi Narayanan MD        0.9% NaCl with KCl 20 mEq infusion   IntraVENous Continuous Kristi Narayanan  mL/hr at 12/11/22 0336 New Bag at 12/11/22 0336    promethazine (PHENERGAN) 25 mg in sodium chloride 0.9 % 50 mL IVPB  25 mg IntraVENous Q8H PRN Kristi Narayanan MD   Stopped at 12/11/22 0433    polyethylene glycol (GLYCOLAX) packet 17 g  17 g Oral Daily Kristi Narayanan MD        [facility]) tablet 17.2 mg  2 tablet Oral BID Kristi Narayanan MD   17.2 mg at 12/11/22 0417    lidocaine 1 % injection 1 mL  1 mL IntraDERmal Once PRN Roxane Dunham MD        fentaNYL (SUBLIMAZE) injection 100 mcg  100 mcg IntraVENous Once PRN Roxane Dunham MD        lactated ringers infusion   IntraVENous Continuous Roxane Dunham  mL/hr at 12/11/22 1301 New Bag at 12/11/22 1301    sodium chloride flush 0.9 % injection 5-40 mL  5-40 mL IntraVENous 2 times per day Roxane Dunham MD        sodium chloride flush 0.9 % injection 5-40 mL  5-40 mL IntraVENous PRN Roxane Dunham MD        0.9 % sodium chloride infusion   IntraVENous PRN Luis Fernando Trejo MD        midazolam PF (VERSED) injection 2 mg  2 mg IntraVENous Once PRN Luis Fernando Trejo MD        ceFAZolin (ANCEF) 2000 mg in sterile water 20 mL IV syringe  2,000 mg IntraVENous On Call to [address], MD        ceFAZolin (ANCEF) 2000 mg in sterile water 20 mL IV syringe  2,000 mg IntraVENous On Call to [address], MD           Allergies: Allergies   Allergen Reactions    Penicillins Hives       Problem List:    Patient Active Problem List   Diagnosis Code    H/O TIA (transient ischemic attack) and stroke Z86.73    Microcytic anemia D50.9    Depression F32. A    Rheumatoid arthritis ([address] M06.9    TIA (transient ischemic attack) G45.9    Reflex sympathetic dystrophy G90.50    Hypertension I10    Cellulitis of second toe, left L03.032    Acute osteomyelitis of right foot (HCC) M86.171    Right lower quadrant abdominal pain R10.31    Chronic pain G89.29    Class 1 obesity in adult E66.9    Peripheral neuropathy G62.9    Ovarian cyst N83.209    Ureteric stone N20.1       Past Medical History:        Diagnosis Date    Anemia     Chronic pain     due to RA    Depression     GERD (gastroesophageal reflux disease)     Hx of renal calculi          Hypercholesterolemia     Hypertension     Migraine     Morbid obesity ([address]     Ovarian cyst     Reflex sympathetic dystrophy since 2005    right arm; s/p injury - laceration in hand    Rheumatoid arthritis ([address] 9/26/2019    Rheumatoid arthritis ([address]          Sinus problem     TIA (transient ischemic attack) 9/26/2019       Past Surgical History:        Procedure Laterality Date    APPENDECTOMY      BREAST BIOPSY Left 4/27/2015    LEFT NIPPLE EXPLORATION WITH REMOVAL OF LATTISIMUS DUCT performed by Sabino Moeller MD at [address], TOTAL ABDOMINAL (CERVIX REMOVED)           ORTHOPEDIC SURGERY      rt hand    ORTHOPEDIC SURGERY neck    SEPTOPLASTY  08/28/2019    FESS,MANNY's    TUBAL LIGATION         Social History:    Social History     Tobacco Use    Smoking status: Never    Smokeless tobacco: Never   Substance Use Topics    Alcohol use: Yes                                Counseling given: Not Answered      Vital Signs (Current):   Vitals:    12/11/22 0232 12/11/22 0829 12/11/22 1129 12/11/22 1254   BP: (!) 103/56 98/68 104/60 (!) 104/58   Pulse: 83 71 70 68   Resp: 18 18 18 16   Temp: 97.9 °F (36.6 °C) 97.9 °F (36.6 °C) 98.1 °F (36.7 °C) 97.8 °F (36.6 °C)   TempSrc: Oral Oral Oral Oral   SpO2:  97% 98% 96%   Weight: 232 lb (105.2 kg)      Height: 5' 8\" (1.727 m)                                                 BP Readings from Last 3 Encounters:   12/11/22 (!) 104/58   12/11/22 (!) 111/58   07/16/22 132/70       NPO Status: Time of last liquid consumption: 1930                        Time of last solid consumption: 1930                        Date of last liquid consumption: 12/10/22                        Date of last solid food consumption: 12/10/22    BMI:   Wt Readings from Last 3 Encounters:   12/11/22 232 lb (105.2 kg)   12/10/22 232 lb (105.2 kg)   07/16/22 221 lb (100.2 kg)     Body mass index is 35.28 kg/m².     CBC:   Lab Results   Component Value Date/Time    WBC 10.9 12/10/2022 10:38 PM    RBC 4.25 12/10/2022 10:38 PM    HGB 11.2 12/10/2022 10:38 PM    HCT 33.8 12/10/2022 10:38 PM    MCV 79.5 12/10/2022 10:38 PM    RDW 15.1 12/10/2022 10:38 PM     12/10/2022 10:38 PM       CMP:   Lab Results   Component Value Date/Time     12/10/2022 10:38 PM    K 3.2 12/10/2022 10:38 PM    CL 99 12/10/2022 10:38 PM    CO2 27 12/10/2022 10:38 PM    BUN 18 12/10/2022 10:38 PM    CREATININE 0.86 12/10/2022 10:38 PM    GFRAA >158 07/16/2022 03:21 PM    AGRATIO 0.9 07/04/2021 11:27 PM    LABGLOM >60 12/10/2022 10:38 PM    GLUCOSE 152 12/10/2022 10:38 PM    PROT 7.7 12/10/2022 10:38 PM    CALCIUM 9.8 12/10/2022 10:38 PM    BILITOT 0.5 12/10/2022 10:38 PM    ALKPHOS 100 12/10/2022 10:38 PM    ALKPHOS 92 07/04/2021 11:27 PM    AST 17 12/10/2022 10:38 PM    ALT 14 12/10/2022 10:38 PM       POC Tests: No results for input(s): POCGLU, POCNA, POCK, POCCL, POCBUN, POCHEMO, POCHCT in the last 72 hours. Coags:   Lab Results   Component Value Date/Time    PROTIME 14.2 09/26/2019 08:20 PM    INR 1.2 09/26/2019 08:20 PM    APTT 31.0 09/26/2019 08:23 PM       HCG (If Applicable): No results found for: PREGTESTUR, PREGSERUM, HCG, HCGQUANT     ABGs: No results found for: PHART, PO2ART, FYS1HEA, UKG8RTJ, BEART, X2UAGNKN     Type & Screen (If Applicable):  No results found for: LABABO, LABRH    Drug/Infectious Status (If Applicable):  No results found for: HIV, HEPCAB    COVID-19 Screening (If Applicable): No results found for: COVID19        Anesthesia Evaluation  Patient summary reviewed and Nursing notes reviewed no history of anesthetic complications:   Airway: Mallampati: I  TM distance: >3 FB   Neck ROM: full  Mouth opening: > = 3 FB   Dental: normal exam         Pulmonary:Negative Pulmonary ROS breath sounds clear to auscultation                             Cardiovascular:  Exercise tolerance: good (>4 METS),   (+) hypertension:, hyperlipidemia        Rhythm: regular  Rate: normal                    Neuro/Psych:   (+) TIA, headaches:, depression/anxiety              ROS comment: Neuropathy? GI/Hepatic/Renal:   (+) GERD:, renal disease: kidney stones,          ROS comment: Intermittent N/V    Obese. Endo/Other:    (+) DiabetesType II DM, , : arthritis (h/o ACDF but does not appear to affect her neck ROM): rheumatoid. , .                  ROS comment: Possible sepsis Abdominal:             Vascular: Other Findings:           Anesthesia Plan      general     ASA 2 - emergent     (GETA, RSI)  Induction: intravenous and rapid sequence. Anesthetic plan and risks discussed with patient.                         Ellen Borges MD

## 2022-12-11 NOTE — ANESTHESIA POSTPROCEDURE EVALUATION
Department of Anesthesiology  Postprocedure Note    Patient: Jacquelyn James  MRN: [id number]  YOB: 1970  Date of evaluation: 12/11/2022      Procedure Summary     Date: 12/11/22 Room / Location: [facility] MAIN OR  [facility] / [facility] MAIN OR    Anesthesia Start: 1338 Anesthesia Stop: 0751    Procedure: CYSTOSCOPY/ RIGHT URETERAL STENT PLACEMENT (Right: Ureter) Diagnosis:       Ureteral stone      (Ureteral stone [N20.1])    Providers: Gabby Wang MD Responsible Provider: Larayne Phalen, MD    Anesthesia Type: general ASA Status: 2 - Emergent          Anesthesia Type: No value filed. Trung Phase I: Trung Score: 8    Trung Phase II:        Anesthesia Post Evaluation    Patient location during evaluation: PACU  Patient participation: complete - patient participated  Level of consciousness: awake and alert  Airway patency: patent  Nausea: well controlled. Complications: no  Cardiovascular status: acceptable.   Respiratory status: acceptable  Hydration status: stable

## 2022-12-11 NOTE — ED PROVIDER NOTES
Emergency Department Provider Note                   PCP:                Yasmin Keenan MD               Age: 50 y.o. Sex: female       ICD-10-CM    1. Intraabdominal mass  R19.00       2. Right ureteral stone  N20.1           DISPOSITION Decision To Transfer 12/11/2022 12:54:17 AM        MDM  Number of Diagnoses or Management Options  Intraabdominal mass  Right ureteral stone  Diagnosis management comments: Soft tissue intra-abdominal mass with new density concerning for possible infectious process or torsion. Patient significantly tender in the left mid abdomen at this location. Also has right ureteral stone. Discussed with general surgery. Dr. Maisha Dunbar reviewed imaging and requested hospitalist admission with surgery and urology consults. Patient in agreement with plan. Pain treated.        Amount and/or Complexity of Data Reviewed  Clinical lab tests: ordered and reviewed  Tests in the radiology section of CPT®: ordered and reviewed  Tests in the medicine section of CPT®: reviewed and ordered  Review and summarize past medical records: yes  Discuss the patient with other providers: yes  Independent visualization of images, tracings, or specimens: yes               Orders Placed This Encounter   Procedures    Blood Culture 1    Blood Culture 2    CT ABDOMEN PELVIS RENAL STONE    CBC with Diff    CMP    Lipase    Urinalysis w rflx microscopic    Urinalysis, Micro    Lactic Acid    Diet NPO    Saline lock IV        Medications   levoFLOXacin (LEVAQUIN) 750 MG/150ML infusion 750 mg (750 mg IntraVENous New Bag 12/11/22 0002)   ondansetron (ZOFRAN) injection 4 mg (4 mg IntraVENous Given 12/10/22 2306)   ketorolac (TORADOL) injection 15 mg (15 mg IntraVENous Given 12/10/22 2302)   nalbuphine (NUBAIN) injection 5 mg (5 mg IntraVENous Given 12/11/22 0037)       New Prescriptions    No medications on file        Aiyana Scales is a 50 y.o. female who presents to the Emergency Department with chief complaint of    Chief Complaint   Patient presents with    Abdominal Pain      53-year-old female with history of kidney stones presents with lower abdominal pain radiating to her upper abdomen for the past 2 days. She reports nausea and vomiting and a \"catching\" sensation to her right back. Pain similar to prior kidney stone. She has urinary pressure, but denies frequency or blood. She has a history of hysterectomy, cholecystectomy, and appendectomy. No documented fevers. Review of Systems   Constitutional:  Negative for fever. HENT:  Negative for facial swelling. Eyes:  Negative for visual disturbance. Respiratory:  Negative for shortness of breath. Cardiovascular:  Negative for chest pain. Gastrointestinal:  Positive for abdominal pain, nausea and vomiting. Negative for diarrhea. Genitourinary:  Positive for difficulty urinating and flank pain. Negative for dysuria, frequency and hematuria. Musculoskeletal:  Positive for back pain. Negative for joint swelling. Skin:  Negative for rash. Neurological:  Negative for speech difficulty. Psychiatric/Behavioral:  Negative for confusion. All other systems reviewed and are negative.     Past Medical History:   Diagnosis Date    Anemia     Chronic pain     due to RA    Depression     GERD (gastroesophageal reflux disease)     Hx of renal calculi          Hypercholesterolemia     Hypertension     Migraine     Morbid obesity ([address]     Ovarian cyst     Reflex sympathetic dystrophy since 2005    right arm; s/p injury - laceration in hand    Rheumatoid arthritis ([address] 9/26/2019    Rheumatoid arthritis ([address]          Sinus problem     TIA (transient ischemic attack) 9/26/2019        Past Surgical History:   Procedure Laterality Date    APPENDECTOMY      BREAST BIOPSY Left 4/27/2015    LEFT NIPPLE EXPLORATION WITH REMOVAL OF LATTISIMUS DUCT performed by Eddy Perez MD at [address] [facility], TOTAL ABDOMINAL (CERVIX REMOVED)           ORTHOPEDIC SURGERY      rt hand    ORTHOPEDIC SURGERY      neck    SEPTOPLASTY  08/28/2019    FESS,SMRIT's    TUBAL LIGATION          Family History   Problem Relation Age of Onset    Cancer Paternal Aunt         brst    Cancer Father         non hodgkins lymphoma    Breast Cancer Sister     Diabetes Paternal Grandfather     Heart Disease Paternal Grandfather     Breast Cancer Maternal Grandmother     Cancer Maternal Grandmother         ovarian    Cancer Maternal Grandfather         colon    Colon Cancer Maternal Grandfather     Breast Cancer Paternal Grandmother 46    Cancer Paternal Grandmother         breast    Ovarian Cancer Paternal Grandmother     Cancer Mother         throat    Hypertension Mother     Breast Cancer Paternal Aunt 39        Social History     Socioeconomic History    Marital status:    Tobacco Use    Smoking status: Never    Smokeless tobacco: Never   Substance and Sexual Activity    Alcohol use: Yes    Drug use: No         Penicillins     Previous Medications    ASPIRIN 81 MG CHEWABLE TABLET    Take 81 mg by mouth in the morning. BUPROPION (WELLBUTRIN) 100 MG TABLET    Take 300 mg by mouth in the morning. DULOXETINE (CYMBALTA) 60 MG EXTENDED RELEASE CAPSULE    Take 60 mg by mouth daily    LISINOPRIL-HYDROCHLOROTHIAZIDE (PRINZIDE;ZESTORETIC) 20-12.5 MG PER TABLET    Take 1 tablet by mouth in the morning. NITROFURANTOIN (MACRODANTIN) 50 MG CAPSULE    Take 50 mg by mouth in the morning and 50 mg in the evening. OMEPRAZOLE (PRILOSEC) 20 MG DELAYED RELEASE CAPSULE    Take 40 mg by mouth in the morning. TAMSULOSIN (FLOMAX) 0.4 MG CAPSULE    Take 1 capsule by mouth in the morning for 7 days. Vitals signs and nursing note reviewed. Patient Vitals for the past 4 hrs:   Temp Pulse Resp BP SpO2   12/10/22 2219 99.1 °F (37.3 °C) (!) 101 18 130/68 98 %          Physical Exam  Vitals and nursing note reviewed.    Constitutional:       Appearance: Normal appearance. She is well-developed. HENT:      Head: Normocephalic and atraumatic. Nose: Nose normal.      Mouth/Throat:      Mouth: Mucous membranes are moist.   Eyes:      Extraocular Movements: Extraocular movements intact. Pupils: Pupils are equal, round, and reactive to light. Cardiovascular:      Rate and Rhythm: Normal rate and regular rhythm. Pulmonary:      Effort: Pulmonary effort is normal. No respiratory distress. Abdominal:      General: Abdomen is flat. There is no distension. Tenderness: There is abdominal tenderness in the right upper quadrant, epigastric area, suprapubic area and left lower quadrant. There is guarding. Musculoskeletal:         General: Normal range of motion. Skin:     General: Skin is warm and dry. Neurological:      General: No focal deficit present. Mental Status: She is alert. Mental status is at baseline. Psychiatric:         Mood and Affect: Mood normal.        Procedures    Results for orders placed or performed during the hospital encounter of 12/10/22   CT ABDOMEN PELVIS RENAL STONE    Narrative    EXAM: Noncontrast CT abdomen and pelvis. INDICATION: Abdominal pain. COMPARISON: Prior CT abdomen and pelvis on July 5, 2021. TECHNIQUE: Axial CT images of the abdomen and pelvis were obtained without IV  contrast. Radiation dose reduction techniques were used for this study. Our CT  scanners use one or all of the following:  Automated exposure control,  adjustment of the mA or kV according to patient size, iterative reconstruction. FINDINGS:    - Liver: Within normal limits. - Gallbladder and bile ducts: Postcholecystectomy, with no irregular dilatation.  - Spleen: Within normal limits. - Urinary tract: There is a new 4 mm stone in the distal right ureter, located  approximately 2 cm from the ureterovesical junction, with no hydronephrosis.   There are several 1-2 mm left kidney stones, with no left ureter stone or  left-sided hydronephrosis. The urinary bladder is collapsed. - Adrenals: Within normal limits. - Pancreas: Within normal limits. - Gastrointestinal tract: Within normal limits. Post appendectomy. - Retroperitoneum: No ascites or free air. Multiple adjacent soft tissue  densities in the left abdomen have been present on prior CT scans of the abdomen  dating back to 2014, presumably splenules. This most recent CT, there is new  hypodensity within one of the densities measuring 4.5 cm with new surrounding  soft tissue stranding.  - Peritoneal cavity and abdominal wall: Within normal limits. - Pelvis: Posthysterectomy. - Spine/bones: No acute process. - Other comments: The lung bases are clear. Impression    1. Nonobstructing 4 mm stone in the distal right ureter. 2. There is new hypodensity and surrounding stranding in one of the multiple  long-term left abdomen soft tissue densities, which are presumably splenules. The new hypodensity and surrounding stranding may relate to torsion although  superimposed infection is not excluded. No associated gas or fluid is seen. 3. Left kidney stones. 4. Prior cholecystectomy, appendectomy and hysterectomy.      CBC with Diff   Result Value Ref Range    WBC 10.9 4.3 - 11.1 K/uL    RBC 4.25 4.05 - 5.20 M/uL    Hemoglobin 11.2 (L) 11.7 - 15.4 g/dL    Hematocrit 33.8 (L) 35.8 - 46.3 %    MCV 79.5 (L) 82.0 - 102.0 FL    MCH 26.4 26.1 - 32.9 PG    MCHC 33.1 31.4 - 35.0 g/dL    RDW 15.1 (H) 11.9 - 14.6 %    Platelets 617 206 - 255 K/uL    MPV 9.0 (L) 9.4 - 12.3 FL    nRBC 0.00 0.0 - 0.2 K/uL    Differential Type AUTOMATED      Seg Neutrophils 70 43 - 78 %    Lymphocytes 21 13 - 44 %    Monocytes 7 4.0 - 12.0 %    Eosinophils % 2 0.5 - 7.8 %    Basophils 0 0.0 - 2.0 %    Immature Granulocytes 1 0.0 - 5.0 %    Segs Absolute 7.6 1.7 - 8.2 K/UL    Absolute Lymph # 2.3 0.5 - 4.6 K/UL    Absolute Mono # 0.7 0.1 - 1.3 K/UL    Absolute Eos # 0.2 0.0 - 0.8 K/UL    Basophils Absolute 0.0 0.0 - 0.2 K/UL    Absolute Immature Granulocyte 0.1 0.0 - 0.5 K/UL   CMP   Result Value Ref Range    Sodium 139 133 - 143 mmol/L    Potassium 3.2 (L) 3.5 - 5.1 mmol/L    Chloride 99 98 - 107 mmol/L    CO2 27 21 - 32 mmol/L    Anion Gap 13 (H) 2 - 11 mmol/L    Glucose 152 (H) 65 - 100 mg/dL    BUN 18 7.0 - 18.0 MG/DL    Creatinine 0.86 0.6 - 1.0 MG/DL    Est, Glom Filt Rate >60 >60 ml/min/1.73m2    Calcium 9.8 8.3 - 10.4 MG/DL    Total Bilirubin 0.5 0.2 - 1.1 MG/DL    ALT 14 13.0 - 61.0 U/L    AST 17 15 - 37 U/L    Alk Phosphatase 100 45.0 - 117.0 U/L    Total Protein 7.7 6.4 - 8.2 g/dL    Albumin 4.3 3.5 - 5.0 g/dL    Globulin 3.4 2.8 - 4.5 g/dL    Albumin/Globulin Ratio 1.3 0.4 - 1.6     Lipase   Result Value Ref Range    Lipase 21 13 - 60 U/L   Urinalysis w rflx microscopic   Result Value Ref Range    Color, UA YELLOW      Appearance CLEAR      Specific Gravity, UA >=1.030 1.001 - 1.023    pH, Urine 6.0 5.0 - 9.0      Protein, UA 30 (A) NEG mg/dL    Glucose, UA Negative mg/dL    Ketones, Urine Negative NEG mg/dL    Bilirubin Urine SMALL (A) NEG      Blood, Urine Negative NEG      Urobilinogen, Urine 1.0 0.2 - 1.0 EU/dL    Nitrite, Urine Positive (A) NEG      Leukocyte Esterase, Urine Negative NEG     Urinalysis, Micro   Result Value Ref Range    WBC, UA 0 0 /hpf    RBC, UA 0 0 /hpf    Epithelial Cells UA 5-10 0 /hpf    BACTERIA, URINE 3+ (H) 0 /hpf    Casts 0 0 /lpf    Crystals 0 0 /LPF    Mucus, UA 4+ (H) 0 /lpf    Other observations RESULTS VERIFIED MANUALLY     Lactic Acid   Result Value Ref Range    Lactic Acid 1.10 0.4 - 2.0 mmol/L        CT ABDOMEN PELVIS RENAL STONE   Final Result   1. Nonobstructing 4 mm stone in the distal right ureter. 2. There is new hypodensity and surrounding stranding in one of the multiple   long-term left abdomen soft tissue densities, which are presumably splenules.    The new hypodensity and surrounding stranding may relate to torsion although   superimposed infection is not excluded. No associated gas or fluid is seen. 3. Left kidney stones. 4. Prior cholecystectomy, appendectomy and hysterectomy. Voice dictation software was used during the making of this note. This software is not perfect and grammatical and other typographical errors may be present. This note has not been completely proofread for errors.      Carmen Trevino MD  12/11/22 1938

## 2022-12-11 NOTE — H&P
[unfilled]    INTERNAL MEDICINE H&P/CONSULT    Subjective:     53-year-old female with history of IBS, recurrent kidney stones, HTN,  presents with lower abdominal pain radiating to her upper abdomen for the past 2 days. She reports nausea and vomiting and a \"catching\" sensation to her right back. Pain similar to prior kidney stone. She has urinary pressure, but denies frequency or blood. She has a history of hysterectomy, cholecystectomy, and appendectomy. No documented fevers. On further questioning, no fever or chills, has lower abdominal and epigastric pain as well as L flank pain. Had constipation for last few days and taking laxatives. Past Medical History:   Diagnosis Date    Anemia     Chronic pain     due to RA    Depression     GERD (gastroesophageal reflux disease)     Hx of renal calculi          Hypercholesterolemia     Hypertension     Migraine     Morbid obesity ([address]     Ovarian cyst     Reflex sympathetic dystrophy since 2005    right arm; s/p injury - laceration in hand    Rheumatoid arthritis ([address] 9/26/2019    Rheumatoid arthritis ([address]          Sinus problem     TIA (transient ischemic attack) 9/26/2019      Past Surgical History:   Procedure Laterality Date    APPENDECTOMY      BREAST BIOPSY Left 4/27/2015    LEFT NIPPLE EXPLORATION WITH REMOVAL OF LATTISIMUS DUCT performed by Isa Lopez MD at [facility], TOTAL ABDOMINAL (CERVIX REMOVED)           ORTHOPEDIC SURGERY      rt hand    ORTHOPEDIC SURGERY      neck    SEPTOPLASTY  08/28/2019    FESS,SMRIT's    TUBAL LIGATION        Prior to Admission medications    Medication Sig Start Date End Date Taking? Authorizing Provider   DULoxetine (CYMBALTA) 60 MG extended release capsule Take 60 mg by mouth daily    Historical Provider, MD   lisinopril-hydroCHLOROthiazide (PRINZIDE;ZESTORETIC) 20-12.5 MG per tablet Take 1 tablet by mouth in the morning.     Historical Provider, MD   buPROPion (WELLBUTRIN) 100 MG tablet Take 300 mg by mouth in the morning. Historical Provider, MD   omeprazole (PRILOSEC) 20 MG delayed release capsule Take 40 mg by mouth in the morning. Historical Provider, MD   aspirin 81 MG chewable tablet Take 81 mg by mouth in the morning. Historical Provider, MD   nitrofurantoin (MACRODANTIN) 50 MG capsule Take 50 mg by mouth in the morning and 50 mg in the evening. Historical Provider, MD   tamsulosin (FLOMAX) 0.4 MG capsule Take 1 capsule by mouth in the morning for 7 days. 7/16/22 7/23/22  Carlos Garcia MD     Allergies   Allergen Reactions    Penicillins Hives      Social History     Tobacco Use    Smoking status: Never    Smokeless tobacco: Never   Substance Use Topics    Alcohol use: Yes        Family History:  HTN    Review of Systems   A comprehensive review of systems was negative except for that written in the HPI. Objective: Intake / Output:  No intake/output data recorded. No intake/output data recorded. Physical Exam:  BP (!) 103/56   Pulse 83   Temp 97.9 °F (36.6 °C) (Oral)   General appearance: awake, alert, cooperative, moderate distress, obese, appears stated age   Head: Normocephalic, without obvious abnormality, atraumatic  Back: symmetric, no curvature. ROM normal. No CVA tenderness. Lungs: clear to auscultation bilaterally   Heart: regular rate and rhythm, S1, S2 normal, no murmur, click, rub or gallop. Abdomen: soft, generalized mild to moderate tenderness, no rebound tenderness or rigidity, L renal angle tenderness, no distension, normal bowel sound, no masses, no organomegaly  Extremities: atraumatic, no cyanosis - Bilateral lower limbs edema none. Skin: No rashes or ulceration.   Neurologic: Grossly intact     ECG: sinus rhythm     Data Review (Labs):   Recent Results (from the past 24 hour(s))   CBC with Diff    Collection Time: 12/10/22 10:38 PM   Result Value Ref Range    WBC 10.9 4.3 - 11.1 K/uL    RBC 4.25 4.05 - 5.20 M/uL Hemoglobin 11.2 (L) 11.7 - 15.4 g/dL    Hematocrit 33.8 (L) 35.8 - 46.3 %    MCV 79.5 (L) 82.0 - 102.0 FL    MCH 26.4 26.1 - 32.9 PG    MCHC 33.1 31.4 - 35.0 g/dL    RDW 15.1 (H) 11.9 - 14.6 %    Platelets 315 647 - 762 K/uL    MPV 9.0 (L) 9.4 - 12.3 FL    nRBC 0.00 0.0 - 0.2 K/uL    Differential Type AUTOMATED      Seg Neutrophils 70 43 - 78 %    Lymphocytes 21 13 - 44 %    Monocytes 7 4.0 - 12.0 %    Eosinophils % 2 0.5 - 7.8 %    Basophils 0 0.0 - 2.0 %    Immature Granulocytes 1 0.0 - 5.0 %    Segs Absolute 7.6 1.7 - 8.2 K/UL    Absolute Lymph # 2.3 0.5 - 4.6 K/UL    Absolute Mono # 0.7 0.1 - 1.3 K/UL    Absolute Eos # 0.2 0.0 - 0.8 K/UL    Basophils Absolute 0.0 0.0 - 0.2 K/UL    Absolute Immature Granulocyte 0.1 0.0 - 0.5 K/UL   CMP    Collection Time: 12/10/22 10:38 PM   Result Value Ref Range    Sodium 139 133 - 143 mmol/L    Potassium 3.2 (L) 3.5 - 5.1 mmol/L    Chloride 99 98 - 107 mmol/L    CO2 27 21 - 32 mmol/L    Anion Gap 13 (H) 2 - 11 mmol/L    Glucose 152 (H) 65 - 100 mg/dL    BUN 18 7.0 - 18.0 MG/DL    Creatinine 0.86 0.6 - 1.0 MG/DL    Est, Glom Filt Rate >60 >60 ml/min/1.73m2    Calcium 9.8 8.3 - 10.4 MG/DL    Total Bilirubin 0.5 0.2 - 1.1 MG/DL    ALT 14 13.0 - 61.0 U/L    AST 17 15 - 37 U/L    Alk Phosphatase 100 45.0 - 117.0 U/L    Total Protein 7.7 6.4 - 8.2 g/dL    Albumin 4.3 3.5 - 5.0 g/dL    Globulin 3.4 2.8 - 4.5 g/dL    Albumin/Globulin Ratio 1.3 0.4 - 1.6     Lipase    Collection Time: 12/10/22 10:38 PM   Result Value Ref Range    Lipase 21 13 - 60 U/L   Urinalysis w rflx microscopic    Collection Time: 12/10/22 10:38 PM   Result Value Ref Range    Color, UA YELLOW      Appearance CLEAR      Specific Gravity, UA >=1.030 1.001 - 1.023    pH, Urine 6.0 5.0 - 9.0      Protein, UA 30 (A) NEG mg/dL    Glucose, UA Negative mg/dL    Ketones, Urine Negative NEG mg/dL    Bilirubin Urine SMALL (A) NEG      Blood, Urine Negative NEG      Urobilinogen, Urine 1.0 0.2 - 1.0 EU/dL    Nitrite, Urine Positive (A) NEG      Leukocyte Esterase, Urine Negative NEG     Urinalysis, Micro    Collection Time: 12/10/22 10:38 PM   Result Value Ref Range    WBC, UA 0 0 /hpf    RBC, UA 0 0 /hpf    Epithelial Cells UA 5-10 0 /hpf    BACTERIA, URINE 3+ (H) 0 /hpf    Casts 0 0 /lpf    Crystals 0 0 /LPF    Mucus, UA 4+ (H) 0 /lpf    Other observations RESULTS VERIFIED MANUALLY     Lactic Acid    Collection Time: 12/10/22 11:46 PM   Result Value Ref Range    Lactic Acid 1.10 0.4 - 2.0 mmol/L       Assessment:     1a- R. Ureteric stone 4 mm, non-obstructing, hx of kidney stones. No evidence of associated infection. 1b- Constipation, as part of IBS history. Questionable bowel infection or obstruction per CT. 2- Hypokalemia, mild  3- Hypertension, controlled    Plan:     Supportive Rx  Pain control  Urology consulted  NPO tonight  Laxatives  Follow CRP/ ESR/ Procal  Gi or Surgery consult to be decided based on work up and clinical need  IVF hydration  Blood pressure control  Insulin scale  AM lab as needed    Patient is full code. Further management depends on patient progress. Thank you for the oppourtinity to contribute in the care of your patient.       Signed By: Jocelyne Ennis MD     December 11, 2022

## 2022-12-12 ENCOUNTER — TELEPHONE (OUTPATIENT)
Dept: UROLOGY | Age: 52
End: 2022-12-12

## 2022-12-12 ENCOUNTER — APPOINTMENT (OUTPATIENT)
Dept: MRI IMAGING | Age: 52
DRG: 660 | End: 2022-12-12
Attending: HOSPITALIST
Payer: COMMERCIAL

## 2022-12-12 PROBLEM — M79.89 LEFT ARM SWELLING: Status: ACTIVE | Noted: 2022-12-12

## 2022-12-12 LAB
ANION GAP SERPL CALC-SCNC: 5 MMOL/L (ref 2–11)
BASOPHILS # BLD: 0 K/UL (ref 0–0.2)
BASOPHILS NFR BLD: 0 % (ref 0–2)
BUN SERPL-MCNC: 16 MG/DL (ref 6–23)
CALCIUM SERPL-MCNC: 8.4 MG/DL (ref 8.3–10.4)
CHLORIDE SERPL-SCNC: 111 MMOL/L (ref 101–110)
CO2 SERPL-SCNC: 26 MMOL/L (ref 21–32)
CREAT SERPL-MCNC: 0.6 MG/DL (ref 0.6–1)
DIFFERENTIAL METHOD BLD: ABNORMAL
EOSINOPHIL # BLD: 0.2 K/UL (ref 0–0.8)
EOSINOPHIL NFR BLD: 4 % (ref 0.5–7.8)
ERYTHROCYTE [DISTWIDTH] IN BLOOD BY AUTOMATED COUNT: 15.1 % (ref 11.9–14.6)
GLUCOSE SERPL-MCNC: 128 MG/DL (ref 65–100)
HCT VFR BLD AUTO: 31.9 % (ref 35.8–46.3)
HGB BLD-MCNC: 10.2 G/DL (ref 11.7–15.4)
IMM GRANULOCYTES # BLD AUTO: 0 K/UL (ref 0–0.5)
IMM GRANULOCYTES NFR BLD AUTO: 0 % (ref 0–5)
LYMPHOCYTES # BLD: 1.7 K/UL (ref 0.5–4.6)
LYMPHOCYTES NFR BLD: 34 % (ref 13–44)
MCH RBC QN AUTO: 26.4 PG (ref 26.1–32.9)
MCHC RBC AUTO-ENTMCNC: 32 G/DL (ref 31.4–35)
MCV RBC AUTO: 82.6 FL (ref 82–102)
MONOCYTES # BLD: 0.5 K/UL (ref 0.1–1.3)
MONOCYTES NFR BLD: 9 % (ref 4–12)
NEUTS SEG # BLD: 2.6 K/UL (ref 1.7–8.2)
NEUTS SEG NFR BLD: 53 % (ref 43–78)
NRBC # BLD: 0 K/UL (ref 0–0.2)
PLATELET # BLD AUTO: 168 K/UL (ref 150–450)
PMV BLD AUTO: 10.2 FL (ref 9.4–12.3)
POTASSIUM SERPL-SCNC: 3.7 MMOL/L (ref 3.5–5.1)
RBC # BLD AUTO: 3.86 M/UL (ref 4.05–5.2)
SODIUM SERPL-SCNC: 142 MMOL/L (ref 133–143)
WBC # BLD AUTO: 4.9 K/UL (ref 4.3–11.1)

## 2022-12-12 PROCEDURE — 99232 SBSQ HOSP IP/OBS MODERATE 35: CPT | Performed by: UROLOGY

## 2022-12-12 PROCEDURE — 99232 SBSQ HOSP IP/OBS MODERATE 35: CPT | Performed by: SURGERY

## 2022-12-12 PROCEDURE — 80048 BASIC METABOLIC PNL TOTAL CA: CPT

## 2022-12-12 PROCEDURE — 2580000003 HC RX 258: Performed by: NURSE PRACTITIONER

## 2022-12-12 PROCEDURE — 2500000003 HC RX 250 WO HCPCS: Performed by: HOSPITALIST

## 2022-12-12 PROCEDURE — 6360000004 HC RX CONTRAST MEDICATION: Performed by: NURSE PRACTITIONER

## 2022-12-12 PROCEDURE — 36415 COLL VENOUS BLD VENIPUNCTURE: CPT

## 2022-12-12 PROCEDURE — 6370000000 HC RX 637 (ALT 250 FOR IP): Performed by: HOSPITALIST

## 2022-12-12 PROCEDURE — 85025 COMPLETE CBC W/AUTO DIFF WBC: CPT

## 2022-12-12 PROCEDURE — 1100000000 HC RM PRIVATE

## 2022-12-12 PROCEDURE — 2580000003 HC RX 258: Performed by: ANESTHESIOLOGY

## 2022-12-12 PROCEDURE — 2580000003 HC RX 258: Performed by: HOSPITALIST

## 2022-12-12 PROCEDURE — A9579 GAD-BASE MR CONTRAST NOS,1ML: HCPCS | Performed by: NURSE PRACTITIONER

## 2022-12-12 PROCEDURE — 6360000002 HC RX W HCPCS: Performed by: HOSPITALIST

## 2022-12-12 PROCEDURE — 74183 MRI ABD W/O CNTR FLWD CNTR: CPT

## 2022-12-12 RX ORDER — SODIUM CHLORIDE 0.9 % (FLUSH) 0.9 %
20 SYRINGE (ML) INJECTION AS NEEDED
Status: DISCONTINUED | OUTPATIENT
Start: 2022-12-12 | End: 2022-12-15 | Stop reason: HOSPADM

## 2022-12-12 RX ORDER — ENOXAPARIN SODIUM 100 MG/ML
30 INJECTION SUBCUTANEOUS EVERY 12 HOURS
Status: DISCONTINUED | OUTPATIENT
Start: 2022-12-13 | End: 2022-12-15 | Stop reason: HOSPADM

## 2022-12-12 RX ADMIN — SODIUM CHLORIDE, PRESERVATIVE FREE 10 ML: 5 INJECTION INTRAVENOUS at 08:33

## 2022-12-12 RX ADMIN — CEFTRIAXONE 1000 MG: 1 INJECTION, POWDER, FOR SOLUTION INTRAMUSCULAR; INTRAVENOUS at 15:17

## 2022-12-12 RX ADMIN — METRONIDAZOLE 500 MG: 500 INJECTION, SOLUTION INTRAVENOUS at 17:04

## 2022-12-12 RX ADMIN — GADOTERIDOL 20 ML: 279.3 INJECTION, SOLUTION INTRAVENOUS at 11:38

## 2022-12-12 RX ADMIN — SODIUM CHLORIDE, PRESERVATIVE FREE 10 ML: 5 INJECTION INTRAVENOUS at 21:25

## 2022-12-12 RX ADMIN — SODIUM CHLORIDE, PRESERVATIVE FREE 20 ML: 5 INJECTION INTRAVENOUS at 11:38

## 2022-12-12 RX ADMIN — SENNOSIDES 17.2 MG: 8.6 TABLET, FILM COATED ORAL at 21:25

## 2022-12-12 RX ADMIN — SODIUM CHLORIDE, POTASSIUM CHLORIDE, SODIUM LACTATE AND CALCIUM CHLORIDE: 600; 310; 30; 20 INJECTION, SOLUTION INTRAVENOUS at 05:48

## 2022-12-12 RX ADMIN — SENNOSIDES 17.2 MG: 8.6 TABLET, FILM COATED ORAL at 08:32

## 2022-12-12 RX ADMIN — HYDROCODONE BITARTRATE AND ACETAMINOPHEN 1 TABLET: 7.5; 325 TABLET ORAL at 17:17

## 2022-12-12 RX ADMIN — ENOXAPARIN SODIUM 40 MG: 100 INJECTION SUBCUTANEOUS at 08:32

## 2022-12-12 RX ADMIN — METRONIDAZOLE 500 MG: 500 INJECTION, SOLUTION INTRAVENOUS at 05:44

## 2022-12-12 RX ADMIN — MORPHINE SULFATE 2 MG: 2 INJECTION, SOLUTION INTRAMUSCULAR; INTRAVENOUS at 12:13

## 2022-12-12 ASSESSMENT — PAIN DESCRIPTION - LOCATION
LOCATION: ABDOMEN
LOCATION: ABDOMEN;PERINEUM

## 2022-12-12 ASSESSMENT — PAIN DESCRIPTION - DESCRIPTORS
DESCRIPTORS: ACHING
DESCRIPTORS: ACHING

## 2022-12-12 ASSESSMENT — PAIN DESCRIPTION - ORIENTATION
ORIENTATION: MID
ORIENTATION: MID

## 2022-12-12 ASSESSMENT — PAIN SCALES - GENERAL
PAINLEVEL_OUTOF10: 3
PAINLEVEL_OUTOF10: 0
PAINLEVEL_OUTOF10: 4
PAINLEVEL_OUTOF10: 8
PAINLEVEL_OUTOF10: 5

## 2022-12-12 NOTE — PROGRESS NOTES
Pt resting in bed comfortably at this time, alert and oriented times 4. No distress noted, respirations even and unlabored on room air. IVF infusing at this time. Pt instructed to call for assistance if needed, call light in place, will continue to monitor.

## 2022-12-12 NOTE — OP NOTE
[facility]  OPERATIVE REPORT    Name:  Sandra Castaneda  MR#:  [id number]  :  1970  ACCOUNT #:  [de-identified]  DATE OF SERVICE:  2022    PREOPERATIVE DIAGNOSIS:  Right ureter stone. POSTOPERATIVE DIAGNOSIS:  Right ureter stone. PROCEDURE PERFORMED:  Cystoscopy, right ureteral stent placement. SURGEON:  Mickie Nagy MD    ASSISTANT:  None. ANESTHESIA:  General.    COMPLICATIONS:  None. SPECIMENS REMOVED:  None. IMPLANTS:  6 x 26 double-J right ureter stent without strings. ESTIMATED BLOOD LOSS:  Minimal.    DRAINS:  None. FINDINGS:  Cloudy urine, unremarkable right ureteral stent placement without strings. INDICATIONS FOR OPERATIVE PROCEDURE:  The patient is a pleasant 65-year-old female who presented to the ER with right abdominal pain and concern for UTI. Her urine showed 3+ bacteria and her CT scan showed concern for a 4-mm right distal ureteral stone as well as some other findings for which General Surgery is involved. She was counseled on her urologic issues and opted to proceed with a cystoscopy, right ureteral stent placement today given her positive bacteriuria in the setting of a ureteral stone. She also wanted the stent for a relief of her right abdominal pain and nausea. DESCRIPTION OF OPERATIVE PROCEDURE:  After informed consent was obtained and the correct patient was identified in the preoperative holding area, she was taken back to the operating room suite, placed on the table in the supine position. Time-out was performed confirming correct patient and procedure. She received 2 g IV Ancef prior to the smooth induction of general endotracheal anesthesia. She was moved into dorsal lithotomy position, prepped and draped in the usual sterile fashion. I began the case by inserting a 22-Cymro rigid cystoscope with a 30-degree lens into the urethra and advanced into the patient's bladder.   Pancystoscopy was performed, which was unremarkable. The ureteral orifices were in orthotopic positions bilaterally. Attention was turned to the right ureteral orifice and sensor wire was placed under fluoroscopic guidance into the right renal pelvis. Once the wire was in position, I backloaded the scope off the wire and prepped the stent. I then loaded the stent onto the wire through the scope and passed under fluoroscopic guidance a 6 x 26 double-J right ureter stent without strings over the wire under fluoroscopic guidance in the right renal pelvis. Once the stent was in position, I pulled the wire noting good curl in the renal pelvis as well as the bladder. The bladder was then drained completely. After the stent was found to be effluxing cloudy urine, she was then awoken from anesthesia and transferred to the PACU in stable condition. She tolerated the procedure well. There were no complications. All counts were correct at the end of procedure. She will be scheduled for interval ureteroscopy in 2-3 weeks once her infection clears to go up and treat her stone and remove the stent.       Ai Edwards MD      [id number]  D:  12/11/2022 14:22  T:  12/11/2022 20:39  JOB #:  [id number]

## 2022-12-12 NOTE — PROGRESS NOTES
General Surgery  Brief Note: MRI resulted, images reviewed by Dr Lisa Nino that demonstrate possible myometrial implants. Recommend GYN consultation.      ADELSO Washington - CNP

## 2022-12-12 NOTE — PROGRESS NOTES
Pt resting in bed comfortably at this time, alert and oriented times 4. No distress noted, respirations even and unlabored on room air. IVF infusing at this time. Pt instructed to call for assistance if needed, call light in place, will continue to monitor. Will prepare for bedside shift report.  at the bedside.

## 2022-12-12 NOTE — PROGRESS NOTES
H&P/Consult Note/Progress Note/Office Note:   Aamir Lopes  MRN: [id number]  [address]  Age:52 y.o.    HPI: 27-year-old female with history of IBS, recurrent kidney stones, HTN,  presents with lower abdominal pain radiating to her upper abdomen for the past 2 days. She reports nausea and vomiting a  Pain similar to prior kidney stone. She has urinary pressure, but denies frequency or blood. She had a CT scan showing a hypodensity area in the left mid abdomen concerning for a ischemic splenule or abscess. 12/12/2022 - she still having some upper abdominal pain. Pain is mostly ARLIN. She reports pain is worse with eating. No nausea. No vomiting. No fever.            Past Medical History:   Diagnosis Date    Anemia     Chronic pain     due to RA    Depression     GERD (gastroesophageal reflux disease)     Hx of renal calculi          Hypercholesterolemia     Hypertension     Migraine     Morbid obesity ([address]     Ovarian cyst     Reflex sympathetic dystrophy since 2005    right arm; s/p injury - laceration in hand    Rheumatoid arthritis ([address] 9/26/2019    Rheumatoid arthritis ([address]          Sinus problem     TIA (transient ischemic attack) 9/26/2019     Past Surgical History:   Procedure Laterality Date    APPENDECTOMY      BREAST BIOPSY Left 4/27/2015    LEFT NIPPLE EXPLORATION WITH REMOVAL OF LATTISIMUS DUCT performed by Isa Lopez MD at [address] [facility] [address] Right 12/11/2022    CYSTOSCOPY/ RIGHT URETERAL STENT PLACEMENT performed by Lupe Anguiano MD at [facility], TOTAL ABDOMINAL (CERVIX REMOVED)           ORTHOPEDIC SURGERY      rt hand    ORTHOPEDIC SURGERY      neck    SEPTOPLASTY  08/28/2019    FESS,SMRIT's    TUBAL LIGATION       Current Facility-Administered Medications   Medication Dose Route Frequency    sodium chloride flush 0.9 % injection 20 mL  20 mL IntraVENous PRN    [START ON 12/13/2022] enoxaparin Sodium (LOVENOX) injection 30 mg  30 mg SubCUTAneous Q12H    sodium chloride flush 0.9 % injection 5-40 mL  5-40 mL IntraVENous 2 times per day    sodium chloride flush 0.9 % injection 5-40 mL  5-40 mL IntraVENous PRN    0.9 % sodium chloride infusion   IntraVENous PRN    ondansetron (ZOFRAN-ODT) disintegrating tablet 4 mg  4 mg Oral Q8H PRN    Or    ondansetron (ZOFRAN) injection 4 mg  4 mg IntraVENous Q6H PRN    polyethylene glycol (GLYCOLAX) packet 17 g  17 g Oral Daily PRN    acetaminophen (TYLENOL) tablet 650 mg  650 mg Oral Q6H PRN    Or    acetaminophen (TYLENOL) suppository 650 mg  650 mg Rectal Q6H PRN    loperamide (IMODIUM) capsule 4 mg  4 mg Oral BID PRN    temazepam (RESTORIL) capsule 15 mg  15 mg Oral Nightly PRN    HYDROcodone-acetaminophen (NORCO) 7.5-325 MG per tablet 1 tablet  1 tablet Oral Q4H PRN    morphine injection 2 mg  2 mg IntraVENous Q4H PRN    cloNIDine (CATAPRES) tablet 0.2 mg  0.2 mg Oral BID PRN    hydrALAZINE (APRESOLINE) injection 10 mg  10 mg IntraVENous Q6H PRN    promethazine (PHENERGAN) 25 mg in sodium chloride 0.9 % 50 mL IVPB  25 mg IntraVENous Q8H PRN    polyethylene glycol (GLYCOLAX) packet 17 g  17 g Oral Daily    senna (SENOKOT) tablet 17.2 mg  2 tablet Oral BID    lactated ringers infusion   IntraVENous Continuous    cefTRIAXone (ROCEPHIN) 1,000 mg in sodium chloride 0.9 % 50 mL IVPB mini-bag  1,000 mg IntraVENous Q24H    metronidazole (FLAGYL) 500 mg in 0.9% NaCl 100 mL IVPB premix  500 mg IntraVENous Q12H     ALLERGIES:  Penicillins    Social History     Socioeconomic History    Marital status:      Spouse name: None    Number of children: None    Years of education: None    Highest education level: None   Tobacco Use    Smoking status: Never    Smokeless tobacco: Never   Substance and Sexual Activity    Alcohol use: Yes    Drug use: No     Social History     Tobacco Use   Smoking Status Never   Smokeless Tobacco Never     Family History   Problem Relation Age of Onset    Cancer Paternal Aunt brst    Cancer Father         non hodgkins lymphoma    Breast Cancer Sister     Diabetes Paternal Grandfather     Heart Disease Paternal Grandfather     Breast Cancer Maternal Grandmother     Cancer Maternal Grandmother         ovarian    Cancer Maternal Grandfather         colon    Colon Cancer Maternal Grandfather     Breast Cancer Paternal Grandmother 46    Cancer Paternal Grandmother         breast    Ovarian Cancer Paternal Grandmother     Cancer Mother         throat    Hypertension Mother     Breast Cancer Paternal Aunt 39     ROS: The patient has no difficulty with chest pain or shortness of breath. No fever or chills. Comprehensive review of systems was otherwise unremarkable except as noted above. Physical Exam:   /62   Pulse 73   Temp 97.5 °F (36.4 °C)   Resp 17   Ht 5' 8\" (1.727 m)   Wt 232 lb (105.2 kg)   SpO2 99%   BMI 35.28 kg/m²   Vitals:    12/12/22 0238 12/12/22 0712 12/12/22 1213 12/12/22 1215   BP: 102/63 (!) 111/53  112/62   Pulse: 78 72  73   Resp: 16 17 18 17   Temp: 98.2 °F (36.8 °C) 98.4 °F (36.9 °C)  97.5 °F (36.4 °C)   TempSrc: Oral Oral     SpO2: 98% 95%  99%   Weight:       Height:         12/12 0701 - 12/12 1900  In: 240 [P.O.:240]  Out: -   No intake/output data recorded. Constitutional: Alert, oriented, cooperative patient in no acute distress; appears stated age    Eyes:Sclera are clear. EOMs intact  ENMT: no external lesions gross hearing normal; no obvious neck masses, no ear or lip lesions, nares normal  CV: RRR. Normal perfusion  Resp: No JVD. Breathing is  non-labored; no audible wheezing. GI: soft and non-distended. ARLIN and LUQ tenderness to palpation. Musculoskeletal: unremarkable with normal function. No embolic signs or cyanosis.    Neuro:  Oriented; moves all 4; no focal deficits  Psychiatric: normal affect and mood, no memory impairment    Recent vitals (if inpt):  Patient Vitals for the past 24 hrs:   BP Temp Temp src Pulse Resp SpO2   12/12/22 1215 112/62 97.5 °F (36.4 °C) -- 73 17 99 %   12/12/22 1213 -- -- -- -- 18 --   12/12/22 0712 (!) 111/53 98.4 °F (36.9 °C) Oral 72 17 95 %   12/12/22 0238 102/63 98.2 °F (36.8 °C) Oral 78 16 98 %   12/11/22 2215 (!) 102/59 98.5 °F (36.9 °C) Oral 79 18 100 %   12/11/22 2204 -- -- -- -- 18 --   12/11/22 2007 (!) 93/58 97.9 °F (36.6 °C) Oral 75 20 98 %   12/11/22 1657 -- -- -- -- 18 --   12/11/22 1627 -- -- -- -- 18 --   12/11/22 1558 107/64 97.7 °F (36.5 °C) Oral 72 18 99 %   12/11/22 1446 (!) 108/58 -- -- 66 -- 100 %   12/11/22 1441 (!) 108/55 -- -- 69 -- 100 %   12/11/22 1435 (!) 110/58 -- -- 71 -- 99 %   12/11/22 1430 (!) 110/57 -- -- 70 -- 100 %   12/11/22 1425 (!) 106/58 -- -- 67 -- 100 %   12/11/22 1422 (!) 112/57 98.3 °F (36.8 °C) Infrared 69 16 100 %   12/11/22 1254 (!) 104/58 97.8 °F (36.6 °C) Oral 68 16 96 %       Amount and/or Complexity of Data Reviewed and Analyzed:  I reviewed and analyzed all of the unique labs and radiologic studies that are shown below as well as any that are in the HPI, and any that are in the expanded problem list below  *Each unique test, order, or document contributes to the combination of 2 or combination of 3 in Category 1 below. For this visit I also reviewed old records and prior notes.       Recent Labs     12/10/22  2238 12/12/22  0455   WBC 10.9 4.9   HGB 11.2* 10.2*    168    142   K 3.2* 3.7   CL 99 111*   CO2 27 26   BUN 18 16   ALT 14  --      Review of most recent CBC  Lab Results   Component Value Date    WBC 4.9 12/12/2022    HGB 10.2 (L) 12/12/2022    HCT 31.9 (L) 12/12/2022    MCV 82.6 12/12/2022     12/12/2022       Review of most recent BMP  Lab Results   Component Value Date/Time     12/12/2022 04:55 AM    K 3.7 12/12/2022 04:55 AM     12/12/2022 04:55 AM    CO2 26 12/12/2022 04:55 AM    BUN 16 12/12/2022 04:55 AM    CREATININE 0.60 12/12/2022 04:55 AM    GLUCOSE 128 12/12/2022 04:55 AM    CALCIUM 8.4 12/12/2022 04:55 AM Review of most recent LFTs (and lipase if done)  Lab Results   Component Value Date    ALT 14 12/10/2022    AST 17 12/10/2022    ALKPHOS 100 12/10/2022    BILITOT 0.5 12/10/2022     Lab Results   Component Value Date    LIPASE 21 12/10/2022       Lab Results   Component Value Date/Time    INR 1.2 09/26/2019 08:20 PM    APTT 31.0 09/26/2019 08:23 PM       Review of most recent HgbA1c  Hemoglobin A1C   Date Value Ref Range Status   02/16/2021 6.4 (H) 4.20 - 6.30 % Final       Nutritional assessment screen for wound healing issues:  Lab Results   Component Value Date    LABALBU 4.3 12/10/2022       @lastcovr@    Xray Result (most recent):  XR TOE LEFT (MIN 2 VIEWS) 02/15/2021    Narrative  Clinical history: Left second toe infection. TECHNIQUE: 3 views of the left second toe. FINDINGS:    There is lucency at the tip of the second toe distal phalanx, on the lateral  view. No soft tissue gas is seen. No acute fracture or dislocation. Alignment is maintained. Joint spaces are  preserved. No radiopaque foreign body. Impression  1. Apparent lucency at the tip of the second toe distal phalanx, on the lateral  view, raising the question of osteomyelitis. Note that MRI is more sensitive for  evaluation of osteomyelitis. 2. No acute fracture. CT Result (most recent):  CT ABDOMEN PELVIS RENAL STONE 12/10/2022    Narrative  EXAM: Noncontrast CT abdomen and pelvis. INDICATION: Abdominal pain. COMPARISON: Prior CT abdomen and pelvis on July 5, 2021. TECHNIQUE: Axial CT images of the abdomen and pelvis were obtained without IV  contrast. Radiation dose reduction techniques were used for this study. Our CT  scanners use one or all of the following:  Automated exposure control,  adjustment of the mA or kV according to patient size, iterative reconstruction. FINDINGS:    - Liver: Within normal limits.   - Gallbladder and bile ducts: Postcholecystectomy, with no irregular dilatation.  - Spleen: Within normal limits. - Urinary tract: There is a new 4 mm stone in the distal right ureter, located  approximately 2 cm from the ureterovesical junction, with no hydronephrosis. There are several 1-2 mm left kidney stones, with no left ureter stone or  left-sided hydronephrosis. The urinary bladder is collapsed. - Adrenals: Within normal limits. - Pancreas: Within normal limits. - Gastrointestinal tract: Within normal limits. Post appendectomy. - Retroperitoneum: No ascites or free air. Multiple adjacent soft tissue  densities in the left abdomen have been present on prior CT scans of the abdomen  dating back to 2014, presumably splenules. This most recent CT, there is new  hypodensity within one of the densities measuring 4.5 cm with new surrounding  soft tissue stranding.  - Peritoneal cavity and abdominal wall: Within normal limits. - Pelvis: Posthysterectomy. - Spine/bones: No acute process. - Other comments: The lung bases are clear. Impression  1. Nonobstructing 4 mm stone in the distal right ureter. 2. There is new hypodensity and surrounding stranding in one of the multiple  long-term left abdomen soft tissue densities, which are presumably splenules. The new hypodensity and surrounding stranding may relate to torsion although  superimposed infection is not excluded. No associated gas or fluid is seen. 3. Left kidney stones. 4. Prior cholecystectomy, appendectomy and hysterectomy. US Result (most recent):  US DUP LOWER EXTREMITY RIGHT DIONISIO 06/29/2020    Narrative  Right lower extremity Doppler evaluation: 06/29/2020    HISTORY: Pain, palpable cords swelling. Symptoms are moderate and have been  present x1 week    Grayscale, color-flow and Doppler evaluation of the major veins of the right  lower extremity was performed. Comparison: None    FINDINGS: There is normal compression and augmentation involving the right  common femoral, superficial femoral and popliteal veins.  No grayscale or  color-flow findings within these vessels or within the distal greater saphenous  or profunda femoral veins were noted to suggest deep venous thrombosis. Interrogated portions of the peroneal and posterior tibial veins were also  unremarkable. No popliteal cyst is identified. Cursory imaging of the left common femoral vein reveals it to be patent with  normal color-flow and augmentation. There is a 2.7 cm lymph node within the  right groin which may be reactive. Impression  IMPRESSION: Negative evaluation for deep venous thrombosis within the right  lower extremity. Admission date (for inpatients): 12/11/2022   1 Day Post-Op  Procedure(s):  CYSTOSCOPY/ RIGHT URETERAL STENT PLACEMENT        ASSESSMENT/PLAN:    Principal Problem:    Ureteric stone  Active Problems:    Acute cystitis without hematuria  Resolved Problems:    * No resolved hospital problems. *     Patient Active Problem List    Diagnosis Date Noted    Ureteric stone 12/11/2022     Priority: Medium    Acute cystitis without hematuria 12/11/2022     Priority: Medium    Microcytic anemia 02/16/2021    Acute osteomyelitis of right foot ([address] 06/30/2020    Peripheral neuropathy 06/30/2020    H/O TIA (transient ischemic attack) and stroke 06/29/2020    Cellulitis of second toe, left 06/29/2020    Class 1 obesity in adult 06/29/2020    Depression 09/26/2019    Rheumatoid arthritis ([address] 09/26/2019    TIA (transient ischemic attack) 09/26/2019    Reflex sympathetic dystrophy 09/26/2019     right arm; s/p injury - laceration in hand        Hypertension 09/26/2019    Chronic pain 09/26/2019     due to RA        Right lower quadrant abdominal pain 08/26/2016    Ovarian cyst         A/P 51-year-old female recurrent kidney stones, presents with upper  abdominal pain. Pain similar to prior kidney stone. She had a CT scan showing a hypodensity area in the left mid abdomen concerning for a ischemic splenule.     On exam, she is tender on in the ARLIN and LUQ areas. She reports pain is worse with eating. Afebrile, normal WBC. The hypodensity showing in the CT scan it could certainly be a torsed splenule, which will improve conservatively. MRI has been ordered to better look at the area. Recommend GI eval for EGD as patient reports pain mostly in ARLIN and worse with eating. No surgical intervention plan at this time. Will await MRI results. Time: I spent 25 minutes preparing to see patient (including chart review and preparation), obtaining and/or reviewing additional medical history, performing a physical exam and evaluation, documenting clinical information in the electronic health record, independently interpreting results, communicating results to patient, family or caregiver, and/or coordinating care.         Signed: Evelina Perez MD  12/12/2022    12:45 PM

## 2022-12-12 NOTE — PROGRESS NOTES
Hospitalist Progress Note   Admit Date:  2022  2:21 AM   Name:  Marta Peterson   Age:  50 y.o. Sex:  female  :  1970   MRN:  [id number]   Room:  /    Presenting Complaint: No chief complaint on file. Reason(s) for Admission: Ureteric stone [N20.1]     Hospital Course: This is a 57-year-old female with past medical history significant for irritable bowel syndrome, recurrent kidney stones, hypertension presented to the ER with lower abdominal pain radiating to the upper abdomen for the past 2 days. She also has nausea vomiting and flank pain. Her pain is similar to prior kidney stone pain. She had a CT of the abdomen and pelvis done which showed nonobstructing 4 mm distal right ureteral stone. Urology consulted. There is a new hypodensity and surrounding stranding in the left abdomen. General surgery was consulted. MRI Abdomen was done and showed multiple masses in left abdomen, with edema. Subjective & 24hr Events (22): Today's postop day 1 status post ureteral stent placement. No fever no chills. Still has midepigastric abdominal pain. Assessment & Plan: This is a 57-year-old female with:     Right ureteric stone s/p stent placement  UTI:  Urology consulted. Appreciate recommendations. Patient underwent cystoscopy with right ureteral stent   Urology recs out-pt follow up. Hyperdensity in the abdomen  Infection versus lesion  General surgery consulted. MRI of the abdomen pelvis shows multiple masses in left abdomen, with edema. Ceftriaxone, Flagyl pending results. Hypokalemia  Resolved. Hypertension  Blood pressure fairly well controlled. Anticipated discharge needs:    Hopefully discharge home in 24 to 48 hours depending on clearance from general surgery. Diet:  ADULT DIET;  Regular  DVT PPx: Lovenox  Code status: Full Code    Hospital Problems:  Principal Problem:    Ureteric stone  Active Problems:    Acute cystitis without hematuria    Left arm swelling  Resolved Problems:    * No resolved hospital problems. *      Objective:   Patient Vitals for the past 24 hrs:   Temp Pulse Resp BP SpO2   12/12/22 1243 -- -- 18 -- --   12/12/22 1215 97.5 °F (36.4 °C) 73 17 112/62 99 %   12/12/22 1213 -- -- 18 -- --   12/12/22 0712 98.4 °F (36.9 °C) 72 17 (!) 111/53 95 %   12/12/22 0238 98.2 °F (36.8 °C) 78 16 102/63 98 %   12/11/22 2215 98.5 °F (36.9 °C) 79 18 (!) 102/59 100 %   12/11/22 2204 -- -- 18 -- --   12/11/22 2007 97.9 °F (36.6 °C) 75 20 (!) 93/58 98 %   12/11/22 1657 -- -- 18 -- --   12/11/22 1627 -- -- 18 -- --   12/11/22 1558 97.7 °F (36.5 °C) 72 18 107/64 99 %   12/11/22 1446 -- 66 -- (!) 108/58 100 %   12/11/22 1441 -- 69 -- (!) 108/55 100 %   12/11/22 1435 -- 71 -- (!) 110/58 99 %   12/11/22 1430 -- 70 -- (!) 110/57 100 %   12/11/22 1425 -- 67 -- (!) 106/58 100 %   12/11/22 1422 98.3 °F (36.8 °C) 69 16 (!) 112/57 100 %       Oxygen Therapy  SpO2: 99 %  Pulse via Oximetry: 67 beats per minute  Pulse Oximeter Device Mode: Continuous  O2 Device: None (Room air)    Estimated body mass index is 35.28 kg/m² as calculated from the following:    Height as of this encounter: 5' 8\" (1.727 m). Weight as of this encounter: 232 lb (105.2 kg). Intake/Output Summary (Last 24 hours) at 12/12/2022 1349  Last data filed at 12/12/2022 0900  Gross per 24 hour   Intake 240 ml   Output 0 ml   Net 240 ml         Physical Exam:     Blood pressure 112/62, pulse 73, temperature 97.5 °F (36.4 °C), resp. rate 18, height 5' 8\" (1.727 m), weight 232 lb (105.2 kg), SpO2 99 %. General:    Well nourished. Alert awake female,  Head:  Normocephalic, atraumatic  Eyes:  Sclerae appear normal.  Pupils equally round. ENT:  Nares appear normal, no drainage. Moist oral mucosa  Neck:  No restricted ROM. Trachea midline   CV:   RRR. No m/r/g. No jugular venous distension. Lungs:   CTAB. No wheezing, rhonchi, or rales. Symmetric expansion.   Abdomen: Bowel sounds present. Soft, nondistended, tenderness noticed diffusely, no guarding no rigidity,  Extremities: No cyanosis or clubbing. No edema  Skin:     No rashes and normal coloration. Warm and dry. Neuro:  CN II-XII grossly intact. Sensation intact. A&Ox3  Psych:  Normal mood and affect.       I have personally reviewed labs and tests showing:  Recent Labs:  Recent Results (from the past 48 hour(s))   CBC with Diff    Collection Time: 12/10/22 10:38 PM   Result Value Ref Range    WBC 10.9 4.3 - 11.1 K/uL    RBC 4.25 4.05 - 5.20 M/uL    Hemoglobin 11.2 (L) 11.7 - 15.4 g/dL    Hematocrit 33.8 (L) 35.8 - 46.3 %    MCV 79.5 (L) 82.0 - 102.0 FL    MCH 26.4 26.1 - 32.9 PG    MCHC 33.1 31.4 - 35.0 g/dL    RDW 15.1 (H) 11.9 - 14.6 %    Platelets 337 950 - 616 K/uL    MPV 9.0 (L) 9.4 - 12.3 FL    nRBC 0.00 0.0 - 0.2 K/uL    Differential Type AUTOMATED      Seg Neutrophils 70 43 - 78 %    Lymphocytes 21 13 - 44 %    Monocytes 7 4.0 - 12.0 %    Eosinophils % 2 0.5 - 7.8 %    Basophils 0 0.0 - 2.0 %    Immature Granulocytes 1 0.0 - 5.0 %    Segs Absolute 7.6 1.7 - 8.2 K/UL    Absolute Lymph # 2.3 0.5 - 4.6 K/UL    Absolute Mono # 0.7 0.1 - 1.3 K/UL    Absolute Eos # 0.2 0.0 - 0.8 K/UL    Basophils Absolute 0.0 0.0 - 0.2 K/UL    Absolute Immature Granulocyte 0.1 0.0 - 0.5 K/UL   CMP    Collection Time: 12/10/22 10:38 PM   Result Value Ref Range    Sodium 139 133 - 143 mmol/L    Potassium 3.2 (L) 3.5 - 5.1 mmol/L    Chloride 99 98 - 107 mmol/L    CO2 27 21 - 32 mmol/L    Anion Gap 13 (H) 2 - 11 mmol/L    Glucose 152 (H) 65 - 100 mg/dL    BUN 18 7.0 - 18.0 MG/DL    Creatinine 0.86 0.6 - 1.0 MG/DL    Est, Glom Filt Rate >60 >60 ml/min/1.73m2    Calcium 9.8 8.3 - 10.4 MG/DL    Total Bilirubin 0.5 0.2 - 1.1 MG/DL    ALT 14 13.0 - 61.0 U/L    AST 17 15 - 37 U/L    Alk Phosphatase 100 45.0 - 117.0 U/L    Total Protein 7.7 6.4 - 8.2 g/dL    Albumin 4.3 3.5 - 5.0 g/dL    Globulin 3.4 2.8 - 4.5 g/dL    Albumin/Globulin Ratio 1.3 0.4 - 1.6     Lipase    Collection Time: 12/10/22 10:38 PM   Result Value Ref Range    Lipase 21 13 - 60 U/L   Urinalysis w rflx microscopic    Collection Time: 12/10/22 10:38 PM   Result Value Ref Range    Color, UA YELLOW      Appearance CLEAR      Specific Gravity, UA >=1.030 1.001 - 1.023    pH, Urine 6.0 5.0 - 9.0      Protein, UA 30 (A) NEG mg/dL    Glucose, UA Negative mg/dL    Ketones, Urine Negative NEG mg/dL    Bilirubin Urine SMALL (A) NEG      Blood, Urine Negative NEG      Urobilinogen, Urine 1.0 0.2 - 1.0 EU/dL    Nitrite, Urine Positive (A) NEG      Leukocyte Esterase, Urine Negative NEG     Urinalysis, Micro    Collection Time: 12/10/22 10:38 PM   Result Value Ref Range    WBC, UA 0 0 /hpf    RBC, UA 0 0 /hpf    Epithelial Cells UA 5-10 0 /hpf    BACTERIA, URINE 3+ (H) 0 /hpf    Casts 0 0 /lpf    Crystals 0 0 /LPF    Mucus, UA 4+ (H) 0 /lpf    Other observations RESULTS VERIFIED MANUALLY     Lactic Acid    Collection Time: 12/10/22 11:46 PM   Result Value Ref Range    Lactic Acid 1.10 0.4 - 2.0 mmol/L   Blood Culture 1    Collection Time: 12/10/22 11:47 PM    Specimen: Blood   Result Value Ref Range    Special Requests RIGHT  Antecubital        Culture NO GROWTH AFTER 21 HOURS     Blood Culture 2    Collection Time: 12/10/22 11:59 PM    Specimen: Blood   Result Value Ref Range    Special Requests RIGHT  ARM        Culture NO GROWTH AFTER 21 HOURS     C-Reactive Protein    Collection Time: 12/11/22 10:22 AM   Result Value Ref Range    CRP 16.4 (H) 0.0 - 0.9 mg/dL   Sedimentation Rate    Collection Time: 12/11/22 10:22 AM   Result Value Ref Range    Sed Rate, Automated 102 (H) 0 - 20 mm/hr   Procalcitonin    Collection Time: 12/11/22 10:22 AM   Result Value Ref Range    Procalcitonin <0.05 0.00 - 0.49 ng/mL   Magnesium    Collection Time: 12/11/22 10:22 AM   Result Value Ref Range    Magnesium 2.0 1.8 - 2.4 mg/dL   CBC with Auto Differential    Collection Time: 12/12/22  4:55 AM   Result Value Ref Range    WBC 4.9 4.3 - 11.1 K/uL    RBC 3.86 (L) 4.05 - 5.2 M/uL    Hemoglobin 10.2 (L) 11.7 - 15.4 g/dL    Hematocrit 31.9 (L) 35.8 - 46.3 %    MCV 82.6 82 - 102 FL    MCH 26.4 26.1 - 32.9 PG    MCHC 32.0 31.4 - 35.0 g/dL    RDW 15.1 (H) 11.9 - 14.6 %    Platelets 553 945 - 844 K/uL    MPV 10.2 9.4 - 12.3 FL    nRBC 0.00 0.0 - 0.2 K/uL    Differential Type AUTOMATED      Seg Neutrophils 53 43 - 78 %    Lymphocytes 34 13 - 44 %    Monocytes 9 4.0 - 12.0 %    Eosinophils % 4 0.5 - 7.8 %    Basophils 0 0.0 - 2.0 %    Immature Granulocytes 0 0.0 - 5.0 %    Segs Absolute 2.6 1.7 - 8.2 K/UL    Absolute Lymph # 1.7 0.5 - 4.6 K/UL    Absolute Mono # 0.5 0.1 - 1.3 K/UL    Absolute Eos # 0.2 0.0 - 0.8 K/UL    Basophils Absolute 0.0 0.0 - 0.2 K/UL    Absolute Immature Granulocyte 0.0 0.0 - 0.5 K/UL   Basic Metabolic Panel w/ Reflex to MG    Collection Time: 12/12/22  4:55 AM   Result Value Ref Range    Sodium 142 133 - 143 mmol/L    Potassium 3.7 3.5 - 5.1 mmol/L    Chloride 111 (H) 101 - 110 mmol/L    CO2 26 21 - 32 mmol/L    Anion Gap 5 2 - 11 mmol/L    Glucose 128 (H) 65 - 100 mg/dL    BUN 16 6 - 23 MG/DL    Creatinine 0.60 0.6 - 1.0 MG/DL    Est, Glom Filt Rate >60 >60 ml/min/1.73m2    Calcium 8.4 8.3 - 10.4 MG/DL       I have personally reviewed imaging studies showing: Other Studies:  MRI ABDOMEN W WO CONTRAST   Final Result   Multiple masses in the left abdomen, 2 with heterogeneous   enhancement and edema. Presence of edema suggests inflammation and possible   partial infarction. While these could represent splenules, they do not follow   spleen signal on all sequences. Myometrial implants should be considered given   absence of the uterus. Malignant transformation is also not excluded given   interval change compared to prior studies.          If there are any questions about this report, I can be reached on [employer]   or at [phone]                   Current Meds:  Current Facility-Administered Medications Medication Dose Route Frequency    sodium chloride flush 0.9 % injection 20 mL  20 mL IntraVENous PRN    [START ON 12/13/2022] enoxaparin Sodium (LOVENOX) injection 30 mg  30 mg SubCUTAneous Q12H    sodium chloride flush 0.9 % injection 5-40 mL  5-40 mL IntraVENous 2 times per day    sodium chloride flush 0.9 % injection 5-40 mL  5-40 mL IntraVENous PRN    0.9 % sodium chloride infusion   IntraVENous PRN    ondansetron (ZOFRAN-ODT) disintegrating tablet 4 mg  4 mg Oral Q8H PRN    Or    ondansetron (ZOFRAN) injection 4 mg  4 mg IntraVENous Q6H PRN    polyethylene glycol (GLYCOLAX) packet 17 g  17 g Oral Daily PRN    acetaminophen (TYLENOL) tablet 650 mg  650 mg Oral Q6H PRN    Or    acetaminophen (TYLENOL) suppository 650 mg  650 mg Rectal Q6H PRN    loperamide (IMODIUM) capsule 4 mg  4 mg Oral BID PRN    temazepam (RESTORIL) capsule 15 mg  15 mg Oral Nightly PRN    HYDROcodone-acetaminophen (NORCO) 7.5-325 MG per tablet 1 tablet  1 tablet Oral Q4H PRN    morphine injection 2 mg  2 mg IntraVENous Q4H PRN    cloNIDine (CATAPRES) tablet 0.2 mg  0.2 mg Oral BID PRN    hydrALAZINE (APRESOLINE) injection 10 mg  10 mg IntraVENous Q6H PRN    promethazine (PHENERGAN) 25 mg in sodium chloride 0.9 % 50 mL IVPB  25 mg IntraVENous Q8H PRN    polyethylene glycol (GLYCOLAX) packet 17 g  17 g Oral Daily    senna (SENOKOT) tablet 17.2 mg  2 tablet Oral BID    lactated ringers infusion   IntraVENous Continuous    cefTRIAXone (ROCEPHIN) 1,000 mg in sodium chloride 0.9 % 50 mL IVPB mini-bag  1,000 mg IntraVENous Q24H    metronidazole (FLAGYL) 500 mg in 0.9% NaCl 100 mL IVPB premix  500 mg IntraVENous Q12H       Signed:  Alyson Varghese MD    Part of this note may have been written by using a voice dictation software. The note has been proof read but may still contain some grammatical/other typographical errors.

## 2022-12-12 NOTE — PROGRESS NOTES
Pt resting in bed comfortably at this time, alert and oriented times 4. No distress noted, respirations even and unlabored on room air. IVF infusing at this time. Pt instructed to call for assistance if needed, call light in place, will continue to monitor. Pt NPO at this time for a MRI of abdomen that was ordered yesterday.

## 2022-12-12 NOTE — CARE COORDINATION
CM following for d/c planning - chart screened. Patient admitted 12.11.2022 with abdominal pain - per report:   Urology following- s/pCystoscopy w R uteteral stent placement yesterday  MRI pending  Surgery following. CM will follow patient's plan of care and assist with supportive care referrals pending patient's clinical progress. Please consult case management if specific needs arise. 12/12/22 1212   Service Assessment   Patient Orientation Alert and Oriented   Cognition Alert   History Provided By Spouse   Primary Caregiver Spouse   Support Systems Spouse/Significant Other   Patient's Healthcare Decision Maker is: Named in [address] [place] [address]   PCP Verified by CM Yes   Last Visit to PCP Within last 6 months   Prior Functional Level Independent in ADLs/IADLs   Current Functional Level Independent in ADLs/IADLs   Can patient return to prior living arrangement Yes   Ability to make needs known: Good   Family able to assist with home care needs: Yes   Would you like for me to discuss the discharge plan with any other family members/significant others, and if so, who? No   Social/Functional History   Lives With Spouse   Type of Home House   Discharge Planning   Type of Residence House   Living Arrangements Spouse/Significant Other   Patient expects to be discharged to: [address] Discharge   Mode of Transport at Discharge Self   Confirm Follow Up Transport Family   Condition of Participation: Discharge Planning   The Patient and/or Patient Representative was provided with a Choice of Provider? Patient   The Patient and/Or Patient Representative agree with the Discharge Plan? Yes   Freedom of Choice list was provided with basic dialogue that supports the patient's individualized plan of care/goals, treatment preferences, and shares the quality data associated with the providers?   Yes

## 2022-12-12 NOTE — TELEPHONE ENCOUNTER
----- Message from Bridgett Morrow MD sent at 12/12/2022  6:00 AM EST -----  Regarding: [address]: [facility]    Surgeon: Nitish Shields    Assist: NONE    Diagnosis: Right Ureter Stone    Procedure: Cystoscopy, RIGHT Ureteroscopy, Laser Lithotripsy, RIGHT Ureteral Stent Exchange    Posting time: 1 hour    Special Instruments Needed:  NONE    Anesthesia: GENERAL    Labs: U/A    Tests: EKG per anesthesia    Blood: NONE    Bowel Prep: NONE    Special Instructions: Schedule with me after the holidays to treat right kidney stone. Has stent in place already.     Orders/HandP:     Follow up appointment: TRUDI

## 2022-12-12 NOTE — PROGRESS NOTES
Pt is in bed resting with no complaints of pain or discomfort at this time. Pt is alert and oriented times 4. Pt has LR infusing at 75. Pt remains NPO at this time.

## 2022-12-12 NOTE — PROGRESS NOTES
Urology Progress Note    Admit Date: 12/11/2022    Subjective:     Patient has no new complaints. Afebrile. Tolerating stent. Voiding. Objective:     Patient Vitals for the past 8 hrs:   BP Temp Temp src Pulse Resp SpO2   12/12/22 0238 102/63 98.2 °F (36.8 °C) Oral 78 16 98 %   12/11/22 2215 (!) 102/59 98.5 °F (36.9 °C) Oral 79 18 100 %   12/11/22 2204 -- -- -- -- 18 --     No intake/output data recorded. No intake/output data recorded. Physical Exam:   /63   Pulse 78   Temp 98.2 °F (36.8 °C) (Oral)   Resp 16   Ht 5' 8\" (1.727 m)   Wt 232 lb (105.2 kg)   SpO2 98%   BMI 35.28 kg/m²      GENERAL: No acute distress, Awake, Alert, Oriented X 3, Gait normal  CARDIAC: regular rate and rhythm  CHEST AND LUNG: Easy work of breathing, clear to auscultation bilaterally, no cyanosis  ABDOMEN: soft, non tender, non-distended, positive bowel sounds, no organomegaly, no palpable masses, no guarding, no rebound tenderness  SKIN: No rash, no erythema, no lacerations or abrasions, no ecchymosis  NEUROLOGIC: cranial nerves 2-12 grossly intact           Data Review   Recent Results (from the past 24 hour(s))   C-Reactive Protein    Collection Time: 12/11/22 10:22 AM   Result Value Ref Range    CRP 16.4 (H) 0.0 - 0.9 mg/dL   Sedimentation Rate    Collection Time: 12/11/22 10:22 AM   Result Value Ref Range    Sed Rate, Automated 102 (H) 0 - 20 mm/hr   Procalcitonin    Collection Time: 12/11/22 10:22 AM   Result Value Ref Range    Procalcitonin <0.05 0.00 - 0.49 ng/mL   Magnesium    Collection Time: 12/11/22 10:22 AM   Result Value Ref Range    Magnesium 2.0 1.8 - 2.4 mg/dL           Assessment:     Principal Problem:    Ureteric stone  Active Problems:    Acute cystitis without hematuria  Resolved Problems:    * No resolved hospital problems. *    POD 1 s/p R ureter stent placement for 4 mm R ureter stone in the setting of UTI / sepsis.     Plan:     -Continue care per primary team  -Follow up cultures and adjust antibiotics as indicated  -Will arrange interval Cystoscopy, RIGHT Ureteroscopy, Laser Lithotripsy, RIGHT Ureteral Stent Exchange in about 2 weeks after infection resolved to treat stone and remove stent. My office will call patient to schedule.   -Levsin 0.125 mg q4h prn bladder spasms  -PO pain control PRN  -Will sign off. Call with questions. Carlton Adams M.D.     [facility]  [facility] [address], [address] [place] [address],[address]  [place], [address]  Phone: [phone]  Fax: [phone]

## 2022-12-12 NOTE — PROGRESS NOTES
Pt is resting in bed with no complaints of pain or discomfort at this time. Pt is aware that she is NPO. Pt is alert and oriented times 4. Pt is on room air with even and unlabored respirations. No further needs expressed at this time.

## 2022-12-13 ENCOUNTER — PREP FOR PROCEDURE (OUTPATIENT)
Dept: SURGERY | Age: 52
End: 2022-12-13

## 2022-12-13 ENCOUNTER — ANESTHESIA EVENT (OUTPATIENT)
Dept: SURGERY | Age: 52
End: 2022-12-13
Payer: COMMERCIAL

## 2022-12-13 PROBLEM — R19.00 ABDOMINAL MASS: Status: ACTIVE | Noted: 2022-12-11

## 2022-12-13 PROBLEM — K66.8 MASS OF PERITONEUM: Status: ACTIVE | Noted: 2022-12-11

## 2022-12-13 LAB
ANION GAP SERPL CALC-SCNC: 8 MMOL/L (ref 2–11)
BASOPHILS # BLD: 0 K/UL (ref 0–0.2)
BASOPHILS NFR BLD: 0 % (ref 0–2)
BUN SERPL-MCNC: 19 MG/DL (ref 6–23)
CALCIUM SERPL-MCNC: 8.9 MG/DL (ref 8.3–10.4)
CHLORIDE SERPL-SCNC: 110 MMOL/L (ref 101–110)
CO2 SERPL-SCNC: 25 MMOL/L (ref 21–32)
CREAT SERPL-MCNC: 0.8 MG/DL (ref 0.6–1)
DIFFERENTIAL METHOD BLD: ABNORMAL
EOSINOPHIL # BLD: 0.2 K/UL (ref 0–0.8)
EOSINOPHIL NFR BLD: 6 % (ref 0.5–7.8)
ERYTHROCYTE [DISTWIDTH] IN BLOOD BY AUTOMATED COUNT: 14.8 % (ref 11.9–14.6)
GLUCOSE SERPL-MCNC: 133 MG/DL (ref 65–100)
HCT VFR BLD AUTO: 28.9 % (ref 35.8–46.3)
HGB BLD-MCNC: 9 G/DL (ref 11.7–15.4)
IMM GRANULOCYTES # BLD AUTO: 0 K/UL (ref 0–0.5)
IMM GRANULOCYTES NFR BLD AUTO: 0 % (ref 0–5)
LYMPHOCYTES # BLD: 1.5 K/UL (ref 0.5–4.6)
LYMPHOCYTES NFR BLD: 36 % (ref 13–44)
MAGNESIUM SERPL-MCNC: 2 MG/DL (ref 1.8–2.4)
MCH RBC QN AUTO: 25.7 PG (ref 26.1–32.9)
MCHC RBC AUTO-ENTMCNC: 31.1 G/DL (ref 31.4–35)
MCV RBC AUTO: 82.6 FL (ref 82–102)
MONOCYTES # BLD: 0.3 K/UL (ref 0.1–1.3)
MONOCYTES NFR BLD: 7 % (ref 4–12)
NEUTS SEG # BLD: 2.1 K/UL (ref 1.7–8.2)
NEUTS SEG NFR BLD: 51 % (ref 43–78)
NRBC # BLD: 0 K/UL (ref 0–0.2)
PLATELET # BLD AUTO: 167 K/UL (ref 150–450)
PMV BLD AUTO: 9.8 FL (ref 9.4–12.3)
POTASSIUM SERPL-SCNC: 3.5 MMOL/L (ref 3.5–5.1)
RBC # BLD AUTO: 3.5 M/UL (ref 4.05–5.2)
SODIUM SERPL-SCNC: 143 MMOL/L (ref 133–143)
WBC # BLD AUTO: 4.1 K/UL (ref 4.3–11.1)

## 2022-12-13 PROCEDURE — 99223 1ST HOSP IP/OBS HIGH 75: CPT | Performed by: SURGERY

## 2022-12-13 PROCEDURE — 99253 IP/OBS CNSLTJ NEW/EST LOW 45: CPT | Performed by: OBSTETRICS & GYNECOLOGY

## 2022-12-13 PROCEDURE — 80048 BASIC METABOLIC PNL TOTAL CA: CPT

## 2022-12-13 PROCEDURE — 2580000003 HC RX 258: Performed by: HOSPITALIST

## 2022-12-13 PROCEDURE — 36415 COLL VENOUS BLD VENIPUNCTURE: CPT

## 2022-12-13 PROCEDURE — 6360000002 HC RX W HCPCS: Performed by: HOSPITALIST

## 2022-12-13 PROCEDURE — 83735 ASSAY OF MAGNESIUM: CPT

## 2022-12-13 PROCEDURE — 6370000000 HC RX 637 (ALT 250 FOR IP): Performed by: HOSPITALIST

## 2022-12-13 PROCEDURE — 2500000003 HC RX 250 WO HCPCS: Performed by: HOSPITALIST

## 2022-12-13 PROCEDURE — 85025 COMPLETE CBC W/AUTO DIFF WBC: CPT

## 2022-12-13 PROCEDURE — 1100000000 HC RM PRIVATE

## 2022-12-13 RX ORDER — BUPROPION HYDROCHLORIDE 75 MG/1
300 TABLET ORAL DAILY
Status: DISCONTINUED | OUTPATIENT
Start: 2022-12-13 | End: 2022-12-15 | Stop reason: HOSPADM

## 2022-12-13 RX ORDER — PRAVASTATIN SODIUM 20 MG
40 TABLET ORAL NIGHTLY
Status: DISCONTINUED | OUTPATIENT
Start: 2022-12-13 | End: 2022-12-15 | Stop reason: HOSPADM

## 2022-12-13 RX ORDER — ASPIRIN 81 MG/1
81 TABLET, CHEWABLE ORAL DAILY
Status: DISCONTINUED | OUTPATIENT
Start: 2022-12-13 | End: 2022-12-15 | Stop reason: HOSPADM

## 2022-12-13 RX ORDER — DULOXETIN HYDROCHLORIDE 30 MG/1
60 CAPSULE, DELAYED RELEASE ORAL DAILY
Status: DISCONTINUED | OUTPATIENT
Start: 2022-12-13 | End: 2022-12-15 | Stop reason: HOSPADM

## 2022-12-13 RX ORDER — LISINOPRIL AND HYDROCHLOROTHIAZIDE 20; 12.5 MG/1; MG/1
1 TABLET ORAL DAILY
Status: DISCONTINUED | OUTPATIENT
Start: 2022-12-13 | End: 2022-12-15 | Stop reason: HOSPADM

## 2022-12-13 RX ORDER — POTASSIUM CHLORIDE 20 MEQ/1
40 TABLET, EXTENDED RELEASE ORAL ONCE
Status: COMPLETED | OUTPATIENT
Start: 2022-12-13 | End: 2022-12-13

## 2022-12-13 RX ADMIN — BUPROPION HYDROCHLORIDE 300 MG: 75 TABLET, FILM COATED ORAL at 14:52

## 2022-12-13 RX ADMIN — TEMAZEPAM 15 MG: 15 CAPSULE ORAL at 23:48

## 2022-12-13 RX ADMIN — SODIUM CHLORIDE, PRESERVATIVE FREE 10 ML: 5 INJECTION INTRAVENOUS at 21:35

## 2022-12-13 RX ADMIN — HYDROCODONE BITARTRATE AND ACETAMINOPHEN 1 TABLET: 7.5; 325 TABLET ORAL at 23:48

## 2022-12-13 RX ADMIN — DULOXETINE HYDROCHLORIDE 60 MG: 30 CAPSULE, DELAYED RELEASE ORAL at 14:52

## 2022-12-13 RX ADMIN — PRAVASTATIN SODIUM 40 MG: 20 TABLET ORAL at 21:35

## 2022-12-13 RX ADMIN — TEMAZEPAM 15 MG: 15 CAPSULE ORAL at 03:45

## 2022-12-13 RX ADMIN — SODIUM CHLORIDE, PRESERVATIVE FREE 10 ML: 5 INJECTION INTRAVENOUS at 08:30

## 2022-12-13 RX ADMIN — ENOXAPARIN SODIUM 30 MG: 100 INJECTION SUBCUTANEOUS at 08:29

## 2022-12-13 RX ADMIN — CEFTRIAXONE 1000 MG: 1 INJECTION, POWDER, FOR SOLUTION INTRAMUSCULAR; INTRAVENOUS at 16:16

## 2022-12-13 RX ADMIN — LISINOPRIL AND HYDROCHLOROTHIAZIDE 1 TABLET: 12.5; 2 TABLET ORAL at 14:52

## 2022-12-13 RX ADMIN — HYDROCODONE BITARTRATE AND ACETAMINOPHEN 1 TABLET: 7.5; 325 TABLET ORAL at 00:59

## 2022-12-13 RX ADMIN — METRONIDAZOLE 500 MG: 500 INJECTION, SOLUTION INTRAVENOUS at 16:54

## 2022-12-13 RX ADMIN — HYDROCODONE BITARTRATE AND ACETAMINOPHEN 1 TABLET: 7.5; 325 TABLET ORAL at 12:01

## 2022-12-13 RX ADMIN — METRONIDAZOLE 500 MG: 500 INJECTION, SOLUTION INTRAVENOUS at 05:24

## 2022-12-13 RX ADMIN — ASPIRIN 81 MG: 81 TABLET, CHEWABLE ORAL at 14:52

## 2022-12-13 RX ADMIN — POTASSIUM CHLORIDE 40 MEQ: 1500 TABLET, EXTENDED RELEASE ORAL at 12:01

## 2022-12-13 ASSESSMENT — PAIN SCALES - GENERAL
PAINLEVEL_OUTOF10: 0
PAINLEVEL_OUTOF10: 7
PAINLEVEL_OUTOF10: 0

## 2022-12-13 ASSESSMENT — PAIN SCALES - WONG BAKER: WONGBAKER_NUMERICALRESPONSE: 0

## 2022-12-13 NOTE — PERIOP NOTE
8th floor staff informed of patient's procedure on 12/14 with Dr. Kavitha Vance. OR time of [phone] with pre-op arrival of 1415.

## 2022-12-13 NOTE — PROGRESS NOTES
Physician Progress Note      PATIENT:               Lefty Mcmullen  CSN #:                  [id number]  :                       1970  ADMIT DATE:       2022 2:21 AM  DISCH DATE:  RESPONDING  PROVIDER #:        Michael Ellis MD          QUERY TEXT:    Pt admitted with abdominal pain and noted to have ureteral stone. Pt noted   to have UTI. If possible, please document in the progress notes and discharge   summary if you are evaluating and /or treating any of the following: The medical record reflects the following:  Risk Factors: UTI  Clinical Indicators: As per urology note on , documentation of   sepsis. On admission, WBC 10.9. Afebrile. . LA wnl. CRP 16.4. Treatment: Rocephin  Options provided:  -- Sepsis, present on admission  -- UTI without Sepsis  -- Other - I will add my own diagnosis  -- Disagree - Not applicable / Not valid  -- Disagree - Clinically unable to determine / Unknown  -- Refer to Clinical Documentation Reviewer    PROVIDER RESPONSE TEXT:    This patient has UTI without Sepsis.     Query created by: Eugenia Garcia on 2022 7:03 AM      Electronically signed by:  Michael Ellis MD 2022 7:11 AM

## 2022-12-13 NOTE — PROGRESS NOTES
Hospitalist Progress Note   Admit Date:  2022  2:21 AM   Name:  Washington Cooper   Age:  50 y.o. Sex:  female  :  1970   MRN:  [id number]   Room:  [facility]    Presenting Complaint: No chief complaint on file. Reason(s) for Admission: Ureteric stone [N20.1]     Hospital Course: This is a 61-year-old female with past medical history significant for irritable bowel syndrome, recurrent kidney stones, hypertension presented to the ER with lower abdominal pain radiating to the upper abdomen for the past 2 days. She also has nausea vomiting and flank pain. Her pain is similar to prior kidney stone pain. She had a CT of the abdomen and pelvis done which showed nonobstructing 4 mm distal right ureteral stone. Urology consulted. There is a new hypodensity and surrounding stranding in the left abdomen. General surgery was consulted. MRI Abdomen was done and showed multiple masses in left abdomen, with edema. Findings concerning for splenule/myometrial implants. Discussed case with gynecologic oncology. Believes this is benign and no significant change compared to 8 years ago. Recommends general surgery to evaluate for laparoscopy. General surgery Dr. Ashutosh Turner consulted. Subjective & 24hr Events (22): Today's postop day 2 status post ureteral stent placement. Patient still complains of diffuse abdominal pain. No fever no chills. No chest pain or shortness of breath. Assessment & Plan: This is a 61-year-old female with:     Right ureteric stone s/p stent placement  UTI:  Urology consulted. Appreciate recommendations. Patient underwent cystoscopy with right ureteral stent   Urology recs out-pt follow up. Hyperdensity in the abdomen/splenules? Infection versus lesion  General surgery consulted. Recommends MRI. MRI of the abdomen pelvis shows multiple masses in left abdomen, with edema. Discussed case with gynecologic oncology Dr. Penny Villalta.   These lesions were seen on CT imaging about 8 years ago. No significant change but patient is currently symptomatic. Discussed with general surgery Dr. Kaylee Lopez.  N.p.o. after midnight for laparoscopy in AM.  Ceftriaxone, Flagyl pending results. Hypokalemia  Resolved. Hypertension  Blood pressure fairly well controlled. Anticipated discharge needs:    Hopefully discharge home in 24 to 48 hours depending on clearance from general surgery. Diet:  ADULT DIET; Regular  Diet NPO  DVT PPx: Lovenox  Code status: Full Code    Hospital Problems:  Principal Problem:    Ureteric stone  Active Problems:    Acute cystitis without hematuria    Left arm swelling    Abdominal mass  Resolved Problems:    * No resolved hospital problems. *      Objective:   Patient Vitals for the past 24 hrs:   Temp Pulse Resp BP SpO2   12/13/22 1144 97.7 °F (36.5 °C) 70 17 109/60 98 %   12/13/22 0814 97.9 °F (36.6 °C) 62 18 109/64 90 %   12/13/22 0411 98 °F (36.7 °C) 67 20 115/62 96 %   12/13/22 0129 -- -- 18 -- --   12/13/22 0059 -- -- 18 -- --   12/12/22 2233 98.5 °F (36.9 °C) 80 18 (!) 116/58 93 %   12/12/22 1912 98.1 °F (36.7 °C) 77 20 (!) 113/59 97 %   12/12/22 1747 -- -- 18 -- --   12/12/22 1717 -- -- 18 -- --   12/12/22 1540 98.6 °F (37 °C) 71 17 119/78 98 %       Oxygen Therapy  SpO2: 98 %  Pulse via Oximetry: 67 beats per minute  Pulse Oximeter Device Mode: Continuous  O2 Device: None (Room air)    Estimated body mass index is 35.28 kg/m² as calculated from the following:    Height as of this encounter: 5' 8\" (1.727 m). Weight as of this encounter: 232 lb (105.2 kg). Intake/Output Summary (Last 24 hours) at 12/13/2022 1346  Last data filed at 12/12/2022 1831  Gross per 24 hour   Intake 460 ml   Output --   Net 460 ml         Physical Exam:     Blood pressure 109/60, pulse 70, temperature 97.7 °F (36.5 °C), temperature source Oral, resp. rate 17, height 5' 8\" (1.727 m), weight 232 lb (105.2 kg), SpO2 98 %. General:    Well nourished. Alert awake female,  Head:  Normocephalic, atraumatic  Eyes:  Sclerae appear normal.  Pupils equally round. ENT:  Nares appear normal, no drainage. Moist oral mucosa  Neck:  No restricted ROM. Trachea midline   CV:   RRR. No m/r/g. No jugular venous distension. Lungs:   CTAB. No wheezing, rhonchi, or rales. Symmetric expansion. Abdomen: Bowel sounds present. Soft, nondistended, tenderness noticed diffusely, no guarding no rigidity,  Extremities: No cyanosis or clubbing. No edema  Skin:     No rashes and normal coloration. Warm and dry. Neuro:  CN II-XII grossly intact. Sensation intact. A&Ox3  Psych:  Normal mood and affect.       I have personally reviewed labs and tests showing:  Recent Labs:  Recent Results (from the past 48 hour(s))   CBC with Auto Differential    Collection Time: 12/12/22  4:55 AM   Result Value Ref Range    WBC 4.9 4.3 - 11.1 K/uL    RBC 3.86 (L) 4.05 - 5.2 M/uL    Hemoglobin 10.2 (L) 11.7 - 15.4 g/dL    Hematocrit 31.9 (L) 35.8 - 46.3 %    MCV 82.6 82 - 102 FL    MCH 26.4 26.1 - 32.9 PG    MCHC 32.0 31.4 - 35.0 g/dL    RDW 15.1 (H) 11.9 - 14.6 %    Platelets 874 237 - 385 K/uL    MPV 10.2 9.4 - 12.3 FL    nRBC 0.00 0.0 - 0.2 K/uL    Differential Type AUTOMATED      Seg Neutrophils 53 43 - 78 %    Lymphocytes 34 13 - 44 %    Monocytes 9 4.0 - 12.0 %    Eosinophils % 4 0.5 - 7.8 %    Basophils 0 0.0 - 2.0 %    Immature Granulocytes 0 0.0 - 5.0 %    Segs Absolute 2.6 1.7 - 8.2 K/UL    Absolute Lymph # 1.7 0.5 - 4.6 K/UL    Absolute Mono # 0.5 0.1 - 1.3 K/UL    Absolute Eos # 0.2 0.0 - 0.8 K/UL    Basophils Absolute 0.0 0.0 - 0.2 K/UL    Absolute Immature Granulocyte 0.0 0.0 - 0.5 K/UL   Basic Metabolic Panel w/ Reflex to MG    Collection Time: 12/12/22  4:55 AM   Result Value Ref Range    Sodium 142 133 - 143 mmol/L    Potassium 3.7 3.5 - 5.1 mmol/L    Chloride 111 (H) 101 - 110 mmol/L    CO2 26 21 - 32 mmol/L    Anion Gap 5 2 - 11 mmol/L    Glucose 128 (H) 65 - 100 mg/dL BUN 16 6 - 23 MG/DL    Creatinine 0.60 0.6 - 1.0 MG/DL    Est, Glom Filt Rate >60 >60 ml/min/1.73m2    Calcium 8.4 8.3 - 10.4 MG/DL   CBC with Auto Differential    Collection Time: 12/13/22  5:03 AM   Result Value Ref Range    WBC 4.1 (L) 4.3 - 11.1 K/uL    RBC 3.50 (L) 4.05 - 5.2 M/uL    Hemoglobin 9.0 (L) 11.7 - 15.4 g/dL    Hematocrit 28.9 (L) 35.8 - 46.3 %    MCV 82.6 82 - 102 FL    MCH 25.7 (L) 26.1 - 32.9 PG    MCHC 31.1 (L) 31.4 - 35.0 g/dL    RDW 14.8 (H) 11.9 - 14.6 %    Platelets 672 715 - 556 K/uL    MPV 9.8 9.4 - 12.3 FL    nRBC 0.00 0.0 - 0.2 K/uL    Differential Type AUTOMATED      Seg Neutrophils 51 43 - 78 %    Lymphocytes 36 13 - 44 %    Monocytes 7 4.0 - 12.0 %    Eosinophils % 6 0.5 - 7.8 %    Basophils 0 0.0 - 2.0 %    Immature Granulocytes 0 0.0 - 5.0 %    Segs Absolute 2.1 1.7 - 8.2 K/UL    Absolute Lymph # 1.5 0.5 - 4.6 K/UL    Absolute Mono # 0.3 0.1 - 1.3 K/UL    Absolute Eos # 0.2 0.0 - 0.8 K/UL    Basophils Absolute 0.0 0.0 - 0.2 K/UL    Absolute Immature Granulocyte 0.0 0.0 - 0.5 K/UL   Basic Metabolic Panel w/ Reflex to MG    Collection Time: 12/13/22  5:03 AM   Result Value Ref Range    Sodium 143 133 - 143 mmol/L    Potassium 3.5 3.5 - 5.1 mmol/L    Chloride 110 101 - 110 mmol/L    CO2 25 21 - 32 mmol/L    Anion Gap 8 2 - 11 mmol/L    Glucose 133 (H) 65 - 100 mg/dL    BUN 19 6 - 23 MG/DL    Creatinine 0.80 0.6 - 1.0 MG/DL    Est, Glom Filt Rate >60 >60 ml/min/1.73m2    Calcium 8.9 8.3 - 10.4 MG/DL   Magnesium    Collection Time: 12/13/22  5:03 AM   Result Value Ref Range    Magnesium 2.0 1.8 - 2.4 mg/dL       I have personally reviewed imaging studies showing: Other Studies:  MRI ABDOMEN W WO CONTRAST   Final Result   Multiple masses in the left abdomen, 2 with heterogeneous   enhancement and edema. Presence of edema suggests inflammation and possible   partial infarction. While these could represent splenules, they do not follow   spleen signal on all sequences.   Myometrial implants should be considered given   absence of the uterus. Malignant transformation is also not excluded given   interval change compared to prior studies.          If there are any questions about this report, I can be reached on [employer]   or at [phone]                   Current Meds:  Current Facility-Administered Medications   Medication Dose Route Frequency    DULoxetine (CYMBALTA) extended release capsule 60 mg  60 mg Oral Daily    buPROPion (WELLBUTRIN) tablet 300 mg  300 mg Oral Daily    lisinopril-hydroCHLOROthiazide (PRINZIDE;ZESTORETIC) 20-12.5 MG per tablet 1 tablet  1 tablet Oral Daily    aspirin chewable tablet 81 mg  81 mg Oral Daily    pravastatin (PRAVACHOL) tablet 40 mg  40 mg Oral Nightly    sodium chloride flush 0.9 % injection 20 mL  20 mL IntraVENous PRN    enoxaparin Sodium (LOVENOX) injection 30 mg  30 mg SubCUTAneous Q12H    sodium chloride flush 0.9 % injection 5-40 mL  5-40 mL IntraVENous 2 times per day    sodium chloride flush 0.9 % injection 5-40 mL  5-40 mL IntraVENous PRN    0.9 % sodium chloride infusion   IntraVENous PRN    ondansetron (ZOFRAN-ODT) disintegrating tablet 4 mg  4 mg Oral Q8H PRN    Or    ondansetron (ZOFRAN) injection 4 mg  4 mg IntraVENous Q6H PRN    polyethylene glycol (GLYCOLAX) packet 17 g  17 g Oral Daily PRN    acetaminophen (TYLENOL) tablet 650 mg  650 mg Oral Q6H PRN    Or    acetaminophen (TYLENOL) suppository 650 mg  650 mg Rectal Q6H PRN    loperamide (IMODIUM) capsule 4 mg  4 mg Oral BID PRN    temazepam (RESTORIL) capsule 15 mg  15 mg Oral Nightly PRN    HYDROcodone-acetaminophen (NORCO) 7.5-325 MG per tablet 1 tablet  1 tablet Oral Q4H PRN    morphine injection 2 mg  2 mg IntraVENous Q4H PRN    cloNIDine (CATAPRES) tablet 0.2 mg  0.2 mg Oral BID PRN    hydrALAZINE (APRESOLINE) injection 10 mg  10 mg IntraVENous Q6H PRN    promethazine (PHENERGAN) 25 mg in sodium chloride 0.9 % 50 mL IVPB  25 mg IntraVENous Q8H PRN    cefTRIAXone (ROCEPHIN) 1,000 mg in sodium chloride 0.9 % 50 mL IVPB mini-bag  1,000 mg IntraVENous Q24H    metronidazole (FLAGYL) 500 mg in 0.9% NaCl 100 mL IVPB premix  500 mg IntraVENous Q12H       Signed:  Aki Phillips MD    Part of this note may have been written by using a voice dictation software. The note has been proof read but may still contain some grammatical/other typographical errors.

## 2022-12-13 NOTE — PROGRESS NOTES
H&P/Consult Note/Progress Note/Office Note:   Mickie Soto  MRN: [id number]  [address]  Age:52 y.o.    HPI: 51-year-old female with history of IBS, recurrent kidney stones, HTN,  presents with lower abdominal pain radiating to her upper abdomen for the past 2 days. She reports nausea and vomiting a  Pain similar to prior kidney stone. She has urinary pressure, but denies frequency or blood. She had a CT scan showing a hypodensity area in the left mid abdomen concerning for a ischemic splenule or abscess. 12/12/2022 - she still having some upper abdominal pain. Pain is mostly ARLIN. She reports pain is worse with eating. No nausea. No vomiting. No fever. 12/13/2022 - she still having some upper abdominal pain but overall \"improved\"  \"just tender. \" Pain is mostly ARLIN. She reports pain is worse with eating. No nausea. No vomiting. No fever.     Past Medical History:   Diagnosis Date    Anemia     Chronic pain     due to RA    Depression     GERD (gastroesophageal reflux disease)     Hx of renal calculi          Hypercholesterolemia     Hypertension     Migraine     Morbid obesity ([address]     Ovarian cyst     Reflex sympathetic dystrophy since 2005    right arm; s/p injury - laceration in hand    Rheumatoid arthritis ([address] 9/26/2019    Rheumatoid arthritis ([address]          Sinus problem     TIA (transient ischemic attack) 9/26/2019     Past Surgical History:   Procedure Laterality Date    APPENDECTOMY      BREAST BIOPSY Left 4/27/2015    LEFT NIPPLE EXPLORATION WITH REMOVAL OF LATTISIMUS DUCT performed by Brent uGerra MD at [address] [facility] [address] Right 12/11/2022    CYSTOSCOPY/ RIGHT URETERAL STENT PLACEMENT performed by Alfonzo Nelson MD at [facility], TOTAL ABDOMINAL (CERVIX REMOVED)           ORTHOPEDIC SURGERY      rt hand    ORTHOPEDIC SURGERY      neck    SEPTOPLASTY  08/28/2019    FESS,SMRIT's    TUBAL LIGATION       Current Facility-Administered Medications   Medication Dose Route Frequency    sodium chloride flush 0.9 % injection 20 mL  20 mL IntraVENous PRN    enoxaparin Sodium (LOVENOX) injection 30 mg  30 mg SubCUTAneous Q12H    sodium chloride flush 0.9 % injection 5-40 mL  5-40 mL IntraVENous 2 times per day    sodium chloride flush 0.9 % injection 5-40 mL  5-40 mL IntraVENous PRN    0.9 % sodium chloride infusion   IntraVENous PRN    ondansetron (ZOFRAN-ODT) disintegrating tablet 4 mg  4 mg Oral Q8H PRN    Or    ondansetron (ZOFRAN) injection 4 mg  4 mg IntraVENous Q6H PRN    polyethylene glycol (GLYCOLAX) packet 17 g  17 g Oral Daily PRN    acetaminophen (TYLENOL) tablet 650 mg  650 mg Oral Q6H PRN    Or    acetaminophen (TYLENOL) suppository 650 mg  650 mg Rectal Q6H PRN    loperamide (IMODIUM) capsule 4 mg  4 mg Oral BID PRN    temazepam (RESTORIL) capsule 15 mg  15 mg Oral Nightly PRN    HYDROcodone-acetaminophen (NORCO) 7.5-325 MG per tablet 1 tablet  1 tablet Oral Q4H PRN    morphine injection 2 mg  2 mg IntraVENous Q4H PRN    cloNIDine (CATAPRES) tablet 0.2 mg  0.2 mg Oral BID PRN    hydrALAZINE (APRESOLINE) injection 10 mg  10 mg IntraVENous Q6H PRN    promethazine (PHENERGAN) 25 mg in sodium chloride 0.9 % 50 mL IVPB  25 mg IntraVENous Q8H PRN    cefTRIAXone (ROCEPHIN) 1,000 mg in sodium chloride 0.9 % 50 mL IVPB mini-bag  1,000 mg IntraVENous Q24H    metronidazole (FLAGYL) 500 mg in 0.9% NaCl 100 mL IVPB premix  500 mg IntraVENous Q12H     ALLERGIES:  Penicillins    Social History     Socioeconomic History    Marital status:      Spouse name: None    Number of children: None    Years of education: None    Highest education level: None   Tobacco Use    Smoking status: Never    Smokeless tobacco: Never   Substance and Sexual Activity    Alcohol use: Yes    Drug use: No     Social History     Tobacco Use   Smoking Status Never   Smokeless Tobacco Never     Family History   Problem Relation Age of Onset    Cancer Paternal Aunt brst    Cancer Father         non hodgkins lymphoma    Breast Cancer Sister     Diabetes Paternal Grandfather     Heart Disease Paternal Grandfather     Breast Cancer Maternal Grandmother     Cancer Maternal Grandmother         ovarian    Cancer Maternal Grandfather         colon    Colon Cancer Maternal Grandfather     Breast Cancer Paternal Grandmother 46    Cancer Paternal Grandmother         breast    Ovarian Cancer Paternal Grandmother     Cancer Mother         throat    Hypertension Mother     Breast Cancer Paternal Aunt 39     ROS: The patient has no difficulty with chest pain or shortness of breath. No fever or chills. Comprehensive review of systems was otherwise unremarkable except as noted above. Physical Exam:   /64   Pulse 62   Temp 97.9 °F (36.6 °C) (Oral)   Resp 18   Ht 5' 8\" (1.727 m)   Wt 232 lb (105.2 kg)   SpO2 90%   BMI 35.28 kg/m²   Vitals:    12/13/22 0059 12/13/22 0129 12/13/22 0411 12/13/22 0814   BP:   115/62 109/64   Pulse:   67 62   Resp: 18 18 20 18   Temp:   98 °F (36.7 °C) 97.9 °F (36.6 °C)   TempSrc:   Oral Oral   SpO2:   96% 90%   Weight:       Height:         No intake/output data recorded. 12/11 1901 - 12/13 0700  In: 700 [P.O.:700]  Out: -     Constitutional: Alert, oriented, cooperative patient in no acute distress; appears stated age    Eyes:Sclera are clear. EOMs intact  ENMT: no external lesions gross hearing normal; no obvious neck masses, no ear or lip lesions, nares normal  CV: RRR. Normal perfusion  Resp: No JVD. Breathing is  non-labored; no audible wheezing. GI: soft and non-distended. ARLIN and LUQ tenderness to palpation. Musculoskeletal: unremarkable with normal function. No embolic signs or cyanosis.    Neuro:  Oriented; moves all 4; no focal deficits  Psychiatric: normal affect and mood, no memory impairment    Recent vitals (if inpt):  Patient Vitals for the past 24 hrs:   BP Temp Temp src Pulse Resp SpO2   12/13/22 0814 109/64 97.9 °F (36.6 °C) Oral 62 18 90 %   12/13/22 0411 115/62 98 °F (36.7 °C) Oral 67 20 96 %   12/13/22 0129 -- -- -- -- 18 --   12/13/22 0059 -- -- -- -- 18 --   12/12/22 2233 (!) 116/58 98.5 °F (36.9 °C) Oral 80 18 93 %   12/12/22 1912 (!) 113/59 98.1 °F (36.7 °C) Oral 77 20 97 %   12/12/22 1747 -- -- -- -- 18 --   12/12/22 1717 -- -- -- -- 18 --   12/12/22 1540 119/78 98.6 °F (37 °C) Oral 71 17 98 %   12/12/22 1243 -- -- -- -- 18 --   12/12/22 1215 112/62 97.5 °F (36.4 °C) Oral 73 17 99 %   12/12/22 1213 -- -- -- -- 18 --       Amount and/or Complexity of Data Reviewed and Analyzed:  I reviewed and analyzed all of the unique labs and radiologic studies that are shown below as well as any that are in the HPI, and any that are in the expanded problem list below  *Each unique test, order, or document contributes to the combination of 2 or combination of 3 in Category 1 below. For this visit I also reviewed old records and prior notes. Recent Labs     12/10/22  2238 12/12/22  0455 12/13/22  0503   WBC 10.9   < > 4.1*   HGB 11.2*   < > 9.0*      < > 167      < > 143   K 3.2*   < > 3.5   CL 99   < > 110   CO2 27   < > 25   BUN 18   < > 19   ALT 14  --   --     < > = values in this interval not displayed.      Review of most recent CBC  Lab Results   Component Value Date    WBC 4.1 (L) 12/13/2022    HGB 9.0 (L) 12/13/2022    HCT 28.9 (L) 12/13/2022    MCV 82.6 12/13/2022     12/13/2022       Review of most recent BMP  Lab Results   Component Value Date/Time     12/13/2022 05:03 AM    K 3.5 12/13/2022 05:03 AM     12/13/2022 05:03 AM    CO2 25 12/13/2022 05:03 AM    BUN 19 12/13/2022 05:03 AM    CREATININE 0.80 12/13/2022 05:03 AM    GLUCOSE 133 12/13/2022 05:03 AM    CALCIUM 8.9 12/13/2022 05:03 AM        Review of most recent LFTs (and lipase if done)  Lab Results   Component Value Date    ALT 14 12/10/2022    AST 17 12/10/2022    ALKPHOS 100 12/10/2022    BILITOT 0.5 12/10/2022     Lab Results Component Value Date    LIPASE 21 12/10/2022       Lab Results   Component Value Date/Time    INR 1.2 09/26/2019 08:20 PM    APTT 31.0 09/26/2019 08:23 PM       Review of most recent HgbA1c  Hemoglobin A1C   Date Value Ref Range Status   02/16/2021 6.4 (H) 4.20 - 6.30 % Final       Nutritional assessment screen for wound healing issues:  Lab Results   Component Value Date    LABALBU 4.3 12/10/2022       12/12/22 MRI ABDOMEN  Multiple masses in the left abdomen, 2 with heterogeneous   enhancement and edema. Presence of edema suggests inflammation and possible   partial infarction. While these could represent splenules, they do not follow   spleen signal on all sequences. Myometrial implants should be considered given   absence of the uterus. Malignant transformation is also not excluded given   interval change compared to prior studies. Xray Result (most recent):  XR TOE LEFT (MIN 2 VIEWS) 02/15/2021    Narrative  Clinical history: Left second toe infection. TECHNIQUE: 3 views of the left second toe. FINDINGS:    There is lucency at the tip of the second toe distal phalanx, on the lateral  view. No soft tissue gas is seen. No acute fracture or dislocation. Alignment is maintained. Joint spaces are  preserved. No radiopaque foreign body. Impression  1. Apparent lucency at the tip of the second toe distal phalanx, on the lateral  view, raising the question of osteomyelitis. Note that MRI is more sensitive for  evaluation of osteomyelitis. 2. No acute fracture. CT Result (most recent):  CT ABDOMEN PELVIS RENAL STONE 12/10/2022    Narrative  EXAM: Noncontrast CT abdomen and pelvis. INDICATION: Abdominal pain. COMPARISON: Prior CT abdomen and pelvis on July 5, 2021. TECHNIQUE: Axial CT images of the abdomen and pelvis were obtained without IV  contrast. Radiation dose reduction techniques were used for this study.   Our CT  scanners use one or all of the following:  Automated exposure control,  adjustment of the mA or kV according to patient size, iterative reconstruction. FINDINGS:    - Liver: Within normal limits. - Gallbladder and bile ducts: Postcholecystectomy, with no irregular dilatation.  - Spleen: Within normal limits. - Urinary tract: There is a new 4 mm stone in the distal right ureter, located  approximately 2 cm from the ureterovesical junction, with no hydronephrosis. There are several 1-2 mm left kidney stones, with no left ureter stone or  left-sided hydronephrosis. The urinary bladder is collapsed. - Adrenals: Within normal limits. - Pancreas: Within normal limits. - Gastrointestinal tract: Within normal limits. Post appendectomy. - Retroperitoneum: No ascites or free air. Multiple adjacent soft tissue  densities in the left abdomen have been present on prior CT scans of the abdomen  dating back to 2014, presumably splenules. This most recent CT, there is new  hypodensity within one of the densities measuring 4.5 cm with new surrounding  soft tissue stranding.  - Peritoneal cavity and abdominal wall: Within normal limits. - Pelvis: Posthysterectomy. - Spine/bones: No acute process. - Other comments: The lung bases are clear. Impression  1. Nonobstructing 4 mm stone in the distal right ureter. 2. There is new hypodensity and surrounding stranding in one of the multiple  long-term left abdomen soft tissue densities, which are presumably splenules. The new hypodensity and surrounding stranding may relate to torsion although  superimposed infection is not excluded. No associated gas or fluid is seen. 3. Left kidney stones. 4. Prior cholecystectomy, appendectomy and hysterectomy. US Result (most recent):  US DUP LOWER EXTREMITY RIGHT DIONISIO 06/29/2020    Narrative  Right lower extremity Doppler evaluation: 06/29/2020    HISTORY: Pain, palpable cords swelling.   Symptoms are moderate and have been  present x1 week    Grayscale, color-flow and Doppler evaluation of the major veins of the right  lower extremity was performed. Comparison: None    FINDINGS: There is normal compression and augmentation involving the right  common femoral, superficial femoral and popliteal veins. No grayscale or  color-flow findings within these vessels or within the distal greater saphenous  or profunda femoral veins were noted to suggest deep venous thrombosis. Interrogated portions of the peroneal and posterior tibial veins were also  unremarkable. No popliteal cyst is identified. Cursory imaging of the left common femoral vein reveals it to be patent with  normal color-flow and augmentation. There is a 2.7 cm lymph node within the  right groin which may be reactive. Impression  IMPRESSION: Negative evaluation for deep venous thrombosis within the right  lower extremity. Admission date (for inpatients): 12/11/2022   2 Days Post-Op  Procedure(s):  CYSTOSCOPY/ RIGHT URETERAL STENT PLACEMENT        ASSESSMENT/PLAN:    Principal Problem:    Ureteric stone  Active Problems:    Acute cystitis without hematuria    Left arm swelling  Resolved Problems:    * No resolved hospital problems.  *     Patient Active Problem List    Diagnosis Date Noted    Left arm swelling 12/12/2022     Priority: Medium    Ureteric stone 12/11/2022     Priority: Medium    Acute cystitis without hematuria 12/11/2022     Priority: Medium    Microcytic anemia 02/16/2021    Acute osteomyelitis of right foot ([address] 06/30/2020    Peripheral neuropathy 06/30/2020    H/O TIA (transient ischemic attack) and stroke 06/29/2020    Cellulitis of second toe, left 06/29/2020    Class 1 obesity in adult 06/29/2020    Depression 09/26/2019    Rheumatoid arthritis ([address] 09/26/2019    TIA (transient ischemic attack) 09/26/2019    Reflex sympathetic dystrophy 09/26/2019     right arm; s/p injury - laceration in hand        Hypertension 09/26/2019    Chronic pain 09/26/2019     due to RA        Right lower quadrant abdominal pain 08/26/2016    Ovarian cyst         A/P 72-year-old female recurrent kidney stones, presents with upper abdominal pain. Pain similar to prior kidney stone. She had a CT scan showing a hypodensity area in the left mid abdomen concerning for a ischemic splenule. On exam, she is tender on in the ARLIN and LUQ areas. She reports pain is worse with eating. Afebrile, normal WBC. The hypodensity showing in the CT scan it could certainly be a torsed splenule, which will improve conservatively. MRI has been ordered to better look at the area which demonstrated possible Myometrial implants. Gyn consulted. Will await their recommendations. Recommend GI eval for EGD as patient reports pain mostly in ARLIN and worse with eating. No surgical intervention plan at this time.              Signed: ADELSO Kim CNP  12/13/2022    9:34 AM

## 2022-12-13 NOTE — CONSULTS
H&P/Consult Note/Progress Note/Office Note:   Aamir Lopes  MRN: [id number]  [address]  Age:52 y.o.    HPI: Aamir Lopes is a 50 y.o. female who is seen in consultation for multiple abdominal masses. The patient states that she presented to the emergency department earlier this week with severe abdominal pain. She states that she will will get severe abdominal pain in her left upper quadrant and across her abdomen and into her back. She states that the pain is mainly in her upper abdomen. She states that the pain is worse after eating. She states when she initially presented it was a 10/10 pain, now it is about a 7/10 pain. On arrival to the emergency department the patient did have a CT scan performed which did reveal a 4 mm stone in the distal right ureter. She did undergo cystoscopy with stent placement by urology. She was also noted to have multiple soft tissue densities within the left abdomen that have been present on previous CT scans dating back to 2014. There is a new hyperdensity with one of the densities measuring 4.5 cm with new surrounding soft tissue stranding. The patient has been able to tolerate a liquid diet. She states that she still does get some pain in her left upper quadrant. The patient does have a significant past surgical center for laparoscopic cholecystectomy, partial hysterectomy, and an appendectomy. She is morbidly obese.       Past Medical History:   Diagnosis Date    Anemia     Chronic pain     due to RA    Depression     GERD (gastroesophageal reflux disease)     Hx of renal calculi          Hypercholesterolemia     Hypertension     Migraine     Morbid obesity ([address]     Ovarian cyst     Reflex sympathetic dystrophy since 2005    right arm; s/p injury - laceration in hand    Rheumatoid arthritis ([address] 9/26/2019    Rheumatoid arthritis ([address]          Sinus problem     TIA (transient ischemic attack) 9/26/2019     Past Surgical History: Procedure Laterality Date    APPENDECTOMY      BREAST BIOPSY Left 4/27/2015    LEFT NIPPLE EXPLORATION WITH REMOVAL OF LATTISIMUS DUCT performed by Isa Lopez MD at [address] Right 12/11/2022    CYSTOSCOPY/ RIGHT URETERAL STENT PLACEMENT performed by Lupe Anguiano MD at [facility] MAIN OR    HYSTERECTOMY, TOTAL ABDOMINAL (CERVIX REMOVED)           ORTHOPEDIC SURGERY      rt hand    ORTHOPEDIC SURGERY      neck    SEPTOPLASTY  08/28/2019    FESS,SMRIT's    TUBAL LIGATION       Current Facility-Administered Medications   Medication Dose Route Frequency    DULoxetine (CYMBALTA) extended release capsule 60 mg  60 mg Oral Daily    buPROPion (WELLBUTRIN) tablet 300 mg  300 mg Oral Daily    lisinopril-hydroCHLOROthiazide (PRINZIDE;ZESTORETIC) 20-12.5 MG per tablet 1 tablet  1 tablet Oral Daily    aspirin chewable tablet 81 mg  81 mg Oral Daily    pravastatin (PRAVACHOL) tablet 40 mg  40 mg Oral Nightly    sodium chloride flush 0.9 % injection 20 mL  20 mL IntraVENous PRN    enoxaparin Sodium (LOVENOX) injection 30 mg  30 mg SubCUTAneous Q12H    sodium chloride flush 0.9 % injection 5-40 mL  5-40 mL IntraVENous 2 times per day    sodium chloride flush 0.9 % injection 5-40 mL  5-40 mL IntraVENous PRN    0.9 % sodium chloride infusion   IntraVENous PRN    ondansetron (ZOFRAN-ODT) disintegrating tablet 4 mg  4 mg Oral Q8H PRN    Or    ondansetron (ZOFRAN) injection 4 mg  4 mg IntraVENous Q6H PRN    polyethylene glycol (GLYCOLAX) packet 17 g  17 g Oral Daily PRN    acetaminophen (TYLENOL) tablet 650 mg  650 mg Oral Q6H PRN    Or    acetaminophen (TYLENOL) suppository 650 mg  650 mg Rectal Q6H PRN    loperamide (IMODIUM) capsule 4 mg  4 mg Oral BID PRN    temazepam (RESTORIL) capsule 15 mg  15 mg Oral Nightly PRN    HYDROcodone-acetaminophen (NORCO) 7.5-325 MG per tablet 1 tablet  1 tablet Oral Q4H PRN    morphine injection 2 mg  2 mg IntraVENous Q4H PRN    cloNIDine (CATAPRES) tablet 0.2 mg  0.2 mg Oral BID PRN    hydrALAZINE (APRESOLINE) injection 10 mg  10 mg IntraVENous Q6H PRN    promethazine (PHENERGAN) 25 mg in sodium chloride 0.9 % 50 mL IVPB  25 mg IntraVENous Q8H PRN    cefTRIAXone (ROCEPHIN) 1,000 mg in sodium chloride 0.9 % 50 mL IVPB mini-bag  1,000 mg IntraVENous Q24H    metronidazole (FLAGYL) 500 mg in 0.9% NaCl 100 mL IVPB premix  500 mg IntraVENous Q12H     Penicillins  Social History     Socioeconomic History    Marital status:      Spouse name: None    Number of children: None    Years of education: None    Highest education level: None   Tobacco Use    Smoking status: Never    Smokeless tobacco: Never   Substance and Sexual Activity    Alcohol use: Yes    Drug use: No     Social History     Tobacco Use   Smoking Status Never   Smokeless Tobacco Never     Family History   Problem Relation Age of Onset    Cancer Paternal Aunt         brst    Cancer Father         non hodgkins lymphoma    Breast Cancer Sister     Diabetes Paternal Grandfather     Heart Disease Paternal Grandfather     Breast Cancer Maternal Grandmother     Cancer Maternal Grandmother         ovarian    Cancer Maternal Grandfather         colon    Colon Cancer Maternal Grandfather     Breast Cancer Paternal Grandmother 46    Cancer Paternal Grandmother         breast    Ovarian Cancer Paternal Grandmother     Cancer Mother         throat    Hypertension Mother     Breast Cancer Paternal Aunt 39     ROS: The patient has no difficulty with chest pain or shortness of breath. No fever or chills. Comprehensive review of systems was otherwise unremarkable except as noted above.     Physical Exam:   /60   Pulse 70   Temp 97.7 °F (36.5 °C) (Oral)   Resp 17   Ht 5' 8\" (1.727 m)   Wt 232 lb (105.2 kg)   SpO2 98%   BMI 35.28 kg/m²   Vitals:    12/13/22 0129 12/13/22 0411 12/13/22 0814 12/13/22 1144   BP:  115/62 109/64 109/60   Pulse:  67 62 70   Resp: 18 20 18 17   Temp:  98 °F (36.7 °C) 97.9 °F (36.6 °C) 97.7 °F (36.5 °C)   TempSrc:  Oral Oral Oral   SpO2:  96% 90% 98%   Weight:       Height:         No intake/output data recorded. 12/11 1901 - 12/13 0700  In: 700 [P.O.:700]  Out: -     Constitutional: Alert, oriented, cooperative patient in no acute distress; appears stated age    Eyes:Sclera are clear. EOMs intact  ENMT: no external lesions gross hearing normal; no obvious neck masses, no ear or lip lesions, nares normal  CV: RRR. Normal perfusion  Resp: No JVD. Breathing is  non-labored; no audible wheezing. GI: soft and non-distended     Musculoskeletal: unremarkable with normal function. No embolic signs or cyanosis. Neuro:  Oriented; moves all 4; no focal deficits  Psychiatric: normal affect and mood, no memory impairment    Recent vitals (if inpt):  Patient Vitals for the past 24 hrs:   BP Temp Temp src Pulse Resp SpO2   12/13/22 1144 109/60 97.7 °F (36.5 °C) Oral 70 17 98 %   12/13/22 0814 109/64 97.9 °F (36.6 °C) Oral 62 18 90 %   12/13/22 0411 115/62 98 °F (36.7 °C) Oral 67 20 96 %   12/13/22 0129 -- -- -- -- 18 --   12/13/22 0059 -- -- -- -- 18 --   12/12/22 2233 (!) 116/58 98.5 °F (36.9 °C) Oral 80 18 93 %   12/12/22 1912 (!) 113/59 98.1 °F (36.7 °C) Oral 77 20 97 %   12/12/22 1747 -- -- -- -- 18 --   12/12/22 1717 -- -- -- -- 18 --   12/12/22 1540 119/78 98.6 °F (37 °C) Oral 71 17 98 %       Amount and/or Complexity of Data Reviewed and Analyzed:  I reviewed and analyzed all of the unique labs and radiologic studies that are shown below as well as any that are in the HPI, and any that are in the expanded problem list below  *Each unique test, order, or document contributes to the combination of 2 or combination of 3 in Category 1 below. For this visit I also reviewed old records and prior notes.       Recent Labs     12/10/22  2238 12/12/22  0455 12/13/22  0503   WBC 10.9   < > 4.1*   HGB 11.2*   < > 9.0*      < > 167      < > 143   K 3.2*   < > 3.5   CL 99   < > 110   CO2 27   < > 25   BUN 18   < > 19   ALT 14  --   --     < > = values in this interval not displayed. Review of most recent CBC  Lab Results   Component Value Date    WBC 4.1 (L) 12/13/2022    HGB 9.0 (L) 12/13/2022    HCT 28.9 (L) 12/13/2022    MCV 82.6 12/13/2022     12/13/2022       Review of most recent BMP  Lab Results   Component Value Date/Time     12/13/2022 05:03 AM    K 3.5 12/13/2022 05:03 AM     12/13/2022 05:03 AM    CO2 25 12/13/2022 05:03 AM    BUN 19 12/13/2022 05:03 AM    CREATININE 0.80 12/13/2022 05:03 AM    GLUCOSE 133 12/13/2022 05:03 AM    CALCIUM 8.9 12/13/2022 05:03 AM        Review of most recent LFTs (and lipase if done)  Lab Results   Component Value Date    ALT 14 12/10/2022    AST 17 12/10/2022    ALKPHOS 100 12/10/2022    BILITOT 0.5 12/10/2022     Lab Results   Component Value Date    LIPASE 21 12/10/2022       Lab Results   Component Value Date/Time    INR 1.2 09/26/2019 08:20 PM    APTT 31.0 09/26/2019 08:23 PM       Review of most recent HgbA1c  Hemoglobin A1C   Date Value Ref Range Status   02/16/2021 6.4 (H) 4.20 - 6.30 % Final       Nutritional assessment screen for wound healing issues:  Lab Results   Component Value Date    LABALBU 4.3 12/10/2022       @lastcovr@  Xray Result (most recent):  XR TOE LEFT (MIN 2 VIEWS) 02/15/2021    Narrative  Clinical history: Left second toe infection. TECHNIQUE: 3 views of the left second toe. FINDINGS:    There is lucency at the tip of the second toe distal phalanx, on the lateral  view. No soft tissue gas is seen. No acute fracture or dislocation. Alignment is maintained. Joint spaces are  preserved. No radiopaque foreign body. Impression  1. Apparent lucency at the tip of the second toe distal phalanx, on the lateral  view, raising the question of osteomyelitis. Note that MRI is more sensitive for  evaluation of osteomyelitis. 2. No acute fracture.     CT Result (most recent):  CT ABDOMEN PELVIS RENAL STONE 12/10/2022    Narrative  EXAM: Noncontrast CT abdomen and pelvis. INDICATION: Abdominal pain. COMPARISON: Prior CT abdomen and pelvis on July 5, 2021. TECHNIQUE: Axial CT images of the abdomen and pelvis were obtained without IV  contrast. Radiation dose reduction techniques were used for this study. Our CT  scanners use one or all of the following:  Automated exposure control,  adjustment of the mA or kV according to patient size, iterative reconstruction. FINDINGS:    - Liver: Within normal limits. - Gallbladder and bile ducts: Postcholecystectomy, with no irregular dilatation.  - Spleen: Within normal limits. - Urinary tract: There is a new 4 mm stone in the distal right ureter, located  approximately 2 cm from the ureterovesical junction, with no hydronephrosis. There are several 1-2 mm left kidney stones, with no left ureter stone or  left-sided hydronephrosis. The urinary bladder is collapsed. - Adrenals: Within normal limits. - Pancreas: Within normal limits. - Gastrointestinal tract: Within normal limits. Post appendectomy. - Retroperitoneum: No ascites or free air. Multiple adjacent soft tissue  densities in the left abdomen have been present on prior CT scans of the abdomen  dating back to 2014, presumably splenules. This most recent CT, there is new  hypodensity within one of the densities measuring 4.5 cm with new surrounding  soft tissue stranding.  - Peritoneal cavity and abdominal wall: Within normal limits. - Pelvis: Posthysterectomy. - Spine/bones: No acute process. - Other comments: The lung bases are clear. Impression  1. Nonobstructing 4 mm stone in the distal right ureter. 2. There is new hypodensity and surrounding stranding in one of the multiple  long-term left abdomen soft tissue densities, which are presumably splenules. The new hypodensity and surrounding stranding may relate to torsion although  superimposed infection is not excluded.  No associated gas or fluid is seen. 3. Left kidney stones. 4. Prior cholecystectomy, appendectomy and hysterectomy. US Result (most recent):  US DUP LOWER EXTREMITY RIGHT DIONISIO 06/29/2020    Narrative  Right lower extremity Doppler evaluation: 06/29/2020    HISTORY: Pain, palpable cords swelling. Symptoms are moderate and have been  present x1 week    Grayscale, color-flow and Doppler evaluation of the major veins of the right  lower extremity was performed. Comparison: None    FINDINGS: There is normal compression and augmentation involving the right  common femoral, superficial femoral and popliteal veins. No grayscale or  color-flow findings within these vessels or within the distal greater saphenous  or profunda femoral veins were noted to suggest deep venous thrombosis. Interrogated portions of the peroneal and posterior tibial veins were also  unremarkable. No popliteal cyst is identified. Cursory imaging of the left common femoral vein reveals it to be patent with  normal color-flow and augmentation. There is a 2.7 cm lymph node within the  right groin which may be reactive. Impression  IMPRESSION: Negative evaluation for deep venous thrombosis within the right  lower extremity. Admission date (for inpatients): 12/11/2022   2 Days Post-Op  Procedure(s):  CYSTOSCOPY/ RIGHT URETERAL STENT PLACEMENT        ASSESSMENT/PLAN:  [unfilled]  Principal Problem:    Ureteric stone  Active Problems:    Acute cystitis without hematuria    Left arm swelling  Resolved Problems:    * No resolved hospital problems. *     It was discussed with the patient that she has had these masses dating back to 2014 per her CT report. There is a mass with some inflammation near where she is having her pain, this could be the etiology. It was discussed that these are likely benign as they have been present for 8 years.     As she is still symptomatic, we will tentatively schedule her to undergo a diagnostic laparoscopy for both diagnostic and therapeutic purposes were we may remove the inflamed mass at that time. If the patient is still symptomatic, we will tentatively schedule this procedure for tomorrow. If she is able to improve, this could be scheduled to be done on an outpatient basis as well. NPO after midnight      Number and Complexity of Problems addressed and   Risks of complications and/or morbidity of management            Level of MDM (2/3 elements below)  Number and Complexity of Problems Addressed Amount and/or Complexity of Data to be Reviewed and Analyzed  *Each unique test, order, or document contributes to the combination of 2 or combination of 3 in Category 1 below. Risk of Complications and/or Morbidity or Mortality of pt Management     55027  40580 SF Minimal  ?1self-limited or minor problem Minimal or none Minimal risk of morbidity from additional diagnostic testing or Rx   53107  06100 Low Low  ? 2or more self-limited or minor problems;    or  ? 1stable chronic illness;    or  ?1acute, uncomplicated illness or injury   Limited  (Must meet the requirements of at least 1 of the 2 categories)  Category 1: Tests and documents   ? Any combination of 2 from the following:  ?Review of prior external note(s) from each unique source*;  ?review of the result(s) of each unique test*;   ?ordering of each unique test*    or   Category 2: Assessment requiring an independent historian(s)  (For the categories of independent interpretation of tests and discussion of management or test interpretation, see moderate or high) Low risk of morbidity from additional diagnostic testing or treatment     39501  69591 Mod Moderate  ? 1or more chronic illnesses with exacerbation, progression, or side effects of treatment;    or  ?2or more stable chronic illnesses;    or  ?1undiagnosed new problem with uncertain prognosis;    or  ?1acute illness with systemic symptoms;    or  ?1acute complicated injury   Moderate  (Must meet the requirements of at least 1 out of 3 categories)  Category 1: Tests, documents, or independent historian(s)  ? Any combination of 3 from the following:   ?Review of prior external note(s) from each unique source*;  ?Review of the result(s) of each unique test*;  ?Ordering of each unique test*;  ?Assessment requiring an independent historian(s)    or  Category 2: Independent interpretation of tests   ? Independent interpretation of a test performed by another physician/other qualified health care professional (not separately reported);     or  Category 3: Discussion of management or test interpretation  ? Discussion of management or test interpretation with external physician/other qualified health care professional/appropriate source (not separately reported)   Moderate risk of morbidity from additional diagnostic testing or treatment  Examples only:  ?Prescription drug management   ? Decision regarding minor surgery with identified patient or procedure risk factors  ? Decision regarding elective major surgery without identified patient or procedure risk factors   ? Diagnosis or treatment significantly limited by social determinants of health       [id number] High High  ? 1or more chronic illnesses with severe exacerbation, progression, or side effects of treatment;    or  ?1 acute or chronic illness or injury that poses a threat to life or bodily function   Extensive  (Must meet the requirements of at least 2 out of 3 categories)  Category 1: Tests, documents, or independent historian(s)  ? Any combination of 3 from the following:   ?Review of prior external note(s) from each unique source*;  ?Review of the result(s) of each unique test*;   ?Ordering of each unique test*;   ?Assessment requiring an independent historian(s)    or   Category 2: Independent interpretation of tests   ? Independent interpretation of a test performed by another physician/other qualified health care professional (not separately reported);     or  Category 3: Discussion of management or test interpretation  ? Discussion of management or test interpretation with external physician/other qualified health care professional/appropriate source (not separately reported)   High risk of morbidity from additional diagnostic testing or treatment  Examples only:  ?Drug therapy requiring intensive monitoring for toxicity  ? Decision regarding elective major surgery with identified patient or procedure risk factors  ? Decision regarding emergency major surgery  ? Decision regarding hospitalization  ? Decision not to resuscitate or to de-escalate care because of poor prognosis             I have personally performed a face-to-face diagnostic evaluation and management  service on this patient. I have independently seen the patient. I have independently obtained the above history from the patient/family. I have independently examined the patient with above findings. I have independently reviewed data/labs for this patient and developed the above plan of care (MDM).   Signed: Zhanna Cochran MD, FACS
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
## 2022-12-13 NOTE — PROGRESS NOTES
Bedside report received from night nurse Yossi. Assessment done as noted  Respiration even and unlabored 20/min; denies pain or nausea at present. Encouraged to call with needs.

## 2022-12-13 NOTE — PROGRESS NOTES
Pt is resting in bed with no complaints of pain or discomfort at this time. Pt is on room air with even and unlabored respirations. Pt has LR infusing at 75ml/h.  Call light is in place

## 2022-12-14 ENCOUNTER — ANESTHESIA (OUTPATIENT)
Dept: SURGERY | Age: 52
End: 2022-12-14
Payer: COMMERCIAL

## 2022-12-14 PROBLEM — F32.A DEPRESSION: Status: ACTIVE | Noted: 2019-09-26

## 2022-12-14 PROBLEM — E87.6 HYPOKALEMIA: Status: ACTIVE | Noted: 2022-12-14

## 2022-12-14 PROBLEM — I10 HYPERTENSION: Status: ACTIVE | Noted: 2019-09-26

## 2022-12-14 PROBLEM — E87.6 HYPOKALEMIA: Status: RESOLVED | Noted: 2022-12-14 | Resolved: 2022-12-14

## 2022-12-14 PROBLEM — M06.9 RHEUMATOID ARTHRITIS (HCC): Status: ACTIVE | Noted: 2019-09-26

## 2022-12-14 PROBLEM — D50.9 MICROCYTIC ANEMIA: Status: ACTIVE | Noted: 2021-02-16

## 2022-12-14 LAB
ABO + RH BLD: NORMAL
ANION GAP SERPL CALC-SCNC: 4 MMOL/L (ref 2–11)
BASOPHILS # BLD: 0 K/UL (ref 0–0.2)
BASOPHILS NFR BLD: 0 % (ref 0–2)
BLOOD GROUP ANTIBODIES SERPL: NORMAL
BUN SERPL-MCNC: 12 MG/DL (ref 6–23)
CALCIUM SERPL-MCNC: 8.9 MG/DL (ref 8.3–10.4)
CHLORIDE SERPL-SCNC: 109 MMOL/L (ref 101–110)
CO2 SERPL-SCNC: 27 MMOL/L (ref 21–32)
CREAT SERPL-MCNC: 0.7 MG/DL (ref 0.6–1)
DIFFERENTIAL METHOD BLD: ABNORMAL
EOSINOPHIL # BLD: 0.2 K/UL (ref 0–0.8)
EOSINOPHIL NFR BLD: 5 % (ref 0.5–7.8)
ERYTHROCYTE [DISTWIDTH] IN BLOOD BY AUTOMATED COUNT: 14.7 % (ref 11.9–14.6)
FERRITIN SERPL-MCNC: 82 NG/ML (ref 8–388)
GLUCOSE SERPL-MCNC: 109 MG/DL (ref 65–100)
HCT VFR BLD AUTO: 28.6 % (ref 35.8–46.3)
HGB BLD-MCNC: 8.9 G/DL (ref 11.7–15.4)
IMM GRANULOCYTES # BLD AUTO: 0 K/UL (ref 0–0.5)
IMM GRANULOCYTES NFR BLD AUTO: 1 % (ref 0–5)
IRON SATN MFR SERPL: 16 %
IRON SERPL-MCNC: 33 UG/DL (ref 35–150)
LYMPHOCYTES # BLD: 1.4 K/UL (ref 0.5–4.6)
LYMPHOCYTES NFR BLD: 32 % (ref 13–44)
MCH RBC QN AUTO: 25.4 PG (ref 26.1–32.9)
MCHC RBC AUTO-ENTMCNC: 31.1 G/DL (ref 31.4–35)
MCV RBC AUTO: 81.7 FL (ref 82–102)
MONOCYTES # BLD: 0.3 K/UL (ref 0.1–1.3)
MONOCYTES NFR BLD: 7 % (ref 4–12)
NEUTS SEG # BLD: 2.5 K/UL (ref 1.7–8.2)
NEUTS SEG NFR BLD: 55 % (ref 43–78)
NRBC # BLD: 0 K/UL (ref 0–0.2)
PLATELET # BLD AUTO: 164 K/UL (ref 150–450)
PMV BLD AUTO: 9.7 FL (ref 9.4–12.3)
POTASSIUM SERPL-SCNC: 4.2 MMOL/L (ref 3.5–5.1)
RBC # BLD AUTO: 3.5 M/UL (ref 4.05–5.2)
SODIUM SERPL-SCNC: 140 MMOL/L (ref 133–143)
SPECIMEN EXP DATE BLD: NORMAL
TIBC SERPL-MCNC: 201 UG/DL (ref 250–450)
WBC # BLD AUTO: 4.4 K/UL (ref 4.3–11.1)

## 2022-12-14 PROCEDURE — 2580000003 HC RX 258: Performed by: PHYSICIAN ASSISTANT

## 2022-12-14 PROCEDURE — 2580000003 HC RX 258: Performed by: NURSE ANESTHETIST, CERTIFIED REGISTERED

## 2022-12-14 PROCEDURE — 6370000000 HC RX 637 (ALT 250 FOR IP): Performed by: HOSPITALIST

## 2022-12-14 PROCEDURE — 1100000000 HC RM PRIVATE

## 2022-12-14 PROCEDURE — 3700000001 HC ADD 15 MINUTES (ANESTHESIA): Performed by: SURGERY

## 2022-12-14 PROCEDURE — 3600000014 HC SURGERY LEVEL 4 ADDTL 15MIN: Performed by: SURGERY

## 2022-12-14 PROCEDURE — 83540 ASSAY OF IRON: CPT

## 2022-12-14 PROCEDURE — 2580000003 HC RX 258: Performed by: HOSPITALIST

## 2022-12-14 PROCEDURE — 49205 PR EXC/DESTRUCTION OPEN ABDOMINAL TUMORS >10.0 CM: CPT | Performed by: SURGERY

## 2022-12-14 PROCEDURE — 85025 COMPLETE CBC W/AUTO DIFF WBC: CPT

## 2022-12-14 PROCEDURE — 2580000003 HC RX 258: Performed by: ANESTHESIOLOGY

## 2022-12-14 PROCEDURE — 0DNU4ZZ RELEASE OMENTUM, PERCUTANEOUS ENDOSCOPIC APPROACH: ICD-10-PCS | Performed by: SURGERY

## 2022-12-14 PROCEDURE — 6370000000 HC RX 637 (ALT 250 FOR IP): Performed by: NURSE ANESTHETIST, CERTIFIED REGISTERED

## 2022-12-14 PROCEDURE — 6360000002 HC RX W HCPCS: Performed by: HOSPITALIST

## 2022-12-14 PROCEDURE — 6360000002 HC RX W HCPCS: Performed by: PHYSICIAN ASSISTANT

## 2022-12-14 PROCEDURE — 7100000001 HC PACU RECOVERY - ADDTL 15 MIN: Performed by: SURGERY

## 2022-12-14 PROCEDURE — 49205 PR EXCISION/DESTRUCTION OPEN ABDOMINAL TUMORS >10.0 CM: CPT | Performed by: SURGERY

## 2022-12-14 PROCEDURE — 36415 COLL VENOUS BLD VENIPUNCTURE: CPT

## 2022-12-14 PROCEDURE — 2500000003 HC RX 250 WO HCPCS: Performed by: NURSE ANESTHETIST, CERTIFIED REGISTERED

## 2022-12-14 PROCEDURE — 80048 BASIC METABOLIC PNL TOTAL CA: CPT

## 2022-12-14 PROCEDURE — 2500000003 HC RX 250 WO HCPCS: Performed by: HOSPITALIST

## 2022-12-14 PROCEDURE — 6370000000 HC RX 637 (ALT 250 FOR IP): Performed by: PHYSICIAN ASSISTANT

## 2022-12-14 PROCEDURE — 3600000004 HC SURGERY LEVEL 4 BASE: Performed by: SURGERY

## 2022-12-14 PROCEDURE — 3700000000 HC ANESTHESIA ATTENDED CARE: Performed by: SURGERY

## 2022-12-14 PROCEDURE — 2720000010 HC SURG SUPPLY STERILE: Performed by: SURGERY

## 2022-12-14 PROCEDURE — 86900 BLOOD TYPING SEROLOGIC ABO: CPT

## 2022-12-14 PROCEDURE — 6360000002 HC RX W HCPCS: Performed by: ANESTHESIOLOGY

## 2022-12-14 PROCEDURE — 7100000000 HC PACU RECOVERY - FIRST 15 MIN: Performed by: SURGERY

## 2022-12-14 PROCEDURE — 82728 ASSAY OF FERRITIN: CPT

## 2022-12-14 PROCEDURE — 6360000002 HC RX W HCPCS: Performed by: NURSE PRACTITIONER

## 2022-12-14 PROCEDURE — 88305 TISSUE EXAM BY PATHOLOGIST: CPT

## 2022-12-14 PROCEDURE — 2709999900 HC NON-CHARGEABLE SUPPLY: Performed by: SURGERY

## 2022-12-14 PROCEDURE — 6360000002 HC RX W HCPCS: Performed by: NURSE ANESTHETIST, CERTIFIED REGISTERED

## 2022-12-14 RX ORDER — HYDRALAZINE HYDROCHLORIDE 20 MG/ML
10 INJECTION INTRAMUSCULAR; INTRAVENOUS
Status: DISCONTINUED | OUTPATIENT
Start: 2022-12-14 | End: 2022-12-14 | Stop reason: HOSPADM

## 2022-12-14 RX ORDER — SODIUM CHLORIDE, SODIUM LACTATE, POTASSIUM CHLORIDE, CALCIUM CHLORIDE 600; 310; 30; 20 MG/100ML; MG/100ML; MG/100ML; MG/100ML
100 INJECTION, SOLUTION INTRAVENOUS CONTINUOUS
Status: DISCONTINUED | OUTPATIENT
Start: 2022-12-14 | End: 2022-12-14 | Stop reason: HOSPADM

## 2022-12-14 RX ORDER — GLYCOPYRROLATE 0.2 MG/ML
INJECTION INTRAMUSCULAR; INTRAVENOUS PRN
Status: DISCONTINUED | OUTPATIENT
Start: 2022-12-14 | End: 2022-12-14 | Stop reason: SDUPTHER

## 2022-12-14 RX ORDER — NEOSTIGMINE METHYLSULFATE 1 MG/ML
INJECTION, SOLUTION INTRAVENOUS PRN
Status: DISCONTINUED | OUTPATIENT
Start: 2022-12-14 | End: 2022-12-14 | Stop reason: SDUPTHER

## 2022-12-14 RX ORDER — LIDOCAINE HYDROCHLORIDE 20 MG/ML
INJECTION, SOLUTION EPIDURAL; INFILTRATION; INTRACAUDAL; PERINEURAL PRN
Status: DISCONTINUED | OUTPATIENT
Start: 2022-12-14 | End: 2022-12-14 | Stop reason: SDUPTHER

## 2022-12-14 RX ORDER — PROCHLORPERAZINE EDISYLATE 5 MG/ML
5 INJECTION INTRAMUSCULAR; INTRAVENOUS
Status: DISCONTINUED | OUTPATIENT
Start: 2022-12-14 | End: 2022-12-14 | Stop reason: HOSPADM

## 2022-12-14 RX ORDER — HYDROMORPHONE HCL 110MG/55ML
0.25 PATIENT CONTROLLED ANALGESIA SYRINGE INTRAVENOUS EVERY 5 MIN PRN
Status: DISCONTINUED | OUTPATIENT
Start: 2022-12-14 | End: 2022-12-14 | Stop reason: HOSPADM

## 2022-12-14 RX ORDER — LABETALOL HYDROCHLORIDE 5 MG/ML
10 INJECTION, SOLUTION INTRAVENOUS
Status: DISCONTINUED | OUTPATIENT
Start: 2022-12-14 | End: 2022-12-14 | Stop reason: HOSPADM

## 2022-12-14 RX ORDER — DEXAMETHASONE SODIUM PHOSPHATE 4 MG/ML
INJECTION, SOLUTION INTRA-ARTICULAR; INTRALESIONAL; INTRAMUSCULAR; INTRAVENOUS; SOFT TISSUE PRN
Status: DISCONTINUED | OUTPATIENT
Start: 2022-12-14 | End: 2022-12-14 | Stop reason: SDUPTHER

## 2022-12-14 RX ORDER — ONDANSETRON 2 MG/ML
INJECTION INTRAMUSCULAR; INTRAVENOUS PRN
Status: DISCONTINUED | OUTPATIENT
Start: 2022-12-14 | End: 2022-12-14 | Stop reason: SDUPTHER

## 2022-12-14 RX ORDER — KETAMINE HYDROCHLORIDE 50 MG/ML
INJECTION, SOLUTION, CONCENTRATE INTRAMUSCULAR; INTRAVENOUS PRN
Status: DISCONTINUED | OUTPATIENT
Start: 2022-12-14 | End: 2022-12-14 | Stop reason: SDUPTHER

## 2022-12-14 RX ORDER — LIDOCAINE HYDROCHLORIDE 10 MG/ML
1 INJECTION, SOLUTION INFILTRATION; PERINEURAL
Status: DISCONTINUED | OUTPATIENT
Start: 2022-12-14 | End: 2022-12-14 | Stop reason: HOSPADM

## 2022-12-14 RX ORDER — FENTANYL CITRATE 50 UG/ML
INJECTION, SOLUTION INTRAMUSCULAR; INTRAVENOUS PRN
Status: DISCONTINUED | OUTPATIENT
Start: 2022-12-14 | End: 2022-12-14 | Stop reason: SDUPTHER

## 2022-12-14 RX ORDER — PROPOFOL 10 MG/ML
INJECTION, EMULSION INTRAVENOUS PRN
Status: DISCONTINUED | OUTPATIENT
Start: 2022-12-14 | End: 2022-12-14 | Stop reason: SDUPTHER

## 2022-12-14 RX ORDER — HYDROMORPHONE HCL 110MG/55ML
PATIENT CONTROLLED ANALGESIA SYRINGE INTRAVENOUS PRN
Status: DISCONTINUED | OUTPATIENT
Start: 2022-12-14 | End: 2022-12-14 | Stop reason: SDUPTHER

## 2022-12-14 RX ORDER — HYDROMORPHONE HYDROCHLORIDE 1 MG/ML
1 INJECTION, SOLUTION INTRAMUSCULAR; INTRAVENOUS; SUBCUTANEOUS ONCE
Status: COMPLETED | OUTPATIENT
Start: 2022-12-14 | End: 2022-12-14

## 2022-12-14 RX ORDER — ALBUTEROL SULFATE 90 UG/1
AEROSOL, METERED RESPIRATORY (INHALATION) PRN
Status: DISCONTINUED | OUTPATIENT
Start: 2022-12-14 | End: 2022-12-14 | Stop reason: SDUPTHER

## 2022-12-14 RX ORDER — HYDROMORPHONE HCL 110MG/55ML
0.5 PATIENT CONTROLLED ANALGESIA SYRINGE INTRAVENOUS EVERY 5 MIN PRN
Status: DISCONTINUED | OUTPATIENT
Start: 2022-12-14 | End: 2022-12-14 | Stop reason: HOSPADM

## 2022-12-14 RX ORDER — ONDANSETRON 2 MG/ML
4 INJECTION INTRAMUSCULAR; INTRAVENOUS
Status: DISCONTINUED | OUTPATIENT
Start: 2022-12-14 | End: 2022-12-14 | Stop reason: HOSPADM

## 2022-12-14 RX ORDER — KETOROLAC TROMETHAMINE 30 MG/ML
30 INJECTION, SOLUTION INTRAMUSCULAR; INTRAVENOUS EVERY 6 HOURS PRN
Status: DISCONTINUED | OUTPATIENT
Start: 2022-12-14 | End: 2022-12-15 | Stop reason: HOSPADM

## 2022-12-14 RX ORDER — VECURONIUM BROMIDE 1 MG/ML
INJECTION, POWDER, LYOPHILIZED, FOR SOLUTION INTRAVENOUS PRN
Status: DISCONTINUED | OUTPATIENT
Start: 2022-12-14 | End: 2022-12-14 | Stop reason: SDUPTHER

## 2022-12-14 RX ORDER — ROCURONIUM BROMIDE 10 MG/ML
INJECTION, SOLUTION INTRAVENOUS PRN
Status: DISCONTINUED | OUTPATIENT
Start: 2022-12-14 | End: 2022-12-14 | Stop reason: SDUPTHER

## 2022-12-14 RX ORDER — OXYCODONE HYDROCHLORIDE 5 MG/1
5 TABLET ORAL
Status: DISCONTINUED | OUTPATIENT
Start: 2022-12-14 | End: 2022-12-14 | Stop reason: HOSPADM

## 2022-12-14 RX ORDER — FENTANYL CITRATE 50 UG/ML
100 INJECTION, SOLUTION INTRAMUSCULAR; INTRAVENOUS
Status: DISCONTINUED | OUTPATIENT
Start: 2022-12-14 | End: 2022-12-14 | Stop reason: HOSPADM

## 2022-12-14 RX ORDER — SODIUM CHLORIDE, SODIUM LACTATE, POTASSIUM CHLORIDE, CALCIUM CHLORIDE 600; 310; 30; 20 MG/100ML; MG/100ML; MG/100ML; MG/100ML
INJECTION, SOLUTION INTRAVENOUS CONTINUOUS PRN
Status: DISCONTINUED | OUTPATIENT
Start: 2022-12-14 | End: 2022-12-14 | Stop reason: SDUPTHER

## 2022-12-14 RX ORDER — LIDOCAINE HYDROCHLORIDE ANHYDROUS AND DEXTROSE MONOHYDRATE .4; 5 G/100ML; G/100ML
INJECTION, SOLUTION INTRAVENOUS CONTINUOUS PRN
Status: DISCONTINUED | OUTPATIENT
Start: 2022-12-14 | End: 2022-12-14 | Stop reason: SDUPTHER

## 2022-12-14 RX ORDER — MIDAZOLAM HYDROCHLORIDE 2 MG/2ML
2 INJECTION, SOLUTION INTRAMUSCULAR; INTRAVENOUS
Status: DISCONTINUED | OUTPATIENT
Start: 2022-12-14 | End: 2022-12-14 | Stop reason: HOSPADM

## 2022-12-14 RX ADMIN — KETAMINE HYDROCHLORIDE 25 MG: 50 INJECTION, SOLUTION INTRAMUSCULAR; INTRAVENOUS at 15:11

## 2022-12-14 RX ADMIN — GLYCOPYRROLATE 0.8 MG: 0.2 INJECTION, SOLUTION INTRAMUSCULAR; INTRAVENOUS at 15:53

## 2022-12-14 RX ADMIN — LIDOCAINE HYDROCHLORIDE 100 MG: 20 INJECTION, SOLUTION EPIDURAL; INFILTRATION; INTRACAUDAL; PERINEURAL at 14:04

## 2022-12-14 RX ADMIN — MORPHINE SULFATE 2 MG: 2 INJECTION, SOLUTION INTRAMUSCULAR; INTRAVENOUS at 17:30

## 2022-12-14 RX ADMIN — MORPHINE SULFATE 2 MG: 2 INJECTION, SOLUTION INTRAMUSCULAR; INTRAVENOUS at 21:12

## 2022-12-14 RX ADMIN — METRONIDAZOLE 500 MG: 500 INJECTION, SOLUTION INTRAVENOUS at 05:27

## 2022-12-14 RX ADMIN — DEXAMETHASONE SODIUM PHOSPHATE 4 MG: 4 INJECTION, SOLUTION INTRAMUSCULAR; INTRAVENOUS at 14:24

## 2022-12-14 RX ADMIN — METRONIDAZOLE 500 MG: 500 INJECTION, SOLUTION INTRAVENOUS at 17:31

## 2022-12-14 RX ADMIN — HYDROCODONE BITARTRATE AND ACETAMINOPHEN 1 TABLET: 7.5; 325 TABLET ORAL at 09:17

## 2022-12-14 RX ADMIN — ASPIRIN 81 MG: 81 TABLET, CHEWABLE ORAL at 09:18

## 2022-12-14 RX ADMIN — HYDROMORPHONE HYDROCHLORIDE 1 MG: 1 INJECTION, SOLUTION INTRAMUSCULAR; INTRAVENOUS; SUBCUTANEOUS at 21:39

## 2022-12-14 RX ADMIN — SODIUM CHLORIDE, SODIUM LACTATE, POTASSIUM CHLORIDE, AND CALCIUM CHLORIDE: 600; 310; 30; 20 INJECTION, SOLUTION INTRAVENOUS at 14:14

## 2022-12-14 RX ADMIN — HYDROCODONE BITARTRATE AND ACETAMINOPHEN 1 TABLET: 7.5; 325 TABLET ORAL at 23:16

## 2022-12-14 RX ADMIN — HYDROMORPHONE HYDROCHLORIDE 0.5 MG: 2 INJECTION INTRAMUSCULAR; INTRAVENOUS; SUBCUTANEOUS at 15:53

## 2022-12-14 RX ADMIN — SODIUM CHLORIDE, PRESERVATIVE FREE 10 ML: 5 INJECTION INTRAVENOUS at 22:51

## 2022-12-14 RX ADMIN — CEFTRIAXONE 1000 MG: 1 INJECTION, POWDER, FOR SOLUTION INTRAMUSCULAR; INTRAVENOUS at 17:31

## 2022-12-14 RX ADMIN — ALBUTEROL SULFATE 8 PUFF: 90 AEROSOL, METERED RESPIRATORY (INHALATION) at 14:20

## 2022-12-14 RX ADMIN — Medication 2 G: at 14:35

## 2022-12-14 RX ADMIN — HYDROMORPHONE HYDROCHLORIDE 0.5 MG: 2 INJECTION INTRAMUSCULAR; INTRAVENOUS; SUBCUTANEOUS at 16:40

## 2022-12-14 RX ADMIN — ROCURONIUM BROMIDE 50 MG: 50 INJECTION, SOLUTION INTRAVENOUS at 14:06

## 2022-12-14 RX ADMIN — PHENYLEPHRINE HYDROCHLORIDE 1 ML: 10 INJECTION INTRAVENOUS at 14:29

## 2022-12-14 RX ADMIN — Medication 5 MG: at 15:53

## 2022-12-14 RX ADMIN — PROPOFOL 200 MG: 10 INJECTION, EMULSION INTRAVENOUS at 14:04

## 2022-12-14 RX ADMIN — DULOXETINE HYDROCHLORIDE 60 MG: 30 CAPSULE, DELAYED RELEASE ORAL at 09:18

## 2022-12-14 RX ADMIN — BUPROPION HYDROCHLORIDE 300 MG: 75 TABLET, FILM COATED ORAL at 09:18

## 2022-12-14 RX ADMIN — KETAMINE HYDROCHLORIDE 25 MG: 50 INJECTION, SOLUTION INTRAMUSCULAR; INTRAVENOUS at 14:04

## 2022-12-14 RX ADMIN — FENTANYL CITRATE 100 MCG: 50 INJECTION, SOLUTION INTRAMUSCULAR; INTRAVENOUS at 14:04

## 2022-12-14 RX ADMIN — HYDROMORPHONE HYDROCHLORIDE 0.5 MG: 2 INJECTION INTRAMUSCULAR; INTRAVENOUS; SUBCUTANEOUS at 16:32

## 2022-12-14 RX ADMIN — HYDROMORPHONE HYDROCHLORIDE 0.5 MG: 2 INJECTION INTRAMUSCULAR; INTRAVENOUS; SUBCUTANEOUS at 16:26

## 2022-12-14 RX ADMIN — PRAVASTATIN SODIUM 40 MG: 20 TABLET ORAL at 21:39

## 2022-12-14 RX ADMIN — PHENYLEPHRINE HYDROCHLORIDE 1 ML: 10 INJECTION INTRAVENOUS at 14:25

## 2022-12-14 RX ADMIN — VECURONIUM BROMIDE 2 MG: 1 INJECTION, POWDER, LYOPHILIZED, FOR SOLUTION INTRAVENOUS at 14:45

## 2022-12-14 RX ADMIN — SODIUM CHLORIDE, POTASSIUM CHLORIDE, SODIUM LACTATE AND CALCIUM CHLORIDE 100 ML/HR: 600; 310; 30; 20 INJECTION, SOLUTION INTRAVENOUS at 12:10

## 2022-12-14 RX ADMIN — ONDANSETRON 4 MG: 2 INJECTION INTRAMUSCULAR; INTRAVENOUS at 15:29

## 2022-12-14 RX ADMIN — VECURONIUM BROMIDE 2 MG: 1 INJECTION, POWDER, LYOPHILIZED, FOR SOLUTION INTRAVENOUS at 15:22

## 2022-12-14 RX ADMIN — ENOXAPARIN SODIUM 30 MG: 100 INJECTION SUBCUTANEOUS at 21:39

## 2022-12-14 RX ADMIN — LIDOCAINE HYDROCHLORIDE 1 MG/KG/HR: 4 INJECTION, SOLUTION INTRAVENOUS at 14:15

## 2022-12-14 RX ADMIN — SODIUM CHLORIDE, PRESERVATIVE FREE 10 ML: 5 INJECTION INTRAVENOUS at 09:19

## 2022-12-14 RX ADMIN — HYDROMORPHONE HYDROCHLORIDE 0.5 MG: 2 INJECTION INTRAMUSCULAR; INTRAVENOUS; SUBCUTANEOUS at 15:14

## 2022-12-14 ASSESSMENT — PAIN DESCRIPTION - DESCRIPTORS
DESCRIPTORS: DISCOMFORT
DESCRIPTORS: ACHING;CRAMPING;DISCOMFORT;SHARP
DESCRIPTORS: BURNING;SHARP
DESCRIPTORS: SHARP

## 2022-12-14 ASSESSMENT — PAIN DESCRIPTION - LOCATION
LOCATION: ABDOMEN
LOCATION: ABDOMEN;PELVIS
LOCATION: ABDOMEN

## 2022-12-14 ASSESSMENT — PAIN SCALES - GENERAL
PAINLEVEL_OUTOF10: 10
PAINLEVEL_OUTOF10: 8
PAINLEVEL_OUTOF10: 8
PAINLEVEL_OUTOF10: 7
PAINLEVEL_OUTOF10: 9
PAINLEVEL_OUTOF10: 10
PAINLEVEL_OUTOF10: 9
PAINLEVEL_OUTOF10: 7

## 2022-12-14 ASSESSMENT — PAIN DESCRIPTION - PAIN TYPE
TYPE: SURGICAL PAIN
TYPE: SURGICAL PAIN

## 2022-12-14 ASSESSMENT — PAIN - FUNCTIONAL ASSESSMENT
PAIN_FUNCTIONAL_ASSESSMENT: 0-10
PAIN_FUNCTIONAL_ASSESSMENT: INTOLERABLE, UNABLE TO DO ANY ACTIVE OR PASSIVE ACTIVITIES

## 2022-12-14 ASSESSMENT — PAIN DESCRIPTION - FREQUENCY
FREQUENCY: CONTINUOUS
FREQUENCY: CONTINUOUS

## 2022-12-14 ASSESSMENT — PAIN DESCRIPTION - ORIENTATION
ORIENTATION: LEFT;UPPER
ORIENTATION: RIGHT;LOWER

## 2022-12-14 ASSESSMENT — PAIN DESCRIPTION - ONSET: ONSET: SUDDEN

## 2022-12-14 NOTE — ANESTHESIA POSTPROCEDURE EVALUATION
Department of Anesthesiology  Postprocedure Note    Patient: Cammy Adler  MRN: [id number]  YOB: 1970  Date of evaluation: 12/14/2022      Procedure Summary     Date: 12/14/22 Room / Location: [facility] MAIN OR 02 / [facility] MAIN OR    Anesthesia Start: 1401 Anesthesia Stop: 7589    Procedure: LAPAROSCOPY DIAGNOSTIC POSSIBLE REMOVAL OF ABDOMINAL MASS (Abdomen) Diagnosis:       Abdominal mass      (Abdominal mass [R19.00])    Providers: Danny Luu MD Responsible Provider: Trenton Ambrocio DO    Anesthesia Type: general ASA Status: 2          Anesthesia Type: No value filed.     Trung Phase I: Trung Score: 9    Trung Phase II:        Anesthesia Post Evaluation    Patient location during evaluation: PACU  Level of consciousness: awake and alert  Airway patency: patent  Nausea & Vomiting: no nausea  Complications: no  Cardiovascular status: hemodynamically stable  Respiratory status: acceptable  Hydration status: euvolemic

## 2022-12-14 NOTE — PERIOP NOTE
TRANSFER - IN REPORT:    Verbal report received from Valentino Feeler, RN on [employer]  being received from [phone] for ordered procedure      Report consisted of patient's Situation, Background, Assessment and   Recommendations(SBAR). Information from the following report(s) Adult Overview and MAR was reviewed with the receiving nurse. Opportunity for questions and clarification was provided. Assessment will be completed upon patient's arrival to unit and care assumed.

## 2022-12-14 NOTE — PERIOP NOTE
TRANSFER - OUT REPORT:    Verbal report given to Charlotte Mohr RN on [employer]  being transferred to  for routine post-op       Report consisted of patients Situation, Background, Assessment and   Recommendations(SBAR). Information from the following report(s) Adult Overview, Surgery Report, Intake/Output, and MAR was reviewed with the receiving nurse. Lines:   Peripheral IV 12/13/22 Left;Ventral Forearm (Active)   Site Assessment Clean, dry & intact 12/14/22 1612   Line Status Infusing 12/14/22 1612   Line Care Ports disinfected 12/14/22 0305   Phlebitis Assessment No symptoms 12/14/22 1612   Infiltration Assessment 0 12/14/22 1612   Alcohol Cap Used Yes 12/14/22 0305   Dressing Status Clean, dry & intact 12/14/22 1612   Dressing Type Transparent 12/14/22 1612   Dressing Intervention New 12/14/22 0305       Peripheral IV 12/14/22 Left Hand (Active)   Site Assessment Clean, dry & intact 12/14/22 1612   Line Status Infusing 12/14/22 1612   Phlebitis Assessment No symptoms 12/14/22 1612   Infiltration Assessment 0 12/14/22 1612   Dressing Status Clean, dry & intact 12/14/22 1612   Dressing Type Transparent 12/14/22 1612        Opportunity for questions and clarification was provided. Patient transported with:   O2 @ 4 liters    VTE prophylaxis orders have been written for [employer]. Patient and family given floor number and nurses name.

## 2022-12-14 NOTE — ANESTHESIA PRE PROCEDURE
Department of Anesthesiology  Preprocedure Note       Name:  Jacquelyn James   Age:  50 y.o.  :  1970                                          MRN:  [id number]         Date:  2022      Surgeon: Cynthia Steiner):  Ramya Houser MD    Procedure: Procedure(s):  LAPAROSCOPY DIAGNOSTIC POSSIBLE REMOVAL OF ABDOMINAL MASS    Medications prior to admission:   Prior to Admission medications    Medication Sig Start Date End Date Taking? Authorizing Provider   pravastatin (PRAVACHOL) 40 MG tablet Take 40 mg by mouth every evening    Historical Provider, MD   DULoxetine (CYMBALTA) 60 MG extended release capsule Take 60 mg by mouth daily    Historical Provider, MD   lisinopril-hydroCHLOROthiazide (PRINZIDE;ZESTORETIC) 20-12.5 MG per tablet Take 1 tablet by mouth in the morning. Historical Provider, MD   buPROPion (WELLBUTRIN) 100 MG tablet Take 300 mg by mouth in the morning. Historical Provider, MD   omeprazole (PRILOSEC) 20 MG delayed release capsule Take 40 mg by mouth in the morning. Historical Provider, MD   aspirin 81 MG chewable tablet Take 81 mg by mouth in the morning. Historical Provider, MD   nitrofurantoin (MACRODANTIN) 50 MG capsule Take 50 mg by mouth in the morning and 50 mg in the evening. Historical Provider, MD   tamsulosin (FLOMAX) 0.4 MG capsule Take 1 capsule by mouth in the morning for 7 days. 22  Rossy Villa MD       Current medications:    No current facility-administered medications for this visit. No current outpatient medications on file.      Facility-Administered Medications Ordered in Other Visits   Medication Dose Route Frequency Provider Last Rate Last Admin    lidocaine 1 % injection 1 mL  1 mL IntraDERmal Once PRN Marcela Miranda,         fentaNYL (SUBLIMAZE) injection 100 mcg  100 mcg IntraVENous Once PRN Marcela Miranda, DO        lactated ringers infusion  100 mL/hr IntraVENous Continuous Marcela Miranda  mL/hr at 12/14/22 1210 100 mL/hr at 12/14/22 1210    midazolam PF (VERSED) injection 2 mg  2 mg IntraVENous Once PRN Rambo Miranda DO        DULoxetine (CYMBALTA) extended release capsule 60 mg  60 mg Oral Daily Grisel Hall MD   60 mg at 12/14/22 0918    buPROPion Alis Roscoe) tablet 300 mg  300 mg Oral Daily Grisel Hall MD   300 mg at 12/14/22 6544    lisinopril-hydroCHLOROthiazide (PRINZIDE;ZESTORETIC) 20-12.5 MG per tablet 1 tablet  1 tablet Oral Daily Grisel Hall MD   1 tablet at 12/13/22 1452    aspirin chewable tablet 81 mg  81 mg Oral Daily Grisel Hall MD   81 mg at 12/14/22 0918    pravastatin (PRAVACHOL) tablet 40 mg  40 mg Oral Nightly Grisel Hall MD   40 mg at 12/13/22 2135    sodium chloride flush 0.9 % injection 20 mL  20 mL IntraVENous PRN ADELSO Ball - CNP   20 mL at 12/12/22 1138    enoxaparin Sodium (LOVENOX) injection 30 mg  30 mg SubCUTAneous Q12H Grisel Hall MD   30 mg at 12/13/22 0829    sodium chloride flush 0.9 % injection 5-40 mL  5-40 mL IntraVENous 2 times per day Aysha James MD   10 mL at 12/14/22 0919    sodium chloride flush 0.9 % injection 5-40 mL  5-40 mL IntraVENous PRN Aysha James MD        0.9 % sodium chloride infusion   IntraVENous PRN Aysha James MD        ondansetron (ZOFRAN-ODT) disintegrating tablet 4 mg  4 mg Oral Q8H PRN Aysha James MD        Or    ondansetron [facility]) injection 4 mg  4 mg IntraVENous Q6H PRN Aysha James MD        polyethylene glycol [facility]) packet 17 g  17 g Oral Daily PRN Aysha James MD        acetaminophen (TYLENOL) tablet 650 mg  650 mg Oral Q6H PRN Aysha James MD        Or    acetaminophen (TYLENOL) suppository 650 mg  650 mg Rectal Q6H PRN Aysha James MD        loperamide (IMODIUM) capsule 4 mg  4 mg Oral BID PRN Aysha James MD        temazepam (RESTORIL) capsule 15 mg  15 mg Oral Nightly PRN Aysha James MD   15 mg at 12/13/22 8475    HYDROcodone-acetaminophen ([address]) 7.5-325 MG per tablet 1 tablet  1 tablet Oral Q4H PRN Abner James MD   1 tablet at 12/14/22 6566    morphine injection 2 mg  2 mg IntraVENous Q4H PRN Abner James MD   2 mg at 12/12/22 1213    cloNIDine (CATAPRES) tablet 0.2 mg  0.2 mg Oral BID PRN Abner James MD        hydrALAZINE (APRESOLINE) injection 10 mg  10 mg IntraVENous Q6H PRN Abner James MD        promethazine (PHENERGAN) 25 mg in sodium chloride 0.9 % 50 mL IVPB  25 mg IntraVENous Q8H PRN Abner James MD   Stopped at 12/11/22 0433    cefTRIAXone (ROCEPHIN) 1,000 mg in sodium chloride 0.9 % 50 mL IVPB mini-bag  1,000 mg IntraVENous Q24H Grisel Hall MD   Stopped at 12/13/22 1656    metronidazole (FLAGYL) 500 mg in 0.9% NaCl 100 mL IVPB premix  500 mg IntraVENous Q12H Grisel Hall  mL/hr at 12/14/22 0527 500 mg at 12/14/22 7502       Allergies: Allergies   Allergen Reactions    Penicillins Hives     Tolerates cephalosporins       Problem List:    Patient Active Problem List   Diagnosis Code    H/O TIA (transient ischemic attack) and stroke Z86.73    Microcytic anemia D50.9    Depression F32. A    Rheumatoid arthritis ([address] M06.9    TIA (transient ischemic attack) G45.9    Reflex sympathetic dystrophy G90.50    Hypertension I10    Cellulitis of second toe, left L03.032    Acute osteomyelitis of right foot (HCC) M86.171    Right lower quadrant abdominal pain R10.31    Chronic pain G89.29    Class 1 obesity in adult E66.9    Peripheral neuropathy G62.9    Ovarian cyst N83.209    Ureteric stone N20.1    Acute cystitis without hematuria N30.00    Left arm swelling M79.89    Mass of peritoneum K66.8       Past Medical History:        Diagnosis Date    Anemia     Chronic pain     due to RA    Depression     GERD (gastroesophageal reflux disease)     Hx of renal calculi          Hypercholesterolemia     Hypertension     Migraine     Morbid obesity ([address]     Ovarian cyst     Reflex sympathetic dystrophy since 2005    right arm; s/p injury - laceration in hand    Rheumatoid [facility] 9/26/2019    Rheumatoid arthritis ([facility]          Sinus problem     TIA (transient ischemic attack) 9/26/2019       Past Surgical History:        Procedure Laterality Date    APPENDECTOMY      BREAST BIOPSY Left 4/27/2015    LEFT NIPPLE EXPLORATION WITH REMOVAL OF LATTISIMUS DUCT [facility]. Right 12/11/2022    CYSTOSCOPY/ RIGHT URETERAL STENT PLACEMENT performed by Lupe Anguiano MD at [address] [place] [address] TOTAL ABDOMINAL (CERVIX REMOVED)           ORTHOPEDIC SURGERY      rt hand    ORTHOPEDIC SURGERY      neck    SEPTOPLASTY  08/28/2019    FESS,SMRIT's    TUBAL LIGATION         Social History:    Social History     Tobacco Use    Smoking status: Never    Smokeless tobacco: Never   Substance Use Topics    Alcohol use: Yes                                Counseling given: Not Answered      Vital Signs (Current): There were no vitals filed for this visit.                                            BP Readings from Last 3 Encounters:   12/14/22 137/64   12/11/22 (!) 111/58   07/16/22 132/70       NPO Status:                                                                                 BMI:   Wt Readings from Last 3 Encounters:   12/11/22 232 lb (105.2 kg)   12/11/22 232 lb (105.2 kg)   12/10/22 232 lb (105.2 kg)     There is no height or weight on file to calculate BMI.    CBC:   Lab Results   Component Value Date/Time    WBC 4.4 12/14/2022 05:09 AM    RBC 3.50 12/14/2022 05:09 AM    HGB 8.9 12/14/2022 05:09 AM    HCT 28.6 12/14/2022 05:09 AM    MCV 81.7 12/14/2022 05:09 AM    RDW 14.7 12/14/2022 05:09 AM     12/14/2022 05:09 AM       CMP:   Lab Results   Component Value Date/Time     12/14/2022 05:09 AM    K 4.2 12/14/2022 05:09 AM     12/14/2022 05:09 AM    CO2 27 12/14/2022 05:09 AM    BUN 12 12/14/2022 05:09 AM    CREATININE 0.70 12/14/2022 05:09 AM    GFRAA >158 07/16/2022 03:21 PM    AGRATIO 0.9 07/04/2021 11:27 PM    LABGLOM >60 12/14/2022 05:09 AM    GLUCOSE 109 12/14/2022 05:09 AM    PROT 7.7 12/10/2022 10:38 PM    CALCIUM 8.9 12/14/2022 05:09 AM    BILITOT 0.5 12/10/2022 10:38 PM    ALKPHOS 100 12/10/2022 10:38 PM    ALKPHOS 92 07/04/2021 11:27 PM    AST 17 12/10/2022 10:38 PM    ALT 14 12/10/2022 10:38 PM       POC Tests: No results for input(s): POCGLU, POCNA, POCK, POCCL, POCBUN, POCHEMO, POCHCT in the last 72 hours. Coags:   Lab Results   Component Value Date/Time    PROTIME 14.2 09/26/2019 08:20 PM    INR 1.2 09/26/2019 08:20 PM    APTT 31.0 09/26/2019 08:23 PM       HCG (If Applicable): No results found for: PREGTESTUR, PREGSERUM, HCG, HCGQUANT     ABGs: No results found for: PHART, PO2ART, APG9MJS, KBA8MDJ, BEART, D4QAYCEK     Type & Screen (If Applicable):  No results found for: LABABO, LABRH    Drug/Infectious Status (If Applicable):  No results found for: HIV, HEPCAB    COVID-19 Screening (If Applicable): No results found for: COVID19        Anesthesia Evaluation  Patient summary reviewed and Nursing notes reviewed no history of anesthetic complications:   Airway: Mallampati: I  TM distance: >3 FB   Neck ROM: full  Mouth opening: > = 3 FB   Dental: normal exam         Pulmonary:Negative Pulmonary ROS breath sounds clear to auscultation                             Cardiovascular:  Exercise tolerance: good (>4 METS),   (+) hypertension:, hyperlipidemia        Rhythm: regular  Rate: normal                    Neuro/Psych:   (+) TIA, headaches:, depression/anxiety              ROS comment: Neuropathy? GI/Hepatic/Renal:   (+) GERD:, renal disease: kidney stones,          ROS comment: Intermittent N/V    Obese. Endo/Other:    (+) DiabetesType II DM, , : arthritis (h/o ACDF but does not appear to affect her neck ROM): rheumatoid. , .                  ROS comment: Possible sepsis Abdominal:             Vascular:           Other Findings: Anesthesia Plan      general     ASA 2     (R/b/i of GETA discussed at length with pt. Pt expresses understanding, questions answered)  Induction: intravenous. MIPS: Postoperative opioids intended and Prophylactic antiemetics administered. Anesthetic plan and risks discussed with patient. Plan discussed with CRNA.                     Elio Mendez DO   12/14/2022

## 2022-12-14 NOTE — PROGRESS NOTES
Pt is resting in bed with no complaints of pain or discomfort at this time. Pt remains alert and oriented times 4. Pt has been NPO since midnight. Call light is in place. No further needs expressed at this time.

## 2022-12-14 NOTE — PROGRESS NOTES
TRANSFER - OUT REPORT:    Verbal report given to [address] [place] [address] on Heidy Prescott  being transferred to [facility] for ordered procedure       Report consisted of patient's Situation, Background, Assessment and   Recommendations(SBAR). Information from the following report(s) Adult Overview, Intake/Output, and Recent Results was reviewed with the receiving nurse. [place] Assessment: No data recorded  Lines:   Peripheral IV 12/13/22 Left;Ventral Forearm (Active)   Site Assessment Clean, dry & intact 12/14/22 0305   Line Status Blood return noted; Flushed;Normal saline locked 12/14/22 0305   Line Care Ports disinfected 12/14/22 0305   Phlebitis Assessment No symptoms 12/14/22 0305   Infiltration Assessment 0 12/14/22 0305   Alcohol Cap Used Yes 12/14/22 0305   Dressing Status New dressing applied 12/14/22 0305   Dressing Type Transparent 12/14/22 0305   Dressing Intervention New 12/14/22 0305        Opportunity for questions and clarification was provided.       Patient transported with:  [employer]

## 2022-12-14 NOTE — BRIEF OP NOTE
Brief Postoperative Note      Patient:  Toñito Newman  YOB: 1970  MRN: [id number]    Date of Procedure: 12/14/2022    Pre-Op Diagnosis: Abdominal mass [R19.00]    Post-Op Diagnosis:  multiple omental mass with hemorrhage       Procedures:  Laparoscopic peritoneal/omental mass resection with hand assistance, the largest is over 10 cm    Surgeon(s):  Dunia Weber MD    Assistant:  Surgical Assistant: Caesar Seema    Anesthesia: General    Estimated Blood Loss (mL): 325     Complications: None    Specimens:   ID Type Source Tests Collected by Time Destination   A : Multiple omentum mass Tissue Abdomen SURGICAL PATHOLOGY Dunia Weber MD 12/14/2022 1538        Implants:  * No implants in log *      Drains:   Urinary Catheter 12/14/22 2 Way (Active)       Findings: multiple perineal/omental masses, 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

## 2022-12-14 NOTE — PROGRESS NOTES
Pt resting in bed awake. Alert and oriented times 3 at this time. On RA. No s/sx of distress noted. Complains for abd pain but does not want pain meds at this time. Encouraged to call for assistance as needed. Call light within reach. Will continue to monitor.

## 2022-12-14 NOTE — PROGRESS NOTES
Hospitalist Progress Note   Admit Date:  2022  2:21 AM   Name:  Nallely Kay   Age:  50 y.o. Sex:  female  :  1970   MRN:  [id number]   Room:  [facility]    Reason(s) for Admission: Ureteric stone [N20.1]     Hospital Course & Interval History:   Mrs. Sonido Brantley is a nice  51 y/o WF with a h/o IBS, recurrent kidney stones and HTN who was admitted to our service on  due to kidney stone. She presented with N/V and abdominal pain. CT showed a non-obstructive 4mm distal stone, Urology consulted, stent placed . CT also showed a new hypodensity and surrounding stranding in the left abdomen. General surgery was consulted. MRI Abdomen was done and showed multiple masses in left abdomen, with edema, findings concerning for splenule/myometrial implants. Prior MD d/w Gyn/Onc, felt to be benign and no significant change compared to 8 years ago. Recommends general surgery to evaluate for laparoscopy. Dr. Antonio Kramer consulted. Subjective/24hr Events (22): Resting comfortably, still some epigastric/LUE pain. Going for laparoscopy today. Occasional nausea still, same as yesterday but says overall better from admission. No chest pain or SOB. Assessment & Plan:   # Abdominal hyperdensity with possible splenules   - Imaging reviewed and noted. On empiric abx, blood cultures negative. Seen by Surgery and Gyn/Onc. Planning laparoscopy with Dr. Antonio Kramer this morning, . # Microcytic anemia   - Hb 12.1 this past July, on admission 11.2 with MCV 79. Hb stable at 8.9 today . Will add iron labs. # Non-obstrucing R 4mm kidney stone   - Urology consulted, s/p stent placement on . # HypoK   - Resolved. # HTN   - Prinzide    # HLD   - Statin    # MDD // anxiety   - Wellbutrin    Discharge Planning: Home when able.   Diet:  Diet NPO  DVT PPx: SCD, Lovenox held pre-operatively  Code status: Full Code    Hospital Problems             Last Modified POA    * (Principal) Ureteric stone 12/11/2022 Yes    Acute cystitis without hematuria 12/11/2022 Yes    Left arm swelling 12/12/2022 Yes    Microcytic anemia 12/14/2022 Yes    Depression 12/14/2022 Yes    Rheumatoid arthritis ([address] 12/14/2022 Yes    Hypertension 12/14/2022 Yes    Mass of peritoneum 12/14/2022 Yes    Overview Signed 12/13/2022  1:41 PM by Miguel Angel Reeves     Added automatically from request for surgery [id number]            Objective:   Patient Vitals for the past 24 hrs:   Temp Pulse Resp BP SpO2   12/14/22 0707 97.7 °F (36.5 °C) 62 18 114/65 96 %   12/14/22 0453 97.8 °F (36.6 °C) 64 18 (!) 107/56 96 %   12/14/22 0024 98.2 °F (36.8 °C) 63 18 (!) 99/51 98 %   12/13/22 1949 98.1 °F (36.7 °C) 64 18 131/72 100 %   12/13/22 1537 98.2 °F (36.8 °C) 65 18 113/61 97 %   12/13/22 1144 97.7 °F (36.5 °C) 70 17 109/60 98 %       Estimated body mass index is 35.28 kg/m² as calculated from the following:    Height as of this encounter: 5' 8\" (1.727 m). Weight as of this encounter: 232 lb (105.2 kg). No intake or output data in the 24 hours ending 12/14/22 0846      Physical Exam:   Blood pressure 114/65, pulse 62, temperature 97.7 °F (36.5 °C), temperature source Oral, resp. rate 18, height 5' 8\" (1.727 m), weight 232 lb (105.2 kg), SpO2 96 %. General:    Well nourished. No overt distress. Obese. Pleasant, awake, oriented, conversant, comfortable. Head:  Normocephalic, atraumatic  Eyes:  Sclerae appear normal. Pupils equally round. ENT:  Nares appear normal, no drainage. Moist oral mucosa  Neck:  No restricted ROM. Trachea midline. CV:   RRR. No m/r/g. No jugular venous distension. Lungs:   CTAB. No wheezing, rhonchi, or rales. Respirations even, unlabored. Abdomen: Bowel sounds present. Soft, nondistended. Tenderness to palpation of epigastrium over to LUQ. Extremities: No cyanosis or clubbing. No edema. Skin:     No rashes and normal coloration. Warm and dry. Neuro:  CN II-XII grossly intact. Sensation intact. A&Ox3  Psych:  Normal mood and affect.       I have reviewed ordered lab tests and independently visualized imaging below:    Recent Labs:  Recent Results (from the past 48 hour(s))   CBC with Auto Differential    Collection Time: 12/13/22  5:03 AM   Result Value Ref Range    WBC 4.1 (L) 4.3 - 11.1 K/uL    RBC 3.50 (L) 4.05 - 5.2 M/uL    Hemoglobin 9.0 (L) 11.7 - 15.4 g/dL    Hematocrit 28.9 (L) 35.8 - 46.3 %    MCV 82.6 82 - 102 FL    MCH 25.7 (L) 26.1 - 32.9 PG    MCHC 31.1 (L) 31.4 - 35.0 g/dL    RDW 14.8 (H) 11.9 - 14.6 %    Platelets 285 495 - 221 K/uL    MPV 9.8 9.4 - 12.3 FL    nRBC 0.00 0.0 - 0.2 K/uL    Differential Type AUTOMATED      Seg Neutrophils 51 43 - 78 %    Lymphocytes 36 13 - 44 %    Monocytes 7 4.0 - 12.0 %    Eosinophils % 6 0.5 - 7.8 %    Basophils 0 0.0 - 2.0 %    Immature Granulocytes 0 0.0 - 5.0 %    Segs Absolute 2.1 1.7 - 8.2 K/UL    Absolute Lymph # 1.5 0.5 - 4.6 K/UL    Absolute Mono # 0.3 0.1 - 1.3 K/UL    Absolute Eos # 0.2 0.0 - 0.8 K/UL    Basophils Absolute 0.0 0.0 - 0.2 K/UL    Absolute Immature Granulocyte 0.0 0.0 - 0.5 K/UL   Basic Metabolic Panel w/ Reflex to MG    Collection Time: 12/13/22  5:03 AM   Result Value Ref Range    Sodium 143 133 - 143 mmol/L    Potassium 3.5 3.5 - 5.1 mmol/L    Chloride 110 101 - 110 mmol/L    CO2 25 21 - 32 mmol/L    Anion Gap 8 2 - 11 mmol/L    Glucose 133 (H) 65 - 100 mg/dL    BUN 19 6 - 23 MG/DL    Creatinine 0.80 0.6 - 1.0 MG/DL    Est, Glom Filt Rate >60 >60 ml/min/1.73m2    Calcium 8.9 8.3 - 10.4 MG/DL   Magnesium    Collection Time: 12/13/22  5:03 AM   Result Value Ref Range    Magnesium 2.0 1.8 - 2.4 mg/dL   CBC with Auto Differential    Collection Time: 12/14/22  5:09 AM   Result Value Ref Range    WBC 4.4 4.3 - 11.1 K/uL    RBC 3.50 (L) 4.05 - 5.2 M/uL    Hemoglobin 8.9 (L) 11.7 - 15.4 g/dL    Hematocrit 28.6 (L) 35.8 - 46.3 %    MCV 81.7 (L) 82 - 102 FL    MCH 25.4 (L) 26.1 - 32.9 PG    MCHC 31.1 (L) 31.4 - 35.0 g/dL    RDW 14.7 (H) 11.9 - 14.6 %    Platelets 614 189 - 613 K/uL    MPV 9.7 9.4 - 12.3 FL    nRBC 0.00 0.0 - 0.2 K/uL    Differential Type AUTOMATED      Seg Neutrophils 55 43 - 78 %    Lymphocytes 32 13 - 44 %    Monocytes 7 4.0 - 12.0 %    Eosinophils % 5 0.5 - 7.8 %    Basophils 0 0.0 - 2.0 %    Immature Granulocytes 1 0.0 - 5.0 %    Segs Absolute 2.5 1.7 - 8.2 K/UL    Absolute Lymph # 1.4 0.5 - 4.6 K/UL    Absolute Mono # 0.3 0.1 - 1.3 K/UL    Absolute Eos # 0.2 0.0 - 0.8 K/UL    Basophils Absolute 0.0 0.0 - 0.2 K/UL    Absolute Immature Granulocyte 0.0 0.0 - 0.5 K/UL   Basic Metabolic Panel w/ Reflex to MG    Collection Time: 12/14/22  5:09 AM   Result Value Ref Range    Sodium 140 133 - 143 mmol/L    Potassium 4.2 3.5 - 5.1 mmol/L    Chloride 109 101 - 110 mmol/L    CO2 27 21 - 32 mmol/L    Anion Gap 4 2 - 11 mmol/L    Glucose 109 (H) 65 - 100 mg/dL    BUN 12 6 - 23 MG/DL    Creatinine 0.70 0.6 - 1.0 MG/DL    Est, Glom Filt Rate >60 >60 ml/min/1.73m2    Calcium 8.9 8.3 - 10.4 MG/DL         Other Studies:  MRI ABDOMEN W WO CONTRAST    Result Date: 12/12/2022  MRI OF THE ABDOMEN INDICATION: Abdominal pain, left abdominal mass Standard MRI sequences were obtained through the abdomen in multiple planes. Images were obtained before and after intravenous injection of 20 mL of ProHance. COMPARISON: Compared with multiple prior CT scans, most recently 12/10/2022 FINDINGS: - LIVER: Liver is slightly prominent, 22 cm in length. No discrete liver mass. - GALLBLADDER/BILE DUCTS: Gallbladder is absent. No bile duct dilatation. - PANCREAS: Normal. - SPLEEN: Normal. - ADRENALS: Normal. - KIDNEYS/URETERS: No hydronephrosis or significant mass. - LYMPH NODES: No significant retroperitoneal or mesenteric adenopathy. - BONES: No fracture or significant bone lesion. - OTHER: Again noted are multiple masses in the left peritoneum, mostly anterior and inferior to the kidney. The most inferior of the masses is largest, 8 cm in diameter.   It appears edematous and has an area of poor enhancement or cystic change. There is also a 4 cm mass just anterior medial to it which is edematous and shows heterogeneous enhancement. The other masses are well-defined and show uniform postcontrast enhancement. The masses have been present on multiple prior CT scans, inflammation is new. Multiple masses in the left abdomen, 2 with heterogeneous enhancement and edema. Presence of edema suggests inflammation and possible partial infarction. While these could represent splenules, they do not follow spleen signal on all sequences. Myometrial implants should be considered given absence of the uterus. Malignant transformation is also not excluded given interval change compared to prior studies.  If there are any questions about this report, I can be reached on shoply or at [phone]       Current Meds:  Current Facility-Administered Medications   Medication Dose Route Frequency    DULoxetine (CYMBALTA) extended release capsule 60 mg  60 mg Oral Daily    buPROPion (WELLBUTRIN) tablet 300 mg  300 mg Oral Daily    lisinopril-hydroCHLOROthiazide (PRINZIDE;ZESTORETIC) 20-12.5 MG per tablet 1 tablet  1 tablet Oral Daily    aspirin chewable tablet 81 mg  81 mg Oral Daily    pravastatin (PRAVACHOL) tablet 40 mg  40 mg Oral Nightly    sodium chloride flush 0.9 % injection 20 mL  20 mL IntraVENous PRN    enoxaparin Sodium (LOVENOX) injection 30 mg  30 mg SubCUTAneous Q12H    sodium chloride flush 0.9 % injection 5-40 mL  5-40 mL IntraVENous 2 times per day    sodium chloride flush 0.9 % injection 5-40 mL  5-40 mL IntraVENous PRN    0.9 % sodium chloride infusion   IntraVENous PRN    ondansetron (ZOFRAN-ODT) disintegrating tablet 4 mg  4 mg Oral Q8H PRN    Or    ondansetron (ZOFRAN) injection 4 mg  4 mg IntraVENous Q6H PRN    polyethylene glycol (GLYCOLAX) packet 17 g  17 g Oral Daily PRN    acetaminophen (TYLENOL) tablet 650 mg  650 mg Oral Q6H PRN    Or    acetaminophen (TYLENOL) suppository 650 mg  650 mg Rectal Q6H PRN    loperamide (IMODIUM) capsule 4 mg  4 mg Oral BID PRN    temazepam (RESTORIL) capsule 15 mg  15 mg Oral Nightly PRN    HYDROcodone-acetaminophen (NORCO) 7.5-325 MG per tablet 1 tablet  1 tablet Oral Q4H PRN    morphine injection 2 mg  2 mg IntraVENous Q4H PRN    cloNIDine (CATAPRES) tablet 0.2 mg  0.2 mg Oral BID PRN    hydrALAZINE (APRESOLINE) injection 10 mg  10 mg IntraVENous Q6H PRN    promethazine (PHENERGAN) 25 mg in sodium chloride 0.9 % 50 mL IVPB  25 mg IntraVENous Q8H PRN    cefTRIAXone (ROCEPHIN) 1,000 mg in sodium chloride 0.9 % 50 mL IVPB mini-bag  1,000 mg IntraVENous Q24H    metronidazole (FLAGYL) 500 mg in 0.9% NaCl 100 mL IVPB premix  500 mg IntraVENous Q12H       Signed:  Patrick Boeck, MD    Part of this note may have been written by using a voice dictation software. The note has been proof read but may still contain some grammatical/other typographical errors.

## 2022-12-14 NOTE — ANESTHESIA PROCEDURE NOTES
Airway  Date/Time: 12/14/2022 2:21 PM  Urgency: elective    Airway not difficult    General Information and Staff    Patient location during procedure: OR  Resident/CRNA: ADELSO Guillen - CRNA  Performed: resident/CRNA     Indications and Patient Condition  Indications for airway management: anesthesia  Spontaneous Ventilation: absent  Sedation level: deep  Preoxygenated: yes  Patient position: sniffing  MILS not maintained throughout  Mask difficulty assessment: vent by bag mask + OA or adjuvant +/- NMBA    Final Airway Details  Final airway type: endotracheal airway      Successful airway: ETT  Cuffed: yes   Successful intubation technique: direct laryngoscopy  Facilitating devices/methods: intubating stylet and Bougie  Endotracheal tube insertion site: oral  Blade: Salena  Blade size: #4  ETT size (mm): 7.0  Cormack-Lehane Classification: grade IIb - view of arytenoids or posterior of glottis only  Placement verified by: chest auscultation and capnometry   Measured from: lips  ETT to lips (cm): 21  Number of attempts at approach: 1  Ventilation between attempts: bag mask  Number of other approaches attempted: 0    Additional Comments  Placed by Elen RICH  no

## 2022-12-15 VITALS
HEIGHT: 68 IN | RESPIRATION RATE: 18 BRPM | SYSTOLIC BLOOD PRESSURE: 115 MMHG | BODY MASS INDEX: 35.16 KG/M2 | TEMPERATURE: 97.9 F | WEIGHT: 232 LBS | HEART RATE: 74 BPM | OXYGEN SATURATION: 99 % | DIASTOLIC BLOOD PRESSURE: 57 MMHG

## 2022-12-15 PROBLEM — N20.0 KIDNEY STONE: Status: ACTIVE | Noted: 2022-12-15

## 2022-12-15 PROBLEM — M79.89 LEFT ARM SWELLING: Status: RESOLVED | Noted: 2022-12-12 | Resolved: 2022-12-15

## 2022-12-15 PROBLEM — N20.1 URETERIC STONE: Status: RESOLVED | Noted: 2022-12-11 | Resolved: 2022-12-15

## 2022-12-15 PROBLEM — N30.00 ACUTE CYSTITIS WITHOUT HEMATURIA: Status: RESOLVED | Noted: 2022-12-11 | Resolved: 2022-12-15

## 2022-12-15 LAB
ANION GAP SERPL CALC-SCNC: 5 MMOL/L (ref 2–11)
BUN SERPL-MCNC: 10 MG/DL (ref 6–23)
CALCIUM SERPL-MCNC: 8.9 MG/DL (ref 8.3–10.4)
CHLORIDE SERPL-SCNC: 105 MMOL/L (ref 101–110)
CO2 SERPL-SCNC: 30 MMOL/L (ref 21–32)
CREAT SERPL-MCNC: 0.7 MG/DL (ref 0.6–1)
ERYTHROCYTE [DISTWIDTH] IN BLOOD BY AUTOMATED COUNT: 14.6 % (ref 11.9–14.6)
GLUCOSE SERPL-MCNC: 145 MG/DL (ref 65–100)
HCT VFR BLD AUTO: 30.5 % (ref 35.8–46.3)
HGB BLD-MCNC: 9.2 G/DL (ref 11.7–15.4)
MCH RBC QN AUTO: 25 PG (ref 26.1–32.9)
MCHC RBC AUTO-ENTMCNC: 30.2 G/DL (ref 31.4–35)
MCV RBC AUTO: 82.9 FL (ref 82–102)
NRBC # BLD: 0 K/UL (ref 0–0.2)
PLATELET # BLD AUTO: 203 K/UL (ref 150–450)
PMV BLD AUTO: 9.6 FL (ref 9.4–12.3)
POTASSIUM SERPL-SCNC: 4.4 MMOL/L (ref 3.5–5.1)
RBC # BLD AUTO: 3.68 M/UL (ref 4.05–5.2)
SODIUM SERPL-SCNC: 140 MMOL/L (ref 133–143)
WBC # BLD AUTO: 6.3 K/UL (ref 4.3–11.1)

## 2022-12-15 PROCEDURE — 36415 COLL VENOUS BLD VENIPUNCTURE: CPT

## 2022-12-15 PROCEDURE — 6370000000 HC RX 637 (ALT 250 FOR IP): Performed by: INTERNAL MEDICINE

## 2022-12-15 PROCEDURE — 6360000002 HC RX W HCPCS: Performed by: PHYSICIAN ASSISTANT

## 2022-12-15 PROCEDURE — 80048 BASIC METABOLIC PNL TOTAL CA: CPT

## 2022-12-15 PROCEDURE — 2580000003 HC RX 258: Performed by: PHYSICIAN ASSISTANT

## 2022-12-15 PROCEDURE — 6370000000 HC RX 637 (ALT 250 FOR IP): Performed by: PHYSICIAN ASSISTANT

## 2022-12-15 PROCEDURE — 85027 COMPLETE CBC AUTOMATED: CPT

## 2022-12-15 PROCEDURE — 2500000003 HC RX 250 WO HCPCS: Performed by: PHYSICIAN ASSISTANT

## 2022-12-15 RX ORDER — FERROUS SULFATE 325(65) MG
325 TABLET ORAL 2 TIMES DAILY WITH MEALS
Status: DISCONTINUED | OUTPATIENT
Start: 2022-12-15 | End: 2022-12-15 | Stop reason: HOSPADM

## 2022-12-15 RX ORDER — ONDANSETRON 4 MG/1
4 TABLET, FILM COATED ORAL DAILY PRN
Qty: 30 TABLET | Refills: 0 | Status: SHIPPED | OUTPATIENT
Start: 2022-12-15

## 2022-12-15 RX ORDER — FERROUS SULFATE 325(65) MG
325 TABLET ORAL 2 TIMES DAILY WITH MEALS
Qty: 60 TABLET | Refills: 0 | Status: SHIPPED | OUTPATIENT
Start: 2022-12-15

## 2022-12-15 RX ORDER — HYDROCODONE BITARTRATE AND ACETAMINOPHEN 7.5; 325 MG/1; MG/1
1 TABLET ORAL EVERY 4 HOURS PRN
Qty: 18 TABLET | Refills: 0 | Status: SHIPPED | OUTPATIENT
Start: 2022-12-15 | End: 2022-12-18

## 2022-12-15 RX ADMIN — MORPHINE SULFATE 2 MG: 2 INJECTION, SOLUTION INTRAMUSCULAR; INTRAVENOUS at 12:43

## 2022-12-15 RX ADMIN — HYDROCODONE BITARTRATE AND ACETAMINOPHEN 1 TABLET: 7.5; 325 TABLET ORAL at 08:35

## 2022-12-15 RX ADMIN — MORPHINE SULFATE 2 MG: 2 INJECTION, SOLUTION INTRAMUSCULAR; INTRAVENOUS at 06:33

## 2022-12-15 RX ADMIN — MORPHINE SULFATE 2 MG: 2 INJECTION, SOLUTION INTRAMUSCULAR; INTRAVENOUS at 01:29

## 2022-12-15 RX ADMIN — LISINOPRIL AND HYDROCHLOROTHIAZIDE 1 TABLET: 12.5; 2 TABLET ORAL at 08:37

## 2022-12-15 RX ADMIN — BUPROPION HYDROCHLORIDE 300 MG: 75 TABLET, FILM COATED ORAL at 08:37

## 2022-12-15 RX ADMIN — DULOXETINE HYDROCHLORIDE 60 MG: 30 CAPSULE, DELAYED RELEASE ORAL at 08:36

## 2022-12-15 RX ADMIN — ENOXAPARIN SODIUM 30 MG: 100 INJECTION SUBCUTANEOUS at 08:37

## 2022-12-15 RX ADMIN — FERROUS SULFATE TAB 325 MG (65 MG ELEMENTAL FE) 325 MG: 325 (65 FE) TAB at 08:37

## 2022-12-15 RX ADMIN — METRONIDAZOLE 500 MG: 500 INJECTION, SOLUTION INTRAVENOUS at 04:58

## 2022-12-15 RX ADMIN — HYDROCODONE BITARTRATE AND ACETAMINOPHEN 1 TABLET: 7.5; 325 TABLET ORAL at 03:38

## 2022-12-15 RX ADMIN — ASPIRIN 81 MG: 81 TABLET, CHEWABLE ORAL at 08:37

## 2022-12-15 RX ADMIN — SODIUM CHLORIDE, PRESERVATIVE FREE 10 ML: 5 INJECTION INTRAVENOUS at 08:38

## 2022-12-15 ASSESSMENT — PAIN DESCRIPTION - DESCRIPTORS
DESCRIPTORS: SHARP
DESCRIPTORS: DISCOMFORT;SORE
DESCRIPTORS: SHARP
DESCRIPTORS: SHARP

## 2022-12-15 ASSESSMENT — PAIN DESCRIPTION - LOCATION
LOCATION: ABDOMEN

## 2022-12-15 ASSESSMENT — PAIN DESCRIPTION - ORIENTATION
ORIENTATION: LEFT;UPPER

## 2022-12-15 ASSESSMENT — PAIN SCALES - GENERAL
PAINLEVEL_OUTOF10: 6
PAINLEVEL_OUTOF10: 6
PAINLEVEL_OUTOF10: 8
PAINLEVEL_OUTOF10: 7
PAINLEVEL_OUTOF10: 8

## 2022-12-15 NOTE — PROGRESS NOTES
Patient requesting pain medication for LUQ abdominal pain 6/10. Norco given per orders. Will continue to monitor.

## 2022-12-15 NOTE — DISCHARGE INSTRUCTIONS
OK to shower    No heavy lifting over 20 lbs for 4 weeks. Avoid constipation. DISCHARGE SUMMARY from Nurse    PATIENT INSTRUCTIONS:    After general anesthesia or intravenous sedation, for 24 hours or while taking prescription Narcotics:  Limit your activities  Do not drive and operate hazardous machinery  Do not make important personal or business decisions  Do  not drink alcoholic beverages  If you have not urinated within 8 hours after discharge, please contact your surgeon on call. Report the following to your surgeon:  Excessive pain, swelling, redness or odor of or around the surgical area  Temperature over 100.5  Nausea and vomiting lasting longer than 4 hours or if unable to take medications  Any signs of decreased circulation or nerve impairment to extremity: change in color, persistent  numbness, tingling, coldness or increase pain  Any questions    What to do at Home:  Recommended activity: activity as tolerated,     If you experience any of the following symptoms shortness of breath not relieved by rest, pain not relieved by medication, chest pain or pressure, temperature greater than 101, increase in swelling or weakness, nausea, vomiting, or diarrhea, please follow up with MD.    *  Please give a list of your current medications to your Primary Care Provider. *  Please update this list whenever your medications are discontinued, doses are      changed, or new medications (including over-the-counter products) are added. *  Please carry medication information at all times in case of emergency situations. These are general instructions for a healthy lifestyle:    No smoking/ No tobacco products/ Avoid exposure to second hand smoke  Surgeon General's Warning:  Quitting smoking now greatly reduces serious risk to your health.     Obesity, smoking, and sedentary lifestyle greatly increases your risk for illness    A healthy diet, regular physical exercise & weight monitoring are important for maintaining a healthy lifestyle    You may be retaining fluid if you have a history of heart failure or if you experience any of the following symptoms:  Weight gain of 3 pounds or more overnight or 5 pounds in a week, increased swelling in our hands or feet or shortness of breath while lying flat in bed. Please call your doctor as soon as you notice any of these symptoms; do not wait until your next office visit. The discharge information has been reviewed with the patient. The patient verbalized understanding. Discharge medications reviewed with the patient and appropriate educational materials and side effects teaching were provided.   ___________________________________________________________________________________________________________________________________

## 2022-12-15 NOTE — PROGRESS NOTES
Instructed by surgery to remove loose staple. Staple removed with no complication.  Steri-strip placed to skin after removal.

## 2022-12-15 NOTE — DISCHARGE SUMMARY
Hospitalist Discharge Summary   Admit Date:  2022  2:21 AM   DC Note date: 12/15/2022  Name:  Nallely Kay   Age:  50 y.o. Sex:  female  :  1970   MRN:  [id number]   [facility]  PCP:  Elena Norton MD    Presenting Complaint: No chief complaint on file. Initial Admission Diagnosis: Ureteric stone [N20.1]     Problem List for this Hospitalization (present on admission):    Principal Problem (Resolved):    Ureteric stone  Active Problems:    Microcytic anemia    Depression    Rheumatoid arthritis ([address]    Hypertension    Mass of peritoneum  Resolved Problems:    Acute cystitis without hematuria    Left arm swelling    Hypokalemia      Hospital Course:  Mrs. Sonido Brantley is a nice  51 y/o WF with a h/o IBS, recurrent kidney stones and HTN who was admitted to our service on  due to kidney stone. She presented with N/V and abdominal pain. CT showed a non-obstructive 4mm distal stone, Urology consulted, stent placed . CT also showed a new hypodensity and surrounding stranding in the left abdomen. General surgery was consulted. MRI abdomen was done and showed multiple masses in left abdomen with edema, findings concerning for splenule/myometrial implants. Prior MD d/w Gyn/Onc, felt to be benign and no significant change compared to 8 years ago. She underwent diagnostic laparoscopy with Dr. Antonio Kramer on  and required peritoneal/omental mass resection with adhesiolysis. Oral iron was added for RACHAEL. Electrolytes were replaced as needed. She is tolerating her diet and passing gas. Alberts removed. Afebrile. Pain is well controlled. She will need outpatient Surgery and Urology follow up. She is medically stable for discharge. Disposition: Home  Diet: ADULT DIET; Regular  Code Status: Full Code    Follow Ups:   Follow-up Information     Leonel Martines MD Follow up in 10 day(s).     Specialty: General Surgery  Contact information:  [address] Aneesh Field MD Follow up. Specialty: Urology  Why: Hospital follow up -- kidney stone, stent placement  Office will call patient  Contact information:  [address], MD Follow up in 2 week(s). Specialty: Family Medicine  Why: Hospital follow up -- kidney stone  Contact information:  [address]  [phone]                       Time spent in patient discharge and coordination 35 minutes. Follow up labs/diagnostics (ultimately defer to outpatient provider):  N/A    Plan was discussed with patient, RN, CM. All questions answered. Patient was stable at time of discharge. Instructions given to call a physician or return if any concerns. Current Discharge Medication List        START taking these medications    Details   HYDROcodone-acetaminophen (NORCO) 7.5-325 MG per tablet Take 1 tablet by mouth every 4 hours as needed for Pain for up to 3 days. Qty: 18 tablet, Refills: 0    Comments: Reduce doses taken as pain becomes manageable  Associated Diagnoses: Ureteric stone; Other chronic postprocedural pain      ondansetron (ZOFRAN) 4 MG tablet Take 1 tablet by mouth daily as needed for Nausea or Vomiting  Qty: 30 tablet, Refills: 0      ferrous sulfate (IRON 325) 325 (65 Fe) MG tablet Take 1 tablet by mouth 2 times daily (with meals)  Qty: 60 tablet, Refills: 0           CONTINUE these medications which have NOT CHANGED    Details   pravastatin (PRAVACHOL) 40 MG tablet Take 40 mg by mouth every evening      DULoxetine (CYMBALTA) 60 MG extended release capsule Take 60 mg by mouth daily      lisinopril-hydroCHLOROthiazide (PRINZIDE;ZESTORETIC) 20-12.5 MG per tablet Take 1 tablet by mouth in the morning. buPROPion (WELLBUTRIN) 100 MG tablet Take 300 mg by mouth in the morning.       omeprazole (PRILOSEC) 20 MG delayed release capsule Take 40 mg by mouth in the morning. aspirin 81 MG chewable tablet Take 81 mg by mouth in the morning. STOP taking these medications       nitrofurantoin (MACRODANTIN) 50 MG capsule Comments:   Reason for Stopping:         tamsulosin (FLOMAX) 0.4 MG capsule Comments:   Reason for Stopping:               Procedures done this admission:  Procedure(s):  LAPAROSCOPY DIAGNOSTIC POSSIBLE REMOVAL OF ABDOMINAL MASS    Consults this admission:  IP CONSULT TO UROLOGY  IP CONSULT TO GENERAL SURGERY  IP CONSULT TO ONCOLOGY  IP CONSULT TO OB GYN  IP CONSULT TO GENERAL SURGERY    Echocardiogram results:  No results found for this or any previous visit. Diagnostic Imaging/Tests:   MRI ABDOMEN W WO CONTRAST    Result Date: 12/12/2022  Multiple masses in the left abdomen, 2 with heterogeneous enhancement and edema. Presence of edema suggests inflammation and possible partial infarction. While these could represent splenules, they do not follow spleen signal on all sequences. Myometrial implants should be considered given absence of the uterus. Malignant transformation is also not excluded given interval change compared to prior studies. If there are any questions about this report, I can be reached on [employer] or at [phone]     DELMY SHAINA DIGITAL SCREEN BILATERAL    Result Date: 11/30/2022  1. No evidence of malignancy. Recommend mammogram in one year. Reminder letter will be scheduled. 2. Supplemental screening breast MRI may be beneficial in the interval if clinically appropriate, given dense parenchyma and elevated risk with the patient's family history of breast cancer. The Tyrer-Cuzick model calculated a lifetime breast cancer risk of 20-25%. BI-RADS Assessment Category 1: Negative. BD2     CT ABDOMEN PELVIS RENAL STONE    Result Date: 12/10/2022  1. Nonobstructing 4 mm stone in the distal right ureter.  2. There is new hypodensity and surrounding stranding in one of the multiple long-term left abdomen soft tissue densities, which are presumably splenules. The new hypodensity and surrounding stranding may relate to torsion although superimposed infection is not excluded. No associated gas or fluid is seen. 3. Left kidney stones. 4. Prior cholecystectomy, appendectomy and hysterectomy. Labs: Results:       BMP, Mg, Phos Recent Labs     12/13/22  0503 12/14/22  0509 12/15/22  0339    140 140   K 3.5 4.2 4.4    109 105   CO2 25 27 30   ANIONGAP 8 4 5   BUN 19 12 10   CREATININE 0.80 0.70 0.70   LABGLOM >60 >60 >60   CALCIUM 8.9 8.9 8.9   GLUCOSE 133* 109* 145*   MG 2.0  --   --       CBC Recent Labs     12/13/22  0503 12/14/22  0509 12/15/22  0339   WBC 4.1* 4.4 6.3   RBC 3.50* 3.50* 3.68*   HGB 9.0* 8.9* 9.2*   HCT 28.9* 28.6* 30.5*   MCV 82.6 81.7* 82.9   MCH 25.7* 25.4* 25.0*   MCHC 31.1* 31.1* 30.2*   RDW 14.8* 14.7* 14.6    164 203   MPV 9.8 9.7 9.6   NRBC 0.00 0.00 0.00   SEGS 51 55  --    LYMPHOPCT 36 32  --    EOSRELPCT 6 5  --    MONOPCT 7 7  --    BASOPCT 0 0  --    IMMGRAN 0 1  --    SEGSABS 2.1 2.5  --    LYMPHSABS 1.5 1.4  --    EOSABS 0.2 0.2  --    MONOSABS 0.3 0.3  --    BASOSABS 0.0 0.0  --    ABSIMMGRAN 0.0 0.0  --       LFT No results for input(s): BILITOT, BILIDIR, ALKPHOS, AST, ALT, PROT, LABALBU, GLOB in the last 72 hours.    Cardiac  No results found for: NTPROBNP, TROPHS   Coags Lab Results   Component Value Date/Time    PROTIME 14.2 09/26/2019 08:20 PM    INR 1.2 09/26/2019 08:20 PM    APTT 31.0 09/26/2019 08:23 PM      A1c Lab Results   Component Value Date/Time    LABA1C 6.4 02/16/2021 05:02 AM    LABA1C 6.4 06/29/2020 04:34 PM    LABA1C 6.5 09/28/2019 04:32 AM     02/16/2021 05:02 AM     06/29/2020 04:34 PM     09/28/2019 04:32 AM      Lipids Lab Results   Component Value Date/Time    CHOL 181 09/28/2019 04:32 AM    LDLCALC 66.4 09/28/2019 04:32 AM    LABVLDL 74.6 09/28/2019 04:32 AM    HDL 40 09/28/2019 04:32 AM    CHOLHDLRATIO 4.5 09/28/2019 04:32 AM    TRIG 373 09/28/2019 04:32 AM      Thyroid  No results found for: [place]     Most Recent UA Lab Results   Component Value Date/Time    COLORU YELLOW 12/10/2022 10:38 PM    APPEARANCE CLEAR 12/10/2022 10:38 PM    SPECGRAV >=1.030 12/10/2022 10:38 PM    LABPH 6.0 12/10/2022 10:38 PM    PROTEINU 30 12/10/2022 10:38 PM    GLUCOSEU Negative 12/10/2022 10:38 PM    KETUA Negative 12/10/2022 10:38 PM    BILIRUBINUR SMALL 12/10/2022 10:38 PM    BILIRUBINUR NEGATIVE 09/28/2019 10:56 AM    BLOODU Negative 12/10/2022 10:38 PM    UROBILINOGEN 1.0 12/10/2022 10:38 PM    NITRU Positive 12/10/2022 10:38 PM    LEUKOCYTESUR Negative 12/10/2022 10:38 PM    WBCUA 0 12/10/2022 10:38 PM    RBCUA 0 12/10/2022 10:38 PM    EPITHUA 5-10 12/10/2022 10:38 PM    BACTERIA 3+ 12/10/2022 10:38 PM    LABCAST 0 12/10/2022 10:38 PM    MUCUS 4+ 12/10/2022 10:38 PM        Recent Labs     12/10/22  2359 12/10/22  2347   CULTURE NO GROWTH 3 DAYS NO GROWTH 3 DAYS       All Labs from Last 24 Hrs:  Recent Results (from the past 24 hour(s))   CBC    Collection Time: 12/15/22  3:39 AM   Result Value Ref Range    WBC 6.3 4.3 - 11.1 K/uL    RBC 3.68 (L) 4.05 - 5.2 M/uL    Hemoglobin 9.2 (L) 11.7 - 15.4 g/dL    Hematocrit 30.5 (L) 35.8 - 46.3 %    MCV 82.9 82 - 102 FL    MCH 25.0 (L) 26.1 - 32.9 PG    MCHC 30.2 (L) 31.4 - 35.0 g/dL    RDW 14.6 11.9 - 14.6 %    Platelets 056 687 - 659 K/uL    MPV 9.6 9.4 - 12.3 FL    nRBC 0.00 0.0 - 0.2 K/uL   Basic Metabolic Panel w/ Reflex to MG    Collection Time: 12/15/22  3:39 AM   Result Value Ref Range    Sodium 140 133 - 143 mmol/L    Potassium 4.4 3.5 - 5.1 mmol/L    Chloride 105 101 - 110 mmol/L    CO2 30 21 - 32 mmol/L    Anion Gap 5 2 - 11 mmol/L    Glucose 145 (H) 65 - 100 mg/dL    BUN 10 6 - 23 MG/DL    Creatinine 0.70 0.6 - 1.0 MG/DL    Est, Glom Filt Rate >60 >60 ml/min/1.73m2    Calcium 8.9 8.3 - 10.4 MG/DL       Allergies   Allergen Reactions    Penicillins Hives     Tolerates cephalosporins     Immunization History   Administered Date(s) Administered    PPD Test 09/28/2019    Tdap (Boostrix, Adacel) 09/21/2018       Recent Vital Data:  Patient Vitals for the past 24 hrs:   Temp Pulse Resp BP SpO2   12/15/22 1113 97.9 °F (36.6 °C) 74 18 (!) 115/57 99 %   12/15/22 0744 97.6 °F (36.4 °C) 82 18 108/61 98 %   12/15/22 0623 98 °F (36.7 °C) 98 16 (!) 139/52 98 %   12/15/22 0042 98.2 °F (36.8 °C) 69 16 117/63 100 %   12/14/22 2005 97.9 °F (36.6 °C) 83 18 125/68 97 %   12/14/22 1725 97.7 °F (36.5 °C) 70 15 131/67 99 %   12/14/22 1702 97.6 °F (36.4 °C) 69 14 123/60 100 %   12/14/22 1657 -- 68 16 (!) 115/56 96 %   12/14/22 1652 -- 71 14 (!) 118/58 100 %   12/14/22 1647 -- 76 12 (!) 100/44 99 %   12/14/22 1642 -- 71 14 (!) 130/56 94 %   12/14/22 1637 -- 74 16 131/63 97 %   12/14/22 1632 -- 71 14 126/61 93 %   12/14/22 1627 -- 76 16 131/63 98 %   12/14/22 1622 -- 75 16 132/62 98 %   12/14/22 1615 -- 76 14 134/63 99 %   12/14/22 1612 97.4 °F (36.3 °C) 82 16 (!) 134/59 95 %       Oxygen Therapy  SpO2: 99 %  Pulse via Oximetry: 67 beats per minute  Pulse Oximeter Device Mode: Continuous  Pulse Oximeter Device Location: Left, Finger  O2 Device: None (Room air)  O2 Flow Rate (L/min): 4 L/min    Estimated body mass index is 35.28 kg/m² as calculated from the following:    Height as of this encounter: 5' 8\" (1.727 m). Weight as of this encounter: 232 lb (105.2 kg). Intake/Output Summary (Last 24 hours) at 12/15/2022 1253  Last data filed at 12/15/2022 1022  Gross per 24 hour   Intake 1610 ml   Output 1300 ml   Net 310 ml         Physical Exam:  General:    Well nourished. Obese. Pleasant, wake in bed, NAD. Head:  Normocephalic, atraumatic  Eyes:  Sclerae appear normal.  Pupils equally round. HENT:  Nares appear normal, no drainage. Moist mucous membranes  Neck:  No restricted ROM. Trachea midline  CV:   RRR. No m/r/g. No JVD  Lungs:   CTAB. No wheezing, rhonchi, or rales.   Respirations even, unlabored  Abdomen:   Soft, nondistended. Mild ttp midline. Surgical incision healing well, staples in tact. Extremities: Warm and dry. No cyanosis or clubbing. No edema. Skin:     No rashes. Normal coloration  Neuro:  CN II-XII grossly intact. Psych:  Normal mood and affect. Signed:  Fady Valles MD    Part of this note may have been written by using a voice dictation software. The note has been proof read but may still contain some grammatical/other typographical errors.

## 2022-12-15 NOTE — PROGRESS NOTES
Patient called for severe constant left upper quadrant pain in the abdomen. Patient states the pain takes her breath away when site is touched. Patient states the pain does not radiate. Patient was give Morphine 2 mg per orders. Perfect serve message sent to Surgery NP Julio Cordoba. New orders of 1 mg Dilaudid were received and given to patient. 23:08 Patient continues to have sever pain of 9/10 in the LUQ. Patient is given Norco per orders. Message via [facility] sent to Surgery NP Dorothy Funez again about patients concerns for bleeding or complications. Patient and  inquiring if a CT is warranted. NP Frommel suggests to order Hgb if bleeding is a concern. Will continue to try and get patients pain under control and monitor for further concerns from the patient and family.

## 2022-12-15 NOTE — OP NOTE
[address] [facility]  OPERATIVE REPORT    Name:  Raffy Manning  MR#:  [id number]  :  1970  ACCOUNT #:  [de-identified]  DATE OF SERVICE:  2022    PREOPERATIVE DIAGNOSIS:  Multiple peritoneal mass with severe pain. POSTOPERATIVE DIAGNOSIS:  Multiple peritoneal/omental masses with internal hemorrhage within the largest mass. PROCEDURE PERFORMED:  Laparoscopic peritoneal/omental mass resection with hand assistance, the largest mass is over 10 cm. SURGEON:  Master Redding MD    ANESTHESIA:  general.    COMPLICATIONS:  none. SPECIMENS REMOVED:  as above. IMPLANTS:  none. ESTIMATED BLOOD LOSS:  200 ml. INDICATION:  This is a 74-year-old female presented with actually a left mid abdominal pain over last few weeks and it was severe and I brought her to the emergency room. She was admitted and the CT scan showed a persistent, slightly enlarged peritoneal mass with inflammation. She did have history of peritoneal mass for years and which was considered as a splenule, and the new symptoms suggest either hemorrhage all portion of this relatively benign structures. Given her symptoms, I offered surgery for exploration and removal at the same time. The patient understood risks, benefits and agreed to proceed. 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 The largest one has internal hemorrhage with surrounding inflammation, which could be responsible for her symptoms. PROCEDURE:  After informed consent obtained, the patient brought into the operating room. General anesthesia was administered. As noted, this patient is very obese and she had a previous infraumbilical incision. We took the same incision, dissected through bunch of scars and then the Robbie cannula was inserted.   We immediately encountered quite a bit of adhesions around the umbilicus and I was able to get it through the omental adhesions and to visualize the right abdomen. Two more 5-mm trocar placed in the right abdomen and then I started lysis of adhesions and this was done with a bipolar device and after umbilicus was released and then we were able to examine the peritoneal cavity. There are multiple peritoneal omental mass encountered at the left upper quadrant. This is below the colons and is consistent with ectopic spleen or splenule, and the largest lines were definitely inflamed and densely stuck to the surrounding tissue and clearly had internal hemorrhage. Given the size of these masses, I felt the hand assistance is necessary and I used some small epigastric midline incision and then the GelPort was placed and so with the hand assistance and I started to dissect the omentum and especially from inflamed surrounding tissues and with both hand dissection and the bipolar device. As noted, the spleen and the omentum was very vascular. Great precaution was used to avoid profuse bleeding. Some area that was really close to the transverse colons, but we were able to hold the mass off the colons and so dissection was performed just of the colons and eventually we able to dissect multiple cluster mass together and this was placed into a Cook bag and then retrieved from the mini laparotomy incision. As noted, the mass was not ruptured during dissection. Then, the surgical field inspected and great care was used for hemostasis and that there were no active bleeding at the end of the case. Intestine all protected during the entire case and then all the trocar removed. Pneumoperitoneum was evacuated. Infraumbilical incision was closed on the fascia with 0 Vicryl in figure-of-eight fashion. The minilaparotomy incision was closed on the fascia with 0 looped PDS in a running fashion. Skin closed with staples. The patient tolerated the procedure well, transferred to recovery room in stable condition.   All needle, instrument count and lap count were correct. Estimated blood loss was about 200 mL.       Roz Gautam MD      [id number]  D:  12/14/2022 16:25  T:  12/15/2022 6:04  JOB #:  [id number]

## 2022-12-15 NOTE — PROGRESS NOTES
Patient requesting pain medication for LUQ abdominal pain 7/10. Morphine given per orders.  Will continue to monitor

## 2022-12-15 NOTE — TELEPHONE ENCOUNTER
Procedures: Procedure(s):   CYSTOSCOPY, RIGHT URETEROSCOPY, LASER LITHOTRIPSY, RIGHT URETERAL STENT EXCHANGE   Date: 1/10/2023   Time: 1217   Location: [facility] MAIN OR  CYSTO     Scheduled. Please complete orders and let me know when to schedule post op appt. Thank you.

## 2022-12-15 NOTE — CARE COORDINATION
Discharge order is in. Pt is discharging home today in stable condition. No discharge needs were identified. Tx goals met. 12/15/22 [id number] [facility] Discharge   Transition of Care Consult (CM Consult) Discharge [address] Discharge None    Resource Information Provided? No   Mode of Transport at Discharge Other (see comment)  (Family)   Confirm Follow Up Transport Self   Condition of Participation: Discharge Planning   The Patient and/or Patient Representative was provided with a Choice of Provider? Patient   The Patient and/Or Patient Representative agree with the Discharge Plan? Yes   Freedom of Choice list was provided with basic dialogue that supports the patient's individualized plan of care/goals, treatment preferences, and shares the quality data associated with the providers?   Yes

## 2022-12-15 NOTE — PROGRESS NOTES
Received report from Tara Guerrero. Patient is resting in the bed.  is at the bedside. Patient is alert and oriented times 4. Patient is on 4L NC with no s/s of distress. Alberts is patent and draining. Call bell is within reach, patient is instructed to call for assistance. Will continue to monitor.

## 2022-12-15 NOTE — PROGRESS NOTES
Discharge instructions and paperwork reviewed with patient and . Meds reviewed. Paper prescriptions being sent home. IV s removed. Signature obtained and placed in chart. Pt to be discharged home when ride arrives.

## 2022-12-15 NOTE — PLAN OF CARE
Problem: Discharge Planning  Goal: Discharge to home or other facility with appropriate resources  Outcome: Adequate for Discharge     Problem: Pain  Goal: Verbalizes/displays adequate comfort level or baseline comfort level  Outcome: Adequate for Discharge     Problem: Safety - Adult  Goal: Free from fall injury  Outcome: Adequate for Discharge

## 2022-12-15 NOTE — PROGRESS NOTES
Admit Date: 2022    POD 1 Day Post-Op    Procedure:  Procedure(s):  LAPAROSCOPY DIAGNOSTIC POSSIBLE REMOVAL OF ABDOMINAL MASS    Subjective: The patient is lying in bed. States that her pain is well controlled. She is tolerating a regular diet. She denies any nausea or emesis. Objective:       Vitals:    12/14/22 2005 12/15/22 0042 12/15/22 0623 12/15/22 0744   BP: 125/68 117/63 (!) 139/52 108/61   Pulse: 83 69 98 82   Resp: 18 16 16 18   Temp: 97.9 °F (36.6 °C) 98.2 °F (36.8 °C) 98 °F (36.7 °C) 97.6 °F (36.4 °C)   TempSrc: Oral Oral Oral Oral   SpO2: 97% 100% 98% 98%   Weight:       Height:           Temp (24hrs), Av.8 °F (36.6 °C), Min:97.3 °F (36.3 °C), Max:98.4 °F (36.9 °C)  . I&O reviewed as documented. Constitutional: Alert, oriented, cooperative patient in no acute distress; appears stated age    Eyes:Sclera are clear. EOMs intact  ENMT: no external lesions gross hearing normal; no obvious neck masses, no ear or lip lesions, nares normal  CV: RRR. Normal perfusion  Resp: No JVD. Breathing is  non-labored; no audible wheezing. GI: soft and non-distended, incisionally tender, no rebounding tenderness or guarding. Incisions are clean, dry, and intact     Musculoskeletal: unremarkable with normal function. No embolic signs or cyanosis.    Neuro:  Oriented; moves all 4; no focal deficits  Psychiatric: normal affect and mood, no memory impairment    Labs:   Recent Results (from the past 24 hour(s))   CBC    Collection Time: 12/15/22  3:39 AM   Result Value Ref Range    WBC 6.3 4.3 - 11.1 K/uL    RBC 3.68 (L) 4.05 - 5.2 M/uL    Hemoglobin 9.2 (L) 11.7 - 15.4 g/dL    Hematocrit 30.5 (L) 35.8 - 46.3 %    MCV 82.9 82 - 102 FL    MCH 25.0 (L) 26.1 - 32.9 PG    MCHC 30.2 (L) 31.4 - 35.0 g/dL    RDW 14.6 11.9 - 14.6 %    Platelets 435 861 - 162 K/uL    MPV 9.6 9.4 - 12.3 FL    nRBC 0.00 0.0 - 0.2 K/uL   Basic Metabolic Panel w/ Reflex to MG    Collection Time: 12/15/22  3:39 AM   Result Value Ref Range    Sodium 140 133 - 143 mmol/L    Potassium 4.4 3.5 - 5.1 mmol/L    Chloride 105 101 - 110 mmol/L    CO2 30 21 - 32 mmol/L    Anion Gap 5 2 - 11 mmol/L    Glucose 145 (H) 65 - 100 mg/dL    BUN 10 6 - 23 MG/DL    Creatinine 0.70 0.6 - 1.0 MG/DL    Est, Glom Filt Rate >60 >60 ml/min/1.73m2    Calcium 8.9 8.3 - 10.4 MG/DL           Assessment:     Principal Problem:    Ureteric stone  Active Problems:    Acute cystitis without hematuria    Left arm swelling    Microcytic anemia    Depression    Rheumatoid arthritis (HCC)    Hypertension    Mass of peritoneum  Resolved Problems:    Hypokalemia        Plan/Recommendations/Medical Decision Making:     Regular diet  Remove Alberts catheter  Encouraged increased level of activity  The patient may be discharged home from a surgical standpoint when medically cleared  Follow-up in our office in about 1 week    Leonela De PA-C

## 2022-12-15 NOTE — PROGRESS NOTES
Patient received 2 mg Morphine foe her LUQ abdominal pain , 6/10. Pain is more well controlled per the patient. Patient is resting comfortably in the bed. Patient is on RA with no s/s of distress. Call bell is within reach. Preparing to give bedside report.

## 2022-12-15 NOTE — PROGRESS NOTES
Staple at end of incision above naval is coming out. Surgery PA notified via perfect serve. Waiting on response.

## 2022-12-16 DIAGNOSIS — K66.8 MASS OF PERITONEUM: Primary | ICD-10-CM

## 2022-12-16 RX ORDER — METHOCARBAMOL 750 MG/1
750 TABLET, FILM COATED ORAL 4 TIMES DAILY
Qty: 40 TABLET | Refills: 0 | Status: SHIPPED | OUTPATIENT
Start: 2022-12-16 | End: 2022-12-26

## 2022-12-16 RX ORDER — GABAPENTIN 300 MG/1
300 CAPSULE ORAL 3 TIMES DAILY
Qty: 30 CAPSULE | Refills: 0 | Status: SHIPPED | OUTPATIENT
Start: 2022-12-16 | End: 2022-12-26

## 2022-12-16 RX ORDER — OXYCODONE AND ACETAMINOPHEN 10; 325 MG/1; MG/1
1 TABLET ORAL EVERY 6 HOURS PRN
Qty: 20 TABLET | Refills: 0 | Status: SHIPPED | OUTPATIENT
Start: 2022-12-16 | End: 2022-12-21

## 2023-01-05 RX ORDER — HYDROCODONE BITARTRATE AND ACETAMINOPHEN 5; 325 MG/1; MG/1
1 TABLET ORAL EVERY 6 HOURS PRN
Status: ON HOLD | COMMUNITY
End: 2023-01-10 | Stop reason: SDUPTHER

## 2023-01-05 NOTE — PRE-PROCEDURE INSTRUCTIONS
Patient verified name and . Order for consent was found in EHR and matches case posting; patient verifies procedure. Type lB surgery, phone assessment complete. Orders were received. Labs per surgeon: noted in 3 Linette Naik per anesthesia protocol: POC Glucose    Patient answered medical/surgical history questions at their best of ability. All prior to admission medications documented in [facility]. Patient instructed to take the following medications the day of surgery according to anesthesia guidelines with a small sip of water: Wellbutrin, Cymbalta, Norco if needed, Zofran or Phenergan if needed, and Omeprazole       Hold all vitamins 7 days prior to surgery and NSAIDS 5 days prior to surgery. Prescription meds to hold:Lisinopril-HCTZ and Ferrous Sulfate am of procedure    Patient instructed on the following:    > Arrive at [facility], time of arrival to be called the day before by 1700  > NPO after midnight, unless otherwise indicated, including gum, mints, and ice chips  > Responsible adult must drive patient to the hospital, stay during surgery, and patient will need supervision 24 hours after anesthesia  > Use antibacterial soap in shower the night before surgery and on the morning of surgery  > All piercings must be removed prior to arrival.    > Leave all valuables (money and jewelry) at home but bring insurance card and ID on DOS.   > You may be required to pay a deductible or co-pay on the day of your procedure. You can pre-pay by calling [phone] if your surgery is at the [facility] or [phone] if your surgery is at the [facility]. > Do not wear make-up, nail polish, lotions, cologne, perfumes, powders, or oil on skin. Artificial nails are not permitted.

## 2023-01-09 ENCOUNTER — ANESTHESIA EVENT (OUTPATIENT)
Dept: SURGERY | Age: 53
End: 2023-01-09
Payer: COMMERCIAL

## 2023-01-10 ENCOUNTER — ANESTHESIA (OUTPATIENT)
Dept: SURGERY | Age: 53
End: 2023-01-10
Payer: COMMERCIAL

## 2023-01-10 ENCOUNTER — HOSPITAL ENCOUNTER (OUTPATIENT)
Age: 53
Setting detail: OUTPATIENT SURGERY
Discharge: HOME | End: 2023-01-10
Attending: UROLOGY | Admitting: UROLOGY
Payer: COMMERCIAL

## 2023-01-10 VITALS
RESPIRATION RATE: 18 BRPM | DIASTOLIC BLOOD PRESSURE: 59 MMHG | WEIGHT: 235.2 LBS | SYSTOLIC BLOOD PRESSURE: 113 MMHG | BODY MASS INDEX: 35.64 KG/M2 | HEART RATE: 59 BPM | TEMPERATURE: 98 F | OXYGEN SATURATION: 97 % | HEIGHT: 68 IN

## 2023-01-10 DIAGNOSIS — N20.0 KIDNEY STONE: ICD-10-CM

## 2023-01-10 LAB
APPEARANCE UR: ABNORMAL
BACTERIA URNS QL MICRO: ABNORMAL /HPF
BILIRUB UR QL: NEGATIVE
CASTS URNS QL MICRO: ABNORMAL /LPF
COLOR UR: ABNORMAL
EPI CELLS #/AREA URNS HPF: ABNORMAL /HPF
GLUCOSE BLD STRIP.AUTO-MCNC: 99 MG/DL (ref 65–100)
GLUCOSE UR STRIP.AUTO-MCNC: NEGATIVE MG/DL
HGB UR QL STRIP: ABNORMAL
KETONES UR QL STRIP.AUTO: ABNORMAL MG/DL
LEUKOCYTE ESTERASE UR QL STRIP.AUTO: ABNORMAL
NITRITE UR QL STRIP.AUTO: NEGATIVE
PH UR STRIP: 6 (ref 5–9)
POTASSIUM BLD-SCNC: 4.1 MMOL/L (ref 3.5–5.1)
PROT UR STRIP-MCNC: 100 MG/DL
RBC #/AREA URNS HPF: ABNORMAL /HPF
SERVICE CMNT-IMP: NORMAL
SP GR UR REFRACTOMETRY: 1.02 (ref 1–1.02)
UROBILINOGEN UR QL STRIP.AUTO: 1 EU/DL (ref 0.2–1)
WBC URNS QL MICRO: >100 /HPF

## 2023-01-10 PROCEDURE — 2500000003 HC RX 250 WO HCPCS: Performed by: NURSE ANESTHETIST, CERTIFIED REGISTERED

## 2023-01-10 PROCEDURE — 6360000002 HC RX W HCPCS: Performed by: UROLOGY

## 2023-01-10 PROCEDURE — 82962 GLUCOSE BLOOD TEST: CPT

## 2023-01-10 PROCEDURE — 2709999900 HC NON-CHARGEABLE SUPPLY: Performed by: UROLOGY

## 2023-01-10 PROCEDURE — C1769 GUIDE WIRE: HCPCS | Performed by: UROLOGY

## 2023-01-10 PROCEDURE — 2580000003 HC RX 258: Performed by: ANESTHESIOLOGY

## 2023-01-10 PROCEDURE — 3700000001 HC ADD 15 MINUTES (ANESTHESIA): Performed by: UROLOGY

## 2023-01-10 PROCEDURE — C2617 STENT, NON-COR, TEM W/O DEL: HCPCS | Performed by: UROLOGY

## 2023-01-10 PROCEDURE — 3600000004 HC SURGERY LEVEL 4 BASE: Performed by: UROLOGY

## 2023-01-10 PROCEDURE — 3700000000 HC ANESTHESIA ATTENDED CARE: Performed by: UROLOGY

## 2023-01-10 PROCEDURE — 84132 ASSAY OF SERUM POTASSIUM: CPT

## 2023-01-10 PROCEDURE — 6360000002 HC RX W HCPCS: Performed by: NURSE ANESTHETIST, CERTIFIED REGISTERED

## 2023-01-10 PROCEDURE — 7100000010 HC PHASE II RECOVERY - FIRST 15 MIN: Performed by: UROLOGY

## 2023-01-10 PROCEDURE — 2720000010 HC SURG SUPPLY STERILE: Performed by: UROLOGY

## 2023-01-10 PROCEDURE — 6370000000 HC RX 637 (ALT 250 FOR IP): Performed by: ANESTHESIOLOGY

## 2023-01-10 PROCEDURE — 6360000002 HC RX W HCPCS: Performed by: ANESTHESIOLOGY

## 2023-01-10 PROCEDURE — 81001 URINALYSIS AUTO W/SCOPE: CPT

## 2023-01-10 PROCEDURE — 88300 SURGICAL PATH GROSS: CPT

## 2023-01-10 PROCEDURE — 7100000000 HC PACU RECOVERY - FIRST 15 MIN: Performed by: UROLOGY

## 2023-01-10 PROCEDURE — 7100000001 HC PACU RECOVERY - ADDTL 15 MIN: Performed by: UROLOGY

## 2023-01-10 PROCEDURE — 3600000014 HC SURGERY LEVEL 4 ADDTL 15MIN: Performed by: UROLOGY

## 2023-01-10 PROCEDURE — 52356 CYSTO/URETERO W/LITHOTRIPSY: CPT | Performed by: UROLOGY

## 2023-01-10 PROCEDURE — 82355 CALCULUS ANALYSIS QUAL: CPT

## 2023-01-10 DEVICE — URETERAL STENT
Type: IMPLANTABLE DEVICE | Site: URETER | Status: FUNCTIONAL
Brand: PERCUFLEX™ PLUS

## 2023-01-10 RX ORDER — APREPITANT 40 MG/1
40 CAPSULE ORAL ONCE
Status: COMPLETED | OUTPATIENT
Start: 2023-01-10 | End: 2023-01-10

## 2023-01-10 RX ORDER — SODIUM CHLORIDE, SODIUM LACTATE, POTASSIUM CHLORIDE, CALCIUM CHLORIDE 600; 310; 30; 20 MG/100ML; MG/100ML; MG/100ML; MG/100ML
INJECTION, SOLUTION INTRAVENOUS CONTINUOUS
Status: DISCONTINUED | OUTPATIENT
Start: 2023-01-10 | End: 2023-01-10 | Stop reason: HOSPADM

## 2023-01-10 RX ORDER — SODIUM CHLORIDE 9 MG/ML
INJECTION, SOLUTION INTRAVENOUS PRN
Status: DISCONTINUED | OUTPATIENT
Start: 2023-01-10 | End: 2023-01-10 | Stop reason: HOSPADM

## 2023-01-10 RX ORDER — GLYCOPYRROLATE 0.2 MG/ML
INJECTION INTRAMUSCULAR; INTRAVENOUS PRN
Status: DISCONTINUED | OUTPATIENT
Start: 2023-01-10 | End: 2023-01-10 | Stop reason: SDUPTHER

## 2023-01-10 RX ORDER — HYDROCODONE BITARTRATE AND ACETAMINOPHEN 5; 325 MG/1; MG/1
1 TABLET ORAL EVERY 6 HOURS PRN
Qty: 20 TABLET | Refills: 0 | Status: SHIPPED | OUTPATIENT
Start: 2023-01-10 | End: 2023-01-17

## 2023-01-10 RX ORDER — FENTANYL CITRATE 50 UG/ML
INJECTION, SOLUTION INTRAMUSCULAR; INTRAVENOUS PRN
Status: DISCONTINUED | OUTPATIENT
Start: 2023-01-10 | End: 2023-01-10 | Stop reason: SDUPTHER

## 2023-01-10 RX ORDER — DEXTROSE MONOHYDRATE 100 MG/ML
INJECTION, SOLUTION INTRAVENOUS CONTINUOUS PRN
Status: DISCONTINUED | OUTPATIENT
Start: 2023-01-10 | End: 2023-01-10 | Stop reason: HOSPADM

## 2023-01-10 RX ORDER — OXYCODONE HYDROCHLORIDE 5 MG/1
5 TABLET ORAL
Status: COMPLETED | OUTPATIENT
Start: 2023-01-10 | End: 2023-01-10

## 2023-01-10 RX ORDER — PROCHLORPERAZINE EDISYLATE 5 MG/ML
5 INJECTION INTRAMUSCULAR; INTRAVENOUS
Status: DISCONTINUED | OUTPATIENT
Start: 2023-01-10 | End: 2023-01-10 | Stop reason: HOSPADM

## 2023-01-10 RX ORDER — HYOSCYAMINE SULFATE 0.12 MG/1
0.12 TABLET SUBLINGUAL EVERY 4 HOURS PRN
Qty: 30 EACH | Refills: 1 | Status: SHIPPED | OUTPATIENT
Start: 2023-01-10

## 2023-01-10 RX ORDER — DEXAMETHASONE SODIUM PHOSPHATE 4 MG/ML
INJECTION, SOLUTION INTRA-ARTICULAR; INTRALESIONAL; INTRAMUSCULAR; INTRAVENOUS; SOFT TISSUE PRN
Status: DISCONTINUED | OUTPATIENT
Start: 2023-01-10 | End: 2023-01-10 | Stop reason: SDUPTHER

## 2023-01-10 RX ORDER — ONDANSETRON 2 MG/ML
INJECTION INTRAMUSCULAR; INTRAVENOUS PRN
Status: DISCONTINUED | OUTPATIENT
Start: 2023-01-10 | End: 2023-01-10 | Stop reason: SDUPTHER

## 2023-01-10 RX ORDER — SODIUM CHLORIDE 0.9 % (FLUSH) 0.9 %
5-40 SYRINGE (ML) INJECTION EVERY 12 HOURS SCHEDULED
Status: DISCONTINUED | OUTPATIENT
Start: 2023-01-10 | End: 2023-01-10 | Stop reason: HOSPADM

## 2023-01-10 RX ORDER — NEOSTIGMINE METHYLSULFATE 1 MG/ML
INJECTION, SOLUTION INTRAVENOUS PRN
Status: DISCONTINUED | OUTPATIENT
Start: 2023-01-10 | End: 2023-01-10 | Stop reason: SDUPTHER

## 2023-01-10 RX ORDER — FENTANYL CITRATE 50 UG/ML
100 INJECTION, SOLUTION INTRAMUSCULAR; INTRAVENOUS
Status: DISCONTINUED | OUTPATIENT
Start: 2023-01-10 | End: 2023-01-10 | Stop reason: HOSPADM

## 2023-01-10 RX ORDER — ACETAMINOPHEN 500 MG
1000 TABLET ORAL ONCE
Status: COMPLETED | OUTPATIENT
Start: 2023-01-10 | End: 2023-01-10

## 2023-01-10 RX ORDER — PROPOFOL 10 MG/ML
INJECTION, EMULSION INTRAVENOUS PRN
Status: DISCONTINUED | OUTPATIENT
Start: 2023-01-10 | End: 2023-01-10 | Stop reason: SDUPTHER

## 2023-01-10 RX ORDER — HYDROMORPHONE HCL 110MG/55ML
0.5 PATIENT CONTROLLED ANALGESIA SYRINGE INTRAVENOUS EVERY 10 MIN PRN
Status: DISCONTINUED | OUTPATIENT
Start: 2023-01-10 | End: 2023-01-10 | Stop reason: HOSPADM

## 2023-01-10 RX ORDER — LIDOCAINE HYDROCHLORIDE 10 MG/ML
1 INJECTION, SOLUTION INFILTRATION; PERINEURAL
Status: DISCONTINUED | OUTPATIENT
Start: 2023-01-10 | End: 2023-01-10 | Stop reason: HOSPADM

## 2023-01-10 RX ORDER — LIDOCAINE HYDROCHLORIDE 20 MG/ML
INJECTION, SOLUTION EPIDURAL; INFILTRATION; INTRACAUDAL; PERINEURAL PRN
Status: DISCONTINUED | OUTPATIENT
Start: 2023-01-10 | End: 2023-01-10 | Stop reason: SDUPTHER

## 2023-01-10 RX ORDER — SULFAMETHOXAZOLE AND TRIMETHOPRIM 800; 160 MG/1; MG/1
1 TABLET ORAL 2 TIMES DAILY
Qty: 10 TABLET | Refills: 0 | Status: SHIPPED | OUTPATIENT
Start: 2023-01-10 | End: 2023-01-15

## 2023-01-10 RX ORDER — ROCURONIUM BROMIDE 10 MG/ML
INJECTION, SOLUTION INTRAVENOUS PRN
Status: DISCONTINUED | OUTPATIENT
Start: 2023-01-10 | End: 2023-01-10 | Stop reason: SDUPTHER

## 2023-01-10 RX ORDER — DIPHENHYDRAMINE HYDROCHLORIDE 50 MG/ML
12.5 INJECTION INTRAMUSCULAR; INTRAVENOUS
Status: DISCONTINUED | OUTPATIENT
Start: 2023-01-10 | End: 2023-01-10 | Stop reason: HOSPADM

## 2023-01-10 RX ORDER — MIDAZOLAM HYDROCHLORIDE 2 MG/2ML
2 INJECTION, SOLUTION INTRAMUSCULAR; INTRAVENOUS
Status: COMPLETED | OUTPATIENT
Start: 2023-01-10 | End: 2023-01-10

## 2023-01-10 RX ORDER — SODIUM CHLORIDE 0.9 % (FLUSH) 0.9 %
5-40 SYRINGE (ML) INJECTION PRN
Status: DISCONTINUED | OUTPATIENT
Start: 2023-01-10 | End: 2023-01-10 | Stop reason: HOSPADM

## 2023-01-10 RX ADMIN — ONDANSETRON 4 MG: 2 INJECTION INTRAMUSCULAR; INTRAVENOUS at 12:21

## 2023-01-10 RX ADMIN — Medication 2000 MG: at 12:09

## 2023-01-10 RX ADMIN — Medication 3 MG: at 12:46

## 2023-01-10 RX ADMIN — OXYCODONE HYDROCHLORIDE 5 MG: 5 TABLET ORAL at 13:07

## 2023-01-10 RX ADMIN — APREPITANT 40 MG: 40 CAPSULE ORAL at 10:19

## 2023-01-10 RX ADMIN — GLYCOPYRROLATE 0.4 MG: 0.2 INJECTION, SOLUTION INTRAMUSCULAR; INTRAVENOUS at 12:46

## 2023-01-10 RX ADMIN — PROPOFOL 170 MG: 10 INJECTION, EMULSION INTRAVENOUS at 12:03

## 2023-01-10 RX ADMIN — FENTANYL CITRATE 50 MCG: 50 INJECTION, SOLUTION INTRAMUSCULAR; INTRAVENOUS at 12:27

## 2023-01-10 RX ADMIN — MIDAZOLAM 2 MG: 1 INJECTION INTRAMUSCULAR; INTRAVENOUS at 11:50

## 2023-01-10 RX ADMIN — DEXAMETHASONE SODIUM PHOSPHATE 4 MG: 4 INJECTION, SOLUTION INTRAMUSCULAR; INTRAVENOUS at 12:07

## 2023-01-10 RX ADMIN — SODIUM CHLORIDE, POTASSIUM CHLORIDE, SODIUM LACTATE AND CALCIUM CHLORIDE: 600; 310; 30; 20 INJECTION, SOLUTION INTRAVENOUS at 10:19

## 2023-01-10 RX ADMIN — ROCURONIUM BROMIDE 30 MG: 50 INJECTION, SOLUTION INTRAVENOUS at 12:04

## 2023-01-10 RX ADMIN — ACETAMINOPHEN 1000 MG: 500 TABLET, FILM COATED ORAL at 10:19

## 2023-01-10 RX ADMIN — LIDOCAINE HYDROCHLORIDE 70 MG: 20 INJECTION, SOLUTION EPIDURAL; INFILTRATION; INTRACAUDAL; PERINEURAL at 12:03

## 2023-01-10 RX ADMIN — HYDROMORPHONE HYDROCHLORIDE 0.5 MG: 2 INJECTION, SOLUTION INTRAMUSCULAR; INTRAVENOUS; SUBCUTANEOUS at 13:06

## 2023-01-10 RX ADMIN — FENTANYL CITRATE 50 MCG: 50 INJECTION, SOLUTION INTRAMUSCULAR; INTRAVENOUS at 12:03

## 2023-01-10 ASSESSMENT — PAIN SCALES - GENERAL
PAINLEVEL_OUTOF10: 3
PAINLEVEL_OUTOF10: 6
PAINLEVEL_OUTOF10: 7

## 2023-01-10 ASSESSMENT — PAIN - FUNCTIONAL ASSESSMENT: PAIN_FUNCTIONAL_ASSESSMENT: 0-10

## 2023-01-10 NOTE — BRIEF OP NOTE
Brief Postoperative Note      Patient: Evan Tamez  YOB: 1970  MRN: [id number]    Date of Procedure: 1/10/2023    Pre-Op Diagnosis: Kidney stone [N20.0]    Post-Op Diagnosis: Same       Procedure(s):  CYSTOSCOPY, RIGHT URETEROSCOPY, LASER LITHOTRIPSY, RIGHT URETERAL STENT EXCHANGE, Basket Stone Extraction    Surgeon(s):  Donna Anderson MD    Assistant:  * No surgical staff found *    Anesthesia: General    Estimated Blood Loss (mL): Minimal    Complications: None    Specimens:   ID Type Source Tests Collected by Time Destination   1 : Right Ureter Stone  Stone (Calculus) Ureter STONE ANALYSIS Donna Anderson MD 1/10/2023 1243        Implants:  Implant Name Type Inv. Item Serial No.  Lot No. LRB No. Used Action   STENT URET 6FR L26CM HYDR+ PGTL Janit Clarissa [facility] [id number]  [facility] 6FR L26CM HYDR+ PGTL TAPR TIP Tyler Coffman  [facility] UROLOGY- [id number] Right 1 Implanted         Drains:   [REMOVED] Urinary Catheter 12/14/22 2 Way (Removed)   $ Urethral catheter insertion Inserted for procedure 12/15/22 0715   Catheter Indications Perioperative use for selected surgical procedures 12/15/22 0715   Site Assessment No urethral drainage 12/15/22 0715   Urine Color Yellow 12/15/22 0715   Urine Appearance Clear 12/15/22 0715   Collection Container Standard 12/15/22 0715   Securement Method Securing device (Describe) 12/15/22 0715   Catheter Care Completed Yes 12/15/22 0715   Catheter Best Practices  Drainage tube clipped to bed;Catheter secured to thigh; Tamper seal intact; Bag below bladder;Bag not on floor; Lack of dependent loop in tubing;Drainage bag less than half full 12/15/22 0715   Status Patent;Draining 12/15/22 0715   Output (mL) 175 mL 12/15/22 1022       Findings: 4 mm R distal ureteral stone fragmented / removed.      Electronically signed by Donna Anderson MD on 1/10/2023 at 12:51 PM

## 2023-01-10 NOTE — ANESTHESIA PRE PROCEDURE
Department of Anesthesiology  Preprocedure Note       Name:  Mariela Mark   Age:  50 y.o.  :  1970                                          MRN:  [id number]         Date:  1/10/2023      Surgeon: Haim Shukla):  Marilyn Montoya MD    Procedure: Procedure(s):  CYSTOSCOPY, RIGHT URETEROSCOPY, LASER LITHOTRIPSY, RIGHT URETERAL STENT EXCHANGE    Medications prior to admission:   Prior to Admission medications    Medication Sig Start Date End Date Taking? Authorizing Provider   HYDROcodone-acetaminophen (NORCO) 5-325 MG per tablet Take 1 tablet by mouth every 6 hours as needed for Pain. Yes Historical Provider, MD   Promethazine HCl (PHENERGAN PO) Take 25 mg by mouth every 6-8 hours as needed   Yes Historical Provider, MD   Dulaglutide (TRULICITY SC) Inject into the skin once a week Monday   Yes Historical Provider, MD   ondansetron (ZOFRAN) 4 MG tablet Take 1 tablet by mouth daily as needed for Nausea or Vomiting 12/15/22   Kai Holland MD   ferrous sulfate (IRON 325) 325 (65 Fe) MG tablet Take 1 tablet by mouth 2 times daily (with meals) 12/15/22   Kai Holland MD   pravastatin (PRAVACHOL) 40 MG tablet Take 40 mg by mouth every evening    Historical Provider, MD   DULoxetine (CYMBALTA) 60 MG extended release capsule Take 60 mg by mouth daily    Historical Provider, MD   lisinopril-hydroCHLOROthiazide (PRINZIDE;ZESTORETIC) 20-12.5 MG per tablet Take 1 tablet by mouth in the morning. Historical Provider, MD   buPROPion (WELLBUTRIN) 100 MG tablet Take 300 mg by mouth in the morning. Historical Provider, MD   omeprazole (PRILOSEC) 20 MG delayed release capsule Take 40 mg by mouth in the morning. Historical Provider, MD   aspirin 81 MG chewable tablet Take 81 mg by mouth in the morning.     Historical Provider, MD       Current medications:    Current Facility-Administered Medications   Medication Dose Route Frequency Provider Last Rate Last Admin    ceFAZolin (ANCEF) 2000 mg in sterile water 20 mL IV syringe  2,000 mg IntraVENous On Call to [id number] Gottlieb Commack, MD        lidocaine 1 % injection 1 mL  1 mL IntraDERmal Once PRN Ellen Borges MD        acetaminophen (TYLENOL) tablet 1,000 mg  1,000 mg Oral Once Ellen Borges MD        fentaNYL (SUBLIMAZE) injection 100 mcg  100 mcg IntraVENous Once PRN Ellen Borges MD        lactated ringers infusion   IntraVENous Continuous Ellen Borges MD        sodium chloride flush 0.9 % injection 5-40 mL  5-40 mL IntraVENous 2 times per day Ellen Borges MD        sodium chloride flush 0.9 % injection 5-40 mL  5-40 mL IntraVENous PRN Ellen Borges MD        0.9 % sodium chloride infusion   IntraVENous PRN Ellen Borges, MD        midazolam PF (VERSED) injection 2 mg  2 mg IntraVENous Once PRN Ellen Borges MD        aprepitant (EMEND) capsule 40 mg  40 mg Oral Once Ellen Borges MD           Allergies: Allergies   Allergen Reactions    Penicillins Hives     Tolerates cephalosporins       Problem List:    Patient Active Problem List   Diagnosis Code    H/O TIA (transient ischemic attack) and stroke Z86.73    Microcytic anemia D50.9    Depression F32. A    Rheumatoid arthritis ([facility] [address] M06.9    TIA (transient ischemic attack) G45.9    Reflex sympathetic dystrophy G90.50    Hypertension I10    Cellulitis of second toe, left L03.032    Acute osteomyelitis of right foot (HCC) M86.171    Right lower quadrant abdominal pain R10.31    Chronic pain G89.29    Class 1 obesity in adult E66.9    Peripheral neuropathy G62.9    Ovarian cyst N83.209    Mass of peritoneum K66.8    Kidney stone N20.0       Past Medical History:        Diagnosis Date    Anemia     Chronic pain     due to RA    Depression     Diabetes mellitus (HCC)     GERD (gastroesophageal reflux disease)     Hypercholesterolemia     Hypertension     Kidney stone 12/15/2022    Migraine     Morbid obesity (HCC)     Ovarian cyst     Reflex sympathetic dystrophy since 2005    right arm; s/p injury - laceration in hand    Rheumatoid arthritis ([address] 9/26/2019    Sinus problem     TIA (transient ischemic attack) 9/26/2019    no deficit noted       Past Surgical History:        Procedure Laterality Date    APPENDECTOMY      BREAST BIOPSY Left 4/27/2015    LEFT NIPPLE EXPLORATION WITH REMOVAL OF LATTISIMUS DUCT performed by Gennaro Grimaldo MD [facility]. Right 12/11/2022    CYSTOSCOPY/ RIGHT URETERAL STENT PLACEMENT performed by Marilyn Montoya MD at [address] [place] [address], TOTAL ABDOMINAL (CERVIX REMOVED)           LAPAROSCOPY N/A 12/14/2022    LAPAROSCOPY DIAGNOSTIC POSSIBLE REMOVAL OF ABDOMINAL MASS performed by Clayton Kelly MD at [facility]      rt hand    ORTHOPEDIC SURGERY  04/2018    ACDF  C4-C7    SEPTOPLASTY  08/28/2019    FESS,SMRIT's    TOE AMPUTATION Right     07/2020 amputation and revision to amputation on 01/03/2022    TUBAL LIGATION         Social History:    Social History     Tobacco Use    Smoking status: Never    Smokeless tobacco: Never   Substance Use Topics    Alcohol use: Yes                                Counseling given: Not Answered      Vital Signs (Current):   Vitals:    01/05/23 1539 01/10/23 0935   BP:  (!) 118/58   Pulse:  68   Resp:  16   Temp:  98.2 °F (36.8 °C)   TempSrc:  Oral   SpO2:  98%   Weight: 231 lb (104.8 kg) 235 lb 3.2 oz (106.7 kg)   Height: 5' 8\" (1.727 m) 5' 8\" (1.727 m)                                              BP Readings from Last 3 Encounters:   01/10/23 (!) 118/58   12/15/22 (!) 115/57   12/11/22 (!) 111/58       NPO Status:                                                                                 BMI:   Wt Readings from Last 3 Encounters:   01/10/23 235 lb 3.2 oz (106.7 kg)   12/11/22 232 lb (105.2 kg)   12/11/22 232 lb (105.2 kg)     Body mass index is 35.76 kg/m².     CBC:   Lab Results   Component Value Date/Time    WBC 6.3 12/15/2022 03:39 AM    RBC 3.68 12/15/2022 03:39 AM    HGB 9.2 12/15/2022 03:39 AM    HCT 30.5 12/15/2022 03:39 AM    MCV 82.9 12/15/2022 03:39 AM    RDW 14.6 12/15/2022 03:39 AM     12/15/2022 03:39 AM       CMP:   Lab Results   Component Value Date/Time     12/15/2022 03:39 AM    K 4.4 12/15/2022 03:39 AM     12/15/2022 03:39 AM    CO2 30 12/15/2022 03:39 AM    BUN 10 12/15/2022 03:39 AM    CREATININE 0.70 12/15/2022 03:39 AM    GFRAA >158 07/16/2022 03:21 PM    AGRATIO 0.9 07/04/2021 11:27 PM    LABGLOM >60 12/15/2022 03:39 AM    GLUCOSE 145 12/15/2022 03:39 AM    PROT 7.7 12/10/2022 10:38 PM    CALCIUM 8.9 12/15/2022 03:39 AM    BILITOT 0.5 12/10/2022 10:38 PM    ALKPHOS 100 12/10/2022 10:38 PM    ALKPHOS 92 07/04/2021 11:27 PM    AST 17 12/10/2022 10:38 PM    ALT 14 12/10/2022 10:38 PM       POC Tests: No results for input(s): POCGLU, POCNA, POCK, POCCL, POCBUN, POCHEMO, POCHCT in the last 72 hours.     Coags:   Lab Results   Component Value Date/Time    PROTIME 14.2 09/26/2019 08:20 PM    INR 1.2 09/26/2019 08:20 PM    APTT 31.0 09/26/2019 08:23 PM       HCG (If Applicable): No results found for: PREGTESTUR, PREGSERUM, HCG, HCGQUANT     ABGs: No results found for: PHART, PO2ART, UAZ3RCH, TAI8VBI, BEART, G4SRRFPQ     Type & Screen (If Applicable):  No results found for: LABABO, LABRH    Drug/Infectious Status (If Applicable):  No results found for: HIV, HEPCAB    COVID-19 Screening (If Applicable): No results found for: COVID19        Anesthesia Evaluation  Patient summary reviewed and Nursing notes reviewed  Airway: Mallampati: II  TM distance: >3 FB   Neck ROM: full  Mouth opening: > = 3 FB   Dental: normal exam         Pulmonary:Negative Pulmonary ROS and normal exam  breath sounds clear to auscultation  (+) pneumonia (Covid/Flu in past month - now completely resolved):                             Cardiovascular:  Exercise tolerance: good (>4 METS),   (+) hypertension:,         Rhythm: regular  Rate: normal                    Neuro/Psych:   (+) TIA,             GI/Hepatic/Renal:   (+) GERD:,           Endo/Other:    (+) Diabetes, : arthritis:., .                  ROS comment: S/p ACDF Abdominal:             Vascular: negative vascular ROS. Other Findings:           Anesthesia Plan      general     ASA 3       Induction: intravenous. Anesthetic plan and risks discussed with patient.                         Kodi Dean MD   1/10/2023

## 2023-01-10 NOTE — DISCHARGE INSTRUCTIONS
Ureteral Stent Placement: What to Expect at [address] [place] [address]  A ureteral (say \"you-REE-ter-ul\") stent is a thin, hollow tube that is placed in the ureter to help urine pass from the kidney into the bladder. Ureters are the tubes that connect the kidneys to the bladder. You may have a small amount of blood in your urine for 1 to 3 days after the procedure. While the stent is in place, you may have to urinate more often, feel a sudden need to urinate, or feel like you cannot completely empty your bladder. You may feel some pain when you urinate or do strenuous activity. You also may notice a small amount of blood in your urine after strenuous activities. These side effects usually do not prevent people from doing their normal daily activities. You may have a string attached to your stent. Do not to pull the string unless the doctor tells you to. The doctor will use the string to pull out the stent when you no longer need it. After the procedure, urine may flow better from your kidneys to your bladder. A ureteral stent may be left in place for several days or for as long as several months. Your doctor will take it out when you no longer need it. Cystoscopy: What to Expect at [address] [facility]  A cystoscopy is a procedure that lets a doctor look inside of the bladder and the urethra. The urethra is the tube that carries urine from the bladder to outside the body. The doctor uses a thin, lighted tube called a cystoscope to look for kidney or bladder stones, tumors, bleeding, or infection. After the cystoscopy, your urethra may be sore at first, and it may burn when you urinate for the first few days after the procedure. You may feel the need to urinate more often, and your urine may be pink. These symptoms should get better in 1 or 2 days. You will probably be able to go back to work or most of your usual activities in 1 or 2 days. How can you care for yourself at home? Activity  Rest when you feel tired. Getting enough sleep will help you recover. Try to walk each day. Start by walking a little more than you did the day before. Bit by bit, increase the amount you walk. Walking boosts blood flow and helps prevent pneumonia and constipation. Avoid strenuous activities, such as bicycle riding, jogging, weight lifting, or aerobic exercise, until your doctor says it is okay. Ask your doctor when you can drive again. Most people are able to return to work within 1 or 2 days after the procedure. You may shower and take baths as usual.  Ask your doctor when it is okay for you to have sex. Diet  You can eat your normal diet. If your stomach is upset, try bland, low-fat foods like plain rice, broiled chicken, toast, and yogurt. Drink plenty of fluids (unless your doctor tells you not to). Medicines  Take pain medicines exactly as directed. If the doctor gave you a prescription medicine for pain, take it as prescribed. If you are not taking a prescription pain medicine, ask your doctor if you can take an over-the-counter medicine. If you think your pain medicine is making you sick to your stomach: Take your medicine after meals (unless your doctor has told you not to). Ask your doctor for a different pain medicine. If your doctor prescribed antibiotics, take them as directed. Do not stop taking them just because you feel better. You need to take the full course of antibiotics. Medication Interaction:  During your procedure you potentially received a medication or medications which may reduce the effectiveness of oral contraceptives. Please consider other forms of contraception for 1 month following your procedure if you are currently using oral contraceptives as your primary form of birth control. In addition to this, we recommend continuing your oral contraceptive as prescribed, unless otherwise instructed by your physician, during this time. Follow-up care is a key part of your treatment and safety.  Be sure to make and go to all appointments, and call your doctor if you are having problems. It's also a good idea to know your test results and keep a list of the medicines you take. When should you call for help? Call 911 anytime you think you may need emergency care. For example, call if:  You passed out (lost consciousness). You have severe trouble breathing. You have sudden chest pain and shortness of breath, or you cough up blood. You have severe belly pain. Call your doctor now or seek immediate medical care if:  You are sick to your stomach or cannot keep fluids down. Your urine is still red or you see blood clots after you have urinated several times. You have trouble passing urine or stool, especially if you have pain or swelling in your lower belly. You have signs of a blood clot, such as:  Pain in your calf, back of the knee, thigh, or groin. Redness and swelling in your leg or groin. You develop a fever or severe chills. You have pain in your back just below your rib cage. This is called flank pain. Watch closely for changes in your health, and be sure to contact your doctor if:  A burning feeling is normal for a day or two after the test, but call if it does not get better. You have a frequent urge to urinate but can pass only small amounts of urine. It is normal for the urine to have a pinkish color for a few days after the test, but call if it does not get better or if your urine is cloudy, smells bad, or has pus in it.     After general anesthesia or intravenous sedation, for 24 hours or while taking prescription Narcotics:  Limit your activities  A responsible adult needs to be with you for the next 24 hours  Do not drive and operate hazardous machinery  Do not make important personal or business decisions  Do not drink alcoholic beverages  If you have not urinated within 8 hours after discharge, and you are experiencing discomfort from urinary retention, please go to the nearest ED.  If you have sleep apnea and have a CPAP machine, please use it for all naps and sleeping. Please use caution when taking narcotics and any of your home medications that may cause drowsiness. *  Please give a list of your current medications to your Primary Care Provider. *  Please update this list whenever your medications are discontinued, doses are      changed, or new medications (including over-the-counter products) are added. *  Please carry medication information at all times in case of emergency situations. These are general instructions for a healthy lifestyle:  No smoking/ No tobacco products/ Avoid exposure to second hand smoke  Surgeon General's Warning:  Quitting smoking now greatly reduces serious risk to your health. Obesity, smoking, and sedentary lifestyle greatly increases your risk for illness  A healthy diet, regular physical exercise & weight monitoring are important for maintaining a healthy lifestyle    You may be retaining fluid if you have a history of heart failure or if you experience any of the following symptoms:  Weight gain of 3 pounds or more overnight or 5 pounds in a week, increased swelling in our hands or feet or shortness of breath while lying flat in bed. Please call your doctor as soon as you notice any of these symptoms; do not wait until your next office visit.

## 2023-01-10 NOTE — PERIOP NOTE
Patient has spoken with HANNAH KHAN. Consent has been verified, signed and witnessed by this RN. 2 mg of Versed given SIVP per orders. Pulse ox monitor in place & tracing appropriately. Bed in low, locked position with side rails up x 2 and call light in reach. Instructed pt to call with any needs and to remain in bed. Verbalizes understanding. spouse at bedside.

## 2023-01-10 NOTE — ANESTHESIA PROCEDURE NOTES
Airway  Date/Time: 1/10/2023 12:07 PM  Urgency: elective    Airway not difficult    General Information and Staff    Patient location during procedure: OR  Resident/CRNA: ADELSO Philip - CRNA  Performed: resident/CRNA     Indications and Patient Condition  Indications for airway management: anesthesia  Spontaneous Ventilation: absent  Sedation level: deep  Preoxygenated: yes  Patient position: sniffing  MILS not maintained throughout  Mask difficulty assessment: vent by bag mask + OA or adjuvant +/- NMBA    Final Airway Details  Final airway type: endotracheal airway      Successful airway: ETT  Cuffed: yes   Successful intubation technique: direct laryngoscopy  Facilitating devices/methods: intubating stylet  Endotracheal tube insertion site: oral  Blade: [place]  Blade size: #4  ETT size (mm): 7.0  Cormack-Lehane Classification: grade I - full view of glottis  Placement verified by: chest auscultation and capnometry   Measured from: lips  Number of attempts at approach: 1  Ventilation between attempts: bag mask  Number of other approaches attempted: 0    no

## 2023-01-10 NOTE — OP NOTE
[address] [facility]  OPERATIVE REPORT    Name:  Aurelio Nick  MR#:  [id number]  :  1970  ACCOUNT #:  [de-identified]  DATE OF SERVICE:  01/10/2023    PREOPERATIVE DIAGNOSIS:  Right ureter stone. POSTOPERATIVE DIAGNOSIS:  Right ureter stone. PROCEDURES PERFORMED:  Cystoscopy, right ureteroscopy, laser lithotripsy, basket stone extraction, right ureteral stent exchange. SURGEON:  Mony Judd MD    ASSISTANT:  None. ANESTHESIA:  General.    COMPLICATIONS:  None. SPECIMENS REMOVED:  Right ureter stone for analysis. IMPLANTS:  6 x 26 double-J right ureter stent with strings. ESTIMATED BLOOD LOSS:  Minimal.    DRAINS:  None. FINDINGS:  A 4-mm right distal ureteral stone fragmented and removed. INDICATIONS FOR OPERATIVE PROCEDURE:  The patient is a pleasant 82-year-old female with a history of obstructing right ureter stone and severe urosepsis who underwent emergent stent placement back in 2022. She was admitted postoperatively for IV antibiotics and treatment of her sepsis. She presents today for interval treatment of her stone and stent exchange now that her sepsis has resolved. After risk-benefit discussion was had, she opted to proceed. DESCRIPTION OF OPERATIVE PROCEDURE:  After informed consent was obtained, the correct patient was identified in the preoperative holding area. She was taken back to the operating room suite and placed on the table in supine position. Time-out was performed confirming correct patient and planned procedure. She received 2 g of IV Ancef prior to the smooth induction of general endotracheal anesthesia. She was moved into dorsal lithotomy position and prepped and draped in the usual sterile fashion. I began the case by inserting a 22-Chinese rigid cystoscope with a 30-degree lens into the urethra and advanced into the patient's bladder. Pancystoscopy was performed which was unremarkable.   The right ureter stent was extruding from the right ureteral orifice. I passed a Sensor wire through the scope and advanced it alongside the stent into the patient's right renal pelvis. Once the wire was in place, I backloaded the scope off the wire, reinserted the scope with flexible stent grasper, grasped the end of the stent and brought the stent out completely intact. I then switched to pressure bag and my semi-rigid ureteroscope. I inserted this alongside the wire and advanced it into the patient's distal right ureter. I was able to advance it about a third of the way up and here I encountered a severely impacted right distal ureteral stone. I then used a 365 micron laser fiber with the settings of 0.8 joules and 8 Hz to carefully fragment the stone. This was a slow going process as visualization was difficult and the stone was embedded in the wall of the ureter. After carefully freeing it up, I then used a New ChatID basket to remove the pieces and deposit them in the bladder. I then carefully inspected the ureter along its entire course from the UPJ down to the UVJ and it was completely clear of stone at the end of the case. The ureter also had no sign of any ureteral injury, no signs of further stone. I then removed the ureteroscope leaving the wire in place. I loaded the wire onto the rigid cystoscope and then passed a 6 x 26 double-J right ureter stent with strings over the wire under fluoroscopic guidance into the right renal pelvis. Once the stent was in position, I pulled the wire noting a good curl in the right renal pelvis as well as the bladder. The patient was then awoken from anesthesia and transferred to PACU in stable condition. She tolerated the procedure well. There were no complications. All counts were correct at the end of procedure.   She will return on Friday for stent removal.        Douglas Samaniego MD      [id number]  D:  01/10/2023 13:23  T:  01/10/2023 15:42  JOB #:  [id number]

## 2023-01-10 NOTE — ANESTHESIA POSTPROCEDURE EVALUATION
Department of Anesthesiology  Postprocedure Note    Patient: Mary Kate Zeng  MRN: [id number]  YOB: 1970  Date of evaluation: 1/10/2023      Procedure Summary     Date: 01/10/23 Room / Location: [facility] MAIN OR 01 CYSTO / [facility] MAIN OR    Anesthesia Start: 1150 Anesthesia Stop: 1301    Procedure: CYSTOSCOPY, RIGHT URETEROSCOPY, LASER LITHOTRIPSY, RIGHT URETERAL STENT EXCHANGE (Right) Diagnosis:       Kidney stone      (Kidney stone [N20.0])    Providers: Juan J Birmingham MD Responsible Provider: Mic Clark MD    Anesthesia Type: general ASA Status: 3          Anesthesia Type: No value filed.     Trung Phase I: Trung Score: 9    Trung Phase II:        Anesthesia Post Evaluation    Patient location during evaluation: PACU  Patient participation: complete - patient participated  Level of consciousness: awake and alert  Airway patency: patent  Nausea & Vomiting: no nausea and no vomiting  Complications: no  Cardiovascular status: hemodynamically stable  Respiratory status: acceptable, nonlabored ventilation and spontaneous ventilation  Hydration status: euvolemic  Comments: BP (!) 113/59   Pulse 59   Temp 98.2 °F (36.8 °C) (Tympanic)   Resp 17   Ht 5' 8\" (1.727 m)   Wt 235 lb 3.2 oz (106.7 kg)   SpO2 97%   BMI 35.76 kg/m²     Multimodal analgesia pain management approach

## 2023-01-10 NOTE — H&P
[address] Urology H&P Note                                           01/10/23     Patient: Mickie Soto  MRN: [id number]    Admission Date:  1/10/2023, 0  Admission Diagnosis: Kidney stone [N20.0]  Reason for Consult: Right Ureter Stone    ASSESSMENT: 50 y.o. female with right ureter stone and UTI s/p stent in 12/2022 who presents for interval treatment today. PLAN:  -To OR for Cystoscopy, RIGHT Ureteroscopy, Laser Lithotripsy, RIGHT Ureteral Stent Exchange  -Consented  -Antibiotic on call to OR      __________________________________________________________________________________    HPI:     Mickie Soto is a 50 y.o. female with right ureter stone s/p stent placement 12/2022 and UTI treated with antibiotics. Presents for interval treatment of stone today.     Past Medical History:  Past Medical History:   Diagnosis Date    Anemia     Chronic pain     due to RA    Depression     Diabetes mellitus (HCC)     GERD (gastroesophageal reflux disease)     Hypercholesterolemia     Hypertension     Kidney stone 12/15/2022    Migraine     Morbid obesity ([address]     Ovarian cyst     Reflex sympathetic dystrophy since 2005    right arm; s/p injury - laceration in hand    Rheumatoid arthritis ([address] 9/26/2019    Sinus problem     TIA (transient ischemic attack) 9/26/2019    no deficit noted       Past Surgical History:  Past Surgical History:   Procedure Laterality Date    APPENDECTOMY      BREAST BIOPSY Left 4/27/2015    LEFT NIPPLE EXPLORATION WITH REMOVAL OF LATTISIMUS DUCT performed by Brent Guerra MD at [address] Right 12/11/2022    CYSTOSCOPY/ RIGHT URETERAL STENT PLACEMENT performed by Alfonzo Nelson MD at [address] [place] [address], TOTAL ABDOMINAL (CERVIX REMOVED)           LAPAROSCOPY N/A 12/14/2022    LAPAROSCOPY DIAGNOSTIC POSSIBLE REMOVAL OF ABDOMINAL MASS performed by Pedro Field MD at [place] [address] rt hand    ORTHOPEDIC SURGERY  04/2018    ACDF  C4-C7    SEPTOPLASTY  08/28/2019    FESS,SHELBIIT's    TOE AMPUTATION Right     07/2020 amputation and revision to amputation on 01/03/2022    TUBAL LIGATION         Medications:  No current facility-administered medications on file prior to encounter. Current Outpatient Medications on File Prior to Encounter   Medication Sig Dispense Refill    HYDROcodone-acetaminophen (NORCO) 5-325 MG per tablet Take 1 tablet by mouth every 6 hours as needed for Pain. Promethazine HCl (PHENERGAN PO) Take 25 mg by mouth every 6-8 hours as needed      Dulaglutide (TRULICITY SC) Inject into the skin once a week Monday      ondansetron (ZOFRAN) 4 MG tablet Take 1 tablet by mouth daily as needed for Nausea or Vomiting 30 tablet 0    ferrous sulfate (IRON 325) 325 (65 Fe) MG tablet Take 1 tablet by mouth 2 times daily (with meals) 60 tablet 0    pravastatin (PRAVACHOL) 40 MG tablet Take 40 mg by mouth every evening      DULoxetine (CYMBALTA) 60 MG extended release capsule Take 60 mg by mouth daily      lisinopril-hydroCHLOROthiazide (PRINZIDE;ZESTORETIC) 20-12.5 MG per tablet Take 1 tablet by mouth in the morning. buPROPion (WELLBUTRIN) 100 MG tablet Take 300 mg by mouth in the morning. omeprazole (PRILOSEC) 20 MG delayed release capsule Take 40 mg by mouth in the morning. aspirin 81 MG chewable tablet Take 81 mg by mouth in the morning. Allergies:   Allergies   Allergen Reactions    Penicillins Hives     Tolerates cephalosporins       Social History:  Social History     Socioeconomic History    Marital status:      Spouse name: Not on file    Number of children: Not on file    Years of education: Not on file    Highest education level: Not on file   Occupational History    Not on file   Tobacco Use    Smoking status: Never    Smokeless tobacco: Never   Substance and Sexual Activity    Alcohol use: Yes    Drug use: No    Sexual activity: Not on file Other Topics Concern    Not on file   Social History Narrative    Not on file     Social Determinants of Health     Financial Resource Strain: Not on file   Food Insecurity: Not on file   Transportation Needs: Not on file   Physical Activity: Not on file   Stress: Not on file   Social Connections: Not on file   Intimate Partner Violence: Not on file   Housing Stability: Not on file       Family History:  Family History   Problem Relation Age of Onset    Cancer Paternal Aunt         brst    Cancer Father         non hodgkins lymphoma    Breast Cancer Sister     Diabetes Paternal Grandfather     Heart Disease Paternal Grandfather     Breast Cancer Maternal Grandmother     Cancer Maternal Grandmother         ovarian    Cancer Maternal Grandfather         colon    Colon Cancer Maternal Grandfather     Breast Cancer Paternal Grandmother 46    Cancer Paternal Grandmother         breast    Ovarian Cancer Paternal Grandmother     Cancer Mother         throat    Hypertension Mother     Breast Cancer Paternal Aunt 39       ROS:  Review of Systems - General ROS: negative for - chills, fatigue, or fever    Vitals:  Vitals:    01/10/23 0935   BP: (!) 118/58   Pulse: 68   Resp: 16   Temp: 98.2 °F (36.8 °C)   SpO2: 98%       Intake/Output:  No intake or output data in the 24 hours ending 01/10/23 1153     Physical Exam:   BP (!) 118/58   Pulse 68   Temp 98.2 °F (36.8 °C) (Oral)   Resp 16   Ht 5' 8\" (1.727 m)   Wt 235 lb 3.2 oz (106.7 kg)   SpO2 98%   BMI 35.76 kg/m²      GENERAL: No acute distress, Awake, Alert, Oriented X 3,  CARDIAC: regular rate and rhythm  CHEST AND LUNG: Easy work of breathing,  ABDOMEN: soft, non tender, non-distended,     Lab/Radiology/Other Diagnostic Tests:  No results for input(s): HGB, HCT, WBC, NA, K, CL, CO2, BUN, CR, GLU, CA, MG, ALBUMIN, AST, ALT, ALKPHOS, LIPASE, PREALB, INR, PTT in the last 72 hours. Invalid input(s): PLTCT, PO4, TOTPROT, TOTBILI, VI, PT      Carlton Reed, YULIA Durham [facility]  [address] [facility], [address]  Phone: [phone]  Fax: [phone]

## 2023-01-13 ENCOUNTER — TELEPHONE (OUTPATIENT)
Dept: UROLOGY | Age: 53
End: 2023-01-13

## 2023-01-13 NOTE — TELEPHONE ENCOUNTER
Pt did not show for stent removal.  I called and spoke to the pt she states she pulled the stent herself and was not feeling well with a head cold and did not come to the office.   The pt states she will call next week if she feels she needs to be seen //dh

## 2023-05-10 RX ORDER — ASPIRIN 81 MG/1
81 TABLET, CHEWABLE ORAL
COMMUNITY

## 2024-02-02 ENCOUNTER — HOSPITAL ENCOUNTER (INPATIENT)
Age: 54
LOS: 2 days | Discharge: HOME | DRG: 603 | End: 2024-02-04
Attending: INTERNAL MEDICINE | Admitting: INTERNAL MEDICINE
Payer: COMMERCIAL

## 2024-02-02 ENCOUNTER — HOSPITAL ENCOUNTER (EMERGENCY)
Age: 54
Discharge: ANOTHER HOSPITAL | End: 2024-02-02
Attending: EMERGENCY MEDICINE
Payer: COMMERCIAL

## 2024-02-02 ENCOUNTER — APPOINTMENT (OUTPATIENT)
Dept: GENERAL RADIOLOGY | Age: 54
End: 2024-02-02
Payer: COMMERCIAL

## 2024-02-02 VITALS
WEIGHT: 221 LBS | RESPIRATION RATE: 16 BRPM | DIASTOLIC BLOOD PRESSURE: 67 MMHG | HEIGHT: 69 IN | BODY MASS INDEX: 32.73 KG/M2 | HEART RATE: 74 BPM | OXYGEN SATURATION: 97 % | SYSTOLIC BLOOD PRESSURE: 126 MMHG | TEMPERATURE: 98.1 F

## 2024-02-02 DIAGNOSIS — E87.20 LACTIC ACIDOSIS: ICD-10-CM

## 2024-02-02 DIAGNOSIS — R11.2 NAUSEA VOMITING AND DIARRHEA: ICD-10-CM

## 2024-02-02 DIAGNOSIS — L03.032 CELLULITIS OF LEFT TOE: Primary | ICD-10-CM

## 2024-02-02 DIAGNOSIS — L08.9 LEFT FOOT INFECTION: Primary | ICD-10-CM

## 2024-02-02 DIAGNOSIS — R19.7 NAUSEA VOMITING AND DIARRHEA: ICD-10-CM

## 2024-02-02 PROBLEM — L97.519 DIABETIC ULCER OF RIGHT FOOT ASSOCIATED WITH DIABETES MELLITUS DUE TO UNDERLYING CONDITION (HCC): Status: ACTIVE | Noted: 2022-01-03

## 2024-02-02 PROBLEM — K64.8 INTERNAL HEMORRHOIDS: Status: ACTIVE | Noted: 2021-08-11

## 2024-02-02 PROBLEM — R11.0 NAUSEA: Status: ACTIVE | Noted: 2020-09-29

## 2024-02-02 PROBLEM — M86.10 ACUTE OSTEOMYELITIS (HCC): Status: ACTIVE | Noted: 2020-06-30

## 2024-02-02 PROBLEM — R51.9 HEADACHE: Status: ACTIVE | Noted: 2020-06-03

## 2024-02-02 PROBLEM — E78.00 HYPERCHOLESTEROLEMIA: Status: ACTIVE | Noted: 2020-04-21

## 2024-02-02 PROBLEM — M48.02 CERVICAL SPINAL STENOSIS: Status: ACTIVE | Noted: 2018-04-03

## 2024-02-02 PROBLEM — N23 RENAL COLIC ON RIGHT SIDE: Status: ACTIVE | Noted: 2021-11-14

## 2024-02-02 PROBLEM — E08.621 DIABETIC ULCER OF RIGHT FOOT ASSOCIATED WITH DIABETES MELLITUS DUE TO UNDERLYING CONDITION (HCC): Status: ACTIVE | Noted: 2022-01-03

## 2024-02-02 PROBLEM — Z88.0 PENICILLIN ALLERGY: Status: ACTIVE | Noted: 2023-03-06

## 2024-02-02 PROBLEM — F51.01 PRIMARY INSOMNIA: Status: ACTIVE | Noted: 2020-04-21

## 2024-02-02 PROBLEM — L03.116 CELLULITIS OF LEFT LOWER LIMB: Status: ACTIVE | Noted: 2023-04-11

## 2024-02-02 PROBLEM — K21.9 GASTROESOPHAGEAL REFLUX DISEASE WITHOUT ESOPHAGITIS: Status: ACTIVE | Noted: 2023-08-23

## 2024-02-02 PROBLEM — F33.1 MAJOR DEPRESSIVE DISORDER, RECURRENT, MODERATE (HCC): Status: ACTIVE | Noted: 2019-09-26

## 2024-02-02 PROBLEM — E11.42 TYPE 2 DIABETES MELLITUS WITH DIABETIC POLYNEUROPATHY, WITHOUT LONG-TERM CURRENT USE OF INSULIN (HCC): Status: ACTIVE | Noted: 2020-02-14

## 2024-02-02 PROBLEM — G62.9 NEUROPATHY: Status: ACTIVE | Noted: 2020-05-06

## 2024-02-02 PROBLEM — R10.9 FLANK PAIN: Status: ACTIVE | Noted: 2020-09-29

## 2024-02-02 PROBLEM — M79.7 FIBROMYALGIA: Status: ACTIVE | Noted: 2020-04-21

## 2024-02-02 PROBLEM — F41.0 GENERALIZED ANXIETY DISORDER WITH PANIC ATTACKS: Status: ACTIVE | Noted: 2021-01-21

## 2024-02-02 PROBLEM — F41.1 GENERALIZED ANXIETY DISORDER WITH PANIC ATTACKS: Status: ACTIVE | Noted: 2021-01-21

## 2024-02-02 PROBLEM — M54.2 NECK PAIN: Status: ACTIVE | Noted: 2023-07-27

## 2024-02-02 LAB
ALBUMIN SERPL-MCNC: 4.2 G/DL (ref 3.5–5)
ALBUMIN/GLOB SERPL: 1.2 (ref 0.4–1.6)
ALP SERPL-CCNC: 89 U/L (ref 45–117)
ALT SERPL-CCNC: 15 U/L (ref 13–61)
ANION GAP SERPL CALC-SCNC: 11 MMOL/L (ref 2–11)
APPEARANCE UR: CLEAR
AST SERPL-CCNC: 11 U/L (ref 15–37)
BASOPHILS # BLD: 0 K/UL (ref 0–0.2)
BASOPHILS NFR BLD: 0 % (ref 0–2)
BILIRUB SERPL-MCNC: 0.3 MG/DL (ref 0.2–1.1)
BILIRUB UR QL: NEGATIVE
BUN SERPL-MCNC: 20 MG/DL (ref 6–23)
CALCIUM SERPL-MCNC: 9.6 MG/DL (ref 8.3–10.4)
CHLORIDE SERPL-SCNC: 102 MMOL/L (ref 98–107)
CO2 SERPL-SCNC: 24 MMOL/L (ref 21–32)
COLOR UR: YELLOW
CREAT SERPL-MCNC: 0.78 MG/DL (ref 0.6–1)
CRP SERPL-MCNC: 1.2 MG/DL (ref 0–0.9)
D DIMER PPP FEU-MCNC: 0.42 UG/ML(FEU)
DIFFERENTIAL METHOD BLD: ABNORMAL
EKG ATRIAL RATE: 74 BPM
EKG DIAGNOSIS: NORMAL
EKG Q-T INTERVAL: 406 MS
EKG QRS DURATION: 100 MS
EKG QTC CALCULATION (BAZETT): 451 MS
EKG R AXIS: 59 DEGREES
EKG T AXIS: 36 DEGREES
EKG VENTRICULAR RATE: 74 BPM
EOSINOPHIL # BLD: 0.2 K/UL (ref 0–0.8)
EOSINOPHIL NFR BLD: 3 % (ref 0.5–7.8)
ERYTHROCYTE [DISTWIDTH] IN BLOOD BY AUTOMATED COUNT: 15.2 % (ref 11.9–14.6)
ERYTHROCYTE [SEDIMENTATION RATE] IN BLOOD: 73 MM/HR (ref 0–30)
FLUAV RNA SPEC QL NAA+PROBE: NOT DETECTED
FLUBV RNA SPEC QL NAA+PROBE: NOT DETECTED
GLOBULIN SER CALC-MCNC: 3.5 G/DL (ref 2.8–4.5)
GLUCOSE SERPL-MCNC: 125 MG/DL (ref 65–100)
GLUCOSE UR STRIP.AUTO-MCNC: NEGATIVE MG/DL
HCT VFR BLD AUTO: 37.5 % (ref 35.8–46.3)
HGB BLD-MCNC: 12.2 G/DL (ref 11.7–15.4)
HGB UR QL STRIP: NEGATIVE
IMM GRANULOCYTES # BLD AUTO: 0 K/UL (ref 0–0.5)
IMM GRANULOCYTES NFR BLD AUTO: 0 % (ref 0–5)
KETONES UR QL STRIP.AUTO: NEGATIVE MG/DL
LACTATE SERPL-SCNC: 1.3 MMOL/L (ref 0.4–2)
LACTATE SERPL-SCNC: 2.2 MMOL/L (ref 0.4–2)
LEUKOCYTE ESTERASE UR QL STRIP.AUTO: NEGATIVE
LIPASE SERPL-CCNC: 27 U/L (ref 13–60)
LYMPHOCYTES # BLD: 1.9 K/UL (ref 0.5–4.6)
LYMPHOCYTES NFR BLD: 30 % (ref 13–44)
MAGNESIUM SERPL-MCNC: 2 MG/DL (ref 1.2–2.6)
MCH RBC QN AUTO: 25.8 PG (ref 26.1–32.9)
MCHC RBC AUTO-ENTMCNC: 32.5 G/DL (ref 31.4–35)
MCV RBC AUTO: 79.3 FL (ref 82–102)
MONOCYTES # BLD: 0.4 K/UL (ref 0.1–1.3)
MONOCYTES NFR BLD: 6 % (ref 4–12)
NEUTS SEG # BLD: 3.7 K/UL (ref 1.7–8.2)
NEUTS SEG NFR BLD: 60 % (ref 43–78)
NITRITE UR QL STRIP.AUTO: NEGATIVE
NRBC # BLD: 0 K/UL (ref 0–0.2)
PH UR STRIP: 6.5 (ref 5–9)
PLATELET # BLD AUTO: 223 K/UL (ref 150–450)
PMV BLD AUTO: 9.3 FL (ref 9.4–12.3)
POTASSIUM SERPL-SCNC: 3.8 MMOL/L (ref 3.5–5.1)
PROCALCITONIN SERPL-MCNC: 0.03 NG/ML (ref 0–0.49)
PROT SERPL-MCNC: 7.7 G/DL (ref 6.4–8.2)
PROT UR STRIP-MCNC: NEGATIVE MG/DL
RBC # BLD AUTO: 4.73 M/UL (ref 4.05–5.2)
SARS-COV-2 RDRP RESP QL NAA+PROBE: NOT DETECTED
SODIUM SERPL-SCNC: 137 MMOL/L (ref 133–143)
SOURCE: NORMAL
SP GR UR REFRACTOMETRY: 1.01 (ref 1–1.02)
TROPONIN T SERPL HS-MCNC: <6 NG/L (ref 0–14)
URATE SERPL-MCNC: 5.2 MG/DL (ref 2.6–6)
UROBILINOGEN UR QL STRIP.AUTO: 0.2 EU/DL (ref 0.2–1)
WBC # BLD AUTO: 6.1 K/UL (ref 4.3–11.1)

## 2024-02-02 PROCEDURE — 93005 ELECTROCARDIOGRAM TRACING: CPT

## 2024-02-02 PROCEDURE — 84145 PROCALCITONIN (PCT): CPT

## 2024-02-02 PROCEDURE — 83605 ASSAY OF LACTIC ACID: CPT

## 2024-02-02 PROCEDURE — 6370000000 HC RX 637 (ALT 250 FOR IP): Performed by: INTERNAL MEDICINE

## 2024-02-02 PROCEDURE — 71046 X-RAY EXAM CHEST 2 VIEWS: CPT

## 2024-02-02 PROCEDURE — 86140 C-REACTIVE PROTEIN: CPT

## 2024-02-02 PROCEDURE — 83690 ASSAY OF LIPASE: CPT

## 2024-02-02 PROCEDURE — 85652 RBC SED RATE AUTOMATED: CPT

## 2024-02-02 PROCEDURE — 2580000003 HC RX 258: Performed by: INTERNAL MEDICINE

## 2024-02-02 PROCEDURE — 1100000000 HC RM PRIVATE

## 2024-02-02 PROCEDURE — 2580000003 HC RX 258

## 2024-02-02 PROCEDURE — 96365 THER/PROPH/DIAG IV INF INIT: CPT

## 2024-02-02 PROCEDURE — 83735 ASSAY OF MAGNESIUM: CPT

## 2024-02-02 PROCEDURE — 96376 TX/PRO/DX INJ SAME DRUG ADON: CPT

## 2024-02-02 PROCEDURE — 73630 X-RAY EXAM OF FOOT: CPT

## 2024-02-02 PROCEDURE — 96375 TX/PRO/DX INJ NEW DRUG ADDON: CPT

## 2024-02-02 PROCEDURE — 96361 HYDRATE IV INFUSION ADD-ON: CPT

## 2024-02-02 PROCEDURE — 6360000002 HC RX W HCPCS: Performed by: INTERNAL MEDICINE

## 2024-02-02 PROCEDURE — 84484 ASSAY OF TROPONIN QUANT: CPT

## 2024-02-02 PROCEDURE — 6360000002 HC RX W HCPCS: Performed by: FAMILY MEDICINE

## 2024-02-02 PROCEDURE — 85379 FIBRIN DEGRADATION QUANT: CPT

## 2024-02-02 PROCEDURE — 85025 COMPLETE CBC W/AUTO DIFF WBC: CPT

## 2024-02-02 PROCEDURE — 99285 EMERGENCY DEPT VISIT HI MDM: CPT

## 2024-02-02 PROCEDURE — 81003 URINALYSIS AUTO W/O SCOPE: CPT

## 2024-02-02 PROCEDURE — 6370000000 HC RX 637 (ALT 250 FOR IP)

## 2024-02-02 PROCEDURE — 93010 ELECTROCARDIOGRAM REPORT: CPT | Performed by: INTERNAL MEDICINE

## 2024-02-02 PROCEDURE — 87635 SARS-COV-2 COVID-19 AMP PRB: CPT

## 2024-02-02 PROCEDURE — 6360000002 HC RX W HCPCS

## 2024-02-02 PROCEDURE — 84550 ASSAY OF BLOOD/URIC ACID: CPT

## 2024-02-02 PROCEDURE — 87040 BLOOD CULTURE FOR BACTERIA: CPT

## 2024-02-02 PROCEDURE — 80053 COMPREHEN METABOLIC PANEL: CPT

## 2024-02-02 PROCEDURE — 87502 INFLUENZA DNA AMP PROBE: CPT

## 2024-02-02 RX ORDER — BUPROPION HYDROCHLORIDE 300 MG/1
300 TABLET ORAL DAILY
Status: DISCONTINUED | OUTPATIENT
Start: 2024-02-03 | End: 2024-02-04 | Stop reason: HOSPADM

## 2024-02-02 RX ORDER — ACETAMINOPHEN 650 MG/1
650 SUPPOSITORY RECTAL EVERY 6 HOURS PRN
Status: DISCONTINUED | OUTPATIENT
Start: 2024-02-02 | End: 2024-02-04 | Stop reason: HOSPADM

## 2024-02-02 RX ORDER — POTASSIUM CHLORIDE 7.45 MG/ML
10 INJECTION INTRAVENOUS PRN
Status: DISCONTINUED | OUTPATIENT
Start: 2024-02-02 | End: 2024-02-04 | Stop reason: HOSPADM

## 2024-02-02 RX ORDER — LISINOPRIL AND HYDROCHLOROTHIAZIDE 20; 12.5 MG/1; MG/1
1 TABLET ORAL DAILY
Status: DISCONTINUED | OUTPATIENT
Start: 2024-02-03 | End: 2024-02-02 | Stop reason: SDUPTHER

## 2024-02-02 RX ORDER — POLYETHYLENE GLYCOL 3350 17 G/17G
17 POWDER, FOR SOLUTION ORAL DAILY PRN
Status: DISCONTINUED | OUTPATIENT
Start: 2024-02-02 | End: 2024-02-04 | Stop reason: HOSPADM

## 2024-02-02 RX ORDER — MAGNESIUM SULFATE IN WATER 40 MG/ML
2000 INJECTION, SOLUTION INTRAVENOUS PRN
Status: DISCONTINUED | OUTPATIENT
Start: 2024-02-02 | End: 2024-02-04 | Stop reason: HOSPADM

## 2024-02-02 RX ORDER — SODIUM CHLORIDE 0.9 % (FLUSH) 0.9 %
5-40 SYRINGE (ML) INJECTION PRN
Status: DISCONTINUED | OUTPATIENT
Start: 2024-02-02 | End: 2024-02-04 | Stop reason: HOSPADM

## 2024-02-02 RX ORDER — ONDANSETRON 4 MG/1
4 TABLET, ORALLY DISINTEGRATING ORAL EVERY 8 HOURS PRN
Status: DISCONTINUED | OUTPATIENT
Start: 2024-02-02 | End: 2024-02-04 | Stop reason: HOSPADM

## 2024-02-02 RX ORDER — PROMETHAZINE HYDROCHLORIDE 25 MG/1
25 TABLET ORAL
Status: COMPLETED | OUTPATIENT
Start: 2024-02-02 | End: 2024-02-02

## 2024-02-02 RX ORDER — ONDANSETRON 2 MG/ML
4 INJECTION INTRAMUSCULAR; INTRAVENOUS EVERY 6 HOURS PRN
Status: DISCONTINUED | OUTPATIENT
Start: 2024-02-02 | End: 2024-02-04 | Stop reason: HOSPADM

## 2024-02-02 RX ORDER — LISINOPRIL 5 MG/1
10 TABLET ORAL DAILY
Status: DISCONTINUED | OUTPATIENT
Start: 2024-02-03 | End: 2024-02-04 | Stop reason: HOSPADM

## 2024-02-02 RX ORDER — PRAVASTATIN SODIUM 20 MG
40 TABLET ORAL EVERY EVENING
Status: DISCONTINUED | OUTPATIENT
Start: 2024-02-02 | End: 2024-02-04 | Stop reason: HOSPADM

## 2024-02-02 RX ORDER — ASPIRIN 81 MG/1
81 TABLET, CHEWABLE ORAL DAILY
Status: DISCONTINUED | OUTPATIENT
Start: 2024-02-03 | End: 2024-02-04 | Stop reason: HOSPADM

## 2024-02-02 RX ORDER — 0.9 % SODIUM CHLORIDE 0.9 %
1000 INTRAVENOUS SOLUTION INTRAVENOUS ONCE
Status: COMPLETED | OUTPATIENT
Start: 2024-02-02 | End: 2024-02-02

## 2024-02-02 RX ORDER — SODIUM CHLORIDE 9 MG/ML
INJECTION, SOLUTION INTRAVENOUS PRN
Status: DISCONTINUED | OUTPATIENT
Start: 2024-02-02 | End: 2024-02-04 | Stop reason: HOSPADM

## 2024-02-02 RX ORDER — SODIUM CHLORIDE 0.9 % (FLUSH) 0.9 %
5-40 SYRINGE (ML) INJECTION EVERY 12 HOURS SCHEDULED
Status: DISCONTINUED | OUTPATIENT
Start: 2024-02-02 | End: 2024-02-04 | Stop reason: HOSPADM

## 2024-02-02 RX ORDER — POTASSIUM CHLORIDE 20 MEQ/1
40 TABLET, EXTENDED RELEASE ORAL PRN
Status: DISCONTINUED | OUTPATIENT
Start: 2024-02-02 | End: 2024-02-04 | Stop reason: HOSPADM

## 2024-02-02 RX ORDER — MORPHINE SULFATE 2 MG/ML
1 INJECTION, SOLUTION INTRAMUSCULAR; INTRAVENOUS ONCE
Status: COMPLETED | OUTPATIENT
Start: 2024-02-02 | End: 2024-02-02

## 2024-02-02 RX ORDER — HYDROCHLOROTHIAZIDE 25 MG/1
12.5 TABLET ORAL DAILY
Status: DISCONTINUED | OUTPATIENT
Start: 2024-02-03 | End: 2024-02-04 | Stop reason: HOSPADM

## 2024-02-02 RX ORDER — SEMAGLUTIDE 0.68 MG/ML
0.5 INJECTION, SOLUTION SUBCUTANEOUS
COMMUNITY
Start: 2023-10-02

## 2024-02-02 RX ORDER — ENOXAPARIN SODIUM 100 MG/ML
40 INJECTION SUBCUTANEOUS EVERY 24 HOURS
Status: DISCONTINUED | OUTPATIENT
Start: 2024-02-02 | End: 2024-02-04 | Stop reason: HOSPADM

## 2024-02-02 RX ORDER — DULOXETIN HYDROCHLORIDE 60 MG/1
60 CAPSULE, DELAYED RELEASE ORAL DAILY
Status: DISCONTINUED | OUTPATIENT
Start: 2024-02-03 | End: 2024-02-04 | Stop reason: HOSPADM

## 2024-02-02 RX ORDER — ACETAMINOPHEN 325 MG/1
650 TABLET ORAL EVERY 6 HOURS PRN
Status: DISCONTINUED | OUTPATIENT
Start: 2024-02-02 | End: 2024-02-04 | Stop reason: HOSPADM

## 2024-02-02 RX ADMIN — SODIUM CHLORIDE, PRESERVATIVE FREE 10 ML: 5 INJECTION INTRAVENOUS at 20:40

## 2024-02-02 RX ADMIN — SODIUM CHLORIDE 1000 ML: 9 INJECTION, SOLUTION INTRAVENOUS at 14:06

## 2024-02-02 RX ADMIN — VANCOMYCIN HYDROCHLORIDE 1000 MG: 1 INJECTION, POWDER, LYOPHILIZED, FOR SOLUTION INTRAVENOUS at 15:22

## 2024-02-02 RX ADMIN — MORPHINE SULFATE 1 MG: 2 INJECTION, SOLUTION INTRAMUSCULAR; INTRAVENOUS at 21:57

## 2024-02-02 RX ADMIN — WATER 1000 MG: 1 INJECTION INTRAMUSCULAR; INTRAVENOUS; SUBCUTANEOUS at 13:30

## 2024-02-02 RX ADMIN — PRAVASTATIN SODIUM 40 MG: 20 TABLET ORAL at 21:57

## 2024-02-02 RX ADMIN — PROMETHAZINE HYDROCHLORIDE 25 MG: 25 TABLET ORAL at 13:37

## 2024-02-02 RX ADMIN — VANCOMYCIN HYDROCHLORIDE 1000 MG: 1 INJECTION, POWDER, LYOPHILIZED, FOR SOLUTION INTRAVENOUS at 14:13

## 2024-02-02 RX ADMIN — ACETAMINOPHEN 650 MG: 325 TABLET, FILM COATED ORAL at 20:39

## 2024-02-02 RX ADMIN — ENOXAPARIN SODIUM 40 MG: 100 INJECTION SUBCUTANEOUS at 20:40

## 2024-02-02 RX ADMIN — SODIUM CHLORIDE 1000 ML: 9 INJECTION, SOLUTION INTRAVENOUS at 13:29

## 2024-02-02 ASSESSMENT — PAIN DESCRIPTION - ORIENTATION
ORIENTATION: LEFT
ORIENTATION: LEFT

## 2024-02-02 ASSESSMENT — PAIN SCALES - GENERAL
PAINLEVEL_OUTOF10: 7
PAINLEVEL_OUTOF10: 8
PAINLEVEL_OUTOF10: 6
PAINLEVEL_OUTOF10: 4

## 2024-02-02 ASSESSMENT — PAIN - FUNCTIONAL ASSESSMENT
PAIN_FUNCTIONAL_ASSESSMENT: ACTIVITIES ARE NOT PREVENTED
PAIN_FUNCTIONAL_ASSESSMENT: 0-10
PAIN_FUNCTIONAL_ASSESSMENT: ACTIVITIES ARE NOT PREVENTED
PAIN_FUNCTIONAL_ASSESSMENT: NONE - DENIES PAIN

## 2024-02-02 ASSESSMENT — ENCOUNTER SYMPTOMS
SHORTNESS OF BREATH: 0
NAUSEA: 1
VOMITING: 1
COLOR CHANGE: 0
ABDOMINAL PAIN: 1
DIARRHEA: 1

## 2024-02-02 ASSESSMENT — PAIN DESCRIPTION - LOCATION
LOCATION: CHEST
LOCATION: FOOT
LOCATION: OTHER (COMMENT)
LOCATION: TOE (COMMENT WHICH ONE);FOOT

## 2024-02-02 ASSESSMENT — PAIN DESCRIPTION - DESCRIPTORS
DESCRIPTORS: BURNING;ACHING;DULL
DESCRIPTORS: BURNING;ACHING;DULL

## 2024-02-02 NOTE — ED NOTES
TRANSFER - OUT REPORT:    Verbal report given to WHIT Pereira on Yemi Su  being transferred to [place] [address] for routine progression of patient care       Report consisted of patient's Situation, Background, Assessment and   Recommendations(SBAR).     Information from the following report(s) MAR, Recent Results, Cardiac Rhythm Sinus Rhythm, and Neuro Assessment was reviewed with the receiving nurse.    Lines:   Peripheral IV 02/02/24 Distal;Left;Ventral Forearm (Active)       Peripheral IV 02/02/24 Right Antecubital (Active)        Opportunity for questions and clarification was provided.      Patient transported with:  Monitor, Patient-specific medications from Pharmacy, and Tech

## 2024-02-02 NOTE — ED TRIAGE NOTES
Pt ambulatory to ED with c/o left great toe pain and fever. Pt states she dropped a glass dish on the toe \"awhile back\" and was healing, but woke up with increased redness and swelling. Pt also had 103 fever for the past two days. PA to triage for exam.    Pt also c/o flu like symptoms, and mild chest pain.

## 2024-02-02 NOTE — ED PROVIDER NOTES
Voice dictation software was used during the making of this note.  This software is not perfect and grammatical and other typographical errors may be present.  This note has not been completely proofread for errors.       Moni Gordon PA  02/02/24 1554

## 2024-02-02 NOTE — H&P
note may have been written by using a voice dictation software.  The note has been proof read but may still contain some grammatical/other typographical errors.

## 2024-02-03 LAB
ALBUMIN SERPL-MCNC: 3 G/DL (ref 3.5–5)
ALBUMIN/GLOB SERPL: 1 (ref 0.4–1.6)
ALP SERPL-CCNC: 75 U/L (ref 50–136)
ALT SERPL-CCNC: 14 U/L (ref 12–65)
ANION GAP SERPL CALC-SCNC: 3 MMOL/L (ref 2–11)
AST SERPL-CCNC: 9 U/L (ref 15–37)
BASOPHILS # BLD: 0 K/UL (ref 0–0.2)
BASOPHILS NFR BLD: 1 % (ref 0–2)
BILIRUB SERPL-MCNC: 0.4 MG/DL (ref 0.2–1.1)
BUN SERPL-MCNC: 14 MG/DL (ref 6–23)
CALCIUM SERPL-MCNC: 8.5 MG/DL (ref 8.3–10.4)
CHLORIDE SERPL-SCNC: 113 MMOL/L (ref 103–113)
CO2 SERPL-SCNC: 27 MMOL/L (ref 21–32)
CREAT SERPL-MCNC: 0.63 MG/DL (ref 0.6–1)
DIFFERENTIAL METHOD BLD: ABNORMAL
EOSINOPHIL # BLD: 0.2 K/UL (ref 0–0.8)
EOSINOPHIL NFR BLD: 5 % (ref 0.5–7.8)
ERYTHROCYTE [DISTWIDTH] IN BLOOD BY AUTOMATED COUNT: 15.3 % (ref 11.9–14.6)
EST. AVERAGE GLUCOSE BLD GHB EST-MCNC: 103 MG/DL
GLOBULIN SER CALC-MCNC: 3 G/DL (ref 2.8–4.5)
GLUCOSE SERPL-MCNC: 101 MG/DL (ref 65–100)
HBA1C MFR BLD: 5.2 % (ref 4.8–5.6)
HCT VFR BLD AUTO: 34.5 % (ref 35.8–46.3)
HGB BLD-MCNC: 10.7 G/DL (ref 11.7–15.4)
IMM GRANULOCYTES # BLD AUTO: 0 K/UL (ref 0–0.5)
IMM GRANULOCYTES NFR BLD AUTO: 0 % (ref 0–5)
LYMPHOCYTES # BLD: 1.1 K/UL (ref 0.5–4.6)
LYMPHOCYTES NFR BLD: 33 % (ref 13–44)
MCH RBC QN AUTO: 25.2 PG (ref 26.1–32.9)
MCHC RBC AUTO-ENTMCNC: 31 G/DL (ref 31.4–35)
MCV RBC AUTO: 81.2 FL (ref 82–102)
MONOCYTES # BLD: 0.3 K/UL (ref 0.1–1.3)
MONOCYTES NFR BLD: 10 % (ref 4–12)
NEUTS SEG # BLD: 1.7 K/UL (ref 1.7–8.2)
NEUTS SEG NFR BLD: 52 % (ref 43–78)
NRBC # BLD: 0 K/UL (ref 0–0.2)
PLATELET # BLD AUTO: 166 K/UL (ref 150–450)
PMV BLD AUTO: 9.8 FL (ref 9.4–12.3)
POTASSIUM SERPL-SCNC: 3.8 MMOL/L (ref 3.5–5.1)
PROT SERPL-MCNC: 6 G/DL (ref 6.3–8.2)
RBC # BLD AUTO: 4.25 M/UL (ref 4.05–5.2)
SODIUM SERPL-SCNC: 143 MMOL/L (ref 136–146)
VANCOMYCIN SERPL-MCNC: 24.1 UG/ML
WBC # BLD AUTO: 3.3 K/UL (ref 4.3–11.1)

## 2024-02-03 PROCEDURE — 1100000000 HC RM PRIVATE

## 2024-02-03 PROCEDURE — 36415 COLL VENOUS BLD VENIPUNCTURE: CPT

## 2024-02-03 PROCEDURE — 6370000000 HC RX 637 (ALT 250 FOR IP): Performed by: INTERNAL MEDICINE

## 2024-02-03 PROCEDURE — 83036 HEMOGLOBIN GLYCOSYLATED A1C: CPT

## 2024-02-03 PROCEDURE — 2580000003 HC RX 258: Performed by: INTERNAL MEDICINE

## 2024-02-03 PROCEDURE — 80202 ASSAY OF VANCOMYCIN: CPT

## 2024-02-03 PROCEDURE — 6360000002 HC RX W HCPCS: Performed by: INTERNAL MEDICINE

## 2024-02-03 PROCEDURE — 85025 COMPLETE CBC W/AUTO DIFF WBC: CPT

## 2024-02-03 PROCEDURE — 80053 COMPREHEN METABOLIC PANEL: CPT

## 2024-02-03 RX ADMIN — VANCOMYCIN HYDROCHLORIDE 1250 MG: 10 INJECTION, POWDER, LYOPHILIZED, FOR SOLUTION INTRAVENOUS at 12:40

## 2024-02-03 RX ADMIN — SODIUM CHLORIDE, PRESERVATIVE FREE 10 ML: 5 INJECTION INTRAVENOUS at 20:52

## 2024-02-03 RX ADMIN — VANCOMYCIN HYDROCHLORIDE 1250 MG: 10 INJECTION, POWDER, LYOPHILIZED, FOR SOLUTION INTRAVENOUS at 01:18

## 2024-02-03 RX ADMIN — ASPIRIN 81 MG 81 MG: 81 TABLET ORAL at 08:43

## 2024-02-03 RX ADMIN — SODIUM CHLORIDE: 9 INJECTION, SOLUTION INTRAVENOUS at 01:15

## 2024-02-03 RX ADMIN — SODIUM CHLORIDE, PRESERVATIVE FREE 10 ML: 5 INJECTION INTRAVENOUS at 08:47

## 2024-02-03 RX ADMIN — LISINOPRIL 10 MG: 5 TABLET ORAL at 08:44

## 2024-02-03 RX ADMIN — DULOXETINE HYDROCHLORIDE 60 MG: 60 CAPSULE, DELAYED RELEASE ORAL at 08:44

## 2024-02-03 RX ADMIN — WATER 1000 MG: 1 INJECTION INTRAMUSCULAR; INTRAVENOUS; SUBCUTANEOUS at 11:14

## 2024-02-03 RX ADMIN — HYDROCHLOROTHIAZIDE 12.5 MG: 25 TABLET ORAL at 08:45

## 2024-02-03 RX ADMIN — ACETAMINOPHEN 650 MG: 325 TABLET, FILM COATED ORAL at 14:05

## 2024-02-03 RX ADMIN — BUPROPION HYDROCHLORIDE 300 MG: 300 TABLET, EXTENDED RELEASE ORAL at 08:44

## 2024-02-03 RX ADMIN — ENOXAPARIN SODIUM 40 MG: 100 INJECTION SUBCUTANEOUS at 20:51

## 2024-02-03 ASSESSMENT — PAIN SCALES - GENERAL: PAINLEVEL_OUTOF10: 6

## 2024-02-03 ASSESSMENT — PAIN DESCRIPTION - DESCRIPTORS: DESCRIPTORS: ACHING

## 2024-02-03 ASSESSMENT — PAIN DESCRIPTION - LOCATION: LOCATION: HEAD

## 2024-02-03 NOTE — PLAN OF CARE
Problem: Discharge Planning  Goal: Discharge to home or other facility with appropriate resources  2/3/2024 0455 by Cynthia Rahman, RN  Outcome: Progressing  2/3/2024 0454 by Cynthia Rahman, RN  Outcome: Progressing  2/2/2024 1817 by Kelli Armendariz, RN  Outcome: Progressing      Walk in

## 2024-02-03 NOTE — CARE COORDINATION
53 yr-old F with foot infection.  Lives at home with spouse.  No needs.     02/03/24 1002   Service Assessment   Patient Orientation Alert and Oriented   Cognition Alert   History Provided By Patient   Primary Caregiver Self   Support Systems Spouse/Significant Other   Patient's Healthcare Decision Maker is: Named in Scanned ACP Document   PCP Verified by CM Yes   Last Visit to PCP Within last 3 months   Prior Functional Level Independent in ADLs/IADLs   Current Functional Level Independent in ADLs/IADLs   Can patient return to prior living arrangement Yes   Ability to make needs known: Good   Family able to assist with home care needs: Yes   Would you like for me to discuss the discharge plan with any other family members/significant others, and if so, who? No   Financial Resources Other (Comment)  ([employer])   Community Resources Other (Comment)  (n/a)

## 2024-02-04 VITALS
SYSTOLIC BLOOD PRESSURE: 120 MMHG | HEART RATE: 61 BPM | DIASTOLIC BLOOD PRESSURE: 75 MMHG | WEIGHT: 219 LBS | TEMPERATURE: 98.4 F | HEIGHT: 68 IN | BODY MASS INDEX: 33.19 KG/M2 | OXYGEN SATURATION: 98 % | RESPIRATION RATE: 18 BRPM

## 2024-02-04 LAB
ANION GAP SERPL CALC-SCNC: 2 MMOL/L (ref 2–11)
BACTERIA SPEC CULT: NORMAL
BACTERIA SPEC CULT: NORMAL
BASOPHILS # BLD: 0 K/UL (ref 0–0.2)
BASOPHILS NFR BLD: 1 % (ref 0–2)
BUN SERPL-MCNC: 13 MG/DL (ref 6–23)
CALCIUM SERPL-MCNC: 8.9 MG/DL (ref 8.3–10.4)
CHLORIDE SERPL-SCNC: 109 MMOL/L (ref 103–113)
CO2 SERPL-SCNC: 28 MMOL/L (ref 21–32)
CREAT SERPL-MCNC: 0.74 MG/DL (ref 0.6–1)
DIFFERENTIAL METHOD BLD: ABNORMAL
EOSINOPHIL # BLD: 0.2 K/UL (ref 0–0.8)
EOSINOPHIL NFR BLD: 4 % (ref 0.5–7.8)
ERYTHROCYTE [DISTWIDTH] IN BLOOD BY AUTOMATED COUNT: 15.2 % (ref 11.9–14.6)
FERRITIN SERPL-MCNC: 21 NG/ML (ref 8–388)
FOLATE SERPL-MCNC: 6.6 NG/ML (ref 3.1–17.5)
GLUCOSE SERPL-MCNC: 100 MG/DL (ref 65–100)
HCT VFR BLD AUTO: 36.4 % (ref 35.8–46.3)
HGB BLD-MCNC: 11.4 G/DL (ref 11.7–15.4)
IMM GRANULOCYTES # BLD AUTO: 0 K/UL (ref 0–0.5)
IMM GRANULOCYTES NFR BLD AUTO: 1 % (ref 0–5)
IRON SATN MFR SERPL: 11 %
IRON SERPL-MCNC: 32 UG/DL (ref 35–150)
LYMPHOCYTES # BLD: 1.2 K/UL (ref 0.5–4.6)
LYMPHOCYTES NFR BLD: 28 % (ref 13–44)
MCH RBC QN AUTO: 25.1 PG (ref 26.1–32.9)
MCHC RBC AUTO-ENTMCNC: 31.3 G/DL (ref 31.4–35)
MCV RBC AUTO: 80.2 FL (ref 82–102)
MONOCYTES # BLD: 0.4 K/UL (ref 0.1–1.3)
MONOCYTES NFR BLD: 9 % (ref 4–12)
NEUTS SEG # BLD: 2.5 K/UL (ref 1.7–8.2)
NEUTS SEG NFR BLD: 59 % (ref 43–78)
NRBC # BLD: 0 K/UL (ref 0–0.2)
PLATELET # BLD AUTO: 214 K/UL (ref 150–450)
PMV BLD AUTO: 9.6 FL (ref 9.4–12.3)
POTASSIUM SERPL-SCNC: 3.8 MMOL/L (ref 3.5–5.1)
RBC # BLD AUTO: 4.54 M/UL (ref 4.05–5.2)
SERVICE CMNT-IMP: NORMAL
SERVICE CMNT-IMP: NORMAL
SODIUM SERPL-SCNC: 139 MMOL/L (ref 136–146)
TIBC SERPL-MCNC: 291 UG/DL (ref 250–450)
VANCOMYCIN SERPL-MCNC: 22 UG/ML
VIT B12 SERPL-MCNC: 573 PG/ML (ref 193–986)
WBC # BLD AUTO: 4.3 K/UL (ref 4.3–11.1)

## 2024-02-04 PROCEDURE — 83540 ASSAY OF IRON: CPT

## 2024-02-04 PROCEDURE — 2580000003 HC RX 258: Performed by: INTERNAL MEDICINE

## 2024-02-04 PROCEDURE — 85025 COMPLETE CBC W/AUTO DIFF WBC: CPT

## 2024-02-04 PROCEDURE — 6360000002 HC RX W HCPCS: Performed by: INTERNAL MEDICINE

## 2024-02-04 PROCEDURE — 36415 COLL VENOUS BLD VENIPUNCTURE: CPT

## 2024-02-04 PROCEDURE — 82746 ASSAY OF FOLIC ACID SERUM: CPT

## 2024-02-04 PROCEDURE — 83550 IRON BINDING TEST: CPT

## 2024-02-04 PROCEDURE — 82728 ASSAY OF FERRITIN: CPT

## 2024-02-04 PROCEDURE — 80048 BASIC METABOLIC PNL TOTAL CA: CPT

## 2024-02-04 PROCEDURE — 80202 ASSAY OF VANCOMYCIN: CPT

## 2024-02-04 PROCEDURE — 82607 VITAMIN B-12: CPT

## 2024-02-04 PROCEDURE — 6370000000 HC RX 637 (ALT 250 FOR IP): Performed by: INTERNAL MEDICINE

## 2024-02-04 RX ORDER — CEPHALEXIN 500 MG/1
500 CAPSULE ORAL 4 TIMES DAILY
Qty: 20 CAPSULE | Refills: 0 | Status: SHIPPED | OUTPATIENT
Start: 2024-02-04 | End: 2024-02-09

## 2024-02-04 RX ORDER — DOXYCYCLINE HYCLATE 100 MG
100 TABLET ORAL 2 TIMES DAILY
Qty: 10 TABLET | Refills: 0 | Status: SHIPPED | OUTPATIENT
Start: 2024-02-04 | End: 2024-02-09

## 2024-02-04 RX ADMIN — HYDROCHLOROTHIAZIDE 12.5 MG: 25 TABLET ORAL at 08:20

## 2024-02-04 RX ADMIN — ASPIRIN 81 MG 81 MG: 81 TABLET ORAL at 08:22

## 2024-02-04 RX ADMIN — VANCOMYCIN HYDROCHLORIDE 1250 MG: 10 INJECTION, POWDER, LYOPHILIZED, FOR SOLUTION INTRAVENOUS at 01:37

## 2024-02-04 RX ADMIN — SODIUM CHLORIDE, PRESERVATIVE FREE 10 ML: 5 INJECTION INTRAVENOUS at 08:22

## 2024-02-04 RX ADMIN — LISINOPRIL 10 MG: 5 TABLET ORAL at 08:21

## 2024-02-04 RX ADMIN — BUPROPION HYDROCHLORIDE 300 MG: 300 TABLET, EXTENDED RELEASE ORAL at 08:22

## 2024-02-04 RX ADMIN — DULOXETINE HYDROCHLORIDE 60 MG: 60 CAPSULE, DELAYED RELEASE ORAL at 08:22

## 2024-02-04 NOTE — DISCHARGE SUMMARY
Hospitalist Discharge Summary   Admit Date:  2024  4:44 PM   DC Note date: 2024  Name:  Yemi Su   Age:  53 y.o.  Sex:  female  :  1970   MRN:  [id number]   Room:  [facility]  PCP:  Jones Andino III, MD    Presenting Complaint: No chief complaint on file.     Initial Admission Diagnosis: Left foot infection [L08.9]     Problem List for this Hospitalization (present on admission):    Principal Problem:    Left foot infection  Active Problems:    Depression    Rheumatoid arthritis (HCC)    Hypertension    Diabetic ulcer of right foot associated with diabetes mellitus due to underlying condition (HCC)    Gastroesophageal reflux disease without esophagitis    Hypercholesterolemia    Major depressive disorder, recurrent, moderate (HCC)    Type 2 diabetes mellitus with diabetic polyneuropathy, without long-term current use of insulin (HCC)    Essential (primary) hypertension    Fibromyalgia  Resolved Problems:    * No resolved hospital problems. *      Hospital Course:  Yemi Su is a 53 y.o. female with IBS, recurrent kidney stones, HTN ,DM hx on chart, HLP, GERD, hx of right foot osteomyelitis who presented with report of fever x 2 days, nausea, vomiting and diarrhea x 1 week ago which has now mostly resolved         [id number] noted to have left great toe redness today. Admits to dropping a pyrex dish on her left first great toe a couple of months ago with subsequent toenail injury.      BMP - lactic acid 2.2, CRP 1.2  CBC - wbc 6.1  UA - unremarkable  CXR - non acute     left foot xr  No acute fracture with significant soft tissue swelling in the plantar aspect of  the foot especially in the heel pad with a large plantar spur     Patient given Vanco/rocephin at [facility] prior to transfer. Hospitalist asked to admit for left toe cellulitis.     Patient with good clinical improvement. No further pain or erythema associated with first great toe. Does have abnormal appearing left

## 2024-02-08 NOTE — PROGRESS NOTES
Physician Progress Note      PATIENT:               DARINEL DORANTES  CSN #:                  [id number]  :                       1970  ADMIT DATE:       2024 4:44 PM  DISCH DATE:        2024 5:04 PM  RESPONDING  PROVIDER #:        Megan Grant DO          QUERY TEXT:    Pt admitted with Left great toe cellulitis. Pt noted to have DM. If possible,   please document in progress notes and discharge summary the relationship, if   any, between cellulitis and DM.    The medical record reflects the following:  Risk Factors: DM, cellulitis  Clinical Indicators: Documented hx of DM and left great toe cellulitis  Treatment: Vancomycin. Rocephin.  Options provided:  -- Left great toe cellulitis associated with Diabetes  -- Left great toe  cellulitis unrelated to Diabetes  -- Other - I will add my own diagnosis  -- Disagree - Not applicable / Not valid  -- Disagree - Clinically unable to determine / Unknown  -- Refer to Clinical Documentation Reviewer    PROVIDER RESPONSE TEXT:    Left great toe cellulitis unrelated to Diabetes.    Query created by: ADAMARIS CAST on 2024 11:57 AM      Electronically signed by:  Megan Grant DO 2024 7:23 AM          
Accessing chart as instructor for pre-licensure, novice nurses.   
Discharge instructions reviewed with Pt. Opportunity for questions and clarification given. IV removed and cath tip intact. Scripts sent to pharmacy. Education given to pt and pt was able to teach back. No needs at this time and pt waiting on ride to arrive.      
VANCO DAILY FOLLOW UP NOTE  [facility]   Pharmacy Pharmacokinetic Monitoring Service - Vancomycin    Consulting Provider: Dr Grant   Indication: SSTI/right toe cellulitis  Target Concentration: Goal AUC/LILIAN 400-600 mg*hr/L  Day of Therapy: 2  Additional Antimicrobials: Rocephin    Patient eligible for piperacillin-tazobactam to cefepime auto-substitution per P&T approved protocol? NO    Pertinent Laboratory Values:   Wt Readings from Last 1 Encounters:   02/02/24 99.3 kg (219 lb)     Temp Readings from Last 1 Encounters:   02/02/24 98.2 °F (36.8 °C) (Oral)     Recent Labs     02/02/24  1313 02/02/24  1512 02/03/24  0530   BUN 20  --  14   CREATININE 0.78  --  0.63   WBC 6.1  --  3.3*   PROCAL 0.03  --   --    LACTA  --  1.30  --    LACTSEPSIS 2.2*  --   --      Estimated Creatinine Clearance: 130 mL/min (based on SCr of 0.63 mg/dL).    Lab Results   Component Value Date/Time    VANCOTROUGH 16.2 02/18/2021 12:40 PM    VANCORANDOM 24.1 02/03/2024 05:30 AM       MRSA Nasal Swab: N/A. Non-respiratory infection    Assessment:  Date/Time Dose Concentration AUC   2/3/24  0530 1250 mg q12h 24.1 456   Note: Serum concentrations collected for AUC dosing may appear elevated if collected in close proximity to the dose administered, this is not necessarily an indication of toxicity    Plan:  Current dosing regimen is therapeutic  Continue current dose  Repeat vancomycin concentration ordered for 2/4 @ 0630    Pharmacy will continue to monitor patient and adjust therapy as indicated      Thank you for the consult,    Jocelynn Renteria, PharmD    
VANCO DAILY FOLLOW UP NOTE  [facility]   Pharmacy Pharmacokinetic Monitoring Service - Vancomycin    Consulting Provider: Dr Grant   Indication: SSTI/right toe cellulitis  Target Concentration: Goal AUC/LILIAN 400-600 mg*hr/L  Day of Therapy: 2  Additional Antimicrobials: Rocephin    Patient eligible for piperacillin-tazobactam to cefepime auto-substitution per P&T approved protocol? NO    Pertinent Laboratory Values:   Wt Readings from Last 1 Encounters:   02/02/24 99.3 kg (219 lb)     Temp Readings from Last 1 Encounters:   02/04/24 98.4 °F (36.9 °C) (Oral)     Recent Labs     02/02/24  1313 02/02/24  1512 02/03/24  0530 02/04/24  0547   BUN 20  --  14 13   CREATININE 0.78  --  0.63 0.74   WBC 6.1  --  3.3* 4.3   PROCAL 0.03  --   --   --    LACTA  --  1.30  --   --    LACTSEPSIS 2.2*  --   --   --      Estimated Creatinine Clearance: 111 mL/min (based on SCr of 0.74 mg/dL).    Lab Results   Component Value Date/Time    VANCOTROUGH 16.2 02/18/2021 12:40 PM    VANCORANDOM 22.0 02/04/2024 05:47 AM       MRSA Nasal Swab: N/A. Non-respiratory infection    Assessment:  Date/Time Dose Concentration AUC   2/3/24  0530 1250 mg q12h 24.1 456   2/4/24  0547 1250 mg q12h 22 548   Note: Serum concentrations collected for AUC dosing may appear elevated if collected in close proximity to the dose administered, this is not necessarily an indication of toxicity    Plan:  Current dosing regimen is therapeutic  Continue current dose  Repeat vancomycin concentrations will be ordered as clinically appropriate   Pharmacy will continue to monitor patient and adjust therapy as indicated      Thank you for the consult,    Jocelynn Renteria, PharmD      
VANCO DAILY NOTE  [facility]   Pharmacy Pharmacokinetic Monitoring Service - Vancomycin    Consulting Provider: Dr. Grant   Indication: SSTI  Target Concentration: Goal AUC/LILIAN 400-600 mg*hr/L  Day of Therapy: 1  Additional Antimicrobials: Rocephin    Patient eligible for piperacillin-tazobactam to cefepime auto-substitution per P&T approved protocol? N/A    Pertinent Laboratory Values:   Wt Readings from Last 1 Encounters:   02/02/24 100.2 kg (221 lb)     Temp Readings from Last 1 Encounters:   02/02/24 98.2 °F (36.8 °C) (Oral)     Recent Labs     02/02/24  1313 02/02/24  1512   BUN 20  --    CREATININE 0.78  --    WBC 6.1  --    PROCAL 0.03  --    LACTA  --  1.30   LACTSEPSIS 2.2*  --      Estimated Creatinine Clearance: 107 mL/min (based on SCr of 0.78 mg/dL).    Lab Results   Component Value Date/Time    VANCOTROUGH 16.2 02/18/2021 12:40 PM       MRSA Nasal Swab: N/A. Non-respiratory infection    Assessment:  Date/Time Dose Concentration AUC         Note: Serum concentrations collected for AUC dosing may appear elevated if collected in close proximity to the dose administered, this is not necessarily an indication of toxicity    Plan:  Dosing recommendations based on Bayesian software  Patient received loading dose of vancomycin 2000 mg x 1 on 2/2/24 @ 1413  Start vancomycin 1250 mg q12h on 2/3/24 @ 0100  Anticipated AUC of 492 and trough concentration of 13.9 at steady state  Renal labs as indicated   Vancomycin concentration ordered for 2/3 @ 0600    Pharmacy will continue to monitor patient and adjust therapy as indicated    Thank you for the consult,  Yasmin Ivan [facility] end  
BIMAL Not detected NOTD      Influenza B, BIMAL Not detected NOTD     EKG 12 Lead    Collection Time: 02/02/24  1:43 PM   Result Value Ref Range    Ventricular Rate 74 BPM    Atrial Rate 74 BPM    QRS Duration 100 ms    Q-T Interval 406 ms    QTc Calculation (Bazett) 451 ms    R Axis 59 degrees    T Axis 36 degrees    Diagnosis       Normal sinus rhythm  Baseline wander in lead(s) V2 V3    Confirmed by MD SHEELA ()ALEX ([id number]) on 2/2/2024 2:24:28 PM     Culture, Blood 1    Collection Time: 02/02/24  3:12 PM    Specimen: Blood   Result Value Ref Range    Special Requests RIGHT  Antecubital        Culture NO GROWTH AFTER 10 HOURS     Lactic Acid    Collection Time: 02/02/24  3:12 PM   Result Value Ref Range    Lactic Acid 1.30 0.4 - 2.0 mmol/L   Comprehensive Metabolic Panel w/ Reflex to MG    Collection Time: 02/03/24  5:30 AM   Result Value Ref Range    Sodium 143 136 - 146 mmol/L    Potassium 3.8 3.5 - 5.1 mmol/L    Chloride 113 103 - 113 mmol/L    CO2 27 21 - 32 mmol/L    Anion Gap 3 2 - 11 mmol/L    Glucose 101 (H) 65 - 100 mg/dL    BUN 14 6 - 23 MG/DL    Creatinine 0.63 0.6 - 1.0 MG/DL    Est, Glom Filt Rate >60 >60 ml/min/1.73m2    Calcium 8.5 8.3 - 10.4 MG/DL    Total Bilirubin 0.4 0.2 - 1.1 MG/DL    ALT 14 12 - 65 U/L    AST 9 (L) 15 - 37 U/L    Alk Phosphatase 75 50 - 136 U/L    Total Protein 6.0 (L) 6.3 - 8.2 g/dL    Albumin 3.0 (L) 3.5 - 5.0 g/dL    Globulin 3.0 2.8 - 4.5 g/dL    Albumin/Globulin Ratio 1.0 0.4 - 1.6     CBC with Auto Differential    Collection Time: 02/03/24  5:30 AM   Result Value Ref Range    WBC 3.3 (L) 4.3 - 11.1 K/uL    RBC 4.25 4.05 - 5.2 M/uL    Hemoglobin 10.7 (L) 11.7 - 15.4 g/dL    Hematocrit 34.5 (L) 35.8 - 46.3 %    MCV 81.2 (L) 82.0 - 102.0 FL    MCH 25.2 (L) 26.1 - 32.9 PG    MCHC 31.0 (L) 31.4 - 35.0 g/dL    RDW 15.3 (H) 11.9 - 14.6 %    Platelets 166 150 - 450 K/uL    MPV 9.8 9.4 - 12.3 FL    nRBC 0.00 0.0 - 0.2 K/uL    Differential Type AUTOMATED      Neutrophils % 52

## 2024-09-02 ENCOUNTER — HOSPITAL ENCOUNTER (EMERGENCY)
Age: 54
Discharge: HOME | End: 2024-09-02
Payer: COMMERCIAL

## 2024-09-02 ENCOUNTER — APPOINTMENT (OUTPATIENT)
Dept: GENERAL RADIOLOGY | Age: 54
End: 2024-09-02
Payer: COMMERCIAL

## 2024-09-02 VITALS
OXYGEN SATURATION: 98 % | BODY MASS INDEX: 37.12 KG/M2 | RESPIRATION RATE: 16 BRPM | DIASTOLIC BLOOD PRESSURE: 78 MMHG | SYSTOLIC BLOOD PRESSURE: 119 MMHG | HEART RATE: 84 BPM | WEIGHT: 244.1 LBS | TEMPERATURE: 99.2 F

## 2024-09-02 DIAGNOSIS — S90.821A BLISTER OF RIGHT FOOT, INITIAL ENCOUNTER: Primary | ICD-10-CM

## 2024-09-02 DIAGNOSIS — L03.115 CELLULITIS OF RIGHT FOOT: ICD-10-CM

## 2024-09-02 PROBLEM — M86.9 OSTEOMYELITIS (HCC): Status: ACTIVE | Noted: 2024-02-17

## 2024-09-02 PROBLEM — E11.69 HISTORY OF DIABETES-RELATED LOWER LIMB AMPUTATION (HCC): Status: ACTIVE | Noted: 2024-02-16

## 2024-09-02 PROBLEM — Z89.619 HISTORY OF DIABETES-RELATED LOWER LIMB AMPUTATION (HCC): Status: ACTIVE | Noted: 2024-02-16

## 2024-09-02 LAB
ALBUMIN SERPL-MCNC: 4.2 G/DL (ref 3.5–5)
ALBUMIN/GLOB SERPL: 1.1 (ref 0.4–1.6)
ALP SERPL-CCNC: 95 U/L (ref 45–117)
ALT SERPL-CCNC: 18 U/L (ref 13–61)
ANION GAP SERPL CALC-SCNC: 14 MMOL/L (ref 9–18)
APPEARANCE UR: ABNORMAL
AST SERPL-CCNC: 17 U/L (ref 15–37)
BACTERIA URNS QL MICRO: ABNORMAL /HPF
BASOPHILS # BLD: 0 K/UL (ref 0–0.2)
BASOPHILS NFR BLD: 0 % (ref 0–2)
BILIRUB SERPL-MCNC: 0.2 MG/DL (ref 0.2–1.1)
BILIRUB UR QL: ABNORMAL
BUN SERPL-MCNC: 18 MG/DL (ref 6–23)
CALCIUM SERPL-MCNC: 9.6 MG/DL (ref 8.3–10.4)
CHLORIDE SERPL-SCNC: 101 MMOL/L (ref 98–107)
CO2 SERPL-SCNC: 24 MMOL/L (ref 21–32)
COLOR UR: YELLOW
CREAT SERPL-MCNC: 0.83 MG/DL (ref 0.6–1)
CRP SERPL-MCNC: 5 MG/DL (ref 0–0.9)
DIFFERENTIAL METHOD BLD: ABNORMAL
EOSINOPHIL # BLD: 0.2 K/UL (ref 0–0.8)
EOSINOPHIL NFR BLD: 2 % (ref 0.5–7.8)
EPI CELLS #/AREA URNS HPF: ABNORMAL /HPF
ERYTHROCYTE [DISTWIDTH] IN BLOOD BY AUTOMATED COUNT: 14.3 % (ref 11.9–14.6)
ERYTHROCYTE [SEDIMENTATION RATE] IN BLOOD: 56 MM/HR (ref 0–30)
GLOBULIN SER CALC-MCNC: 3.7 G/DL (ref 2.8–4.5)
GLUCOSE SERPL-MCNC: 99 MG/DL (ref 65–100)
GLUCOSE UR STRIP.AUTO-MCNC: NEGATIVE MG/DL
HCT VFR BLD AUTO: 36.9 % (ref 35.8–46.3)
HGB BLD-MCNC: 12 G/DL (ref 11.7–15.4)
HGB UR QL STRIP: NEGATIVE
IMM GRANULOCYTES # BLD AUTO: 0 K/UL (ref 0–0.5)
IMM GRANULOCYTES NFR BLD AUTO: 0 % (ref 0–5)
KETONES UR QL STRIP.AUTO: ABNORMAL MG/DL
LACTATE SERPL-SCNC: 1.2 MMOL/L (ref 0.5–2)
LEUKOCYTE ESTERASE UR QL STRIP.AUTO: ABNORMAL
LYMPHOCYTES # BLD: 2.1 K/UL (ref 0.5–4.6)
LYMPHOCYTES NFR BLD: 29 % (ref 13–44)
MCH RBC QN AUTO: 26.4 PG (ref 26.1–32.9)
MCHC RBC AUTO-ENTMCNC: 32.5 G/DL (ref 31.4–35)
MCV RBC AUTO: 81.3 FL (ref 82–102)
MONOCYTES # BLD: 0.6 K/UL (ref 0.1–1.3)
MONOCYTES NFR BLD: 8 % (ref 4–12)
MUCOUS THREADS URNS QL MICRO: ABNORMAL /LPF
NEUTS SEG # BLD: 4.5 K/UL (ref 1.7–8.2)
NEUTS SEG NFR BLD: 60 % (ref 43–78)
NITRITE UR QL STRIP.AUTO: NEGATIVE
NRBC # BLD: 0 K/UL (ref 0–0.2)
OTHER OBSERVATIONS: ABNORMAL
PH UR STRIP: 6 (ref 5–9)
PLATELET # BLD AUTO: 252 K/UL (ref 150–450)
PMV BLD AUTO: 9.4 FL (ref 9.4–12.3)
POTASSIUM SERPL-SCNC: 3.6 MMOL/L (ref 3.5–5.1)
PROCALCITONIN SERPL-MCNC: 0.05 NG/ML (ref 0–0.49)
PROT SERPL-MCNC: 7.9 G/DL (ref 6.4–8.2)
PROT UR STRIP-MCNC: NEGATIVE MG/DL
RBC # BLD AUTO: 4.54 M/UL (ref 4.05–5.2)
RBC #/AREA URNS HPF: 0 /HPF
SARS-COV-2 RDRP RESP QL NAA+PROBE: NOT DETECTED
SODIUM SERPL-SCNC: 139 MMOL/L (ref 133–143)
SOURCE: NORMAL
SP GR UR REFRACTOMETRY: 1.02 (ref 1–1.02)
UROBILINOGEN UR QL STRIP.AUTO: 1 EU/DL (ref 0.2–1)
WBC # BLD AUTO: 7.4 K/UL (ref 4.3–11.1)
WBC URNS QL MICRO: ABNORMAL /HPF

## 2024-09-02 PROCEDURE — 87635 SARS-COV-2 COVID-19 AMP PRB: CPT

## 2024-09-02 PROCEDURE — 87086 URINE CULTURE/COLONY COUNT: CPT

## 2024-09-02 PROCEDURE — 71046 X-RAY EXAM CHEST 2 VIEWS: CPT

## 2024-09-02 PROCEDURE — 83605 ASSAY OF LACTIC ACID: CPT

## 2024-09-02 PROCEDURE — 85025 COMPLETE CBC W/AUTO DIFF WBC: CPT

## 2024-09-02 PROCEDURE — 73630 X-RAY EXAM OF FOOT: CPT

## 2024-09-02 PROCEDURE — 86140 C-REACTIVE PROTEIN: CPT

## 2024-09-02 PROCEDURE — 80053 COMPREHEN METABOLIC PANEL: CPT

## 2024-09-02 PROCEDURE — 84145 PROCALCITONIN (PCT): CPT

## 2024-09-02 PROCEDURE — 99284 EMERGENCY DEPT VISIT MOD MDM: CPT

## 2024-09-02 PROCEDURE — 85652 RBC SED RATE AUTOMATED: CPT

## 2024-09-02 PROCEDURE — 81001 URINALYSIS AUTO W/SCOPE: CPT

## 2024-09-02 RX ORDER — CLINDAMYCIN HCL 300 MG
300 CAPSULE ORAL 3 TIMES DAILY
Qty: 21 CAPSULE | Refills: 0 | Status: SHIPPED | OUTPATIENT
Start: 2024-09-02 | End: 2024-09-09

## 2024-09-02 ASSESSMENT — PAIN SCALES - GENERAL: PAINLEVEL_OUTOF10: 0

## 2024-09-02 ASSESSMENT — LIFESTYLE VARIABLES
HOW MANY STANDARD DRINKS CONTAINING ALCOHOL DO YOU HAVE ON A TYPICAL DAY: PATIENT DOES NOT DRINK
HOW OFTEN DO YOU HAVE A DRINK CONTAINING ALCOHOL: NEVER

## 2024-09-02 NOTE — ED PROVIDER NOTES
K/UL    Monocytes Absolute 0.6 0.1 - 1.3 K/UL    Eosinophils Absolute 0.2 0.0 - 0.8 K/UL    Basophils Absolute 0.0 0.0 - 0.2 K/UL    Immature Granulocytes Absolute 0.0 0.0 - 0.5 K/UL   Comprehensive Metabolic Panel   Result Value Ref Range    Sodium 139 133 - 143 mmol/L    Potassium 3.6 3.5 - 5.1 mmol/L    Chloride 101 98 - 107 mmol/L    CO2 24 21 - 32 mmol/L    Anion Gap 14 9 - 18 mmol/L    Glucose 99 65 - 100 mg/dL    BUN 18 6 - 23 MG/DL    Creatinine 0.83 0.6 - 1.0 MG/DL    Est, Glom Filt Rate 85 >60 ml/min/1.73m2    Calcium 9.6 8.3 - 10.4 MG/DL    Total Bilirubin 0.2 0.2 - 1.1 MG/DL    ALT 18 13.0 - 61.0 U/L    AST 17 15 - 37 U/L    Alk Phosphatase 95 45.0 - 117.0 U/L    Total Protein 7.9 6.4 - 8.2 g/dL    Albumin 4.2 3.5 - 5.0 g/dL    Globulin 3.7 2.8 - 4.5 g/dL    Albumin/Globulin Ratio 1.1 0.4 - 1.6     Lactate, Sepsis   Result Value Ref Range    Lactic Acid, Sepsis 1.2 0.5 - 2.0 MMOL/L   Sedimentation Rate   Result Value Ref Range    Sed Rate, Automated 56 (H) 0 - 30 mm/hr   Urinalysis w rflx microscopic   Result Value Ref Range    Color, UA YELLOW      Appearance SLIGHTLY CLOUDY      Specific Gravity, UA 1.020 1.001 - 1.023      pH, Urine 6.0 5.0 - 9.0      Protein, UA Negative NEG mg/dL    Glucose, Ur Negative mg/dL    Ketones, Urine TRACE (A) NEG mg/dL    Bilirubin, Urine SMALL (A) NEG      Blood, Urine Negative NEG      Urobilinogen, Urine 1.0 0.2 - 1.0 EU/dL    Nitrite, Urine Negative NEG      Leukocyte Esterase, Urine SMALL (A) NEG     C-Reactive Protein   Result Value Ref Range    CRP 5.0 (H) 0.0 - 0.9 mg/dL   Urinalysis, Micro   Result Value Ref Range    WBC, UA 5-10 0 /hpf    RBC, UA 0 0 /hpf    Epithelial Cells, UA 5-10 0 /hpf    BACTERIA, URINE 2+ (H) 0 /hpf    Mucus, UA 2+ (H) 0 /lpf    Other observations RESULTS VERIFIED MANUALLY           XR FOOT RIGHT (MIN 3 VIEWS)   Final Result   Wound overlying the dorsum of the foot. No definite osseous   involvement (radiograph not sensitive).

## 2024-09-02 NOTE — ED TRIAGE NOTES
Pt arrives to the ER with c/o R. Food wound r/t blister popping on the bottom of the foot x 2 days ago. Wound estimated 4 inches in length and 2 inches in width.

## 2024-09-04 LAB
BACTERIA SPEC CULT: NORMAL
BACTERIA SPEC CULT: NORMAL
SERVICE CMNT-IMP: NORMAL

## 2024-10-16 ENCOUNTER — APPOINTMENT (OUTPATIENT)
Dept: GENERAL RADIOLOGY | Age: 54
DRG: 617 | End: 2024-10-16
Payer: COMMERCIAL

## 2024-10-16 ENCOUNTER — HOSPITAL ENCOUNTER (EMERGENCY)
Age: 54
Discharge: ANOTHER HOSPITAL | DRG: 617 | End: 2024-10-16
Attending: STUDENT IN AN ORGANIZED HEALTH CARE EDUCATION/TRAINING PROGRAM
Payer: COMMERCIAL

## 2024-10-16 ENCOUNTER — HOSPITAL ENCOUNTER (INPATIENT)
Age: 54
LOS: 4 days | Discharge: HOME | DRG: 617 | End: 2024-10-20
Attending: INTERNAL MEDICINE | Admitting: HOSPITALIST
Payer: COMMERCIAL

## 2024-10-16 VITALS
WEIGHT: 218 LBS | HEIGHT: 68 IN | RESPIRATION RATE: 17 BRPM | BODY MASS INDEX: 33.04 KG/M2 | HEART RATE: 74 BPM | TEMPERATURE: 99 F | OXYGEN SATURATION: 98 % | SYSTOLIC BLOOD PRESSURE: 106 MMHG | DIASTOLIC BLOOD PRESSURE: 61 MMHG

## 2024-10-16 DIAGNOSIS — M86.9 OSTEOMYELITIS OF GREAT TOE OF LEFT FOOT: ICD-10-CM

## 2024-10-16 DIAGNOSIS — M86.179 OTHER ACUTE OSTEOMYELITIS OF FOOT, UNSPECIFIED LATERALITY: Primary | ICD-10-CM

## 2024-10-16 DIAGNOSIS — M86.9 OSTEOMYELITIS OF GREAT TOE OF LEFT FOOT: Primary | ICD-10-CM

## 2024-10-16 LAB
ALBUMIN SERPL-MCNC: 4.2 G/DL (ref 3.5–5)
ALBUMIN/GLOB SERPL: 1.1 (ref 0.4–1.6)
ALP SERPL-CCNC: 102 U/L (ref 45–117)
ALT SERPL-CCNC: 12 U/L (ref 13–61)
ANION GAP SERPL CALC-SCNC: 14 MMOL/L (ref 9–18)
AST SERPL-CCNC: 13 U/L (ref 15–37)
BASOPHILS # BLD: 0 K/UL (ref 0–0.2)
BASOPHILS NFR BLD: 0 % (ref 0–2)
BILIRUB SERPL-MCNC: 0.2 MG/DL (ref 0.2–1.1)
BUN SERPL-MCNC: 17 MG/DL (ref 6–23)
CALCIUM SERPL-MCNC: 10.2 MG/DL (ref 8.3–10.4)
CHLORIDE SERPL-SCNC: 100 MMOL/L (ref 98–107)
CO2 SERPL-SCNC: 26 MMOL/L (ref 21–32)
CREAT SERPL-MCNC: 0.76 MG/DL (ref 0.6–1)
CRP SERPL-MCNC: 2.4 MG/DL (ref 0–0.9)
DIFFERENTIAL METHOD BLD: ABNORMAL
EOSINOPHIL # BLD: 0.2 K/UL (ref 0–0.8)
EOSINOPHIL NFR BLD: 3 % (ref 0.5–7.8)
ERYTHROCYTE [DISTWIDTH] IN BLOOD BY AUTOMATED COUNT: 14.2 % (ref 11.9–14.6)
GLOBULIN SER CALC-MCNC: 3.9 G/DL (ref 2.8–4.5)
GLUCOSE SERPL-MCNC: 111 MG/DL (ref 65–100)
HCT VFR BLD AUTO: 35 % (ref 35.8–46.3)
HGB BLD-MCNC: 11.6 G/DL (ref 11.7–15.4)
IMM GRANULOCYTES # BLD AUTO: 0 K/UL (ref 0–0.5)
IMM GRANULOCYTES NFR BLD AUTO: 0 % (ref 0–5)
LACTATE SERPL-SCNC: 1.8 MMOL/L (ref 0.5–2)
LYMPHOCYTES # BLD: 2.2 K/UL (ref 0.5–4.6)
LYMPHOCYTES NFR BLD: 35 % (ref 13–44)
MCH RBC QN AUTO: 26.6 PG (ref 26.1–32.9)
MCHC RBC AUTO-ENTMCNC: 33.1 G/DL (ref 31.4–35)
MCV RBC AUTO: 80.3 FL (ref 82–102)
MONOCYTES # BLD: 0.4 K/UL (ref 0.1–1.3)
MONOCYTES NFR BLD: 6 % (ref 4–12)
NEUTS SEG # BLD: 3.4 K/UL (ref 1.7–8.2)
NEUTS SEG NFR BLD: 56 % (ref 43–78)
NRBC # BLD: 0 K/UL (ref 0–0.2)
PLATELET # BLD AUTO: 261 K/UL (ref 150–450)
PMV BLD AUTO: 9.6 FL (ref 9.4–12.3)
POTASSIUM SERPL-SCNC: 3.4 MMOL/L (ref 3.5–5.1)
PROT SERPL-MCNC: 8.1 G/DL (ref 6.4–8.2)
RBC # BLD AUTO: 4.36 M/UL (ref 4.05–5.2)
SODIUM SERPL-SCNC: 140 MMOL/L (ref 133–143)
WBC # BLD AUTO: 6.1 K/UL (ref 4.3–11.1)

## 2024-10-16 PROCEDURE — 1100000000 HC RM PRIVATE

## 2024-10-16 PROCEDURE — 83605 ASSAY OF LACTIC ACID: CPT

## 2024-10-16 PROCEDURE — 85025 COMPLETE CBC W/AUTO DIFF WBC: CPT

## 2024-10-16 PROCEDURE — 73630 X-RAY EXAM OF FOOT: CPT

## 2024-10-16 PROCEDURE — 80053 COMPREHEN METABOLIC PANEL: CPT

## 2024-10-16 PROCEDURE — 99285 EMERGENCY DEPT VISIT HI MDM: CPT

## 2024-10-16 PROCEDURE — 96375 TX/PRO/DX INJ NEW DRUG ADDON: CPT

## 2024-10-16 PROCEDURE — 96367 TX/PROPH/DG ADDL SEQ IV INF: CPT

## 2024-10-16 PROCEDURE — 96365 THER/PROPH/DIAG IV INF INIT: CPT

## 2024-10-16 PROCEDURE — 6360000002 HC RX W HCPCS

## 2024-10-16 PROCEDURE — 87040 BLOOD CULTURE FOR BACTERIA: CPT

## 2024-10-16 PROCEDURE — 86140 C-REACTIVE PROTEIN: CPT

## 2024-10-16 PROCEDURE — 6370000000 HC RX 637 (ALT 250 FOR IP): Performed by: HOSPITALIST

## 2024-10-16 PROCEDURE — 2580000003 HC RX 258

## 2024-10-16 RX ORDER — POTASSIUM CHLORIDE 7.45 MG/ML
10 INJECTION INTRAVENOUS PRN
Status: DISCONTINUED | OUTPATIENT
Start: 2024-10-16 | End: 2024-10-20 | Stop reason: HOSPADM

## 2024-10-16 RX ORDER — OXYCODONE HYDROCHLORIDE 5 MG/1
5 TABLET ORAL EVERY 4 HOURS PRN
Status: DISCONTINUED | OUTPATIENT
Start: 2024-10-16 | End: 2024-10-20 | Stop reason: HOSPADM

## 2024-10-16 RX ORDER — ACETAMINOPHEN 325 MG/1
650 TABLET ORAL EVERY 6 HOURS PRN
Status: DISCONTINUED | OUTPATIENT
Start: 2024-10-16 | End: 2024-10-20 | Stop reason: HOSPADM

## 2024-10-16 RX ORDER — ONDANSETRON 4 MG/1
4 TABLET, ORALLY DISINTEGRATING ORAL EVERY 8 HOURS PRN
Status: DISCONTINUED | OUTPATIENT
Start: 2024-10-16 | End: 2024-10-20 | Stop reason: HOSPADM

## 2024-10-16 RX ORDER — POLYETHYLENE GLYCOL 3350 17 G/17G
17 POWDER, FOR SOLUTION ORAL DAILY PRN
Status: DISCONTINUED | OUTPATIENT
Start: 2024-10-16 | End: 2024-10-20 | Stop reason: HOSPADM

## 2024-10-16 RX ORDER — GABAPENTIN 300 MG/1
CAPSULE ORAL
COMMUNITY
Start: 2024-08-06

## 2024-10-16 RX ORDER — MAGNESIUM SULFATE IN WATER 40 MG/ML
2000 INJECTION, SOLUTION INTRAVENOUS PRN
Status: DISCONTINUED | OUTPATIENT
Start: 2024-10-16 | End: 2024-10-20 | Stop reason: HOSPADM

## 2024-10-16 RX ORDER — POTASSIUM CHLORIDE 1500 MG/1
40 TABLET, EXTENDED RELEASE ORAL PRN
Status: DISCONTINUED | OUTPATIENT
Start: 2024-10-16 | End: 2024-10-20 | Stop reason: HOSPADM

## 2024-10-16 RX ORDER — DOXYCYCLINE 100 MG/1
100 CAPSULE ORAL 2 TIMES DAILY
Status: ON HOLD | COMMUNITY
Start: 2024-10-16 | End: 2024-10-19 | Stop reason: HOSPADM

## 2024-10-16 RX ORDER — MORPHINE SULFATE 4 MG/ML
4 INJECTION, SOLUTION INTRAMUSCULAR; INTRAVENOUS
Status: COMPLETED | OUTPATIENT
Start: 2024-10-16 | End: 2024-10-16

## 2024-10-16 RX ORDER — DULOXETIN HYDROCHLORIDE 60 MG/1
60 CAPSULE, DELAYED RELEASE ORAL 2 TIMES DAILY
Status: DISCONTINUED | OUTPATIENT
Start: 2024-10-17 | End: 2024-10-20 | Stop reason: HOSPADM

## 2024-10-16 RX ORDER — ENOXAPARIN SODIUM 100 MG/ML
30 INJECTION SUBCUTANEOUS 2 TIMES DAILY
Status: DISCONTINUED | OUTPATIENT
Start: 2024-10-17 | End: 2024-10-17

## 2024-10-16 RX ORDER — SODIUM CHLORIDE 0.9 % (FLUSH) 0.9 %
5-40 SYRINGE (ML) INJECTION PRN
Status: DISCONTINUED | OUTPATIENT
Start: 2024-10-16 | End: 2024-10-20 | Stop reason: HOSPADM

## 2024-10-16 RX ORDER — PRAVASTATIN SODIUM 20 MG
40 TABLET ORAL EVERY EVENING
Status: DISCONTINUED | OUTPATIENT
Start: 2024-10-17 | End: 2024-10-20 | Stop reason: HOSPADM

## 2024-10-16 RX ORDER — SODIUM CHLORIDE 0.9 % (FLUSH) 0.9 %
5-40 SYRINGE (ML) INJECTION EVERY 12 HOURS SCHEDULED
Status: DISCONTINUED | OUTPATIENT
Start: 2024-10-17 | End: 2024-10-20 | Stop reason: HOSPADM

## 2024-10-16 RX ORDER — ASPIRIN 81 MG/1
81 TABLET, CHEWABLE ORAL DAILY
Status: DISCONTINUED | OUTPATIENT
Start: 2024-10-17 | End: 2024-10-20 | Stop reason: HOSPADM

## 2024-10-16 RX ORDER — ACETAMINOPHEN 650 MG/1
650 SUPPOSITORY RECTAL EVERY 6 HOURS PRN
Status: DISCONTINUED | OUTPATIENT
Start: 2024-10-16 | End: 2024-10-20 | Stop reason: HOSPADM

## 2024-10-16 RX ORDER — ONDANSETRON 2 MG/ML
4 INJECTION INTRAMUSCULAR; INTRAVENOUS
Status: COMPLETED | OUTPATIENT
Start: 2024-10-16 | End: 2024-10-16

## 2024-10-16 RX ORDER — LISINOPRIL AND HYDROCHLOROTHIAZIDE 12.5; 2 MG/1; MG/1
1 TABLET ORAL DAILY
Status: DISCONTINUED | OUTPATIENT
Start: 2024-10-17 | End: 2024-10-17

## 2024-10-16 RX ORDER — ONDANSETRON 2 MG/ML
4 INJECTION INTRAMUSCULAR; INTRAVENOUS EVERY 6 HOURS PRN
Status: DISCONTINUED | OUTPATIENT
Start: 2024-10-16 | End: 2024-10-20 | Stop reason: HOSPADM

## 2024-10-16 RX ORDER — SODIUM CHLORIDE 9 MG/ML
INJECTION, SOLUTION INTRAVENOUS PRN
Status: DISCONTINUED | OUTPATIENT
Start: 2024-10-16 | End: 2024-10-20 | Stop reason: HOSPADM

## 2024-10-16 RX ADMIN — OXYCODONE 5 MG: 5 TABLET ORAL at 23:40

## 2024-10-16 RX ADMIN — ONDANSETRON 4 MG: 2 INJECTION, SOLUTION INTRAMUSCULAR; INTRAVENOUS at 22:16

## 2024-10-16 RX ADMIN — MORPHINE SULFATE 4 MG: 4 INJECTION INTRAVENOUS at 22:16

## 2024-10-16 RX ADMIN — CEFTRIAXONE SODIUM 2000 MG: 2 INJECTION, POWDER, FOR SOLUTION INTRAMUSCULAR; INTRAVENOUS at 21:21

## 2024-10-16 RX ADMIN — VANCOMYCIN HYDROCHLORIDE 1000 MG: 1 INJECTION, POWDER, LYOPHILIZED, FOR SOLUTION INTRAVENOUS at 22:07

## 2024-10-16 ASSESSMENT — ENCOUNTER SYMPTOMS
EYES NEGATIVE: 1
ALLERGIC/IMMUNOLOGIC NEGATIVE: 1
RESPIRATORY NEGATIVE: 1
COLOR CHANGE: 1
GASTROINTESTINAL NEGATIVE: 1

## 2024-10-16 ASSESSMENT — PAIN - FUNCTIONAL ASSESSMENT
PAIN_FUNCTIONAL_ASSESSMENT: 0-10
PAIN_FUNCTIONAL_ASSESSMENT: 0-10

## 2024-10-16 ASSESSMENT — PAIN DESCRIPTION - DESCRIPTORS: DESCRIPTORS: ACHING;DISCOMFORT;OTHER (COMMENT)

## 2024-10-16 ASSESSMENT — PAIN SCALES - GENERAL
PAINLEVEL_OUTOF10: 8
PAINLEVEL_OUTOF10: 3
PAINLEVEL_OUTOF10: 8

## 2024-10-16 ASSESSMENT — PAIN DESCRIPTION - ORIENTATION
ORIENTATION: LEFT
ORIENTATION: RIGHT

## 2024-10-16 ASSESSMENT — LIFESTYLE VARIABLES
HOW OFTEN DO YOU HAVE A DRINK CONTAINING ALCOHOL: NEVER
HOW MANY STANDARD DRINKS CONTAINING ALCOHOL DO YOU HAVE ON A TYPICAL DAY: PATIENT DOES NOT DRINK

## 2024-10-16 ASSESSMENT — PAIN DESCRIPTION - LOCATION
LOCATION: TOE (COMMENT WHICH ONE)
LOCATION: FOOT

## 2024-10-16 NOTE — ED TRIAGE NOTES
Patient to triage after being sent by the wound center for concerns of infection in her left foot.

## 2024-10-16 NOTE — ED PROVIDER NOTES
Emergency Department Provider Note       PCP: Jones Andino III, MD   Age: 54 y.o.   Sex: female     DISPOSITION Decision To Transfer 10/16/2024 09:24:41 PM  Condition at Disposition: Data Unavailable       ICD-10-CM    1. Osteomyelitis of great toe of left foot  M86.9           Medical Decision Making     This is a nonacute appearing 54-year-old female arriving at the recommendation of her wound care physician with [facility] for concerns for osteomyelitis.  She has had a small ulceration to the plantar aspect of the left great toe that is been ongoing for the past couple weeks with some associated cellulitic features now.  Ulceration is able to be probed to the bone.  Sepsis labs were obtained in which her CBC did not show evidence of leukocytosis or associated left shift.  Lactic was normal.  Mildly elevated CRP.  CMP normal.  X-ray was obtained which did show concerns for osteomyelitis.  I did reach out to orthopedic on-call who recommended hospitalist admission with orthopedic/surgery consultation in the morning.  I reached out to hospitalist, Dr. Higuera who is in agreement for admission for this patient.  I appreciate her willingness to further care for this patient.  Ceftriaxone and vancomycin were initiated here in the emergency department.  Although patient does have allergies to penicillin she has tolerated all cephalosporins in the past per her report.  She had no signs here of allergic reaction.  This plan was discussed with patient which she is in agreement with.    This patient was also seen in consultation with Dr. Arriaga, ER attending who is in agreement with the above plan.  ED Course as of 10/16/24 2130   Wed Oct 16, 2024   1948 CBC without evidence of leukocytosis.  H&H mildly decreased.  No evidence of thrombocytopenia.  Neutrophilia is present. [TC]   2007 CRP 2.4.  Lactate normal 1.8.  CMP without gross electrolyte abnormality.  Kidney function normal.  LFTs normal.  Normal T. bili. [TC]

## 2024-10-17 ENCOUNTER — ANESTHESIA EVENT (OUTPATIENT)
Dept: SURGERY | Age: 54
End: 2024-10-17
Payer: COMMERCIAL

## 2024-10-17 ENCOUNTER — APPOINTMENT (OUTPATIENT)
Dept: ULTRASOUND IMAGING | Age: 54
DRG: 617 | End: 2024-10-17
Attending: INTERNAL MEDICINE
Payer: COMMERCIAL

## 2024-10-17 LAB
ALBUMIN SERPL-MCNC: 3.1 G/DL (ref 3.5–5)
ALBUMIN/GLOB SERPL: 0.8 (ref 1–1.9)
ALP SERPL-CCNC: 82 U/L (ref 35–104)
ALT SERPL-CCNC: 19 U/L (ref 8–45)
ANION GAP SERPL CALC-SCNC: 11 MMOL/L (ref 9–18)
AST SERPL-CCNC: 12 U/L (ref 15–37)
BASOPHILS # BLD: 0 K/UL (ref 0–0.2)
BASOPHILS NFR BLD: 0 % (ref 0–2)
BILIRUB SERPL-MCNC: <0.2 MG/DL (ref 0–1.2)
BUN SERPL-MCNC: 19 MG/DL (ref 6–23)
CALCIUM SERPL-MCNC: 9.4 MG/DL (ref 8.8–10.2)
CHLORIDE SERPL-SCNC: 103 MMOL/L (ref 98–107)
CK SERPL-CCNC: 32 U/L (ref 21–215)
CO2 SERPL-SCNC: 27 MMOL/L (ref 20–28)
CREAT SERPL-MCNC: 0.81 MG/DL (ref 0.6–1.1)
DIFFERENTIAL METHOD BLD: ABNORMAL
EOSINOPHIL # BLD: 0.2 K/UL (ref 0–0.8)
EOSINOPHIL NFR BLD: 3 % (ref 0.5–7.8)
ERYTHROCYTE [DISTWIDTH] IN BLOOD BY AUTOMATED COUNT: 14.6 % (ref 11.9–14.6)
GLOBULIN SER CALC-MCNC: 4 G/DL (ref 2.3–3.5)
GLUCOSE BLD STRIP.AUTO-MCNC: 78 MG/DL (ref 65–100)
GLUCOSE BLD STRIP.AUTO-MCNC: 85 MG/DL (ref 65–100)
GLUCOSE BLD STRIP.AUTO-MCNC: 87 MG/DL (ref 65–100)
GLUCOSE SERPL-MCNC: 106 MG/DL (ref 70–99)
HCT VFR BLD AUTO: 33.3 % (ref 35.8–46.3)
HGB BLD-MCNC: 10.7 G/DL (ref 11.7–15.4)
IMM GRANULOCYTES # BLD AUTO: 0 K/UL (ref 0–0.5)
IMM GRANULOCYTES NFR BLD AUTO: 0 % (ref 0–5)
LYMPHOCYTES # BLD: 2 K/UL (ref 0.5–4.6)
LYMPHOCYTES NFR BLD: 34 % (ref 13–44)
MAGNESIUM SERPL-MCNC: 1.9 MG/DL (ref 1.8–2.4)
MCH RBC QN AUTO: 26.1 PG (ref 26.1–32.9)
MCHC RBC AUTO-ENTMCNC: 32.1 G/DL (ref 31.4–35)
MCV RBC AUTO: 81.2 FL (ref 82–102)
MONOCYTES # BLD: 0.3 K/UL (ref 0.1–1.3)
MONOCYTES NFR BLD: 6 % (ref 4–12)
NEUTS SEG # BLD: 3.4 K/UL (ref 1.7–8.2)
NEUTS SEG NFR BLD: 57 % (ref 43–78)
NRBC # BLD: 0 K/UL (ref 0–0.2)
PLATELET # BLD AUTO: 225 K/UL (ref 150–450)
PMV BLD AUTO: 9.4 FL (ref 9.4–12.3)
POTASSIUM SERPL-SCNC: 3.5 MMOL/L (ref 3.5–5.1)
PROT SERPL-MCNC: 7.2 G/DL (ref 6.3–8.2)
RBC # BLD AUTO: 4.1 M/UL (ref 4.05–5.2)
SERVICE CMNT-IMP: NORMAL
SODIUM SERPL-SCNC: 141 MMOL/L (ref 136–145)
VAS LEFT ABI: 1.33
VAS LEFT ARM BP: 102 MMHG
VAS LEFT DORSALIS PEDIS BP: 131 MMHG
VAS LEFT PTA BP: 138 MMHG
VAS LEFT TBI: 1.3
VAS LEFT TOE PRESSURE: 135 MMHG
VAS RIGHT ABI: 1.19
VAS RIGHT ARM BP: 104 MMHG
VAS RIGHT DORSALIS PEDIS BP: 115 MMHG
VAS RIGHT PTA BP: 124 MMHG
WBC # BLD AUTO: 5.8 K/UL (ref 4.3–11.1)

## 2024-10-17 PROCEDURE — 85025 COMPLETE CBC W/AUTO DIFF WBC: CPT

## 2024-10-17 PROCEDURE — 1100000000 HC RM PRIVATE

## 2024-10-17 PROCEDURE — 6370000000 HC RX 637 (ALT 250 FOR IP): Performed by: HOSPITALIST

## 2024-10-17 PROCEDURE — 82962 GLUCOSE BLOOD TEST: CPT

## 2024-10-17 PROCEDURE — 36415 COLL VENOUS BLD VENIPUNCTURE: CPT

## 2024-10-17 PROCEDURE — 93925 LOWER EXTREMITY STUDY: CPT | Performed by: RADIOLOGY

## 2024-10-17 PROCEDURE — 6360000002 HC RX W HCPCS: Performed by: HOSPITALIST

## 2024-10-17 PROCEDURE — 99222 1ST HOSP IP/OBS MODERATE 55: CPT | Performed by: PHYSICIAN ASSISTANT

## 2024-10-17 PROCEDURE — 2580000003 HC RX 258: Performed by: HOSPITALIST

## 2024-10-17 PROCEDURE — 83735 ASSAY OF MAGNESIUM: CPT

## 2024-10-17 PROCEDURE — 82550 ASSAY OF CK (CPK): CPT

## 2024-10-17 PROCEDURE — 80053 COMPREHEN METABOLIC PANEL: CPT

## 2024-10-17 PROCEDURE — 93925 LOWER EXTREMITY STUDY: CPT

## 2024-10-17 PROCEDURE — 6370000000 HC RX 637 (ALT 250 FOR IP): Performed by: INTERNAL MEDICINE

## 2024-10-17 RX ORDER — HYDROCHLOROTHIAZIDE 25 MG/1
25 TABLET ORAL DAILY
Status: DISCONTINUED | OUTPATIENT
Start: 2024-10-17 | End: 2024-10-20 | Stop reason: HOSPADM

## 2024-10-17 RX ORDER — PROMETHAZINE HYDROCHLORIDE 25 MG/1
25 SUPPOSITORY RECTAL EVERY 6 HOURS PRN
Status: DISCONTINUED | OUTPATIENT
Start: 2024-10-17 | End: 2024-10-20 | Stop reason: HOSPADM

## 2024-10-17 RX ORDER — MORPHINE SULFATE 4 MG/ML
4 INJECTION, SOLUTION INTRAMUSCULAR; INTRAVENOUS
Status: DISCONTINUED | OUTPATIENT
Start: 2024-10-17 | End: 2024-10-20 | Stop reason: HOSPADM

## 2024-10-17 RX ORDER — LISINOPRIL 20 MG/1
20 TABLET ORAL DAILY
Status: DISCONTINUED | OUTPATIENT
Start: 2024-10-17 | End: 2024-10-20 | Stop reason: HOSPADM

## 2024-10-17 RX ADMIN — SODIUM CHLORIDE, PRESERVATIVE FREE 10 ML: 5 INJECTION INTRAVENOUS at 21:35

## 2024-10-17 RX ADMIN — WATER 2000 MG: 1 INJECTION INTRAMUSCULAR; INTRAVENOUS; SUBCUTANEOUS at 01:32

## 2024-10-17 RX ADMIN — MORPHINE SULFATE 4 MG: 4 INJECTION INTRAVENOUS at 06:42

## 2024-10-17 RX ADMIN — WATER 2000 MG: 1 INJECTION INTRAMUSCULAR; INTRAVENOUS; SUBCUTANEOUS at 09:20

## 2024-10-17 RX ADMIN — MORPHINE SULFATE 4 MG: 4 INJECTION INTRAVENOUS at 21:36

## 2024-10-17 RX ADMIN — DULOXETINE HYDROCHLORIDE 60 MG: 60 CAPSULE, DELAYED RELEASE ORAL at 07:49

## 2024-10-17 RX ADMIN — MORPHINE SULFATE 4 MG: 4 INJECTION INTRAVENOUS at 12:00

## 2024-10-17 RX ADMIN — ASPIRIN 81 MG 81 MG: 81 TABLET ORAL at 07:49

## 2024-10-17 RX ADMIN — WATER 2000 MG: 1 INJECTION INTRAMUSCULAR; INTRAVENOUS; SUBCUTANEOUS at 16:58

## 2024-10-17 RX ADMIN — ENOXAPARIN SODIUM 30 MG: 100 INJECTION SUBCUTANEOUS at 01:31

## 2024-10-17 RX ADMIN — DIPHENHYDRAMINE HYDROCHLORIDE 5 ML: 12.5 LIQUID ORAL at 15:21

## 2024-10-17 RX ADMIN — ONDANSETRON 4 MG: 2 INJECTION INTRAMUSCULAR; INTRAVENOUS at 12:00

## 2024-10-17 RX ADMIN — HYDROCHLOROTHIAZIDE 25 MG: 25 TABLET ORAL at 07:49

## 2024-10-17 RX ADMIN — DULOXETINE HYDROCHLORIDE 60 MG: 60 CAPSULE, DELAYED RELEASE ORAL at 21:35

## 2024-10-17 RX ADMIN — MORPHINE SULFATE 4 MG: 4 INJECTION INTRAVENOUS at 15:27

## 2024-10-17 RX ADMIN — SODIUM CHLORIDE 500 MG: 9 INJECTION INTRAMUSCULAR; INTRAVENOUS; SUBCUTANEOUS at 02:35

## 2024-10-17 RX ADMIN — ENOXAPARIN SODIUM 30 MG: 100 INJECTION SUBCUTANEOUS at 09:20

## 2024-10-17 RX ADMIN — LISINOPRIL 20 MG: 20 TABLET ORAL at 07:49

## 2024-10-17 RX ADMIN — MORPHINE SULFATE 4 MG: 4 INJECTION INTRAVENOUS at 02:35

## 2024-10-17 RX ADMIN — SODIUM CHLORIDE, PRESERVATIVE FREE 10 ML: 5 INJECTION INTRAVENOUS at 07:52

## 2024-10-17 ASSESSMENT — PAIN DESCRIPTION - DESCRIPTORS
DESCRIPTORS: ACHING;THROBBING
DESCRIPTORS: ACHING;DISCOMFORT;POUNDING;THROBBING
DESCRIPTORS: ACHING;DISCOMFORT;POUNDING;THROBBING
DESCRIPTORS: ACHING;STABBING

## 2024-10-17 ASSESSMENT — PAIN DESCRIPTION - LOCATION
LOCATION: FOOT
LOCATION: TOE (COMMENT WHICH ONE)

## 2024-10-17 ASSESSMENT — PAIN SCALES - GENERAL
PAINLEVEL_OUTOF10: 0
PAINLEVEL_OUTOF10: 8
PAINLEVEL_OUTOF10: 7
PAINLEVEL_OUTOF10: 7
PAINLEVEL_OUTOF10: 8
PAINLEVEL_OUTOF10: 0
PAINLEVEL_OUTOF10: 0
PAINLEVEL_OUTOF10: 7
PAINLEVEL_OUTOF10: 7

## 2024-10-17 ASSESSMENT — PAIN DESCRIPTION - ORIENTATION
ORIENTATION: LEFT

## 2024-10-17 NOTE — WOUND CARE
Pt seen for left great toe wound and right plantar foot wound, present on admission. Pt states that she has been seeing OP wound care through [facility]. Currently dressing wounds with poly mem. This dressing is not on formulary at this hospital, pt able to have someone bring into hospital and can use. Recommend to use xeroform daily until poly mem is available. Noted plans for ortho consult for eval of left great toe r/t possible osteo. Defer to ortho surgery if any interventions are done.     Right plantar foot and Left distal great toe wounds:  Cleanse with wound cleanser and apply xeroform over wounds, cover with ABD and secure with rolled gauze. Change daily. May substitute xeroform for PolyMem from home once available.      Left distal great toe    Right plantar foot

## 2024-10-17 NOTE — CARE COORDINATION
Pt presented to the ED from the [facility] with concern for osteomyelitis of her left great toe. Medical hx includes: borderline DM, depression and peripheral neuropathy. PTA, pt indep with her ADLs. A/O x4. Lives with her spouse in a two story private residence. On RA. Denies DME needs or recent h/o falls. Denies current HH services. PCP established. [employer] verified and able to afford home meds. Will remain available for potential DORIE needs at discharge.      1413-Amputation of great L toe planned tomorrow with ortho.Therapy likely to be consulted there after; TBD.     10/17/24 7943   Service Assessment   Patient Orientation Alert and Oriented;Person;Place;Situation;Self   Cognition Alert   History Provided By Patient   Primary Caregiver Self   Support Systems Spouse/Significant Other;Children;Family Members;[employer];Friends/Neighbors   Patient's Healthcare Decision Maker is: Legal Next of Kin   PCP Verified by CM Yes   Last Visit to PCP Within last 3 months   Prior Functional Level Independent in ADLs/IADLs   Current Functional Level Independent in ADLs/IADLs   Can patient return to prior living arrangement Yes   Ability to make needs known: Good   Family able to assist with home care needs: Yes   Would you like for me to discuss the discharge plan with any other family members/significant others, and if so, who? No   Financial Resources Other (Comment)  ([employer])   Community Resources Other (Comment)  ([facility])   Social/Functional History   Lives With Spouse   Type of Home House   Home Layout Two level   Home Access Stairs to enter without rails   Entrance Stairs - Number of Steps 5   Entrance Stairs - Rails None   Bathroom Toilet Standard   Bathroom Accessibility Accessible   Home Equipment None   Receives Help From Family   ADL Assistance Independent   Homemaking Assistance Independent   Ambulation Assistance Independent   Transfer Assistance Independent   Active  No

## 2024-10-17 NOTE — H&P
Group     ondansetron (ZOFRAN-ODT) disintegrating tablet 4 mg     ondansetron (ZOFRAN) injection 4 mg    polyethylene glycol (GLYCOLAX) packet 17 g    OR Linked Order Group     acetaminophen (TYLENOL) tablet 650 mg     acetaminophen (TYLENOL) suppository 650 mg    cefepime (MAXIPIME) 1,000 mg in sodium chloride 0.9 % 50 mL IVPB (mini-bag)     Order Specific Question:   Antimicrobial Indications     Answer:   Bone and Joint Infection    DULoxetine (CYMBALTA) extended release capsule 60 mg    lisinopril-hydroCHLOROthiazide (PRINZIDE;ZESTORETIC) 20-12.5 MG per tablet 1 tablet     Order Specific Question:   Please select a reason the therapeutic interchange was not accepted:     Answer:   Okay for Pharmacy to Substitute    pravastatin (PRAVACHOL) tablet 40 mg    aspirin chewable tablet 81 mg       Prior to Admit Medications:  Current Outpatient Medications   Medication Instructions    amoxicillin-clavulanate (AUGMENTIN) 875-125 MG per tablet 1 tablet, Oral, 2 TIMES DAILY    aspirin 81 mg, Oral, DAILY    buPROPion (WELLBUTRIN) 300 mg, Oral, DAILY    doxycycline hyclate (VIBRAMYCIN) 100 mg, Oral, 2 TIMES DAILY    DULoxetine (CYMBALTA) 60 mg, Oral, DAILY    gabapentin (NEURONTIN) 300 MG capsule     Hyoscyamine Sulfate SL (LEVSIN/SL) 0.125 mg, SubLINGual, EVERY 4 HOURS PRN    lisinopril-hydroCHLOROthiazide (PRINZIDE;ZESTORETIC) 20-12.5 MG per tablet 1 tablet, Oral, DAILY, Patient states MD recently lowered the dose to 10-12.5....    OMEPRAZOLE PO 20 mg, Oral, DAILY    ondansetron (ZOFRAN) 4 mg, Oral, DAILY PRN    Ozempic (0.25 or 0.5 MG/DOSE) 0.5 mg, SubCUTAneous, EVERY 7 DAYS    pravastatin (PRAVACHOL) 40 mg, Oral, EVERY EVENING    Promethazine HCl (PHENERGAN PO) 25 mg, Oral, EVERY 6-8 HOURS PRN       I have personally reviewed labs and tests:  Recent Results (from the past 24 hour(s))   CBC with Auto Differential    Collection Time: 10/16/24  7:31 PM   Result Value Ref Range    WBC 6.1 4.3 - 11.1 K/uL    RBC 4.36 4.05 -

## 2024-10-17 NOTE — PLAN OF CARE
Problem: Chronic Conditions and Co-morbidities  Goal: Patient's chronic conditions and co-morbidity symptoms are monitored and maintained or improved  Outcome: Progressing  Flowsheets (Taken 10/17/2024 0000 by Ayana Rai RN)  Care Plan - Patient's Chronic Conditions and Co-Morbidity Symptoms are Monitored and Maintained or Improved: Monitor and assess patient's chronic conditions and comorbid symptoms for stability, deterioration, or improvement     Problem: Discharge Planning  Goal: Discharge to home or other facility with appropriate resources  Outcome: Progressing  Flowsheets (Taken 10/17/2024 0000 by Ayana Rai RN)  Discharge to home or other facility with appropriate resources: Identify barriers to discharge with patient and caregiver     Problem: Pain  Goal: Verbalizes/displays adequate comfort level or baseline comfort level  Outcome: Progressing  Flowsheets  Taken 10/17/2024 0749 by Abi Pedraza RN  Verbalizes/displays adequate comfort level or baseline comfort level:   Encourage patient to monitor pain and request assistance   Assess pain using appropriate pain scale  Taken 10/17/2024 0605 by Ayana Rai RN  Verbalizes/displays adequate comfort level or baseline comfort level: Encourage patient to monitor pain and request assistance  Taken 10/17/2024 0415 by Ayana Rai RN  Verbalizes/displays adequate comfort level or baseline comfort level: Encourage patient to monitor pain and request assistance  Taken 10/17/2024 0250 by Ayana Rai RN  Verbalizes/displays adequate comfort level or baseline comfort level: Encourage patient to monitor pain and request assistance  Taken 10/17/2024 0000 by Ayana Rai RN  Verbalizes/displays adequate comfort level or baseline comfort level: Encourage patient to monitor pain and request assistance     Problem: Safety - Adult  Goal: Free from fall injury  Outcome: Progressing

## 2024-10-18 ENCOUNTER — ANESTHESIA (OUTPATIENT)
Dept: SURGERY | Age: 54
End: 2024-10-18
Payer: COMMERCIAL

## 2024-10-18 DIAGNOSIS — M86.9 OSTEOMYELITIS OF GREAT TOE OF LEFT FOOT: Primary | ICD-10-CM

## 2024-10-18 LAB
ANION GAP SERPL CALC-SCNC: 10 MMOL/L (ref 9–18)
BASOPHILS # BLD: 0 K/UL (ref 0–0.2)
BASOPHILS NFR BLD: 1 % (ref 0–2)
BUN SERPL-MCNC: 16 MG/DL (ref 6–23)
CALCIUM SERPL-MCNC: 9.1 MG/DL (ref 8.8–10.2)
CHLORIDE SERPL-SCNC: 100 MMOL/L (ref 98–107)
CO2 SERPL-SCNC: 25 MMOL/L (ref 20–28)
CREAT SERPL-MCNC: 0.79 MG/DL (ref 0.6–1.1)
DIFFERENTIAL METHOD BLD: ABNORMAL
EOSINOPHIL # BLD: 0.2 K/UL (ref 0–0.8)
EOSINOPHIL NFR BLD: 5 % (ref 0.5–7.8)
ERYTHROCYTE [DISTWIDTH] IN BLOOD BY AUTOMATED COUNT: 14.6 % (ref 11.9–14.6)
GLUCOSE BLD STRIP.AUTO-MCNC: 128 MG/DL (ref 65–100)
GLUCOSE BLD STRIP.AUTO-MCNC: 218 MG/DL (ref 65–100)
GLUCOSE BLD STRIP.AUTO-MCNC: 98 MG/DL (ref 65–100)
GLUCOSE SERPL-MCNC: 122 MG/DL (ref 70–99)
HCT VFR BLD AUTO: 34.7 % (ref 35.8–46.3)
HGB BLD-MCNC: 11.1 G/DL (ref 11.7–15.4)
IMM GRANULOCYTES # BLD AUTO: 0 K/UL (ref 0–0.5)
IMM GRANULOCYTES NFR BLD AUTO: 1 % (ref 0–5)
LYMPHOCYTES # BLD: 1.4 K/UL (ref 0.5–4.6)
LYMPHOCYTES NFR BLD: 32 % (ref 13–44)
MCH RBC QN AUTO: 26.2 PG (ref 26.1–32.9)
MCHC RBC AUTO-ENTMCNC: 32 G/DL (ref 31.4–35)
MCV RBC AUTO: 81.8 FL (ref 82–102)
MONOCYTES # BLD: 0.4 K/UL (ref 0.1–1.3)
MONOCYTES NFR BLD: 8 % (ref 4–12)
NEUTS SEG # BLD: 2.4 K/UL (ref 1.7–8.2)
NEUTS SEG NFR BLD: 53 % (ref 43–78)
NRBC # BLD: 0 K/UL (ref 0–0.2)
PLATELET # BLD AUTO: 221 K/UL (ref 150–450)
PMV BLD AUTO: 9.6 FL (ref 9.4–12.3)
POTASSIUM SERPL-SCNC: 3.8 MMOL/L (ref 3.5–5.1)
RBC # BLD AUTO: 4.24 M/UL (ref 4.05–5.2)
SERVICE CMNT-IMP: ABNORMAL
SERVICE CMNT-IMP: ABNORMAL
SERVICE CMNT-IMP: NORMAL
SODIUM SERPL-SCNC: 135 MMOL/L (ref 136–145)
WBC # BLD AUTO: 4.3 K/UL (ref 4.3–11.1)

## 2024-10-18 PROCEDURE — 2500000003 HC RX 250 WO HCPCS

## 2024-10-18 PROCEDURE — 2580000003 HC RX 258: Performed by: HOSPITALIST

## 2024-10-18 PROCEDURE — 6360000002 HC RX W HCPCS: Performed by: HOSPITALIST

## 2024-10-18 PROCEDURE — 0Y6Q0Z0 DETACHMENT AT LEFT 1ST TOE, COMPLETE, OPEN APPROACH: ICD-10-PCS | Performed by: ORTHOPAEDIC SURGERY

## 2024-10-18 PROCEDURE — 1100000000 HC RM PRIVATE

## 2024-10-18 PROCEDURE — 6360000002 HC RX W HCPCS: Performed by: SURGERY

## 2024-10-18 PROCEDURE — 85025 COMPLETE CBC W/AUTO DIFF WBC: CPT

## 2024-10-18 PROCEDURE — 2580000003 HC RX 258: Performed by: ORTHOPAEDIC SURGERY

## 2024-10-18 PROCEDURE — 64447 NJX AA&/STRD FEMORAL NRV IMG: CPT | Performed by: SURGERY

## 2024-10-18 PROCEDURE — 3700000001 HC ADD 15 MINUTES (ANESTHESIA): Performed by: ORTHOPAEDIC SURGERY

## 2024-10-18 PROCEDURE — 7100000000 HC PACU RECOVERY - FIRST 15 MIN: Performed by: ORTHOPAEDIC SURGERY

## 2024-10-18 PROCEDURE — 3700000000 HC ANESTHESIA ATTENDED CARE: Performed by: ORTHOPAEDIC SURGERY

## 2024-10-18 PROCEDURE — 3600000002 HC SURGERY LEVEL 2 BASE: Performed by: ORTHOPAEDIC SURGERY

## 2024-10-18 PROCEDURE — 28825 PARTIAL AMPUTATION OF TOE: CPT | Performed by: ORTHOPAEDIC SURGERY

## 2024-10-18 PROCEDURE — 7100000001 HC PACU RECOVERY - ADDTL 15 MIN: Performed by: ORTHOPAEDIC SURGERY

## 2024-10-18 PROCEDURE — 6370000000 HC RX 637 (ALT 250 FOR IP): Performed by: ORTHOPAEDIC SURGERY

## 2024-10-18 PROCEDURE — 2580000003 HC RX 258

## 2024-10-18 PROCEDURE — 2709999900 HC NON-CHARGEABLE SUPPLY: Performed by: ORTHOPAEDIC SURGERY

## 2024-10-18 PROCEDURE — 97161 PT EVAL LOW COMPLEX 20 MIN: CPT

## 2024-10-18 PROCEDURE — 3E0T3BZ INTRODUCTION OF ANESTHETIC AGENT INTO PERIPHERAL NERVES AND PLEXI, PERCUTANEOUS APPROACH: ICD-10-PCS | Performed by: SURGERY

## 2024-10-18 PROCEDURE — 82962 GLUCOSE BLOOD TEST: CPT

## 2024-10-18 PROCEDURE — 80048 BASIC METABOLIC PNL TOTAL CA: CPT

## 2024-10-18 PROCEDURE — 6360000002 HC RX W HCPCS

## 2024-10-18 PROCEDURE — 3600000012 HC SURGERY LEVEL 2 ADDTL 15MIN: Performed by: ORTHOPAEDIC SURGERY

## 2024-10-18 PROCEDURE — 64445 NJX AA&/STRD SCIATIC NRV IMG: CPT | Performed by: SURGERY

## 2024-10-18 PROCEDURE — 36415 COLL VENOUS BLD VENIPUNCTURE: CPT

## 2024-10-18 PROCEDURE — 6360000002 HC RX W HCPCS: Performed by: ORTHOPAEDIC SURGERY

## 2024-10-18 RX ORDER — SODIUM CHLORIDE 0.9 % (FLUSH) 0.9 %
5-40 SYRINGE (ML) INJECTION EVERY 12 HOURS SCHEDULED
Status: DISCONTINUED | OUTPATIENT
Start: 2024-10-18 | End: 2024-10-18 | Stop reason: HOSPADM

## 2024-10-18 RX ORDER — MIDAZOLAM HYDROCHLORIDE 2 MG/2ML
2 INJECTION, SOLUTION INTRAMUSCULAR; INTRAVENOUS
Status: COMPLETED | OUTPATIENT
Start: 2024-10-18 | End: 2024-10-18

## 2024-10-18 RX ORDER — IPRATROPIUM BROMIDE AND ALBUTEROL SULFATE 2.5; .5 MG/3ML; MG/3ML
1 SOLUTION RESPIRATORY (INHALATION)
Status: DISCONTINUED | OUTPATIENT
Start: 2024-10-18 | End: 2024-10-18 | Stop reason: HOSPADM

## 2024-10-18 RX ORDER — LIDOCAINE HYDROCHLORIDE 10 MG/ML
1 INJECTION, SOLUTION INFILTRATION; PERINEURAL
Status: DISCONTINUED | OUTPATIENT
Start: 2024-10-18 | End: 2024-10-18 | Stop reason: HOSPADM

## 2024-10-18 RX ORDER — OXYCODONE HYDROCHLORIDE 5 MG/1
5 TABLET ORAL
Status: DISCONTINUED | OUTPATIENT
Start: 2024-10-18 | End: 2024-10-18 | Stop reason: HOSPADM

## 2024-10-18 RX ORDER — LIDOCAINE HYDROCHLORIDE 20 MG/ML
INJECTION, SOLUTION EPIDURAL; INFILTRATION; INTRACAUDAL; PERINEURAL
Status: DISCONTINUED | OUTPATIENT
Start: 2024-10-18 | End: 2024-10-18 | Stop reason: SDUPTHER

## 2024-10-18 RX ORDER — BACITRACIN ZINC 500 [USP'U]/G
OINTMENT TOPICAL PRN
Status: DISCONTINUED | OUTPATIENT
Start: 2024-10-18 | End: 2024-10-18 | Stop reason: ALTCHOICE

## 2024-10-18 RX ORDER — ACETAMINOPHEN 500 MG
1000 TABLET ORAL ONCE
Status: DISCONTINUED | OUTPATIENT
Start: 2024-10-18 | End: 2024-10-18 | Stop reason: HOSPADM

## 2024-10-18 RX ORDER — ROPIVACAINE HYDROCHLORIDE 5 MG/ML
INJECTION, SOLUTION EPIDURAL; INFILTRATION; PERINEURAL
Status: DISCONTINUED | OUTPATIENT
Start: 2024-10-18 | End: 2024-10-18 | Stop reason: SDUPTHER

## 2024-10-18 RX ORDER — HYDROMORPHONE HYDROCHLORIDE 2 MG/ML
0.5 INJECTION, SOLUTION INTRAMUSCULAR; INTRAVENOUS; SUBCUTANEOUS EVERY 5 MIN PRN
Status: DISCONTINUED | OUTPATIENT
Start: 2024-10-18 | End: 2024-10-18 | Stop reason: HOSPADM

## 2024-10-18 RX ORDER — PROPOFOL 10 MG/ML
INJECTION, EMULSION INTRAVENOUS
Status: DISCONTINUED | OUTPATIENT
Start: 2024-10-18 | End: 2024-10-18 | Stop reason: SDUPTHER

## 2024-10-18 RX ORDER — PROCHLORPERAZINE EDISYLATE 5 MG/ML
5 INJECTION INTRAMUSCULAR; INTRAVENOUS
Status: DISCONTINUED | OUTPATIENT
Start: 2024-10-18 | End: 2024-10-18 | Stop reason: HOSPADM

## 2024-10-18 RX ORDER — SODIUM CHLORIDE 0.9 % (FLUSH) 0.9 %
SYRINGE (ML) INJECTION
Status: DISCONTINUED | OUTPATIENT
Start: 2024-10-18 | End: 2024-10-18 | Stop reason: SDUPTHER

## 2024-10-18 RX ORDER — SODIUM CHLORIDE 0.9 % (FLUSH) 0.9 %
5-40 SYRINGE (ML) INJECTION PRN
Status: DISCONTINUED | OUTPATIENT
Start: 2024-10-18 | End: 2024-10-18 | Stop reason: HOSPADM

## 2024-10-18 RX ORDER — SODIUM CHLORIDE, SODIUM LACTATE, POTASSIUM CHLORIDE, CALCIUM CHLORIDE 600; 310; 30; 20 MG/100ML; MG/100ML; MG/100ML; MG/100ML
INJECTION, SOLUTION INTRAVENOUS CONTINUOUS
Status: DISCONTINUED | OUTPATIENT
Start: 2024-10-18 | End: 2024-10-18 | Stop reason: HOSPADM

## 2024-10-18 RX ORDER — NALOXONE HYDROCHLORIDE 0.4 MG/ML
INJECTION, SOLUTION INTRAMUSCULAR; INTRAVENOUS; SUBCUTANEOUS PRN
Status: DISCONTINUED | OUTPATIENT
Start: 2024-10-18 | End: 2024-10-18 | Stop reason: HOSPADM

## 2024-10-18 RX ORDER — EPHEDRINE SULFATE 5 MG/ML
INJECTION INTRAVENOUS
Status: DISCONTINUED | OUTPATIENT
Start: 2024-10-18 | End: 2024-10-18 | Stop reason: SDUPTHER

## 2024-10-18 RX ORDER — GLYCOPYRROLATE 0.2 MG/ML
INJECTION INTRAMUSCULAR; INTRAVENOUS
Status: DISCONTINUED | OUTPATIENT
Start: 2024-10-18 | End: 2024-10-18 | Stop reason: SDUPTHER

## 2024-10-18 RX ORDER — FENTANYL CITRATE 50 UG/ML
100 INJECTION, SOLUTION INTRAMUSCULAR; INTRAVENOUS
Status: COMPLETED | OUTPATIENT
Start: 2024-10-18 | End: 2024-10-18

## 2024-10-18 RX ORDER — HALOPERIDOL 5 MG/ML
1 INJECTION INTRAMUSCULAR
Status: DISCONTINUED | OUTPATIENT
Start: 2024-10-18 | End: 2024-10-18 | Stop reason: HOSPADM

## 2024-10-18 RX ORDER — LABETALOL HYDROCHLORIDE 5 MG/ML
10 INJECTION, SOLUTION INTRAVENOUS
Status: DISCONTINUED | OUTPATIENT
Start: 2024-10-18 | End: 2024-10-18 | Stop reason: HOSPADM

## 2024-10-18 RX ORDER — SODIUM CHLORIDE 9 MG/ML
INJECTION, SOLUTION INTRAVENOUS PRN
Status: DISCONTINUED | OUTPATIENT
Start: 2024-10-18 | End: 2024-10-18 | Stop reason: HOSPADM

## 2024-10-18 RX ADMIN — MORPHINE SULFATE 4 MG: 4 INJECTION INTRAVENOUS at 13:51

## 2024-10-18 RX ADMIN — PHENYLEPHRINE HYDROCHLORIDE 100 MCG: 10 INJECTION INTRAVENOUS at 08:51

## 2024-10-18 RX ADMIN — PROPOFOL 60 MG: 10 INJECTION, EMULSION INTRAVENOUS at 08:26

## 2024-10-18 RX ADMIN — HYDROCHLOROTHIAZIDE 25 MG: 25 TABLET ORAL at 10:06

## 2024-10-18 RX ADMIN — WATER 2000 MG: 1 INJECTION INTRAMUSCULAR; INTRAVENOUS; SUBCUTANEOUS at 08:32

## 2024-10-18 RX ADMIN — PROPOFOL 180 MCG/KG/MIN: 10 INJECTION, EMULSION INTRAVENOUS at 08:27

## 2024-10-18 RX ADMIN — FENTANYL CITRATE 50 MCG: 50 INJECTION INTRAMUSCULAR; INTRAVENOUS at 07:38

## 2024-10-18 RX ADMIN — OXYCODONE 5 MG: 5 TABLET ORAL at 15:31

## 2024-10-18 RX ADMIN — MEPIVACAINE HYDROCHLORIDE 12.5 ML: 15 INJECTION, SOLUTION EPIDURAL; INFILTRATION at 07:41

## 2024-10-18 RX ADMIN — MORPHINE SULFATE 4 MG: 4 INJECTION INTRAVENOUS at 20:40

## 2024-10-18 RX ADMIN — SODIUM CHLORIDE, PRESERVATIVE FREE 10 ML: 5 INJECTION INTRAVENOUS at 20:07

## 2024-10-18 RX ADMIN — PHENYLEPHRINE HYDROCHLORIDE 100 MCG: 10 INJECTION INTRAVENOUS at 08:40

## 2024-10-18 RX ADMIN — ROPIVACAINE HYDROCHLORIDE 12.5 ML: 5 INJECTION, SOLUTION EPIDURAL; INFILTRATION; PERINEURAL at 07:41

## 2024-10-18 RX ADMIN — EPHEDRINE SULFATE 10 MG: 5 INJECTION INTRAVENOUS at 08:35

## 2024-10-18 RX ADMIN — MIDAZOLAM 2 MG: 1 INJECTION INTRAMUSCULAR; INTRAVENOUS at 07:38

## 2024-10-18 RX ADMIN — WATER 2000 MG: 1 INJECTION INTRAMUSCULAR; INTRAVENOUS; SUBCUTANEOUS at 17:19

## 2024-10-18 RX ADMIN — LIDOCAINE HYDROCHLORIDE 50 MG: 20 INJECTION, SOLUTION EPIDURAL; INFILTRATION; INTRACAUDAL; PERINEURAL at 08:26

## 2024-10-18 RX ADMIN — SODIUM CHLORIDE, PRESERVATIVE FREE 40 ML: 5 INJECTION INTRAVENOUS at 08:26

## 2024-10-18 RX ADMIN — GLYCOPYRROLATE 0.1 MG: 0.2 INJECTION INTRAMUSCULAR; INTRAVENOUS at 08:26

## 2024-10-18 RX ADMIN — SODIUM CHLORIDE 500 MG: 9 INJECTION INTRAMUSCULAR; INTRAVENOUS; SUBCUTANEOUS at 03:29

## 2024-10-18 RX ADMIN — SODIUM CHLORIDE, PRESERVATIVE FREE 10 ML: 5 INJECTION INTRAVENOUS at 08:49

## 2024-10-18 RX ADMIN — FENTANYL CITRATE 50 MCG: 50 INJECTION INTRAMUSCULAR; INTRAVENOUS at 08:04

## 2024-10-18 RX ADMIN — SODIUM CHLORIDE, PRESERVATIVE FREE 20 ML: 5 INJECTION INTRAVENOUS at 08:51

## 2024-10-18 RX ADMIN — ROPIVACAINE HYDROCHLORIDE 7.5 ML: 5 INJECTION, SOLUTION EPIDURAL; INFILTRATION; PERINEURAL at 07:45

## 2024-10-18 RX ADMIN — DULOXETINE HYDROCHLORIDE 60 MG: 60 CAPSULE, DELAYED RELEASE ORAL at 20:07

## 2024-10-18 RX ADMIN — EPHEDRINE SULFATE 10 MG: 5 INJECTION INTRAVENOUS at 08:51

## 2024-10-18 RX ADMIN — SODIUM CHLORIDE, PRESERVATIVE FREE 10 ML: 5 INJECTION INTRAVENOUS at 10:09

## 2024-10-18 RX ADMIN — MORPHINE SULFATE 4 MG: 4 INJECTION INTRAVENOUS at 17:32

## 2024-10-18 RX ADMIN — WATER 2000 MG: 1 INJECTION INTRAMUSCULAR; INTRAVENOUS; SUBCUTANEOUS at 00:26

## 2024-10-18 RX ADMIN — EPHEDRINE SULFATE 15 MG: 5 INJECTION INTRAVENOUS at 08:39

## 2024-10-18 RX ADMIN — SODIUM CHLORIDE, PRESERVATIVE FREE 10 ML: 5 INJECTION INTRAVENOUS at 08:40

## 2024-10-18 RX ADMIN — PHENYLEPHRINE HYDROCHLORIDE 100 MCG: 10 INJECTION INTRAVENOUS at 08:46

## 2024-10-18 RX ADMIN — MORPHINE SULFATE 4 MG: 4 INJECTION INTRAVENOUS at 04:53

## 2024-10-18 RX ADMIN — SODIUM CHLORIDE 500 MG: 9 INJECTION INTRAMUSCULAR; INTRAVENOUS; SUBCUTANEOUS at 15:39

## 2024-10-18 RX ADMIN — LISINOPRIL 20 MG: 20 TABLET ORAL at 10:06

## 2024-10-18 RX ADMIN — SODIUM CHLORIDE, PRESERVATIVE FREE 10 ML: 5 INJECTION INTRAVENOUS at 08:32

## 2024-10-18 RX ADMIN — SODIUM CHLORIDE, PRESERVATIVE FREE 10 ML: 5 INJECTION INTRAVENOUS at 08:35

## 2024-10-18 ASSESSMENT — PAIN SCALES - GENERAL
PAINLEVEL_OUTOF10: 8
PAINLEVEL_OUTOF10: 9
PAINLEVEL_OUTOF10: 8
PAINLEVEL_OUTOF10: 4
PAINLEVEL_OUTOF10: 6

## 2024-10-18 ASSESSMENT — PAIN DESCRIPTION - PAIN TYPE: TYPE: CHRONIC PAIN

## 2024-10-18 ASSESSMENT — PAIN DESCRIPTION - ORIENTATION
ORIENTATION: LEFT

## 2024-10-18 ASSESSMENT — PAIN DESCRIPTION - LOCATION
LOCATION: FOOT;TOE (COMMENT WHICH ONE)
LOCATION: FOOT
LOCATION: TOE (COMMENT WHICH ONE)
LOCATION: FOOT

## 2024-10-18 ASSESSMENT — PAIN DESCRIPTION - DESCRIPTORS
DESCRIPTORS: SHARP
DESCRIPTORS: SORE;ACHING
DESCRIPTORS: SHARP

## 2024-10-18 ASSESSMENT — PAIN - FUNCTIONAL ASSESSMENT
PAIN_FUNCTIONAL_ASSESSMENT: 0-10
PAIN_FUNCTIONAL_ASSESSMENT: PREVENTS OR INTERFERES SOME ACTIVE ACTIVITIES AND ADLS

## 2024-10-18 NOTE — ANESTHESIA PROCEDURE NOTES
Peripheral Block    Patient location during procedure: pre-op  Reason for block: post-op pain management and at surgeon's request  Start time: 10/18/2024 7:39 AM  End time: 10/18/2024 7:41 AM  Staffing  Performed: anesthesiologist   Anesthesiologist: Dylan Flowers MD  Performed by: Dylan Flowers MD  Authorized by: Dylan Flowers MD    Preanesthetic Checklist  Completed: patient identified, IV checked, site marked, risks and benefits discussed, surgical/procedural consents, equipment checked, pre-op evaluation, timeout performed, anesthesia consent given, oxygen available and monitors applied/VS acknowledged  Peripheral Block   Patient position: right lateral decubitus  Prep: ChloraPrep  Provider prep: mask and sterile gloves  Patient monitoring: cardiac monitor, continuous pulse ox, frequent blood pressure checks, IV access and oxygen  Block type: Sciatic  Popliteal  Laterality: left  Injection technique: single-shot  Guidance: ultrasound guided  Local infiltration: ropivacaine  Local infiltration: ropivacaine    Needle   Needle type: insulated echogenic nerve stimulator needle   Needle gauge: 21 G  Needle localization: ultrasound guidance  Needle length: 10 cm  Assessment   Injection assessment: negative aspiration for heme, no paresthesia on injection, local visualized surrounding nerve on ultrasound and no intravascular symptoms  Paresthesia pain: none  Slow fractionated injection: yes  Hemodynamics: stable  Outcomes: patient tolerated procedure well and uncomplicated    Additional Notes  -Block placed for post op pain at surgeon's request.     -Ultrasound used to identify anatomy of nerve bundle.    -Needle placement and local injection at perineural area confirmed with real time ultrasound guidance.     -Local visualized with ultrasound surrounding nerve.    -Permanent Image taken and placed on chart.    Medications Administered  ropivacaine (NAROPIN) injection 0.5% - Perineural   12.5 mL - 
7:45:00 AM  ropivacaine (NAROPIN) injection 0.5% - Perineural   7.5 mL - 10/18/2024 7:45:00 AM

## 2024-10-18 NOTE — CARE COORDINATION
S/P (L) great toe amputation. PT recommending OP PT. CM met with spouse,patient sleeping. Spouse unsure patient will need therapy.   CM will follow status to assist with transition of care.

## 2024-10-18 NOTE — THERAPY EVALUATION
ACUTE PHYSICAL THERAPY GOALS:   (Developed with and agreed upon by patient and/or caregiver.)    LTG:  (1.)Ms. Su will navigate at least 5 steps with CONTACT GUARD ASSIST using the least restrictive device within 7 treatment day(s).    (2.)Ms. Su will transfer from bed to chair and chair to bed with MODIFIED INDEPENDENCE using the least restrictive device within 7 treatment day(s).    (3.)Ms. Su will ambulate with MODIFIED INDEPENDENCE for 250 feet with the least restrictive device within 7 treatment day(s).  ________________________________________________________________________________________________      PHYSICAL THERAPY Initial Assessment, Daily Note, and AM  (Link to Caseload Tracking: PT Visit Days : 1  Acknowledge Orders  Time In/Out  PT Charge Capture  Rehab Caseload Tracker    Yemi Su is a 54 y.o. female   PRIMARY DIAGNOSIS:   Osteomyelitis of great toe of left foot  Osteomyelitis of great toe of left foot [M86.9]  Procedure(s) (LRB):  LEFT GREAT TOE AMPUTATION (Left)  Day of Surgery  Reason for Referral:   Difficulty in walking, Not elsewhere classified (R26.2)  Other abnormalities of gait and mobility (R26.89)  Inpatient: Payor: BCBS / Plan: BCBS OUT OF STATE / Product Type: *No Product type* /     ASSESSMENT:     REHAB RECOMMENDATIONS:   Recommendation to date pending progress:  Setting:  Outpatient Therapy    Equipment:    To Be Determined     ASSESSMENT:  Ms. Su is a 54 y.o. female admitted for increasing redness, pain, and swelling in her left great toe and who is day 0 s/p left great toe amputation. She has a history of right great toe amputation/revision. Prior to admittance pt was independent with mobility and ADL. Today she presents as independent with bed mobility, but is SBA/CGA with transfers and ambulation utilizing the RW. Her balance is diminished and she relies more greatly on UE for stability. At this time she will benefit from continued acute

## 2024-10-18 NOTE — ED PROVIDER NOTES
ED ATTENDING SHARED SERVICES NOTE:     I have seen and evaluated the patient and performed an independent history and physical exam, and I agree with the assessment and plan as documented in the advanced provider note.  I performed the physical exam and medical decision making in its entirety.  With the following updates: 54-year-old female patient with history of diabetes presenting with reports of progressive redness, swelling involving the great toe of the left foot.  History of osteomyelitis resultant and toe amputation in the past.  Workup today suggest developing osteomyelitis in the left great toe, will require admission, agree with [facility] documentation.                               Jordan Arriaga, DO  10/18/24 0914

## 2024-10-18 NOTE — ANESTHESIA POSTPROCEDURE EVALUATION
Department of Anesthesiology  Postprocedure Note    Patient: Yemi Su  MRN: [id number]  YOB: 1970  Date of evaluation: 10/18/2024    Procedure Summary       Date: 10/18/24 Room / Location: [facility] MAIN OR  / [facility] MAIN OR    Anesthesia Start: 0822 Anesthesia Stop: 0902    Procedure: LEFT GREAT TOE AMPUTATION (Left: First Toe) Diagnosis:       Osteomyelitis of great toe of left foot      (Osteomyelitis of great toe of left foot [M86.9])    Providers: Yaron Fiore MD Responsible Provider: Dylan Flowers MD    Anesthesia Type: TIVA ASA Status: 3            Anesthesia Type: TIVA    Trung Phase I: Trung Score: 9    Trung Phase II:      Anesthesia Post Evaluation    Patient location during evaluation: PACU  Patient participation: complete - patient participated  Level of consciousness: awake and alert  Airway patency: patent  Nausea & Vomiting: no nausea and no vomiting  Cardiovascular status: hemodynamically stable  Respiratory status: acceptable, nonlabored ventilation and spontaneous ventilation  Hydration status: euvolemic  Comments: /71   Pulse 76   Temp 98.1 °F (36.7 °C) (Oral)   Resp 17   Ht 1.727 m (5' 8\")   Wt 108.7 kg (239 lb 9.6 oz)   SpO2 97%   BMI 36.43 kg/m²     Multimodal analgesia pain management approach  Pain management: adequate and satisfactory to patient    No notable events documented.

## 2024-10-18 NOTE — OP NOTE
Operative Report    Patient: Yemi Su MRN: [id number]  SSN: [id number]    YOB: 1970  Age: 54 y.o.  Sex: female       Date of Surgery: October 18, 2024     History:  Yemi Su is a 54 y.o. female who presents with increasing redness pain swelling in her left great toe.  She sort of had some issues with this before she has been hospitalized a couple times for antibiotics.  She is also had a right great toe amputation and revision done by the vascular surgery team at [facility].  I did have a chance to speak with her preoperatively and explained that we could try just the surgical debridement or continued antibiotics or he does go ahead and proceed with the amputation.  She thinks she is at the point where she like to go ahead and proceed with amputation of the left great toe.      I talked to the patient and/or their representative and explained the exact nature the procedure.  I also went through a detailed list of the material risks associated with  the procedure which included risk of bleeding, infection, injury to nearby structures, worsening the situation, as well as the risks associate with anesthesia and finally death.  Also talked with him regarding the benefits and alternatives to the procedure.    Preoperative Diagnosis: Left great toe osteomyelitis    Postoperative Diagnosis:   Left great toe osteomyelitis      Surgeons and Role:     * Yaron Fiore MD - Primary     * Yifan Adams MD    Anesthesia: Choice     Procedure: Amputation of left great toe through IP joint    Procedure in Detail: After the successful duction of general anesthetic as well as a regional block postop analgesia the left lower extremity was prepped and draped in usual sterile fashion.  I then made a racquet incision roughly outlined on the medial aspect of her left great toe and circumferentially around the toe itself trying to keep this just distal to the IP joint.  I then went through

## 2024-10-18 NOTE — PERIOP NOTE
TRANSFER - OUT REPORT:    Verbal report given to WHIT Duval on Yemi Su  being transferred to [facility] for routine post-op       Report consisted of patient’s Situation, Background, Assessment and   Recommendations(SBAR).     Information from the following report(s) Adult Overview, Surgery Report, Intake/Output, and MAR was reviewed with the receiving nurse.    Lines:   Peripheral IV 10/16/24 Right Antecubital (Active)   Site Assessment Clean, dry & intact 10/18/24 0902   Line Status Flushed 10/18/24 0728   Line Care Connections checked and tightened 10/18/24 0021   Phlebitis Assessment No symptoms 10/18/24 0902   Infiltration Assessment 0 10/18/24 0902   Alcohol Cap Used Yes 10/18/24 0728   Dressing Status Clean, dry & intact 10/18/24 0902   Dressing Type Transparent 10/18/24 0902       Peripheral IV 10/16/24 Left;Posterior Forearm (Active)   Site Assessment Clean, dry & intact 10/18/24 0902   Line Status Flushed 10/18/24 0728   Line Care Connections checked and tightened 10/18/24 0902   Phlebitis Assessment No symptoms 10/18/24 0902   Infiltration Assessment 0 10/18/24 0902   Alcohol Cap Used Yes 10/18/24 0728   Dressing Status Clean, dry & intact 10/18/24 0902   Dressing Type Transparent 10/18/24 0902        Opportunity for questions and clarification was provided.      Patient transported with:   O2 @ 3 liters    VTE prophylaxis orders have been written for Yemi Su.    Patient and family given floor number and nurses name.

## 2024-10-18 NOTE — ANESTHESIA PRE PROCEDURE
Department of Anesthesiology  Preprocedure Note       Name:  Yemi Su   Age:  54 y.o.  :  1970                                          MRN:  [id number]         Date:  10/17/2024      Surgeon: Surgeon(s):  Yaron Fiore MD    Procedure: Procedure(s):  TOE AMPUTATION    Medications prior to admission:   Prior to Admission medications    Medication Sig Start Date End Date Taking? Authorizing Provider   amoxicillin-clavulanate (AUGMENTIN) 875-125 MG per tablet Take 1 tablet by mouth 2 times daily 10/16/24 11/6/24 Yes Kerry Carroll MD   doxycycline hyclate (VIBRAMYCIN) 100 MG capsule Take 1 capsule by mouth 2 times daily 10/16/24 11/6/24 Yes Kerry Carroll MD   Semaglutide,0.25 or 0.5MG/DOS, (OZEMPIC, 0.25 OR 0.5 MG/DOSE,) 2 MG/3ML SOPN Inject 0.5 mg into the skin every 7 days 10/2/23  Yes ProviderKerry MD   OMEPRAZOLE PO Take 20 mg by mouth daily   Yes Automatic Reconciliation, Ar   Promethazine HCl (PHENERGAN PO) Take 25 mg by mouth every 6-8 hours as needed   Yes ProviderKerry MD   pravastatin (PRAVACHOL) 40 MG tablet Take 1 tablet by mouth every evening   Yes ProviderKerry MD   DULoxetine (CYMBALTA) 60 MG extended release capsule Take 1 capsule by mouth daily   Yes ProviderKerry MD   lisinopril-hydroCHLOROthiazide (PRINZIDE;ZESTORETIC) 20-12.5 MG per tablet Take 1 tablet by mouth daily Patient states MD recently lowered the dose to 10-12.5....   Yes ProviderKerry MD   buPROPion (WELLBUTRIN) 100 MG tablet Take 3 tablets by mouth daily   Yes ProviderKerry MD   aspirin 81 MG chewable tablet Take 1 tablet by mouth daily   Yes Kerry Carroll MD   gabapentin (NEURONTIN) 300 MG capsule  24   ProviderKerry MD   Hyoscyamine Sulfate SL (LEVSIN/SL) 0.125 MG SUBL Place 0.125 mg under the tongue every 4 hours as needed (bladder spasms)  Patient not taking: Reported on 10/16/2024 1/10/23   Carlton Reed MD   ondansetron

## 2024-10-18 NOTE — DISCHARGE INSTRUCTIONS
[facility]        Patient Discharge Instructions    Yemi Su / [id number] : 1970    Admitted 10/16/2024 Discharged: 10/18/2024     IF YOU HAVE ANY PROBLEMS ONCE YOU ARE AT HOME CALL THE FOLLOWING NUMBERS:   Main office number: [phone] ask for Felicity (our medical assistant)  Office Address: [address] [place] [address] Dr. Moura [address] [place] [address]          Activity  As tolerated and as directed by your doctor.   Keep dressing dry and clean      Diet  Resume regular diet       Medications    The medications you are to continue on are listed on the medication reconciliation sheet.   Narcotic pain medications as well as supplemental iron can cause constipation. If this occurs try stopping the narcotic pain medication and/or the iron.   It is important that you take the medication exactly as they are prescribed.  Keep your medication in the bottles provided by the pharmacist and keep a list of the medication names, dosages, and times to be taken in your wallet.   Do not take other medications without consulting your doctor.       Other Important Information    Do NOT smoke as this will greatly increase your risk of infection!    Do not drive and operate hazardous machinery until being cleared to do so by your doctor     You are at a risk for falls. Use the rolling walker when walking.     Patients should not drive if they are still taking narcotic pain mediation during the daytime hours. Most patients wean themselves off of pain medication within 2-5 weeks after surgery.     Please give a list of your current medications to your Primary Care Provider and update this list whenever your medications are discontinued, doses are changed, or new medications (including over-the-counter products) are added. Please carry medication information at all times in case of emergency situations.    If you have sleep apnea and have a CPAP machine, please use it for all naps and sleeping.          When to

## 2024-10-19 LAB
ANION GAP SERPL CALC-SCNC: 12 MMOL/L (ref 9–18)
BASOPHILS # BLD: 0 K/UL (ref 0–0.2)
BASOPHILS NFR BLD: 0 % (ref 0–2)
BUN SERPL-MCNC: 16 MG/DL (ref 6–23)
CALCIUM SERPL-MCNC: 9.2 MG/DL (ref 8.8–10.2)
CHLORIDE SERPL-SCNC: 100 MMOL/L (ref 98–107)
CO2 SERPL-SCNC: 25 MMOL/L (ref 20–28)
CREAT SERPL-MCNC: 0.74 MG/DL (ref 0.6–1.1)
DIFFERENTIAL METHOD BLD: ABNORMAL
EOSINOPHIL # BLD: 0.2 K/UL (ref 0–0.8)
EOSINOPHIL NFR BLD: 3 % (ref 0.5–7.8)
ERYTHROCYTE [DISTWIDTH] IN BLOOD BY AUTOMATED COUNT: 14.3 % (ref 11.9–14.6)
GLUCOSE BLD STRIP.AUTO-MCNC: 121 MG/DL (ref 65–100)
GLUCOSE BLD STRIP.AUTO-MCNC: 123 MG/DL (ref 65–100)
GLUCOSE BLD STRIP.AUTO-MCNC: 94 MG/DL (ref 65–100)
GLUCOSE BLD STRIP.AUTO-MCNC: 97 MG/DL (ref 65–100)
GLUCOSE SERPL-MCNC: 122 MG/DL (ref 70–99)
HCT VFR BLD AUTO: 35.3 % (ref 35.8–46.3)
HGB BLD-MCNC: 11.2 G/DL (ref 11.7–15.4)
IMM GRANULOCYTES # BLD AUTO: 0 K/UL (ref 0–0.5)
IMM GRANULOCYTES NFR BLD AUTO: 0 % (ref 0–5)
LYMPHOCYTES # BLD: 1.7 K/UL (ref 0.5–4.6)
LYMPHOCYTES NFR BLD: 31 % (ref 13–44)
MCH RBC QN AUTO: 25.9 PG (ref 26.1–32.9)
MCHC RBC AUTO-ENTMCNC: 31.7 G/DL (ref 31.4–35)
MCV RBC AUTO: 81.5 FL (ref 82–102)
MONOCYTES # BLD: 0.5 K/UL (ref 0.1–1.3)
MONOCYTES NFR BLD: 10 % (ref 4–12)
NEUTS SEG # BLD: 3 K/UL (ref 1.7–8.2)
NEUTS SEG NFR BLD: 56 % (ref 43–78)
NRBC # BLD: 0 K/UL (ref 0–0.2)
PLATELET # BLD AUTO: 207 K/UL (ref 150–450)
PMV BLD AUTO: 9.1 FL (ref 9.4–12.3)
POTASSIUM SERPL-SCNC: 3.7 MMOL/L (ref 3.5–5.1)
RBC # BLD AUTO: 4.33 M/UL (ref 4.05–5.2)
SERVICE CMNT-IMP: ABNORMAL
SERVICE CMNT-IMP: ABNORMAL
SERVICE CMNT-IMP: NORMAL
SERVICE CMNT-IMP: NORMAL
SODIUM SERPL-SCNC: 136 MMOL/L (ref 136–145)
WBC # BLD AUTO: 5.4 K/UL (ref 4.3–11.1)

## 2024-10-19 PROCEDURE — 6360000002 HC RX W HCPCS: Performed by: ORTHOPAEDIC SURGERY

## 2024-10-19 PROCEDURE — 1100000000 HC RM PRIVATE

## 2024-10-19 PROCEDURE — 85025 COMPLETE CBC W/AUTO DIFF WBC: CPT

## 2024-10-19 PROCEDURE — 6370000000 HC RX 637 (ALT 250 FOR IP): Performed by: ORTHOPAEDIC SURGERY

## 2024-10-19 PROCEDURE — 6370000000 HC RX 637 (ALT 250 FOR IP): Performed by: INTERNAL MEDICINE

## 2024-10-19 PROCEDURE — 2580000003 HC RX 258: Performed by: ORTHOPAEDIC SURGERY

## 2024-10-19 PROCEDURE — 36415 COLL VENOUS BLD VENIPUNCTURE: CPT

## 2024-10-19 PROCEDURE — 82962 GLUCOSE BLOOD TEST: CPT

## 2024-10-19 PROCEDURE — 80048 BASIC METABOLIC PNL TOTAL CA: CPT

## 2024-10-19 RX ORDER — OXYCODONE HYDROCHLORIDE 5 MG/1
5 TABLET ORAL EVERY 4 HOURS PRN
Qty: 18 TABLET | Refills: 0 | Status: SHIPPED | OUTPATIENT
Start: 2024-10-19 | End: 2024-10-22

## 2024-10-19 RX ADMIN — DIPHENHYDRAMINE HYDROCHLORIDE 5 ML: 12.5 LIQUID ORAL at 08:56

## 2024-10-19 RX ADMIN — WATER 2000 MG: 1 INJECTION INTRAMUSCULAR; INTRAVENOUS; SUBCUTANEOUS at 08:50

## 2024-10-19 RX ADMIN — AMOXICILLIN AND CLAVULANATE POTASSIUM 1 TABLET: 875; 125 TABLET, FILM COATED ORAL at 19:57

## 2024-10-19 RX ADMIN — WATER 2000 MG: 1 INJECTION INTRAMUSCULAR; INTRAVENOUS; SUBCUTANEOUS at 00:34

## 2024-10-19 RX ADMIN — MORPHINE SULFATE 4 MG: 4 INJECTION INTRAVENOUS at 08:45

## 2024-10-19 RX ADMIN — MORPHINE SULFATE 4 MG: 4 INJECTION INTRAVENOUS at 00:48

## 2024-10-19 RX ADMIN — LISINOPRIL 20 MG: 20 TABLET ORAL at 08:54

## 2024-10-19 RX ADMIN — MORPHINE SULFATE 4 MG: 4 INJECTION INTRAVENOUS at 16:05

## 2024-10-19 RX ADMIN — SODIUM CHLORIDE, PRESERVATIVE FREE 10 ML: 5 INJECTION INTRAVENOUS at 09:01

## 2024-10-19 RX ADMIN — HYDROCHLOROTHIAZIDE 25 MG: 25 TABLET ORAL at 09:01

## 2024-10-19 RX ADMIN — MORPHINE SULFATE 4 MG: 4 INJECTION INTRAVENOUS at 12:44

## 2024-10-19 RX ADMIN — MORPHINE SULFATE 4 MG: 4 INJECTION INTRAVENOUS at 05:01

## 2024-10-19 RX ADMIN — OXYCODONE 5 MG: 5 TABLET ORAL at 18:21

## 2024-10-19 RX ADMIN — OXYCODONE 5 MG: 5 TABLET ORAL at 03:06

## 2024-10-19 RX ADMIN — DIPHENHYDRAMINE HYDROCHLORIDE 5 ML: 12.5 LIQUID ORAL at 16:09

## 2024-10-19 RX ADMIN — ASPIRIN 81 MG 81 MG: 81 TABLET ORAL at 08:54

## 2024-10-19 RX ADMIN — DULOXETINE HYDROCHLORIDE 60 MG: 60 CAPSULE, DELAYED RELEASE ORAL at 08:54

## 2024-10-19 RX ADMIN — MORPHINE SULFATE 4 MG: 4 INJECTION INTRAVENOUS at 19:58

## 2024-10-19 RX ADMIN — DIPHENHYDRAMINE HYDROCHLORIDE 5 ML: 12.5 LIQUID ORAL at 21:15

## 2024-10-19 ASSESSMENT — PAIN DESCRIPTION - LOCATION
LOCATION: FOOT
LOCATION: LEG;FOOT
LOCATION: LEG
LOCATION: LEG
LOCATION: FOOT

## 2024-10-19 ASSESSMENT — PAIN DESCRIPTION - ORIENTATION
ORIENTATION: LEFT

## 2024-10-19 ASSESSMENT — PAIN SCALES - GENERAL
PAINLEVEL_OUTOF10: 8
PAINLEVEL_OUTOF10: 9
PAINLEVEL_OUTOF10: 7
PAINLEVEL_OUTOF10: 0
PAINLEVEL_OUTOF10: 8
PAINLEVEL_OUTOF10: 5
PAINLEVEL_OUTOF10: 7
PAINLEVEL_OUTOF10: 8
PAINLEVEL_OUTOF10: 9

## 2024-10-19 ASSESSMENT — PAIN DESCRIPTION - DESCRIPTORS
DESCRIPTORS: SHARP
DESCRIPTORS: SHARP
DESCRIPTORS: SORE
DESCRIPTORS: SHARP

## 2024-10-19 NOTE — CARE COORDINATION
CM reviewed pt chart for continued stay.  LMSW follow up with pt about recommendation for home health RN to support wound care and healing.  Referral placed to [facility] as requested by pt.  Will continue to follow pt plan of care and assist further with any additional supportive care needs as appropriate.

## 2024-10-19 NOTE — DISCHARGE SUMMARY
Hospitalist Discharge Summary   Admit Date:  10/16/2024 10:56 PM   DC Note date: 10/19/2024  Name:  Yemi Su   Age:  54 y.o.  Sex:  female  :  1970   MRN:  [id number]   Room:  [facility]  PCP:  Jones Andino III, MD    Presenting Complaint: No chief complaint on file.     Initial Admission Diagnosis: Osteomyelitis of great toe of left foot [M86.9]     Problem List for this Hospitalization (present on admission):    Principal Problem:    Osteomyelitis of great toe of left foot  Active Problems:    Hypertension    Type 2 diabetes mellitus with diabetic polyneuropathy, without long-term current use of insulin (HCC)    Essential (primary) hypertension  Resolved Problems:    * No resolved hospital problems. *      Hospital Course:    54 years old female with history of borderline diabetes, depression, peripheral neuropathy presented to emergency room from wound care clinic concern for osteomyelitis of her left great toe.  Patient went to wound care clinic, evaluated about redness involving her great toe involving plantar surface.  Patient was taking leftover doxycycline at home with no improvement.  Patient subsequently developed a fever today so evaluated by wound care specialist.  In emergency room x-rays were obtained shows lytic defect in tuft of the great toe may represent osteomyelitis with soft tissue swelling surrounding.  Patient was given antibiotics, orthopedics was contacted who recommended transfer to [facility]. Underwent left great toe amputation 10/20. Clinically stable wants to go home. Orthopedics made follow up for suture removal /wound eval in 2 week. She will call primary for appt within one week. Home health nursing for dressing / wound eval. 5 day augmentin at [place].            Disposition: Home with Home Health  Diet: ADULT DIET; Regular; 3 carb choices (45 gm/meal)  Code Status: Full Code    Follow Ups:  Follow-up Information       Follow up With Specialties Details Why

## 2024-10-20 VITALS
WEIGHT: 233.47 LBS | OXYGEN SATURATION: 100 % | TEMPERATURE: 97.9 F | HEART RATE: 76 BPM | DIASTOLIC BLOOD PRESSURE: 72 MMHG | RESPIRATION RATE: 16 BRPM | SYSTOLIC BLOOD PRESSURE: 109 MMHG | HEIGHT: 68 IN | BODY MASS INDEX: 35.38 KG/M2

## 2024-10-20 LAB
ANION GAP SERPL CALC-SCNC: 9 MMOL/L (ref 9–18)
BACTERIA SPEC CULT: NORMAL
BACTERIA SPEC CULT: NORMAL
BASOPHILS # BLD: 0 K/UL (ref 0–0.2)
BASOPHILS NFR BLD: 0 % (ref 0–2)
BUN SERPL-MCNC: 14 MG/DL (ref 6–23)
CALCIUM SERPL-MCNC: 9.3 MG/DL (ref 8.8–10.2)
CHLORIDE SERPL-SCNC: 99 MMOL/L (ref 98–107)
CO2 SERPL-SCNC: 29 MMOL/L (ref 20–28)
CREAT SERPL-MCNC: 0.66 MG/DL (ref 0.6–1.1)
DIFFERENTIAL METHOD BLD: ABNORMAL
EOSINOPHIL # BLD: 0.3 K/UL (ref 0–0.8)
EOSINOPHIL NFR BLD: 5 % (ref 0.5–7.8)
ERYTHROCYTE [DISTWIDTH] IN BLOOD BY AUTOMATED COUNT: 14.2 % (ref 11.9–14.6)
GLUCOSE BLD STRIP.AUTO-MCNC: 105 MG/DL (ref 65–100)
GLUCOSE BLD STRIP.AUTO-MCNC: 98 MG/DL (ref 65–100)
GLUCOSE SERPL-MCNC: 121 MG/DL (ref 70–99)
HCT VFR BLD AUTO: 35.1 % (ref 35.8–46.3)
HGB BLD-MCNC: 11.2 G/DL (ref 11.7–15.4)
IMM GRANULOCYTES # BLD AUTO: 0 K/UL (ref 0–0.5)
IMM GRANULOCYTES NFR BLD AUTO: 1 % (ref 0–5)
LYMPHOCYTES # BLD: 1.7 K/UL (ref 0.5–4.6)
LYMPHOCYTES NFR BLD: 32 % (ref 13–44)
MCH RBC QN AUTO: 26 PG (ref 26.1–32.9)
MCHC RBC AUTO-ENTMCNC: 31.9 G/DL (ref 31.4–35)
MCV RBC AUTO: 81.4 FL (ref 82–102)
MONOCYTES # BLD: 0.5 K/UL (ref 0.1–1.3)
MONOCYTES NFR BLD: 9 % (ref 4–12)
NEUTS SEG # BLD: 2.9 K/UL (ref 1.7–8.2)
NEUTS SEG NFR BLD: 53 % (ref 43–78)
NRBC # BLD: 0 K/UL (ref 0–0.2)
PLATELET # BLD AUTO: 228 K/UL (ref 150–450)
PMV BLD AUTO: 9.5 FL (ref 9.4–12.3)
POTASSIUM SERPL-SCNC: 3.8 MMOL/L (ref 3.5–5.1)
RBC # BLD AUTO: 4.31 M/UL (ref 4.05–5.2)
SERVICE CMNT-IMP: ABNORMAL
SERVICE CMNT-IMP: NORMAL
SODIUM SERPL-SCNC: 137 MMOL/L (ref 136–145)
WBC # BLD AUTO: 5.4 K/UL (ref 4.3–11.1)

## 2024-10-20 PROCEDURE — 6370000000 HC RX 637 (ALT 250 FOR IP): Performed by: ORTHOPAEDIC SURGERY

## 2024-10-20 PROCEDURE — 82962 GLUCOSE BLOOD TEST: CPT

## 2024-10-20 PROCEDURE — 2580000003 HC RX 258: Performed by: ORTHOPAEDIC SURGERY

## 2024-10-20 PROCEDURE — 80048 BASIC METABOLIC PNL TOTAL CA: CPT

## 2024-10-20 PROCEDURE — 36415 COLL VENOUS BLD VENIPUNCTURE: CPT

## 2024-10-20 PROCEDURE — 6370000000 HC RX 637 (ALT 250 FOR IP): Performed by: INTERNAL MEDICINE

## 2024-10-20 PROCEDURE — 6360000002 HC RX W HCPCS: Performed by: ORTHOPAEDIC SURGERY

## 2024-10-20 PROCEDURE — 85025 COMPLETE CBC W/AUTO DIFF WBC: CPT

## 2024-10-20 RX ADMIN — OXYCODONE 5 MG: 5 TABLET ORAL at 10:27

## 2024-10-20 RX ADMIN — AMOXICILLIN AND CLAVULANATE POTASSIUM 1 TABLET: 875; 125 TABLET, FILM COATED ORAL at 08:42

## 2024-10-20 RX ADMIN — HYDROCHLOROTHIAZIDE 25 MG: 25 TABLET ORAL at 08:42

## 2024-10-20 RX ADMIN — MORPHINE SULFATE 4 MG: 4 INJECTION INTRAVENOUS at 08:38

## 2024-10-20 RX ADMIN — MORPHINE SULFATE 4 MG: 4 INJECTION INTRAVENOUS at 04:36

## 2024-10-20 RX ADMIN — DULOXETINE HYDROCHLORIDE 60 MG: 60 CAPSULE, DELAYED RELEASE ORAL at 08:42

## 2024-10-20 RX ADMIN — MORPHINE SULFATE 4 MG: 4 INJECTION INTRAVENOUS at 13:08

## 2024-10-20 RX ADMIN — SODIUM CHLORIDE, PRESERVATIVE FREE 10 ML: 5 INJECTION INTRAVENOUS at 08:43

## 2024-10-20 RX ADMIN — LISINOPRIL 20 MG: 20 TABLET ORAL at 08:42

## 2024-10-20 RX ADMIN — ASPIRIN 81 MG 81 MG: 81 TABLET ORAL at 08:42

## 2024-10-20 RX ADMIN — OXYCODONE 5 MG: 5 TABLET ORAL at 00:15

## 2024-10-20 ASSESSMENT — PAIN DESCRIPTION - LOCATION
LOCATION: LEG
LOCATION: FOOT
LOCATION: LEG;FOOT
LOCATION: LEG
LOCATION: FOOT

## 2024-10-20 ASSESSMENT — PAIN SCALES - GENERAL
PAINLEVEL_OUTOF10: 0
PAINLEVEL_OUTOF10: 7
PAINLEVEL_OUTOF10: 8
PAINLEVEL_OUTOF10: 7
PAINLEVEL_OUTOF10: 9
PAINLEVEL_OUTOF10: 0
PAINLEVEL_OUTOF10: 8

## 2024-10-20 ASSESSMENT — PAIN DESCRIPTION - PAIN TYPE: TYPE: SURGICAL PAIN

## 2024-10-20 ASSESSMENT — PAIN DESCRIPTION - DESCRIPTORS
DESCRIPTORS: SHARP
DESCRIPTORS: SHARP
DESCRIPTORS: STABBING

## 2024-10-20 ASSESSMENT — PAIN DESCRIPTION - ORIENTATION
ORIENTATION: LEFT

## 2024-10-20 ASSESSMENT — PAIN - FUNCTIONAL ASSESSMENT: PAIN_FUNCTIONAL_ASSESSMENT: PREVENTS OR INTERFERES SOME ACTIVE ACTIVITIES AND ADLS

## 2024-10-20 ASSESSMENT — PAIN DESCRIPTION - FREQUENCY: FREQUENCY: CONTINUOUS

## 2024-10-20 NOTE — DISCHARGE SUMMARY
Hospitalist Discharge Summary   Admit Date:  10/16/2024 10:56 PM   DC Note date: 10/20/2024  Name:  Yemi Su   Age:  54 y.o.  Sex:  female  :  1970   MRN:  [id number]   Room:  [facility]  PCP:  Jones Andino III, MD    Presenting Complaint: No chief complaint on file.     Initial Admission Diagnosis: Osteomyelitis of great toe of left foot [M86.9]     Problem List for this Hospitalization (present on admission):    Principal Problem:    Osteomyelitis of great toe of left foot  Active Problems:    Hypertension    Type 2 diabetes mellitus with diabetic polyneuropathy, without long-term current use of insulin (HCC)    Essential (primary) hypertension  Resolved Problems:    * No resolved hospital problems. *      Hospital Course:    54 years old female with history of borderline diabetes, depression, peripheral neuropathy presented to emergency room from wound care clinic concern for osteomyelitis of her left great toe.  Patient went to wound care clinic, evaluated about redness involving her great toe involving plantar surface.  Patient was taking leftover doxycycline at home with no improvement.  Patient subsequently developed a fever today so evaluated by wound care specialist.  In emergency room x-rays were obtained shows lytic defect in tuft of the great toe may represent osteomyelitis with soft tissue swelling surrounding.  Patient was given antibiotics, orthopedics was contacted who recommended transfer to [facility]. Underwent left great toe amputation 10/20. Clinically stable wants to go home. Orthopedics made follow up for suture removal /wound eval in 2 week. She will call primary for appt within one week. Home health nursing for dressing / wound eval. 7 day augmentin at [place].       Addendum-- 1:20 PM-discharge was held last evening due to intractable pain patient requested continue morphine sulfate and she wished to speak with orthopedics prior to discharge.  She appears more

## 2024-10-20 NOTE — PLAN OF CARE
Problem: Chronic Conditions and Co-morbidities  Goal: Patient's chronic conditions and co-morbidity symptoms are monitored and maintained or improved  Outcome: Progressing  Flowsheets  Taken 10/20/2024 0838 by Abi Pedraza RN  Care Plan - Patient's Chronic Conditions and Co-Morbidity Symptoms are Monitored and Maintained or Improved:   Monitor and assess patient's chronic conditions and comorbid symptoms for stability, deterioration, or improvement   Collaborate with multidisciplinary team to address chronic and comorbid conditions and prevent exacerbation or deterioration   Update acute care plan with appropriate goals if chronic or comorbid symptoms are exacerbated and prevent overall improvement and discharge  Taken 10/19/2024 1957 by Bassem Peterson RN  Care Plan - Patient's Chronic Conditions and Co-Morbidity Symptoms are Monitored and Maintained or Improved:   Monitor and assess patient's chronic conditions and comorbid symptoms for stability, deterioration, or improvement   Collaborate with multidisciplinary team to address chronic and comorbid conditions and prevent exacerbation or deterioration   Update acute care plan with appropriate goals if chronic or comorbid symptoms are exacerbated and prevent overall improvement and discharge     Problem: Discharge Planning  Goal: Discharge to home or other facility with appropriate resources  Outcome: Progressing  Flowsheets  Taken 10/20/2024 0838 by Abi Pedraza RN  Discharge to home or other facility with appropriate resources:   Identify barriers to discharge with patient and caregiver   Arrange for needed discharge resources and transportation as appropriate   Identify discharge learning needs (meds, wound care, etc)   Refer to discharge planning if patient needs post-hospital services based on physician order or complex needs related to functional status, cognitive ability or social support system  Taken 10/19/2024 1957 by Bassem Peterson,

## 2024-10-20 NOTE — CARE COORDINATION
Pt is for discharge home today with family.  Referral called/sent to [facility]  for follow up home care as ordered.  No additional CM orders received or supportive care needs expressed at this time.     10/20/24 7506   Service Assessment   Patient's Healthcare Decision Maker is: Legal Next of Kin   Social/Functional History   Lives With Spouse   Type of Home House   Home Layout Two level   Home Access Stairs to enter without rails   Entrance Stairs - Number of Steps 5   Entrance Stairs - Rails None   Bathroom Toilet Standard   Bathroom Accessibility Accessible   Home Equipment None   Receives Help From Family   ADL Assistance Independent   Homemaking Assistance Independent   Ambulation Assistance Independent   Transfer Assistance Independent   Active  No   Occupation Unemployed   Services At/After Discharge   Transition of Care Consult (CM Consult) Discharge Planning;Home Health   Internal Home Health No   Reason Outside Agency Chosen Script used patient chose alternate agency  ([facility] )   Services At/After Discharge Home Health;Nursing services   [place] Resource Information Provided? No   Mode of Transport at Discharge Other (see comment)  (family)   Confirm Follow Up Transport Family   Condition of Participation: Discharge Planning   The Plan for Transition of Care is related to the following treatment goals: Pt will return home with home health care to support recovery/post op care.   The Patient and/or Patient Representative was provided with a Choice of Provider? Patient   The Patient and/Or Patient Representative agree with the Discharge Plan? Yes   Freedom of Choice list was provided with basic dialogue that supports the patient's individualized plan of care/goals, treatment preferences, and shares the quality data associated with the providers?  Yes

## 2024-10-20 NOTE — PROGRESS NOTES
Hospitalist Progress Note   Admit Date:  10/16/2024 10:56 PM   Name:  Yeim Su   Age:  54 y.o.  Sex:  female  :  1970   MRN:  [id number]   Room:  [facility]/    Presenting/Chief Complaint: No chief complaint on file.     Reason(s) for Admission: Osteomyelitis of great toe of left foot [M86.9]     Hospital Course:     Copied from admission history and physical HPI:  54 years old female with history of borderline diabetes, depression, peripheral neuropathy presented to emergency room from wound care clinic concern for osteomyelitis of her left great toe.  Patient went to wound care clinic, evaluated about redness involving her great toe involving plantar surface.  Patient was taking leftover doxycycline at home with no improvement.  Patient subsequently developed a fever today so evaluated by wound care specialist.  In emergency room x-rays were obtained shows lytic defect in tuft of the great toe may represent osteomyelitis with soft tissue swelling surrounding.  Patient was given antibiotics, orthopedics was contacted who recommended transfer to [facility].  I have seen this patient on fourth floor, patient is well-appearing, not in acute distress.  Labs reviewed from emergency room which shows white count within normal limits, CRP is 2.4.          Subjective & 24hr Events:     Patient with amputation of great toe earlier today.  No new complaints.  Analgesia effective.  Discussed PT and OT eval and possible home health discharge soon.  Not clear that we need to await culture results given source control following amputation.           Assessment & Plan:      Osteomyelitis of left great toe: Will request orthopedics to evaluate and decide further plan meanwhile initiated on antibiotics which will be continued.  Will provide pain medications.  -for amputation in AM.10/18--- status post left great toe amputation earlier today.  Possible home sometime next 48 hours with oral 
       Hospitalist Progress Note   Admit Date:  10/16/2024 10:56 PM   Name:  Yemi Su   Age:  54 y.o.  Sex:  female  :  1970   MRN:  [id number]   Room:  [facility]/    Presenting/Chief Complaint: No chief complaint on file.     Reason(s) for Admission: Osteomyelitis of great toe of left foot [M86.9]     Hospital Course:     Copied from admission history and physical HPI:  54 years old female with history of borderline diabetes, depression, peripheral neuropathy presented to emergency room from wound care clinic concern for osteomyelitis of her left great toe.  Patient went to wound care clinic, evaluated about redness involving her great toe involving plantar surface.  Patient was taking leftover doxycycline at home with no improvement.  Patient subsequently developed a fever today so evaluated by wound care specialist.  In emergency room x-rays were obtained shows lytic defect in tuft of the great toe may represent osteomyelitis with soft tissue swelling surrounding.  Patient was given antibiotics, orthopedics was contacted who recommended transfer to [facility].  I have seen this patient on fourth floor, patient is well-appearing, not in acute distress.  Labs reviewed from emergency room which shows white count within normal limits, CRP is 2.4.          Subjective & 24hr Events:     Patient seen by orthopedics-planned left great toe amputation tomorrow .  Vascular studies okay.  MRI from [facility] showed osteomyelitis.  Continuing with current antibiotics.  Blood sugars well-controlled.  No new complaints.  No shaking chills or high fevers.  No dyspnea diarrhea nausea vomiting or cough.  Denies chest pain.           Assessment & Plan:      Osteomyelitis of left great toe: Will request orthopedics to evaluate and decide further plan meanwhile initiated on antibiotics which will be continued.  Will provide pain medications.  -for amputation in AM.        Borderline diabetes type 2: 
4 Eyes Skin Assessment     NAME:  Yemi Su  YOB: 1970  MEDICAL RECORD NUMBER:  [id number]    The patient is being assessed for  Admission    I agree that at least one RN has performed a thorough Head to Toe Skin Assessment on the patient. ALL assessment sites listed below have been assessed.      Areas assessed by both nurses:    Head, Face, Ears, Shoulders, Back, Chest, Arms, Elbows, Hands, Sacrum. Buttock, Coccyx, Ischium, and Legs. Feet and Heels        Does the Patient have a Wound? Yes wound(s) were present on assessment. LDA wound assessment was Initiated and completed by RN  Located on L great toe, R ball of foot       Garo Prevention initiated by RN: Yes  Wound Care Orders initiated by RN: No    Pressure Injury (Stage 3,4, Unstageable, DTI, NWPT, and Complex wounds) if present, place Wound referral order by RN under : No    New Ostomies, if present place, Ostomy referral order under : No     Nurse 1 eSignature: Electronically signed by JOE WINSLOW RN on 10/17/24 at 3:05 AM EDT    **SHARE this note so that the co-signing nurse can place an eSignature**    Nurse 2 eSignature: {Esignature:[id number]}   
Discharge instructions gone over with patient and her son. Prescriptions given to her. Informed not to remove dressing from left foot until follow up appointment. She verbalized understanding and had no further questions.   
Nursing notified of pending surgery tomorrow 10/18-to be in preop at 0600  
October 20, 2024         Post Op day: 2 Days Post-Op Procedure(s) (LRB):  LEFT GREAT TOE AMPUTATION (Left)      Admit Date: 10/16/2024  Admit Diagnosis: Osteomyelitis of great toe of left foot [M86.9]       Principle Problem: Osteomyelitis of great toe of left foot.           Subjective: Doing well, No complaints, No SOB, No Chest Pain, No Nausea or Vomiting.  Status post left great toe amputation x 2 days.  Complains of pain.  Would like to go home.  Apparently she removed the dressing to look at her wound and has a picture of it.     Objective:   Vital Signs are Stable, No Acute Distress, Alert,  Dressing is Dry,  Neurovascular exam is normal.     Assessment / Plan :  Patient Active Problem List   Diagnosis    H/O TIA (transient ischemic attack) and stroke    Microcytic anemia    Depression    Rheumatoid arthritis (HCC)    TIA (transient ischemic attack)    Reflex sympathetic dystrophy    Hypertension    Cellulitis of second toe, left    Acute osteomyelitis of right foot    Right lower quadrant abdominal pain    Chronic pain    Class 1 obesity in adult    Peripheral neuropathy    Ovarian cyst    Mass of peritoneum    Kidney stone    Cellulitis of left lower limb    Cervical spinal stenosis    Diabetic ulcer of right foot associated with diabetes mellitus due to underlying condition (HCC)    Flank pain    Gastroesophageal reflux disease without esophagitis    Generalized anxiety disorder with panic attacks    Hypercholesterolemia    Internal hemorrhoids    Major depressive disorder, recurrent, moderate (HCC)    Nausea    Penicillin allergy    Primary insomnia    Renal colic on right side    Type 2 diabetes mellitus with diabetic polyneuropathy, without long-term current use of insulin (HCC)    Acute osteomyelitis    Essential (primary) hypertension    Fibromyalgia    Headache    Neck pain    Neuropathy    Left foot infection    History of diabetes-related lower limb amputation (HCC)    Osteomyelitis    
TRANSFER - IN REPORT:    Verbal report received from Alida SHERMAN on Yemi Su being received from [place] ED for routine progression of care.     Report consisted of patient’s Situation, Background, Assessment and Recommendations(SBAR).     Information from the following report(s) SBAR was reviewed. Opportunity for questions and clarification was provided.      Assessment completed upon patient’s arrival to unit and care assumed.     Patient received to room 411. Patient connected to monitor and assessment completed. Plan of care reviewed. Patient oriented to room and call light. Patient aware to use call light to communicate any chest pain or needs.        
infusion   IntraVENous PRN    potassium chloride (KLOR-CON M) extended release tablet 40 mEq  40 mEq Oral PRN    Or    potassium bicarb-citric acid (EFFER-K) effervescent tablet 40 mEq  40 mEq Oral PRN    Or    potassium chloride 10 mEq/100 mL IVPB (Peripheral Line)  10 mEq IntraVENous PRN    magnesium sulfate 2000 mg in 50 mL IVPB premix  2,000 mg IntraVENous PRN    enoxaparin Sodium (LOVENOX) injection 30 mg  30 mg SubCUTAneous BID    ondansetron (ZOFRAN-ODT) disintegrating tablet 4 mg  4 mg Oral Q8H PRN    Or    ondansetron (ZOFRAN) injection 4 mg  4 mg IntraVENous Q6H PRN    polyethylene glycol (GLYCOLAX) packet 17 g  17 g Oral Daily PRN    acetaminophen (TYLENOL) tablet 650 mg  650 mg Oral Q6H PRN    Or    acetaminophen (TYLENOL) suppository 650 mg  650 mg Rectal Q6H PRN    ceFEPIme (MAXIPIME) 2,000 mg in sterile water 20 mL IV syringe  2,000 mg IntraVENous Q8H    DULoxetine (CYMBALTA) extended release capsule 60 mg  60 mg Oral BID    [Held by provider] pravastatin (PRAVACHOL) tablet 40 mg  40 mg Oral QPM    aspirin chewable tablet 81 mg  81 mg Oral Daily         Review of Systems:  Pertinent items are noted in HPI.    Physical Exam:      General: NAD, Alert, Oriented x 3   Mental Status: Appropriate   Psych: Normal Affect, Normal Mood    HEENT: Normal Cephalic/Atraumatic, PERRL   Lungs: Respirations even and unlabored, Breath Sounds were clear, no respiratory distress   Heart: Regular Rate and Rhythm   Vascular: Distal pulses intact, good capillary refill   Skin: No redness, No Rashes, Skin is dry   Musculoskeletal: On exam there is a small dime size ulcerated chronic appearing wound of the left distal portion of the left great toe.  Unable to express any purulence from this.  Lymphatic: No lympahdenopathy, No distal edema   Neuro: No gross deficits   Abdomen: Soft, Non tender, No distension      VITALS: Patient Vitals for the past 8 hrs:   BP Temp Temp src Pulse Resp SpO2   10/17/24 1204 (!) 109/51 98.1 °F

## 2024-10-22 ENCOUNTER — TELEPHONE (OUTPATIENT)
Dept: ORTHOPEDIC SURGERY | Age: 54
End: 2024-10-22

## 2024-10-22 NOTE — TELEPHONE ENCOUNTER
Spoke to Pt who stated her Post op dressing was removed at the hospital and since then by . Pt states her toe was\"butchered\" she c/o pain  and redness. Offered pt an appt for tomorrow w/ BARBARA [facility]brook pt refused. Pt stated she wanted to see the Dr. Adams. Pt was given an appt for 10/24/24 @ 9:45am. Pt requested a referral to go to [facility]. Pt was advised since she was post op Dr. Adams needed to see her first then we could do a referral. Pt advised also to leave a dressing on the wound until we see her. Pt voiced complete understanding. LH

## 2024-10-24 ENCOUNTER — TELEPHONE (OUTPATIENT)
Dept: ORTHOPEDIC SURGERY | Age: 54
End: 2024-10-24

## 2024-10-24 NOTE — TELEPHONE ENCOUNTER
Pt Cx post op appt today via phone per messages. Patient cancel no reschedule (going to [facility] for continued care) LH

## 2025-03-18 ENCOUNTER — APPOINTMENT (OUTPATIENT)
Dept: GENERAL RADIOLOGY | Age: 55
End: 2025-03-18
Payer: COMMERCIAL

## 2025-03-18 ENCOUNTER — HOSPITAL ENCOUNTER (EMERGENCY)
Age: 55
Discharge: HOME | End: 2025-03-18
Payer: COMMERCIAL

## 2025-03-18 VITALS
BODY MASS INDEX: 33.49 KG/M2 | HEART RATE: 96 BPM | OXYGEN SATURATION: 99 % | HEIGHT: 68 IN | DIASTOLIC BLOOD PRESSURE: 76 MMHG | WEIGHT: 221 LBS | RESPIRATION RATE: 18 BRPM | TEMPERATURE: 98.9 F | SYSTOLIC BLOOD PRESSURE: 118 MMHG

## 2025-03-18 DIAGNOSIS — E08.621 DIABETIC ULCER OF TOE ASSOCIATED WITH DIABETES MELLITUS DUE TO UNDERLYING CONDITION, LIMITED TO BREAKDOWN OF SKIN, UNSPECIFIED LATERALITY: Primary | ICD-10-CM

## 2025-03-18 DIAGNOSIS — L97.501 DIABETIC ULCER OF TOE ASSOCIATED WITH DIABETES MELLITUS DUE TO UNDERLYING CONDITION, LIMITED TO BREAKDOWN OF SKIN, UNSPECIFIED LATERALITY: Primary | ICD-10-CM

## 2025-03-18 LAB
ALBUMIN SERPL-MCNC: 4.4 G/DL (ref 3.5–5)
ALBUMIN/GLOB SERPL: 1.1 (ref 1–1.9)
ALP SERPL-CCNC: 118 U/L (ref 35–104)
ALT SERPL-CCNC: 16 U/L (ref 12–65)
ANION GAP SERPL CALC-SCNC: 11 MMOL/L (ref 7–16)
AST SERPL-CCNC: 14 U/L (ref 15–37)
BASOPHILS # BLD: 0.01 K/UL (ref 0–0.2)
BASOPHILS NFR BLD: 0.2 % (ref 0–2)
BILIRUB SERPL-MCNC: 0.2 MG/DL (ref 0–1.2)
BUN SERPL-MCNC: 21 MG/DL (ref 6–23)
CALCIUM SERPL-MCNC: 10.1 MG/DL (ref 8.8–10.2)
CHLORIDE SERPL-SCNC: 100 MMOL/L (ref 98–107)
CO2 SERPL-SCNC: 28 MMOL/L (ref 20–29)
CREAT SERPL-MCNC: 0.69 MG/DL (ref 0.8–1.3)
DIFFERENTIAL METHOD BLD: ABNORMAL
EOSINOPHIL # BLD: 0.13 K/UL (ref 0–0.8)
EOSINOPHIL NFR BLD: 2.2 % (ref 0.5–7.8)
ERYTHROCYTE [DISTWIDTH] IN BLOOD BY AUTOMATED COUNT: 15.5 % (ref 11.9–14.6)
GLOBULIN SER CALC-MCNC: 3.9 G/DL (ref 2.3–3.5)
GLUCOSE SERPL-MCNC: 101 MG/DL (ref 65–100)
HCT VFR BLD AUTO: 38.6 % (ref 35.8–46.3)
HGB BLD-MCNC: 12.3 G/DL (ref 11.7–15.4)
IMM GRANULOCYTES # BLD AUTO: 0.03 K/UL (ref 0–0.5)
IMM GRANULOCYTES NFR BLD AUTO: 0.5 % (ref 0–5)
LACTATE SERPL-SCNC: 1.7 MMOL/L (ref 0.5–2)
LYMPHOCYTES # BLD: 1.87 K/UL (ref 0.5–4.6)
LYMPHOCYTES NFR BLD: 32.3 % (ref 13–44)
MCH RBC QN AUTO: 25.4 PG (ref 26.1–32.9)
MCHC RBC AUTO-ENTMCNC: 31.9 G/DL (ref 31.4–35)
MCV RBC AUTO: 79.6 FL (ref 82–102)
MONOCYTES # BLD: 0.32 K/UL (ref 0.1–1.3)
MONOCYTES NFR BLD: 5.5 % (ref 4–12)
NEUTS SEG # BLD: 3.43 K/UL (ref 1.7–8.2)
NEUTS SEG NFR BLD: 59.3 % (ref 43–78)
NRBC # BLD: 0 K/UL (ref 0–0.2)
PLATELET # BLD AUTO: 242 K/UL (ref 150–450)
PMV BLD AUTO: 9.3 FL (ref 9.4–12.3)
POTASSIUM SERPL-SCNC: 4.2 MMOL/L (ref 3.5–5.1)
PROT SERPL-MCNC: 8.3 G/DL (ref 6.3–8.2)
RBC # BLD AUTO: 4.85 M/UL (ref 4.05–5.2)
SODIUM SERPL-SCNC: 139 MMOL/L (ref 133–143)
WBC # BLD AUTO: 5.8 K/UL (ref 4.3–11.1)

## 2025-03-18 PROCEDURE — 73660 X-RAY EXAM OF TOE(S): CPT

## 2025-03-18 PROCEDURE — 87040 BLOOD CULTURE FOR BACTERIA: CPT

## 2025-03-18 PROCEDURE — 85025 COMPLETE CBC W/AUTO DIFF WBC: CPT

## 2025-03-18 PROCEDURE — 99284 EMERGENCY DEPT VISIT MOD MDM: CPT

## 2025-03-18 PROCEDURE — 80053 COMPREHEN METABOLIC PANEL: CPT

## 2025-03-18 PROCEDURE — 83605 ASSAY OF LACTIC ACID: CPT

## 2025-03-18 RX ORDER — CEPHALEXIN 500 MG/1
500 CAPSULE ORAL 2 TIMES DAILY
Qty: 14 CAPSULE | Refills: 0 | Status: SHIPPED | OUTPATIENT
Start: 2025-03-18 | End: 2025-03-25

## 2025-03-18 RX ORDER — DOXYCYCLINE HYCLATE 100 MG
100 TABLET ORAL 2 TIMES DAILY
Qty: 14 TABLET | Refills: 0 | Status: SHIPPED | OUTPATIENT
Start: 2025-03-18 | End: 2025-03-25

## 2025-03-18 ASSESSMENT — PAIN - FUNCTIONAL ASSESSMENT
PAIN_FUNCTIONAL_ASSESSMENT: NONE - DENIES PAIN
PAIN_FUNCTIONAL_ASSESSMENT: NONE - DENIES PAIN

## 2025-03-18 ASSESSMENT — ENCOUNTER SYMPTOMS
EYES NEGATIVE: 1
GASTROINTESTINAL NEGATIVE: 1
RESPIRATORY NEGATIVE: 1
ALLERGIC/IMMUNOLOGIC NEGATIVE: 1

## 2025-03-18 NOTE — ED TRIAGE NOTES
Pt to ED with c/o possible infection to 2nd toe on left foot. Pt states hx of osteo with removal of toes. Pt states noticed the toe looking pink about 2 weeks ago. Pt states has been on doxycycline with no improvement. Pt states unable to follow up with pcp until next week. Pt alert ambulatory and in no acute distress at this time.

## 2025-03-18 NOTE — ED PROVIDER NOTES
Emergency Department Provider Note       PCP: Jones Andino III, MD   Age: 54 y.o.   Sex: female     DISPOSITION Decision To Discharge 03/18/2025 08:15:12 PM    ICD-10-CM    1. Diabetic ulcer of toe associated with diabetes mellitus due to underlying condition, limited to breakdown of skin, unspecified laterality ([facility])  E08.621     L97.501           Medical Decision Making     In summary,'s is a well-appearing nontoxic 54-year-old female, history noted, coming into the emergency department for complaints of left second toe infection has been ongoing for 2 weeks.  History of diabetes, history of neuropathy.  Patient recently had left big toe amputated a few months ago and had concerns for osteomyelitis given history.  We obtain workup here which showed a CBC without leukocytosis or associated left shift.  CMP is unremarkable.  Lactate normal.  X-ray shows moderate soft tissue swelling with no evidence of gas.  No bony erosions noted.  Given no obvious osteomyelitis and normal labs I feel she is appropriate for outpatient therapy.  I will send home with Keflex and doxycycline.  I have recommended patient follow back up with [facility] who performed the amputation.  I have instructed her to call her primary care doctor as well to be evaluated within the week and/or wound care in which she has follow-up in a couple of weeks.  This plan was discussed at length with patient which she is in agreement with.  Findings here today were discussed also in which she verbalizes understanding of.  Given history, very strict return precautions were discussed with patient which she verbalized understanding of.  ED Course as of 03/18/25 2020   Tue Mar 18, 2025   1717 CBC without evidence of leukocytosis or associated left shift.  H&H stable.  No thrombocytopenia. [TC]   1755 CMP grossly unremarkable although alk phos is mildly elevated.  Normal T. bili.  Lactate normal. [TC]   1855 X-ray per radiology read.  FINDINGS: Moderate

## 2025-03-19 NOTE — DISCHARGE INSTRUCTIONS
As we discussed, your workup does not reveal any emergency process here today.  Your lactic was normal.  Your white blood cell count is normal.  Your x-ray does not show any signs of osteomyelitis.  Given these reassuring findings I do think you would benefit from antibiotic administration.  Have sent in Keflex and doxycycline to the [facility] which is a 24-hour pharmacy.  He may get these and start these tomorrow.  I do want you to follow-up either with [facility], your primary care and/or wound care sooner than 2 weeks for recheck of symptoms.  As we discussed, please return to the emergency department for any new, worsening, concerning symptoms.

## 2025-03-22 ENCOUNTER — RESULTS FOLLOW-UP (OUTPATIENT)
Dept: EMERGENCY DEPT | Age: 55
End: 2025-03-22

## 2025-03-23 LAB
BACTERIA SPEC CULT: NORMAL
BACTERIA SPEC CULT: NORMAL
SERVICE CMNT-IMP: NORMAL
SERVICE CMNT-IMP: NORMAL

## (undated) DEVICE — SOLUTION IV STRL H2O 500 ML AQUALITE POUR BTL

## (undated) DEVICE — FOOT & ANKLE SOFT DR WOMACK: Brand: MEDLINE INDUSTRIES, INC.

## (undated) DEVICE — BLADE SURG NO15 C STL DISPOSABLE ST

## (undated) DEVICE — SOLUTION IRRIG 3000ML H2O STRL BAG

## (undated) DEVICE — GARMENT,MEDLINE,DVT,INT,CALF,MED, GEN2: Brand: MEDLINE

## (undated) DEVICE — SUTURE MCRYL SZ 3-0 L27IN ABSRB UD L19MM PS-2 3/8 CIR PRIM Y427H

## (undated) DEVICE — SUTURE ETHILON SZ 2-0 L30IN NONABSORBABLE BLK L36MM PSLX 3/8 1697H

## (undated) DEVICE — SOLUTION IRRIG 1000ML 0.9% SOD CHL USP POUR PLAS BTL

## (undated) DEVICE — LOWER EXTREMITY: Brand: MEDLINE INDUSTRIES, INC.

## (undated) DEVICE — GLOVE ORANGE PI 7 1/2   MSG9075

## (undated) DEVICE — BNDG,ELSTC,MATRIX,STRL,4"X5YD,LF,HOOK&LP: Brand: MEDLINE

## (undated) DEVICE — GENERAL LAPAROSCOPY: Brand: MEDLINE INDUSTRIES, INC.

## (undated) DEVICE — SPONGE GZ W4XL4IN COT 12 PLY TYP VII WVN C FLD DSGN STERILE

## (undated) DEVICE — (D)PREP SKN CHLRAPRP APPL 26ML -- CONVERT TO ITEM 371833

## (undated) DEVICE — NITINOL STONE RETRIEVAL DEVICE: Brand: DAKOTA

## (undated) DEVICE — BNDG,ELSTC,MATRIX,STRL,3"X5YD,LF,HOOK&LP: Brand: MEDLINE

## (undated) DEVICE — TROCAR: Brand: KII FIOS FIRST ENTRY

## (undated) DEVICE — TUBING INSUFFLATION SMK EVAC HI FLO SET PNEUMOCLEAR

## (undated) DEVICE — BANDAGE,GAUZE,CONFORMING,3"X75",STRL,LF: Brand: MEDLINE

## (undated) DEVICE — APPLICATOR MEDICATED 26 CC SOLUTION HI LT ORNG CHLORAPREP

## (undated) DEVICE — Device

## (undated) DEVICE — PADDING CAST W2INXL4YD NONSTERILE COT COHESIVE HND TEARABLE

## (undated) DEVICE — PADDING CAST W4INXL4YD ST COT COHESIVE HND TEARABLE SPEC

## (undated) DEVICE — GUIDEWIRE UROLOGICAL STR 3 CM 0.038 INX150 CM DUAL-FLEX

## (undated) DEVICE — TRAY,URINE METER,100% SILICONE,16FR10ML: Brand: MEDLINE

## (undated) DEVICE — AMD ANTIMICROBIAL GAUZE SPONGES,12 PLY USP TYPE VII, 0.2% POLYHEXAMETHYLENE BIGUANIDE HCI (PHMB): Brand: CURITY

## (undated) DEVICE — GLOVE SURG SZ 6.5 L11.2IN THK8.6MIL LT BRN LTX FREE

## (undated) DEVICE — GLOVE SURG SZ 8 L12IN FNGR THK79MIL GRN LTX FREE

## (undated) DEVICE — SOLUTION IRRIG 3000ML 0.9% SOD CHL USP UROMATIC PLAS CONT

## (undated) DEVICE — DRESSING PETRO W3XL18IN WHT GZ FOR N ADH WND CURAD

## (undated) DEVICE — DRSG GZ OIL EMUL CURAD 3X8 --

## (undated) DEVICE — SUT ETHLN 3-0 18IN PS1 BLK --

## (undated) DEVICE — PACK SURGICAL PROCEDURE KIT CYSTOSCOPY TOTE

## (undated) DEVICE — BANDAGE,GAUZE,CONFORMING,2"X75",STRL,LF: Brand: MEDLINE INDUSTRIES, INC.

## (undated) DEVICE — SOLUTION IRRIGATION STRL H2O 3000 ML 4/CA

## (undated) DEVICE — GOWN,REINF,POLY,ECL,PP SLV,XL: Brand: MEDLINE

## (undated) DEVICE — SUTURE SZ 0 27IN 5/8 CIR UR-6  TAPER PT VIOLET ABSRB VICRYL J603H

## (undated) DEVICE — BNDG ELAS ESMARK 4INX12FT LF -- STRL

## (undated) DEVICE — SOLUTION IV 1000ML 0.9% SOD CHL

## (undated) DEVICE — REM POLYHESIVE ADULT PATIENT RETURN ELECTRODE: Brand: VALLEYLAB

## (undated) DEVICE — FLEXIVA  PULSE  AND  FLEXIVA  PULSE  TRACTIP  LASER  FIBERS  ARE  HIGH  POWER  SINGLE-USE FIBER: Brand: FLEXIVA PULSE

## (undated) DEVICE — LAPSAC SURGICAL TISSUE POUCH: Brand: LAPSAC

## (undated) DEVICE — Z INACTIVE USE 2854097 SPONGE GZ W4XL4IN COT 12 PLY TYP VII WVN C FLD DSGN

## (undated) DEVICE — PAD,ABDOMINAL,5"X9",ST,LF,25/BX: Brand: MEDLINE INDUSTRIES, INC.

## (undated) DEVICE — TROCAR: Brand: KII® SLEEVE

## (undated) DEVICE — SUREFIT, DUAL DISPERSIVE ELECTRODE, CONTACT QUALITY MONITOR: Brand: SUREFIT

## (undated) DEVICE — BUTTON SWITCH PENCIL BLADE ELECTRODE, HOLSTER: Brand: EDGE

## (undated) DEVICE — ACCESS PLATFORM FOR MINIMALLY INVASIVE SURGERY.: Brand: GELPORT® LAPAROSCOPIC  SYSTEM

## (undated) DEVICE — TRAY CATHETER 16FR F INCLUDE LUB URIN M STATLOK STBL DEV SURSTP

## (undated) DEVICE — SOLUTION ANTIFOG VIS SYS CLEARIFY LAPSCP

## (undated) DEVICE — SUTURE PDS II SZ 0 L60IN ABSRB VLT L65MM TP-1 1/2 CIR Z991G

## (undated) DEVICE — ZIMMER® STERILE DISPOSABLE TOURNIQUET CUFF WITH PLC, DUAL PORT, SINGLE BLADDER, 18 IN. (46 CM)

## (undated) DEVICE — GLOVE SURG SZ 65 L12IN FNGR THK79MIL GRN LTX FREE

## (undated) DEVICE — 3M™ COBAN™ NL STERILE NON-LATEX SELF-ADHERENT WRAP, 2082S, 2 IN X 5 YD (5 CM X 4,5 M), 36 ROLLS/CASE: Brand: 3M™ COBAN™